# Patient Record
Sex: FEMALE | Race: WHITE | NOT HISPANIC OR LATINO | Employment: OTHER | URBAN - METROPOLITAN AREA
[De-identification: names, ages, dates, MRNs, and addresses within clinical notes are randomized per-mention and may not be internally consistent; named-entity substitution may affect disease eponyms.]

---

## 2017-02-03 PROBLEM — N39.3 STRESS INCONTINENCE OF URINE: Status: ACTIVE | Noted: 2017-02-03

## 2017-02-03 PROBLEM — N81.4 UTERINE PROLAPSE: Status: ACTIVE | Noted: 2017-02-03

## 2017-02-03 PROBLEM — N39.41 URGENCY INCONTINENCE: Status: ACTIVE | Noted: 2017-02-03

## 2019-02-21 ENCOUNTER — OFFICE VISIT (OUTPATIENT)
Dept: GASTROENTEROLOGY | Facility: CLINIC | Age: 64
End: 2019-02-21

## 2019-02-21 VITALS — DIASTOLIC BLOOD PRESSURE: 66 MMHG | SYSTOLIC BLOOD PRESSURE: 147 MMHG | HEART RATE: 72 BPM | WEIGHT: 122 LBS

## 2019-02-21 DIAGNOSIS — R19.7 DIARRHEA, UNSPECIFIED TYPE: Primary | ICD-10-CM

## 2019-02-21 PROCEDURE — 99244 OFF/OP CNSLTJ NEW/EST MOD 40: CPT | Performed by: INTERNAL MEDICINE

## 2019-02-21 RX ORDER — PROCHLORPERAZINE MALEATE 10 MG
TABLET ORAL EVERY 8 HOURS PRN
Refills: 0 | COMMUNITY
Start: 2018-12-31 | End: 2020-01-22 | Stop reason: HOSPADM

## 2019-02-21 RX ORDER — CARVEDILOL 25 MG/1
25 TABLET ORAL 2 TIMES DAILY WITH MEALS
Refills: 0 | COMMUNITY
Start: 2018-12-15

## 2019-02-21 RX ORDER — VENLAFAXINE HYDROCHLORIDE 150 MG/1
150 CAPSULE, EXTENDED RELEASE ORAL DAILY
Refills: 0 | COMMUNITY
Start: 2019-01-30 | End: 2020-01-22 | Stop reason: HOSPADM

## 2019-02-21 RX ORDER — LOSARTAN POTASSIUM 100 MG/1
100 TABLET ORAL DAILY
Refills: 0 | COMMUNITY
Start: 2019-01-29

## 2019-02-21 RX ORDER — AMLODIPINE BESYLATE 5 MG/1
TABLET ORAL
Refills: 0 | COMMUNITY
Start: 2019-02-09 | End: 2020-01-22 | Stop reason: HOSPADM

## 2019-02-21 RX ORDER — TRAZODONE HYDROCHLORIDE 100 MG/1
100 TABLET ORAL NIGHTLY
Refills: 1 | COMMUNITY
Start: 2019-01-30

## 2019-02-21 RX ORDER — SERTRALINE HYDROCHLORIDE 100 MG/1
50 TABLET, FILM COATED ORAL 2 TIMES DAILY
Refills: 0 | COMMUNITY
Start: 2019-02-09 | End: 2020-01-22 | Stop reason: HOSPADM

## 2019-02-21 RX ORDER — COLESEVELAM 180 1/1
625 TABLET ORAL 2 TIMES DAILY WITH MEALS
Refills: 0 | COMMUNITY
Start: 2019-01-29 | End: 2020-01-22 | Stop reason: HOSPADM

## 2019-02-21 RX ORDER — FENOFIBRATE 160 MG/1
160 TABLET ORAL DAILY
Refills: 0 | COMMUNITY
Start: 2019-01-29 | End: 2020-01-22 | Stop reason: HOSPADM

## 2019-02-21 RX ORDER — CYANOCOBALAMIN 1000 UG/ML
INJECTION, SOLUTION INTRAMUSCULAR; SUBCUTANEOUS
Refills: 0 | COMMUNITY
Start: 2019-02-14

## 2019-02-21 RX ORDER — DONEPEZIL HYDROCHLORIDE 5 MG/1
5 TABLET, FILM COATED ORAL NIGHTLY
Refills: 1 | COMMUNITY
Start: 2019-01-30

## 2019-02-21 RX ORDER — POTASSIUM CHLORIDE 20 MEQ/1
20 TABLET, EXTENDED RELEASE ORAL DAILY
Refills: 0 | COMMUNITY
Start: 2019-02-20 | End: 2020-01-22 | Stop reason: HOSPADM

## 2019-02-21 RX ORDER — TOPIRAMATE 50 MG/1
50 TABLET, FILM COATED ORAL DAILY
Refills: 0 | COMMUNITY
Start: 2019-01-29 | End: 2020-01-22 | Stop reason: HOSPADM

## 2019-02-22 PROBLEM — R19.7 DIARRHEA: Status: ACTIVE | Noted: 2019-02-22

## 2019-03-22 ENCOUNTER — OUTSIDE FACILITY SERVICE (OUTPATIENT)
Dept: GASTROENTEROLOGY | Facility: CLINIC | Age: 64
End: 2019-03-22

## 2019-03-22 ENCOUNTER — LAB REQUISITION (OUTPATIENT)
Dept: LAB | Facility: HOSPITAL | Age: 64
End: 2019-03-22

## 2019-03-22 DIAGNOSIS — R19.7 DIARRHEA: ICD-10-CM

## 2019-03-22 PROCEDURE — 45385 COLONOSCOPY W/LESION REMOVAL: CPT | Performed by: INTERNAL MEDICINE

## 2019-03-22 PROCEDURE — 45380 COLONOSCOPY AND BIOPSY: CPT | Performed by: INTERNAL MEDICINE

## 2019-03-22 PROCEDURE — 88305 TISSUE EXAM BY PATHOLOGIST: CPT | Performed by: INTERNAL MEDICINE

## 2019-03-25 LAB
CYTO UR: NORMAL
LAB AP CASE REPORT: NORMAL
LAB AP CLINICAL INFORMATION: NORMAL
PATH REPORT.FINAL DX SPEC: NORMAL
PATH REPORT.GROSS SPEC: NORMAL

## 2019-04-04 ENCOUNTER — OFFICE VISIT (OUTPATIENT)
Dept: GASTROENTEROLOGY | Facility: CLINIC | Age: 64
End: 2019-04-04

## 2019-04-04 ENCOUNTER — APPOINTMENT (OUTPATIENT)
Dept: LAB | Facility: HOSPITAL | Age: 64
End: 2019-04-04

## 2019-04-04 VITALS
WEIGHT: 126 LBS | DIASTOLIC BLOOD PRESSURE: 71 MMHG | HEART RATE: 58 BPM | SYSTOLIC BLOOD PRESSURE: 147 MMHG | OXYGEN SATURATION: 98 % | RESPIRATION RATE: 20 BRPM

## 2019-04-04 DIAGNOSIS — R10.9 ABDOMINAL PAIN, UNSPECIFIED ABDOMINAL LOCATION: ICD-10-CM

## 2019-04-04 DIAGNOSIS — R19.7 DIARRHEA, UNSPECIFIED TYPE: Primary | ICD-10-CM

## 2019-04-04 PROCEDURE — 36415 COLL VENOUS BLD VENIPUNCTURE: CPT | Performed by: INTERNAL MEDICINE

## 2019-04-04 PROCEDURE — 86316 IMMUNOASSAY TUMOR OTHER: CPT | Performed by: INTERNAL MEDICINE

## 2019-04-04 PROCEDURE — 99214 OFFICE O/P EST MOD 30 MIN: CPT | Performed by: INTERNAL MEDICINE

## 2019-04-04 RX ORDER — ALOSETRON HYDROCHLORIDE 0.5 MG/1
0.5 TABLET, FILM COATED ORAL 2 TIMES DAILY
Qty: 60 TABLET | Refills: 1 | Status: SHIPPED | OUTPATIENT
Start: 2019-04-04 | End: 2019-05-02

## 2019-04-05 NOTE — PROGRESS NOTES
PCP:  Philomena Walters MD     No referring provider defined for this encounter.    Chief Complaint   Patient presents with   • Follow-up     colonoscopy        HPI   The patient is known to me.  She had a tubular adenoma there was a small benign-appearing submucosal lesion distal to the valve.  She has had a recent CAT scan..  Apparently, the CAT scan was normal.  She would like to try something different for her area.    Allergies   Allergen Reactions   • Codeine           Current Outpatient Medications:   •  amLODIPine (NORVASC) 5 MG tablet, , Disp: , Rfl: 0  •  carvedilol (COREG) 25 MG tablet, , Disp: , Rfl: 0  •  colesevelam (WELCHOL) 625 MG tablet, , Disp: , Rfl: 0  •  cyanocobalamin 1000 MCG/ML injection, inject contents of 1 vial intramuscularly MONTHLY, Disp: , Rfl: 0  •  donepezil (ARICEPT) 5 MG tablet, , Disp: , Rfl: 1  •  fenofibrate 160 MG tablet, , Disp: , Rfl: 0  •  losartan (COZAAR) 100 MG tablet, , Disp: , Rfl: 0  •  metFORMIN (GLUCOPHAGE) 1000 MG tablet, , Disp: , Rfl: 0  •  potassium chloride (K-DUR,KLOR-CON) 20 MEQ CR tablet, , Disp: , Rfl: 0  •  prochlorperazine (COMPAZINE) 10 MG tablet, Take 10 mg by mouth 3 (Three) Times a Day As Needed., Disp: , Rfl: 0  •  sertraline (ZOLOFT) 100 MG tablet, take 1/2 tablet by mouth every morning then 1 tablet every evening, Disp: , Rfl: 0  •  topiramate (TOPAMAX) 50 MG tablet, , Disp: , Rfl: 0  •  traZODone (DESYREL) 100 MG tablet, , Disp: , Rfl: 1  •  venlafaxine XR (EFFEXOR-XR) 150 MG 24 hr capsule, , Disp: , Rfl: 0  •  alosetron (LOTRONEX) 0.5 MG tablet, Take 1 tablet by mouth 2 (Two) Times a Day., Disp: 60 tablet, Rfl: 1  •  Pancrelipase, Lip-Prot-Amyl, (CREON) 79260 units capsule delayed-release particles, Take 2 by mouth 30 minutes before meals and 1 before a snack, Disp: 630 capsule, Rfl: 1     Past Medical History:   Diagnosis Date   • COPD (chronic obstructive pulmonary disease) (CMS/HCC)    • Diabetes mellitus (CMS/HCC)    • History of ear injury  1973    Injury of the right eardrum.This has resulted in the headaches   • Hypertension        Past Surgical History:   Procedure Laterality Date   • BREAST CYST EXCISION     • SINUS SURGERY     • TONSILLECTOMY     • TUBAL ABDOMINAL LIGATION          Social History     Socioeconomic History   • Marital status:      Spouse name: Not on file   • Number of children: Not on file   • Years of education: Not on file   • Highest education level: Not on file   Tobacco Use   • Smoking status: Current Every Day Smoker     Packs/day: 1.00     Types: Cigarettes   • Smokeless tobacco: Never Used   Substance and Sexual Activity   • Alcohol use: No        Family History   Problem Relation Age of Onset   • Aortic aneurysm Mother    • Lung disease Father    • Parkinsonism Father    • Heart disease Other         Review of Systems     Vitals:    04/04/19 1509   BP: 147/71   Pulse: 58   Resp: 20   SpO2: 98%        Physical Exam   General Appearance: Alert, in no acute distress   Head: Normocephalic, without obvious abnormality, atraumatic   Eyes: Lids and lashes normal, conjunctivae and sclerae normal, no icterus, no pallor, corneas clear, PERRLA   Ears: Ears appear intact with no abnormalities noted   Throat: No oral lesions, no thrush, oral mucosa moist   Neck: No adenopathy, supple, trachea midline, no thyromegaly, no JVD   Lungs: Clear to auscultation,respirations regular, even and unlabored Heart: Regular rhythm and normal rate, normal S1 and S2, no murmur, no gallop, no rub, no click   Chest Wall: Symmetrical respiratory expansion   Abdomen: Normal bowel sounds, no masses, no organomegaly, soft non-tender, non-distended, no guarding, no rebound tenderness   Extremities: Moves all extremities well, no edema, no cyanosis, no redness   Skin: No bleeding, bruising or rash   Neurologic: Cranial nerves 2 - 12 grossly intact, no focal deficits       Glo was seen today for follow-up.    Diagnoses and all orders for this  visit:    Diarrhea, unspecified type    Impressions and plan #1 diarrhea: I am going to check a chromogranin A level we will try her on Lotronex 0.5 mg twice a day.  If she gets constipated she is to stop it immediately and call us.  We talked about the issues that have developed in the past with patients getting ischemic colitis.  We will be starting a low dose.  We will continue on that for a month.  If she does not have any significant improvement we may need to discontinue it.  She feels the Creon is helping to some degree.  Interestingly, it seems to be helping with her abdominal discomfort.  We will continue the Creon and call in a prescription.  We will see her back in follow-up.  She will call us with any worsening.  We may need to reevaluate her colon in 1 year a year to be sure that the small benign-appearing movable submucosal lesion is no larger.    Adan Wright MD

## 2019-04-08 LAB — CGA SERPL-SCNC: 27 NMOL/L (ref 0–5)

## 2019-05-02 RX ORDER — ALOSETRON HYDROCHLORIDE 0.5 MG/1
1 TABLET, FILM COATED ORAL 2 TIMES DAILY
Qty: 60 TABLET | Refills: 5 | Status: ON HOLD | OUTPATIENT
Start: 2019-05-02 | End: 2020-01-06

## 2020-01-06 ENCOUNTER — APPOINTMENT (OUTPATIENT)
Dept: CT IMAGING | Facility: HOSPITAL | Age: 65
End: 2020-01-06

## 2020-01-06 ENCOUNTER — HOSPITAL ENCOUNTER (INPATIENT)
Facility: HOSPITAL | Age: 65
LOS: 16 days | Discharge: NURSING, REHAB OR LONG-TERM CARE | End: 2020-01-22
Attending: INTERNAL MEDICINE | Admitting: INTERNAL MEDICINE

## 2020-01-06 ENCOUNTER — APPOINTMENT (OUTPATIENT)
Dept: GENERAL RADIOLOGY | Facility: HOSPITAL | Age: 65
End: 2020-01-06

## 2020-01-06 ENCOUNTER — APPOINTMENT (OUTPATIENT)
Dept: NEUROLOGY | Facility: HOSPITAL | Age: 65
End: 2020-01-06

## 2020-01-06 DIAGNOSIS — Z74.09 IMPAIRED MOBILITY AND ADLS: ICD-10-CM

## 2020-01-06 DIAGNOSIS — Z78.9 IMPAIRED MOBILITY AND ADLS: ICD-10-CM

## 2020-01-06 DIAGNOSIS — G93.40 ENCEPHALOPATHY ACUTE: Primary | ICD-10-CM

## 2020-01-06 DIAGNOSIS — R13.10 DYSPHAGIA, UNSPECIFIED TYPE: ICD-10-CM

## 2020-01-06 DIAGNOSIS — R41.841 COGNITIVE COMMUNICATION DEFICIT: ICD-10-CM

## 2020-01-06 PROBLEM — G40.901 STATUS EPILEPTICUS (HCC): Status: ACTIVE | Noted: 2020-01-06

## 2020-01-06 PROBLEM — K86.89 PANCREATIC INSUFFICIENCY: Chronic | Status: ACTIVE | Noted: 2020-01-06

## 2020-01-06 PROBLEM — I63.9 STROKE (CEREBRUM) (HCC): Status: ACTIVE | Noted: 2020-01-06

## 2020-01-06 PROBLEM — K86.89 PANCREATIC INSUFFICIENCY: Status: ACTIVE | Noted: 2020-01-06

## 2020-01-06 PROBLEM — F17.200 SMOKER: Status: ACTIVE | Noted: 2020-01-06

## 2020-01-06 PROBLEM — K52.9 CHRONIC DIARRHEA: Status: ACTIVE | Noted: 2020-01-06

## 2020-01-06 PROBLEM — E11.9 TYPE 2 DIABETES MELLITUS (HCC): Chronic | Status: ACTIVE | Noted: 2020-01-06

## 2020-01-06 PROBLEM — I10 HYPERTENSION: Status: ACTIVE | Noted: 2020-01-06

## 2020-01-06 PROBLEM — E11.9 TYPE 2 DIABETES MELLITUS (HCC): Status: ACTIVE | Noted: 2020-01-06

## 2020-01-06 PROBLEM — E11.40 DIABETIC NEUROPATHY (HCC): Status: ACTIVE | Noted: 2020-01-06

## 2020-01-06 PROBLEM — M48.061 LUMBAR STENOSIS: Status: ACTIVE | Noted: 2020-01-06

## 2020-01-06 PROBLEM — F32.A DEPRESSION: Status: ACTIVE | Noted: 2020-01-06

## 2020-01-06 PROBLEM — J96.00 ACUTE RESPIRATORY FAILURE (HCC): Status: ACTIVE | Noted: 2020-01-06

## 2020-01-06 PROBLEM — K52.9 CHRONIC DIARRHEA: Chronic | Status: ACTIVE | Noted: 2020-01-06

## 2020-01-06 PROBLEM — I10 HYPERTENSION: Chronic | Status: ACTIVE | Noted: 2020-01-06

## 2020-01-06 PROBLEM — F32.A DEPRESSION: Chronic | Status: ACTIVE | Noted: 2020-01-06

## 2020-01-06 PROBLEM — F03.90 DEMENTIA (HCC): Status: ACTIVE | Noted: 2020-01-06

## 2020-01-06 PROBLEM — F03.90 DEMENTIA (HCC): Chronic | Status: ACTIVE | Noted: 2020-01-06

## 2020-01-06 PROBLEM — M48.061 LUMBAR STENOSIS: Chronic | Status: ACTIVE | Noted: 2020-01-06

## 2020-01-06 PROBLEM — E11.40 DIABETIC NEUROPATHY (HCC): Chronic | Status: ACTIVE | Noted: 2020-01-06

## 2020-01-06 LAB
ALBUMIN SERPL-MCNC: 4.1 G/DL (ref 3.5–5.2)
ALBUMIN/GLOB SERPL: 1.5 G/DL
ALP SERPL-CCNC: 46 U/L (ref 39–117)
ALT SERPL W P-5'-P-CCNC: 18 U/L (ref 1–33)
ANION GAP SERPL CALCULATED.3IONS-SCNC: 15 MMOL/L (ref 5–15)
APTT PPP: 27.7 SECONDS (ref 24–37)
ARTERIAL PATENCY WRIST A: ABNORMAL
AST SERPL-CCNC: 40 U/L (ref 1–32)
ATMOSPHERIC PRESS: ABNORMAL MM[HG]
BASE EXCESS BLDA CALC-SCNC: -3.9 MMOL/L (ref 0–2)
BASOPHILS # BLD AUTO: 0.03 10*3/MM3 (ref 0–0.2)
BASOPHILS NFR BLD AUTO: 0.2 % (ref 0–1.5)
BDY SITE: ABNORMAL
BILIRUB SERPL-MCNC: 0.3 MG/DL (ref 0.2–1.2)
BODY TEMPERATURE: 37 C
BUN BLD-MCNC: 19 MG/DL (ref 8–23)
BUN/CREAT SERPL: 25.3 (ref 7–25)
CALCIUM SPEC-SCNC: 9.2 MG/DL (ref 8.6–10.5)
CHLORIDE SERPL-SCNC: 103 MMOL/L (ref 98–107)
CO2 BLDA-SCNC: 21.1 MMOL/L (ref 22–33)
CO2 SERPL-SCNC: 21 MMOL/L (ref 22–29)
COHGB MFR BLD: 1.5 % (ref 0–2)
CREAT BLD-MCNC: 0.75 MG/DL (ref 0.57–1)
D-LACTATE SERPL-SCNC: 1.3 MMOL/L (ref 0.5–2)
DEPRECATED RDW RBC AUTO: 43.9 FL (ref 37–54)
EOSINOPHIL # BLD AUTO: 0.01 10*3/MM3 (ref 0–0.4)
EOSINOPHIL NFR BLD AUTO: 0.1 % (ref 0.3–6.2)
EPAP: 0
ERYTHROCYTE [DISTWIDTH] IN BLOOD BY AUTOMATED COUNT: 14.1 % (ref 12.3–15.4)
GFR SERPL CREATININE-BSD FRML MDRD: 78 ML/MIN/1.73
GLOBULIN UR ELPH-MCNC: 2.7 GM/DL
GLUCOSE BLD-MCNC: 147 MG/DL (ref 65–99)
GLUCOSE BLDC GLUCOMTR-MCNC: 145 MG/DL (ref 70–130)
HBA1C MFR BLD: 5.6 % (ref 4.8–5.6)
HCO3 BLDA-SCNC: 20.1 MMOL/L (ref 20–26)
HCT VFR BLD AUTO: 36.9 % (ref 34–46.6)
HCT VFR BLD CALC: 34.7 %
HGB BLD-MCNC: 11.2 G/DL (ref 12–15.9)
HGB BLDA-MCNC: 11.3 G/DL (ref 14–18)
HOROWITZ INDEX BLD+IHG-RTO: 60 %
IMM GRANULOCYTES # BLD AUTO: 0.1 10*3/MM3 (ref 0–0.05)
IMM GRANULOCYTES NFR BLD AUTO: 0.6 % (ref 0–0.5)
INR PPP: 1.22 (ref 0.85–1.16)
IPAP: 0
LIPASE SERPL-CCNC: 695 U/L (ref 13–60)
LYMPHOCYTES # BLD AUTO: 1.47 10*3/MM3 (ref 0.7–3.1)
LYMPHOCYTES NFR BLD AUTO: 8.3 % (ref 19.6–45.3)
MAGNESIUM SERPL-MCNC: 1.4 MG/DL (ref 1.6–2.4)
MCH RBC QN AUTO: 26.4 PG (ref 26.6–33)
MCHC RBC AUTO-ENTMCNC: 30.4 G/DL (ref 31.5–35.7)
MCV RBC AUTO: 87 FL (ref 79–97)
METHGB BLD QL: 0.5 % (ref 0–1.5)
MODALITY: ABNORMAL
MONOCYTES # BLD AUTO: 1.14 10*3/MM3 (ref 0.1–0.9)
MONOCYTES NFR BLD AUTO: 6.4 % (ref 5–12)
NEUTROPHILS # BLD AUTO: 14.94 10*3/MM3 (ref 1.7–7)
NEUTROPHILS NFR BLD AUTO: 84.4 % (ref 42.7–76)
NOTE: ABNORMAL
NRBC BLD AUTO-RTO: 0 /100 WBC (ref 0–0.2)
NT-PROBNP SERPL-MCNC: 2061 PG/ML (ref 5–900)
OXYHGB MFR BLDV: 98.2 % (ref 94–99)
PAW @ PEAK INSP FLOW SETTING VENT: 0 CMH2O
PCO2 BLDA: 32.5 MM HG (ref 35–45)
PCO2 TEMP ADJ BLD: 32.5 MM HG (ref 35–45)
PH BLDA: 7.4 PH UNITS (ref 7.35–7.45)
PH, TEMP CORRECTED: 7.4 PH UNITS
PHOSPHATE SERPL-MCNC: 4.2 MG/DL (ref 2.5–4.5)
PLATELET # BLD AUTO: 163 10*3/MM3 (ref 140–450)
PMV BLD AUTO: 12.1 FL (ref 6–12)
PO2 BLDA: 151 MM HG (ref 83–108)
PO2 TEMP ADJ BLD: 151 MM HG (ref 83–108)
POTASSIUM BLD-SCNC: 3.4 MMOL/L (ref 3.5–5.2)
PROT SERPL-MCNC: 6.8 G/DL (ref 6–8.5)
PROTHROMBIN TIME: 14.8 SECONDS (ref 11.2–14.3)
RBC # BLD AUTO: 4.24 10*6/MM3 (ref 3.77–5.28)
SODIUM BLD-SCNC: 139 MMOL/L (ref 136–145)
TOTAL RATE: 0 BREATHS/MINUTE
TROPONIN T SERPL-MCNC: <0.01 NG/ML (ref 0–0.03)
TSH SERPL DL<=0.05 MIU/L-ACNC: 2.19 UIU/ML (ref 0.27–4.2)
WBC NRBC COR # BLD: 17.69 10*3/MM3 (ref 3.4–10.8)

## 2020-01-06 PROCEDURE — 83605 ASSAY OF LACTIC ACID: CPT | Performed by: INTERNAL MEDICINE

## 2020-01-06 PROCEDURE — 82962 GLUCOSE BLOOD TEST: CPT

## 2020-01-06 PROCEDURE — 25010000002 PROPOFOL 10 MG/ML EMULSION: Performed by: NURSE PRACTITIONER

## 2020-01-06 PROCEDURE — 71045 X-RAY EXAM CHEST 1 VIEW: CPT

## 2020-01-06 PROCEDURE — 0042T HC CT CEREBRAL PERFUSION W/WO CONTRAST: CPT

## 2020-01-06 PROCEDURE — 94799 UNLISTED PULMONARY SVC/PX: CPT

## 2020-01-06 PROCEDURE — 70450 CT HEAD/BRAIN W/O DYE: CPT

## 2020-01-06 PROCEDURE — 82805 BLOOD GASES W/O2 SATURATION: CPT

## 2020-01-06 PROCEDURE — 84443 ASSAY THYROID STIM HORMONE: CPT | Performed by: NURSE PRACTITIONER

## 2020-01-06 PROCEDURE — 70498 CT ANGIOGRAPHY NECK: CPT

## 2020-01-06 PROCEDURE — 85610 PROTHROMBIN TIME: CPT | Performed by: INTERNAL MEDICINE

## 2020-01-06 PROCEDURE — 83880 ASSAY OF NATRIURETIC PEPTIDE: CPT | Performed by: INTERNAL MEDICINE

## 2020-01-06 PROCEDURE — 80053 COMPREHEN METABOLIC PANEL: CPT | Performed by: INTERNAL MEDICINE

## 2020-01-06 PROCEDURE — 85730 THROMBOPLASTIN TIME PARTIAL: CPT | Performed by: INTERNAL MEDICINE

## 2020-01-06 PROCEDURE — 83735 ASSAY OF MAGNESIUM: CPT | Performed by: INTERNAL MEDICINE

## 2020-01-06 PROCEDURE — 99291 CRITICAL CARE FIRST HOUR: CPT | Performed by: INTERNAL MEDICINE

## 2020-01-06 PROCEDURE — 84484 ASSAY OF TROPONIN QUANT: CPT | Performed by: INTERNAL MEDICINE

## 2020-01-06 PROCEDURE — 25010000002 HEPARIN (PORCINE) PER 1000 UNITS: Performed by: NURSE PRACTITIONER

## 2020-01-06 PROCEDURE — 84100 ASSAY OF PHOSPHORUS: CPT | Performed by: INTERNAL MEDICINE

## 2020-01-06 PROCEDURE — 94640 AIRWAY INHALATION TREATMENT: CPT

## 2020-01-06 PROCEDURE — 99255 IP/OBS CONSLTJ NEW/EST HI 80: CPT | Performed by: PSYCHIATRY & NEUROLOGY

## 2020-01-06 PROCEDURE — 95819 EEG AWAKE AND ASLEEP: CPT

## 2020-01-06 PROCEDURE — 0 IOPAMIDOL PER 1 ML: Performed by: INTERNAL MEDICINE

## 2020-01-06 PROCEDURE — 94002 VENT MGMT INPAT INIT DAY: CPT

## 2020-01-06 PROCEDURE — 83690 ASSAY OF LIPASE: CPT | Performed by: INTERNAL MEDICINE

## 2020-01-06 PROCEDURE — 36600 WITHDRAWAL OF ARTERIAL BLOOD: CPT

## 2020-01-06 PROCEDURE — 85025 COMPLETE CBC W/AUTO DIFF WBC: CPT | Performed by: INTERNAL MEDICINE

## 2020-01-06 PROCEDURE — 83036 HEMOGLOBIN GLYCOSYLATED A1C: CPT | Performed by: INTERNAL MEDICINE

## 2020-01-06 PROCEDURE — 70496 CT ANGIOGRAPHY HEAD: CPT

## 2020-01-06 RX ORDER — HEPARIN SODIUM 5000 [USP'U]/ML
5000 INJECTION, SOLUTION INTRAVENOUS; SUBCUTANEOUS EVERY 8 HOURS SCHEDULED
Status: DISCONTINUED | OUTPATIENT
Start: 2020-01-06 | End: 2020-01-08

## 2020-01-06 RX ORDER — SODIUM CHLORIDE 9 MG/ML
50 INJECTION, SOLUTION INTRAVENOUS CONTINUOUS
Status: DISCONTINUED | OUTPATIENT
Start: 2020-01-06 | End: 2020-01-10

## 2020-01-06 RX ORDER — POTASSIUM CHLORIDE 1.5 G/1.77G
40 POWDER, FOR SOLUTION ORAL ONCE
Status: COMPLETED | OUTPATIENT
Start: 2020-01-06 | End: 2020-01-06

## 2020-01-06 RX ORDER — IPRATROPIUM BROMIDE AND ALBUTEROL SULFATE 2.5; .5 MG/3ML; MG/3ML
3 SOLUTION RESPIRATORY (INHALATION)
Status: DISCONTINUED | OUTPATIENT
Start: 2020-01-06 | End: 2020-01-20

## 2020-01-06 RX ORDER — FAMOTIDINE 10 MG/ML
20 INJECTION, SOLUTION INTRAVENOUS DAILY
Status: DISCONTINUED | OUTPATIENT
Start: 2020-01-06 | End: 2020-01-09

## 2020-01-06 RX ORDER — ATORVASTATIN CALCIUM 40 MG/1
80 TABLET, FILM COATED ORAL NIGHTLY
Status: DISCONTINUED | OUTPATIENT
Start: 2020-01-06 | End: 2020-01-22 | Stop reason: HOSPADM

## 2020-01-06 RX ORDER — DEXTROSE MONOHYDRATE 25 G/50ML
25 INJECTION, SOLUTION INTRAVENOUS
Status: DISCONTINUED | OUTPATIENT
Start: 2020-01-06 | End: 2020-01-07

## 2020-01-06 RX ORDER — ASPIRIN 300 MG/1
300 SUPPOSITORY RECTAL DAILY
Status: DISCONTINUED | OUTPATIENT
Start: 2020-01-06 | End: 2020-01-22 | Stop reason: HOSPADM

## 2020-01-06 RX ORDER — MAGNESIUM SULFATE HEPTAHYDRATE 40 MG/ML
4 INJECTION, SOLUTION INTRAVENOUS ONCE
Status: COMPLETED | OUTPATIENT
Start: 2020-01-06 | End: 2020-01-06

## 2020-01-06 RX ORDER — CHLORHEXIDINE GLUCONATE 0.12 MG/ML
15 RINSE ORAL EVERY 12 HOURS SCHEDULED
Status: DISCONTINUED | OUTPATIENT
Start: 2020-01-06 | End: 2020-01-08

## 2020-01-06 RX ORDER — DICYCLOMINE HCL 20 MG
20 TABLET ORAL 2 TIMES DAILY
COMMUNITY
End: 2020-01-22 | Stop reason: HOSPADM

## 2020-01-06 RX ORDER — NICOTINE POLACRILEX 4 MG
15 LOZENGE BUCCAL
Status: DISCONTINUED | OUTPATIENT
Start: 2020-01-06 | End: 2020-01-07

## 2020-01-06 RX ORDER — ASPIRIN 81 MG/1
81 TABLET, CHEWABLE ORAL DAILY
Status: DISCONTINUED | OUTPATIENT
Start: 2020-01-06 | End: 2020-01-22 | Stop reason: HOSPADM

## 2020-01-06 RX ADMIN — CHLORHEXIDINE GLUCONATE 0.12% ORAL RINSE 15 ML: 1.2 LIQUID ORAL at 20:04

## 2020-01-06 RX ADMIN — FAMOTIDINE 20 MG: 10 INJECTION, SOLUTION INTRAVENOUS at 18:30

## 2020-01-06 RX ADMIN — HEPARIN SODIUM 5000 UNITS: 5000 INJECTION, SOLUTION INTRAVENOUS; SUBCUTANEOUS at 22:25

## 2020-01-06 RX ADMIN — SODIUM CHLORIDE 50 ML/HR: 9 INJECTION, SOLUTION INTRAVENOUS at 18:31

## 2020-01-06 RX ADMIN — IOPAMIDOL 115 ML: 755 INJECTION, SOLUTION INTRAVENOUS at 17:30

## 2020-01-06 RX ADMIN — ATORVASTATIN CALCIUM 80 MG: 40 TABLET, FILM COATED ORAL at 22:25

## 2020-01-06 RX ADMIN — ASPIRIN 300 MG: 300 SUPPOSITORY RECTAL at 18:30

## 2020-01-06 RX ADMIN — MAGNESIUM SULFATE 4 G: 4 INJECTION INTRAVENOUS at 22:25

## 2020-01-06 RX ADMIN — POTASSIUM CHLORIDE 40 MEQ: 1.5 POWDER, FOR SOLUTION ORAL at 22:25

## 2020-01-06 RX ADMIN — IPRATROPIUM BROMIDE AND ALBUTEROL SULFATE 3 ML: 2.5; .5 SOLUTION RESPIRATORY (INHALATION) at 20:37

## 2020-01-06 RX ADMIN — SODIUM CHLORIDE 5 MG/HR: 9 INJECTION, SOLUTION INTRAVENOUS at 20:04

## 2020-01-06 RX ADMIN — PROPOFOL 5 MCG/KG/MIN: 10 INJECTION, EMULSION INTRAVENOUS at 22:20

## 2020-01-06 NOTE — NURSING NOTE
ACC REVIEW REPORT: [facility]        PATIENT NAME: Glo Berry    PATIENT ID: [id number]    BED: N 235    BED TYPE: ICU    BED GIVEN TO: Diane in the ED    TIME BED GIVEN: 1500    YOB: 1955    AGE: 64    GENDER: F    PREVIOUS ADMIT TO [facility]:     PREVIOUS ADMISSION DATE:     PATIENT CLASS:     TODAY'S DATE: 1/6/2020    TRANSFER DATE: 1-6-20    ETA: ~ 1545    TRANSFERRING FACILITY: [facility]    TRANSFERRING FACILITY PHONE # : [phone], ext [phone]    TRANSFERRING MD: Eyad    DATE/TIME REQUEST RECEIVED: 1-6-20@8636    [facility] RN: Courtney Santiago    REPORT FROM: Diane    TIME REPORT TAKEN: 1456    DIAGNOSIS: CVA/seizure    REASON FOR TRANSFER TO [facility]: higher level of care    TRANSPORTATION: flying    CLINICAL REASON FOR TRANSFER TO [facility]: 64 y.o. Woke up this morning with confused conversation - she went to the BR and said she didn't feel well - she fell to the floor on her side & had a seizure - per EMS pt was drawing to the left and her gaze was to the left - she would make noises but no comprehensible sounds  She arrived in the ED at 12:53 and the seizure stopped at 1317  She is intubated (medically induced coma)      CLINICAL INFORMATION    HEIGHT: estimated 5'    WEIGHT: 138#    ALLERGIES: NKA    LUNDY: 16F    INFECTIOUS DISEASE:     ISOLATION:     1ST VITAL SIGNS:   TIME: initial  B/P: >200/100  RESP:     LAST VITAL SIGNS:  TIME: 1418  TEMP: not checked  PULSE: 74  B/P: 164/93  RESP:     LAB INFORMATION: bun 21, Cr 1.07, PT 12, INR 1.2, PTT 22.4, WBC 20, 85% neutrophils, glucose 161, , alk phos 56, ast 59, alt 31, total bilirubin 50, albumin 4.1, Mg 1.4    CULTURE INFORMATION:     MEDS/IV FLUIDS: #18 x2 rt forearm  NS x 1 liter  meds given:  Ativan 4mg IV @1302  Morphine 4mg at 1312  Versed 5mg at 1313  Rocc 60mg at 1315  1000mg dilantin completed at 1409  Versed 5mg @1421  Rocc 60mg at 1426  Versed gtt at 10 at 1434      CARDIAC SYSTEM:    RHYTHM: NSR    Is patient  taking or has patient been given any drugs that could increase bleeding? no  (Plavix, Brilinta, Effient, Eliquis, Xarelto, Warfarin, Integrilin, Angiomax)      CARDIAC ENZYMES:    DATE: 1-6-20  TROP: 0.0446    CARDIAC NOTES: BP dropped to 95/67 @ 1349      RESPIRATORY SYSTEM:    LUNG SOUNDS:    ABG RESULTS: not available at time of report if done    ETT: #7    SECURED AT MEASUREMENT (CM): 24 at the lip    ON VENTILATOR:    TV: 417  FI02: 50%  RATE: 14  PEEP: 5    O2 SAT: 100%    RESPIRATORY STATUS: CT chest done - edema at josseline-hepatic...unknown significance      CNS/MUSCULOSKELETAL      WILFREDO COMA SCALE: 10    LAST KNOWN WELL: unknown      NIHSS - unable to perform    CAT SCAN RESULTS: chronic white matter changes, old left basal ganglia lacunar infarction    CNS/MUSCULOSKELETAL NOTES: pt started tearing and and raising her arms at 1418 and BP came up to 164/93 - she is not totally sedated after versed gtt started  Decerebrate posturing ceased after paralytics given      GI//GY    NG TUBE:    SIZE: 16 g. Rt nare - clamped  DATE INSERTED: 1/6/20    GI//GY NOTES: dark yellow urine per callahan    PAST MEDICAL HISTORY: HTN, afib, GERD, DM,    OTHER SYMPTOM NOTES: pt takes topamax - no hx of sz; she also takes donepezil - unknown if hx of dementia  She also takes creon    ADDITIONAL NOTES: Dr Petersen to be notified by Caterina Santiago, RN  1/6/2020  3:16 PM

## 2020-01-06 NOTE — CONSULTS
Neurology    Referring provider:   Maximiliano Muller  [address]  [address]  [place] [address]    Reason for Consultation: Altered mental status possible stroke    Chief complaint: Nonverbal    History of present illness: 64-year-old woman seen for Dr. Jacobs for evaluation of altered mental status.    The patient presented to the emergency room at [facility].  She apparently was last known well last night at 10:00 but was restless prior to going to bed.    On awakening the ED noted that she was awake on arrival but inappropriate with leftward eye gaze right-sided neglect and facial droop and questionable decorticate posturing.    Became unresponsive at 1240 and was intubated.    She apparently received multiple doses of Ativan and Versed paralytics and morphine.  She was said to get a gram of Dilantin as well.    Have records from [facility] indicate that she had a history of COPD diabetes and hypertension.    Surgical history is positive for sinus surgery tubal ligation the breast cyst excision and tonsillectomy.        Review of Systems: She is noncommunicative    Home meds:   Medications Prior to Admission   Medication Sig Dispense Refill Last Dose   • alosetron (LOTRONEX) 0.5 MG tablet Take 2 tablets by mouth 2 (Two) Times a Day. 60 tablet 5    • amLODIPine (NORVASC) 5 MG tablet   0 Taking   • carvedilol (COREG) 25 MG tablet   0 Taking   • colesevelam (WELCHOL) 625 MG tablet   0 Taking   • cyanocobalamin 1000 MCG/ML injection inject contents of 1 vial intramuscularly MONTHLY  0 Taking   • donepezil (ARICEPT) 5 MG tablet   1 Taking   • fenofibrate 160 MG tablet   0 Taking   • losartan (COZAAR) 100 MG tablet   0 Taking   • metFORMIN (GLUCOPHAGE) 1000 MG tablet   0 Taking   • Pancrelipase, Lip-Prot-Amyl, (CREON) 06469 units capsule delayed-release particles Take 2 by mouth 30 minutes before meals and 1 before a snack 630 capsule 1    • potassium chloride (K-DUR,KLOR-CON) 20 MEQ CR tablet   0 Taking    • prochlorperazine (COMPAZINE) 10 MG tablet Take 10 mg by mouth 3 (Three) Times a Day As Needed.  0 Taking   • sertraline (ZOLOFT) 100 MG tablet take 1/2 tablet by mouth every morning then 1 tablet every evening  0 Taking   • topiramate (TOPAMAX) 50 MG tablet   0 Taking   • traZODone (DESYREL) 100 MG tablet   1 Taking   • venlafaxine XR (EFFEXOR-XR) 150 MG 24 hr capsule   0 Taking       History  Past Medical History:   Diagnosis Date   • COPD (chronic obstructive pulmonary disease) (CMS/[facility])    • Diabetes mellitus (CMS/[facility])    • History of ear injury 1973    Injury of the right eardrum.This has resulted in the headaches   • Hypertension    ,   Past Surgical History:   Procedure Laterality Date   • BREAST CYST EXCISION     • SINUS SURGERY     • TONSILLECTOMY     • TUBAL ABDOMINAL LIGATION     ,   Family History   Problem Relation Age of Onset   • Aortic aneurysm Mother    • Lung disease Father    • Parkinsonism Father    • Heart disease Other    ,   Social History     Tobacco Use   • Smoking status: Current Every Day Smoker     Packs/day: 1.00     Types: Cigarettes   • Smokeless tobacco: Never Used   Substance Use Topics   • Alcohol use: No   • Drug use: Not on file    and Allergies:  Codeine,    Vital Signs   There were no vitals taken for this visit.  There is no height or weight on file to calculate BMI.    Physical Exam:   General: White female unresponsive              Head: No sign of trauma              Neck: No bruit              Resp: Ventilated              Cor: Regular rhythm.  Pressure is elevated at 179 systolic              Extremities: No edema              Skin: Warm and dry              Neuro: Unresponsive but does localize discomfort with the right arm.    Corneal reflexes are absent bilaterally.    Pupils are 3 mm and minimally reactive.    She has roving eye movements.    Face appears symmetrical.    Reflexes are 3+ right with crossed adductors and bilateral Babinski signs.    Motor testing  shows her to move her left side better than her right.    Muscle tone is increased in the right leg compared to the left.        Results Review: CT of the brain was personally reviewed and is unremarkable.        Labs:  Lab Results (last 72 hours)     ** No results found for the last 72 hours. **          Rads:  Imaging Results (Last 72 Hours)     Procedure Component Value Units Date/Time    XR Chest 1 View [[id number]] Resulted:  01/06/20 1648     Updated:  01/06/20 1648    CT Head Without Contrast [[id number]] Collected:  01/06/20 1618     Updated:  01/06/20 1645    Narrative:       EXAMINATION: CT HEAD WO CONTRAST-01/06/2020:      INDICATION: Stroke.     TECHNIQUE: CT scan of the head was performed at 5 mm intervals. No  intravenous contrast was utilized.     The radiation dose reduction device was turned on for each scan per the  ALARA (As Low as Reasonably Achievable) protocol.     COMPARISON: NONE.     FINDINGS: There are age-related periventricular white matter changes.  There is no intracranial mass, hemorrhage, midline shift or extra-axial  fluid collection.       Impression:       Age-related change. There are no acute findings.     D:  01/06/2020  E:  01/06/2020             CT Angiogram Head [[id number]] Resulted:  01/06/20 1618     Updated:  01/06/20 1634    CT Angiogram Neck [[id number]] Resulted:  01/06/20 1618     Updated:  01/06/20 1634    CT Cerebral Perfusion With & Without Contrast [[id number]] Resulted:  01/06/20 1637     Updated:  01/06/20 1634            Assessment: Stroke, acute versus remote    Altered mental status, complicated by sedation       Plan:    Continue full support and try to get a better picture of back story.        Comment:   Guarded prognosis    I discussed the patients findings and my recommendations with nursing staff and primary care team      Buzz Petersen MD  01/06/20  4:50 PM

## 2020-01-06 NOTE — H&P
INTENSIVIST   HOSPITAL VISIT NOTE     Hospital:  LOS: 0 days     Subjective   S     Ms. Glo Brery, 64 y.o. female is followed for:      Encephalopathy acute    Hypertension    T2DM       As an Intensivist, we provide an integrated approach to the ICU patient and family, medical management of comorbid conditions, including but not limited to electrolytes, glycemic control, organ dysfunction, lead interdisciplinary rounds and coordinate the care with all other services, including those from other specialists.     HPI:  Glo Berry is a 64 y.o. female being transferred via airvac to [facility] on 1/6 from [facility] with altered mentation concerning for stroke vs status epilepticus.     Her last known well was 1/5 ~10PM and family reports that she was somewhat restless prior to bed.     Upon awakening her family found her altered and took her to the OSH for further evaluation.    Per the ED physican, she was awake on arrival but inappropriate with leftward eye deviation, right-sided neglect, facial droop, and questionable decorticate posturing, ~1240 she became unresponsive and was intubated.     Reportedly she received multiple doses of Ativan and Versed, however it is difficult to discern if this was pre/post intubation. CT head negative for acute process and she was transferred to [facility] for higher level of care.    ED physician talked to the Stroke RN, who accepted the patient.    Dr. Petersen -Neurology- called me to let me know of the admission.      PMH: She  has a past medical history of COPD (chronic obstructive pulmonary disease) (CMS/[facility]), Diabetes mellitus (CMS/[facility]), History of ear injury (1973), and Hypertension.   PSxH: She  has a past surgical history that includes Sinus surgery; Tubal ligation; Breast cyst excision; and Tonsillectomy.      Medications:  No current facility-administered medications on file prior to encounter.      Current Outpatient Medications on File Prior to Encounter    Medication Sig   • alosetron (LOTRONEX) 0.5 MG tablet Take 2 tablets by mouth 2 (Two) Times a Day.   • amLODIPine (NORVASC) 5 MG tablet    • carvedilol (COREG) 25 MG tablet    • colesevelam (WELCHOL) 625 MG tablet    • cyanocobalamin 1000 MCG/ML injection inject contents of 1 vial intramuscularly MONTHLY   • donepezil (ARICEPT) 5 MG tablet    • fenofibrate 160 MG tablet    • losartan (COZAAR) 100 MG tablet    • metFORMIN (GLUCOPHAGE) 1000 MG tablet    • Pancrelipase, Lip-Prot-Amyl, (CREON) 86490 units capsule delayed-release particles Take 2 by mouth 30 minutes before meals and 1 before a snack   • potassium chloride (K-DUR,KLOR-CON) 20 MEQ CR tablet    • prochlorperazine (COMPAZINE) 10 MG tablet Take 10 mg by mouth 3 (Three) Times a Day As Needed.   • sertraline (ZOLOFT) 100 MG tablet take 1/2 tablet by mouth every morning then 1 tablet every evening   • topiramate (TOPAMAX) 50 MG tablet    • traZODone (DESYREL) 100 MG tablet    • venlafaxine XR (EFFEXOR-XR) 150 MG 24 hr capsule        Allergies: She is allergic to codeine.   FH: Her family history includes Aortic aneurysm in her mother; Heart disease in an other family member; Lung disease in her father; Parkinsonism in her father.   SH: She  reports that she has been smoking cigarettes. She has been smoking about 1.00 pack per day. She has never used smokeless tobacco. She reports that she does not drink alcohol.     ROS:   ROS cannot be reliably obtained from the patient due to her Acuity of condition, Altered Mental Status and Intubated.     Objective   O     .Vitals:  Temp:   No data recorded   BP:   No data recorded   Pulse:   No data recorded   Resp:   No data recorded   SpO2:   No data recorded   Device:      Flow Rate:   No data recorded     Medications (drips):      Mechanical Ventilation  Settings: Observed:                                          Physical Examination    Telemetry:            Constitutional:  No acute distress.   Cardiovascular:  Normal rate, regular and rhythm. Normal heart sounds.  No murmurs, gallop or rub.   Respiratory: No respiratory distress.  Normal breath sounds  No adventitious sounds.    Abdominal:  Soft with no tenderness  No distension. No HSM.   Extremities: Warm with no cyanosis.  No peripheral edema.   Neurological:   Non responsive to verbal stimuli, although she withdraws to certain stimuli.  She received ROCuronium for intubation.                   Hematology:        Chemistry:  CrCl cannot be calculated (Patient's most recent lab result is older than the maximum 30 days allowed.).                  Hepatic:        Arterial Blood Gases:        Images:  No radiology results for the last day    Echo:       Results: Reviewed.  I reviewed the patient's new laboratory and imaging results.  I independently reviewed the patient's new images.    Medications: Reviewed.    Assessment/Plan   A / P     Assessment:    64 y.o.female, admitted on (Not on file) with CVA (cerebral vascular accident) (CMS/[facility]) [I63.9]:     1. Altered mental status  1. R/O AIS  2. R/o Seizures  2. PMH Dementia  3. PMH HTN  4. PMH Pancreatic inusfficiency, chronic diarrhea.  5. Tobacco use  6. T2DM with neuropathy        No results found for: HGBA1C    Nutrition Support: Patient isn't on Tube Feeding   Modulars: Patient doesn't have any tube feeding modular orders   Diet: No diet orders on file   Advance Directives: There are no questions and answers to display.        Plan:    1. Neurology to see her. Discussed with Dr. Petersen  2. CT Stroke protocol done on admission.  3. Continue Mechanical Ventilation  4. Discussion with family re: [facility], based on her neuro diagnosis and prognosis, as well as her history of dementia.    Plan of care and goals reviewed during interdisciplinary rounds.  I discussed the patient's findings and my recommendations with nursing staff    Level of Risk is High due to:  illness with threat to life or bodily function.     Time: was  greater than 35 minutes.    (This excludes time spent performing separately reportable procedures and services).  Patient was at high risk of imminent or life-threatening deterioration in her condition due to Altered mental status and Respiratory failure.     Tristan Hull MD, FACP, FCCP, CNSC  Intensive Care Medicine, Nutrition Support and Pulmonary Medicine     [x]  Primary Attending  []  Consultant

## 2020-01-07 ENCOUNTER — APPOINTMENT (OUTPATIENT)
Dept: GENERAL RADIOLOGY | Facility: HOSPITAL | Age: 65
End: 2020-01-07

## 2020-01-07 ENCOUNTER — APPOINTMENT (OUTPATIENT)
Dept: CARDIOLOGY | Facility: HOSPITAL | Age: 65
End: 2020-01-07

## 2020-01-07 LAB
AMPHET+METHAMPHET UR QL: NEGATIVE
AMPHETAMINES UR QL: NEGATIVE
ANION GAP SERPL CALCULATED.3IONS-SCNC: 15 MMOL/L (ref 5–15)
ARTERIAL PATENCY WRIST A: ABNORMAL
ATMOSPHERIC PRESS: ABNORMAL MM[HG]
BARBITURATES UR QL SCN: POSITIVE
BASE EXCESS BLDA CALC-SCNC: -3.2 MMOL/L (ref 0–2)
BASOPHILS # BLD AUTO: 0.05 10*3/MM3 (ref 0–0.2)
BASOPHILS NFR BLD AUTO: 0.3 % (ref 0–1.5)
BDY SITE: ABNORMAL
BENZODIAZ UR QL SCN: POSITIVE
BH CV ECHO MEAS - AO MAX PG: 19 MMHG
BH CV ECHO MEAS - AO MEAN PG: 8 MMHG
BH CV ECHO MEAS - AO ROOT DIAM: 3.2 CM
BH CV ECHO MEAS - AO V2 MAX: 217 CM/SEC
BH CV ECHO MEAS - AO V2 VTI: 46.3 CM
BH CV ECHO MEAS - AVA PLANIMETRY TRACED: 1.5 CM2
BH CV ECHO MEAS - EDV(MOD-SP2): 96.3 ML
BH CV ECHO MEAS - EF(MOD-BP): 69 %
BH CV ECHO MEAS - ESV(MOD-SP2): 30.3 ML
BH CV ECHO MEAS - IVS/LVPW: 1.1 CM
BH CV ECHO MEAS - IVSD: 0.9 CM
BH CV ECHO MEAS - LA DIMENSION: 4 CM
BH CV ECHO MEAS - LAT PEAK E' VEL: 6.4 CM/SEC
BH CV ECHO MEAS - LV MAX PG: 13 MMHG
BH CV ECHO MEAS - LV MEAN PG: 5 MMHG
BH CV ECHO MEAS - LV V1 MAX: 181 CM/SEC
BH CV ECHO MEAS - LV V1 VTI: 35.1 CM
BH CV ECHO MEAS - LVIDD: 4.6 CM
BH CV ECHO MEAS - LVIDS: 2.8 CM
BH CV ECHO MEAS - LVOT AREA: 1.8 CM2
BH CV ECHO MEAS - LVOT DIAM: 1.5 CM
BH CV ECHO MEAS - LVPWD: 0.8 CM
BH CV ECHO MEAS - MED PEAK E' VEL: 4.61 CM/SEC
BH CV ECHO MEAS - MV A MAX VEL: 114 CM/SEC
BH CV ECHO MEAS - MV DEC SLOPE: 425 CM/SEC2
BH CV ECHO MEAS - MV DEC TIME: 292 MSEC
BH CV ECHO MEAS - MV E MAX VEL: 91.9 CM/SEC
BH CV ECHO MEAS - MV E/A: 0.8
BH CV ECHO MEAS - MV P1/2T: 65 MSEC
BH CV ECHO MEAS - MV V2 VTI: 34 CM
BH CV ECHO MEAS - PA ACC SLOPE: 638 CM/SEC2
BH CV ECHO MEAS - PA ACC TIME: 176 SEC
BH CV ECHO MEAS - PA V2 MAX: 123 CM/SEC
BH CV ECHO MEAS - RV MAX PG: 6 MMHG
BH CV ECHO MEAS - RV V1 VTI: 33 CM
BH CV ECHO MEAS - TAPSE (>1.6): 2.8 CM2
BH CV ECHO MEASUREMENTS AVERAGE E/E' RATIO: 16.69
BH CV XLRA - RV BASE: 3.1 CM
BH CV XLRA - RV LENGTH: 5.9 CM
BH CV XLRA - RV MID: 1.9 CM
BH CV XLRA - TDI S': 12.7 CM/SEC
BH CV XLRA MEAS LEFT CCA RATIO VEL: 85.6 CM/SEC
BH CV XLRA MEAS LEFT DIST CCA EDV: 15.7 CM/SEC
BH CV XLRA MEAS LEFT DIST CCA PSV: 83.8 CM/SEC
BH CV XLRA MEAS LEFT DIST ICA EDV: 32.4 CM/SEC
BH CV XLRA MEAS LEFT DIST ICA PSV: 177.8 CM/SEC
BH CV XLRA MEAS LEFT ICA RATIO VEL: 166 CM/SEC
BH CV XLRA MEAS LEFT ICA/CCA RATIO: 1.9
BH CV XLRA MEAS LEFT MID CCA EDV: 17.5 CM/SEC
BH CV XLRA MEAS LEFT MID CCA PSV: 86.4 CM/SEC
BH CV XLRA MEAS LEFT MID ICA EDV: 41.3 CM/SEC
BH CV XLRA MEAS LEFT MID ICA PSV: 167 CM/SEC
BH CV XLRA MEAS LEFT PROX CCA EDV: 13.1 CM/SEC
BH CV XLRA MEAS LEFT PROX CCA PSV: 82.9 CM/SEC
BH CV XLRA MEAS LEFT PROX ECA EDV: 32.3 CM/SEC
BH CV XLRA MEAS LEFT PROX ECA PSV: 220.3 CM/SEC
BH CV XLRA MEAS LEFT PROX ICA EDV: 29.5 CM/SEC
BH CV XLRA MEAS LEFT PROX ICA PSV: 140.4 CM/SEC
BH CV XLRA MEAS LEFT PROX SCLA PSV: 149 CM/SEC
BH CV XLRA MEAS LEFT VERTEBRAL A EDV: 13.4 CM/SEC
BH CV XLRA MEAS LEFT VERTEBRAL A PSV: 51.5 CM/SEC
BH CV XLRA MEAS RIGHT CCA RATIO VEL: 84.9 CM/SEC
BH CV XLRA MEAS RIGHT DIST CCA EDV: 14.9 CM/SEC
BH CV XLRA MEAS RIGHT DIST CCA PSV: 81.6 CM/SEC
BH CV XLRA MEAS RIGHT DIST ICA EDV: 20.7 CM/SEC
BH CV XLRA MEAS RIGHT DIST ICA PSV: 93 CM/SEC
BH CV XLRA MEAS RIGHT ICA RATIO VEL: 117 CM/SEC
BH CV XLRA MEAS RIGHT ICA/CCA RATIO: 1.4
BH CV XLRA MEAS RIGHT MID CCA EDV: 19.9 CM/SEC
BH CV XLRA MEAS RIGHT MID CCA PSV: 85.5 CM/SEC
BH CV XLRA MEAS RIGHT MID ICA EDV: 30.8 CM/SEC
BH CV XLRA MEAS RIGHT MID ICA PSV: 117.5 CM/SEC
BH CV XLRA MEAS RIGHT PROX CCA EDV: 14.9 CM/SEC
BH CV XLRA MEAS RIGHT PROX CCA PSV: 91 CM/SEC
BH CV XLRA MEAS RIGHT PROX ECA EDV: 20.3 CM/SEC
BH CV XLRA MEAS RIGHT PROX ECA PSV: 114.5 CM/SEC
BH CV XLRA MEAS RIGHT PROX ICA EDV: 23.3 CM/SEC
BH CV XLRA MEAS RIGHT PROX ICA PSV: 109.4 CM/SEC
BH CV XLRA MEAS RIGHT PROX SCLA PSV: 182 CM/SEC
BH CV XLRA MEAS RIGHT VERTEBRAL A EDV: 13 CM/SEC
BH CV XLRA MEAS RIGHT VERTEBRAL A PSV: 41.6 CM/SEC
BODY TEMPERATURE: 37 C
BUN BLD-MCNC: 18 MG/DL (ref 8–23)
BUN/CREAT SERPL: 24 (ref 7–25)
BUPRENORPHINE SERPL-MCNC: NEGATIVE NG/ML
CALCIUM SPEC-SCNC: 9.1 MG/DL (ref 8.6–10.5)
CANNABINOIDS SERPL QL: NEGATIVE
CHLORIDE SERPL-SCNC: 107 MMOL/L (ref 98–107)
CHOLEST SERPL-MCNC: 108 MG/DL (ref 0–200)
CO2 BLDA-SCNC: 21.8 MMOL/L (ref 22–33)
CO2 SERPL-SCNC: 20 MMOL/L (ref 22–29)
COCAINE UR QL: NEGATIVE
COHGB MFR BLD: 1.6 % (ref 0–2)
CREAT BLD-MCNC: 0.75 MG/DL (ref 0.57–1)
DEPRECATED RDW RBC AUTO: 47.7 FL (ref 37–54)
EOSINOPHIL # BLD AUTO: 0.03 10*3/MM3 (ref 0–0.4)
EOSINOPHIL NFR BLD AUTO: 0.2 % (ref 0.3–6.2)
ERYTHROCYTE [DISTWIDTH] IN BLOOD BY AUTOMATED COUNT: 14.3 % (ref 12.3–15.4)
GFR SERPL CREATININE-BSD FRML MDRD: 78 ML/MIN/1.73
GLUCOSE BLD-MCNC: 125 MG/DL (ref 65–99)
GLUCOSE BLDC GLUCOMTR-MCNC: 127 MG/DL (ref 70–130)
GLUCOSE BLDC GLUCOMTR-MCNC: 131 MG/DL (ref 70–130)
GLUCOSE BLDC GLUCOMTR-MCNC: 133 MG/DL (ref 70–130)
GLUCOSE BLDC GLUCOMTR-MCNC: 146 MG/DL (ref 70–130)
HCO3 BLDA-SCNC: 20.8 MMOL/L (ref 20–26)
HCT VFR BLD AUTO: 37.1 % (ref 34–46.6)
HCT VFR BLD CALC: 33.2 %
HDLC SERPL-MCNC: 32 MG/DL (ref 40–60)
HGB BLD-MCNC: 10.7 G/DL (ref 12–15.9)
HGB BLDA-MCNC: 10.8 G/DL (ref 14–18)
HOROWITZ INDEX BLD+IHG-RTO: 30 %
IMM GRANULOCYTES # BLD AUTO: 0.11 10*3/MM3 (ref 0–0.05)
IMM GRANULOCYTES NFR BLD AUTO: 0.6 % (ref 0–0.5)
LDLC SERPL CALC-MCNC: 30 MG/DL (ref 0–100)
LDLC/HDLC SERPL: 0.93 {RATIO}
LEFT ARM BP: NORMAL MMHG
LEFT ATRIUM VOLUME INDEX: 29.2 ML/M2
LEFT ATRIUM VOLUME: 50 CM3
LV EF 2D ECHO EST: 70 %
LYMPHOCYTES # BLD AUTO: 2.07 10*3/MM3 (ref 0.7–3.1)
LYMPHOCYTES NFR BLD AUTO: 10.9 % (ref 19.6–45.3)
MAGNESIUM SERPL-MCNC: 3.1 MG/DL (ref 1.6–2.4)
MAXIMAL PREDICTED HEART RATE: 156 BPM
MCH RBC QN AUTO: 26.7 PG (ref 26.6–33)
MCHC RBC AUTO-ENTMCNC: 28.8 G/DL (ref 31.5–35.7)
MCV RBC AUTO: 92.5 FL (ref 79–97)
METHADONE UR QL SCN: NEGATIVE
METHGB BLD QL: 0.8 % (ref 0–1.5)
MODALITY: ABNORMAL
MONOCYTES # BLD AUTO: 1.45 10*3/MM3 (ref 0.1–0.9)
MONOCYTES NFR BLD AUTO: 7.7 % (ref 5–12)
NEUTROPHILS # BLD AUTO: 15.22 10*3/MM3 (ref 1.7–7)
NEUTROPHILS NFR BLD AUTO: 80.3 % (ref 42.7–76)
NOTE: ABNORMAL
NRBC BLD AUTO-RTO: 0 /100 WBC (ref 0–0.2)
OPIATES UR QL: POSITIVE
OXYCODONE UR QL SCN: NEGATIVE
OXYHGB MFR BLDV: 95.1 % (ref 94–99)
PCO2 BLDA: 32.8 MM HG (ref 35–45)
PCO2 TEMP ADJ BLD: 32.8 MM HG (ref 35–45)
PCP UR QL SCN: POSITIVE
PH BLDA: 7.41 PH UNITS (ref 7.35–7.45)
PH, TEMP CORRECTED: 7.41 PH UNITS
PHOSPHATE SERPL-MCNC: 2.7 MG/DL (ref 2.5–4.5)
PLATELET # BLD AUTO: 167 10*3/MM3 (ref 140–450)
PMV BLD AUTO: 12 FL (ref 6–12)
PO2 BLDA: 86.2 MM HG (ref 83–108)
PO2 TEMP ADJ BLD: 86.2 MM HG (ref 83–108)
POTASSIUM BLD-SCNC: 3.3 MMOL/L (ref 3.5–5.2)
POTASSIUM BLD-SCNC: 4.5 MMOL/L (ref 3.5–5.2)
PROPOXYPH UR QL: NEGATIVE
RBC # BLD AUTO: 4.01 10*6/MM3 (ref 3.77–5.28)
SODIUM BLD-SCNC: 142 MMOL/L (ref 136–145)
STRESS TARGET HR: 133 BPM
TRICYCLICS UR QL SCN: NEGATIVE
TRIGL SERPL-MCNC: 231 MG/DL (ref 0–150)
VENTILATOR MODE: ABNORMAL
VLDLC SERPL-MCNC: 46.2 MG/DL
WBC NRBC COR # BLD: 18.93 10*3/MM3 (ref 3.4–10.8)

## 2020-01-07 PROCEDURE — 94799 UNLISTED PULMONARY SVC/PX: CPT

## 2020-01-07 PROCEDURE — 94003 VENT MGMT INPAT SUBQ DAY: CPT

## 2020-01-07 PROCEDURE — 99291 CRITICAL CARE FIRST HOUR: CPT | Performed by: INTERNAL MEDICINE

## 2020-01-07 PROCEDURE — 82805 BLOOD GASES W/O2 SATURATION: CPT

## 2020-01-07 PROCEDURE — 83735 ASSAY OF MAGNESIUM: CPT | Performed by: INTERNAL MEDICINE

## 2020-01-07 PROCEDURE — 82962 GLUCOSE BLOOD TEST: CPT

## 2020-01-07 PROCEDURE — 71045 X-RAY EXAM CHEST 1 VIEW: CPT

## 2020-01-07 PROCEDURE — 84100 ASSAY OF PHOSPHORUS: CPT | Performed by: INTERNAL MEDICINE

## 2020-01-07 PROCEDURE — 25010000002 FOSPHENYTOIN 100 MG PE/2ML SOLUTION 2 ML VIAL: Performed by: PSYCHIATRY & NEUROLOGY

## 2020-01-07 PROCEDURE — 25010000002 FENTANYL CITRATE (PF) 2500 MCG/50ML SOLUTION: Performed by: INTERNAL MEDICINE

## 2020-01-07 PROCEDURE — 85025 COMPLETE CBC W/AUTO DIFF WBC: CPT | Performed by: INTERNAL MEDICINE

## 2020-01-07 PROCEDURE — 99232 SBSQ HOSP IP/OBS MODERATE 35: CPT | Performed by: PSYCHIATRY & NEUROLOGY

## 2020-01-07 PROCEDURE — 93880 EXTRACRANIAL BILAT STUDY: CPT

## 2020-01-07 PROCEDURE — 25010000002 HEPARIN (PORCINE) PER 1000 UNITS: Performed by: NURSE PRACTITIONER

## 2020-01-07 PROCEDURE — 25010000002 PROPOFOL 10 MG/ML EMULSION: Performed by: NURSE PRACTITIONER

## 2020-01-07 PROCEDURE — 93306 TTE W/DOPPLER COMPLETE: CPT

## 2020-01-07 PROCEDURE — 36600 WITHDRAWAL OF ARTERIAL BLOOD: CPT

## 2020-01-07 PROCEDURE — 80306 DRUG TEST PRSMV INSTRMNT: CPT | Performed by: NURSE PRACTITIONER

## 2020-01-07 PROCEDURE — 80061 LIPID PANEL: CPT | Performed by: NURSE PRACTITIONER

## 2020-01-07 PROCEDURE — 80048 BASIC METABOLIC PNL TOTAL CA: CPT | Performed by: INTERNAL MEDICINE

## 2020-01-07 PROCEDURE — 84132 ASSAY OF SERUM POTASSIUM: CPT | Performed by: INTERNAL MEDICINE

## 2020-01-07 RX ORDER — POTASSIUM CHLORIDE 1.5 G/1.77G
40 POWDER, FOR SOLUTION ORAL ONCE
Status: DISCONTINUED | OUTPATIENT
Start: 2020-01-07 | End: 2020-01-07

## 2020-01-07 RX ORDER — POTASSIUM CHLORIDE 7.45 MG/ML
10 INJECTION INTRAVENOUS
Status: DISCONTINUED | OUTPATIENT
Start: 2020-01-07 | End: 2020-01-22 | Stop reason: HOSPADM

## 2020-01-07 RX ORDER — MAGNESIUM SULFATE HEPTAHYDRATE 40 MG/ML
4 INJECTION, SOLUTION INTRAVENOUS AS NEEDED
Status: DISPENSED | OUTPATIENT
Start: 2020-01-07 | End: 2020-01-21

## 2020-01-07 RX ORDER — RISPERIDONE 1 MG/ML
1 SOLUTION ORAL EVERY 12 HOURS SCHEDULED
Status: DISCONTINUED | OUTPATIENT
Start: 2020-01-07 | End: 2020-01-09

## 2020-01-07 RX ORDER — NICOTINE 21 MG/24HR
1 PATCH, TRANSDERMAL 24 HOURS TRANSDERMAL ONCE
Status: DISCONTINUED | OUTPATIENT
Start: 2020-01-07 | End: 2020-01-08

## 2020-01-07 RX ORDER — NICOTINE 21 MG/24HR
1 PATCH, TRANSDERMAL 24 HOURS TRANSDERMAL
Status: DISCONTINUED | OUTPATIENT
Start: 2020-01-08 | End: 2020-01-22 | Stop reason: HOSPADM

## 2020-01-07 RX ORDER — ATROPINE SULFATE 1 MG/ML
INJECTION, SOLUTION INTRAMUSCULAR; INTRAVENOUS; SUBCUTANEOUS
Status: DISPENSED
Start: 2020-01-07 | End: 2020-01-08

## 2020-01-07 RX ORDER — POTASSIUM CHLORIDE 1.5 G/1.77G
40 POWDER, FOR SOLUTION ORAL AS NEEDED
Status: DISCONTINUED | OUTPATIENT
Start: 2020-01-07 | End: 2020-01-22 | Stop reason: HOSPADM

## 2020-01-07 RX ADMIN — SODIUM CHLORIDE 5 MG/HR: 9 INJECTION, SOLUTION INTRAVENOUS at 12:37

## 2020-01-07 RX ADMIN — HEPARIN SODIUM 5000 UNITS: 5000 INJECTION, SOLUTION INTRAVENOUS; SUBCUTANEOUS at 15:00

## 2020-01-07 RX ADMIN — POTASSIUM CHLORIDE 40 MEQ: 1.5 POWDER, FOR SOLUTION ORAL at 12:09

## 2020-01-07 RX ADMIN — SODIUM CHLORIDE 5 MG/HR: 9 INJECTION, SOLUTION INTRAVENOUS at 02:09

## 2020-01-07 RX ADMIN — FAMOTIDINE 20 MG: 10 INJECTION, SOLUTION INTRAVENOUS at 08:44

## 2020-01-07 RX ADMIN — SODIUM CHLORIDE 5 MG/HR: 9 INJECTION, SOLUTION INTRAVENOUS at 07:14

## 2020-01-07 RX ADMIN — HEPARIN SODIUM 5000 UNITS: 5000 INJECTION, SOLUTION INTRAVENOUS; SUBCUTANEOUS at 06:09

## 2020-01-07 RX ADMIN — FOSPHENYTOIN SODIUM 100 MG PE: 50 INJECTION, SOLUTION INTRAMUSCULAR; INTRAVENOUS at 22:17

## 2020-01-07 RX ADMIN — POTASSIUM CHLORIDE 40 MEQ: 1.5 POWDER, FOR SOLUTION ORAL at 16:49

## 2020-01-07 RX ADMIN — ASPIRIN 300 MG: 300 SUPPOSITORY RECTAL at 08:39

## 2020-01-07 RX ADMIN — FOSPHENYTOIN SODIUM 100 MG PE: 50 INJECTION, SOLUTION INTRAMUSCULAR; INTRAVENOUS at 16:11

## 2020-01-07 RX ADMIN — ATORVASTATIN CALCIUM 80 MG: 40 TABLET, FILM COATED ORAL at 20:50

## 2020-01-07 RX ADMIN — NICOTINE 1 PATCH: 21 PATCH, EXTENDED RELEASE TRANSDERMAL at 22:21

## 2020-01-07 RX ADMIN — RISPERIDONE 1 MG: 1 SOLUTION ORAL at 20:51

## 2020-01-07 RX ADMIN — HEPARIN SODIUM 5000 UNITS: 5000 INJECTION, SOLUTION INTRAVENOUS; SUBCUTANEOUS at 22:17

## 2020-01-07 RX ADMIN — SODIUM CHLORIDE 50 ML/HR: 9 INJECTION, SOLUTION INTRAVENOUS at 16:16

## 2020-01-07 RX ADMIN — CHLORHEXIDINE GLUCONATE 0.12% ORAL RINSE 15 ML: 1.2 LIQUID ORAL at 20:00

## 2020-01-07 RX ADMIN — RISPERIDONE 1 MG: 1 SOLUTION ORAL at 12:09

## 2020-01-07 RX ADMIN — FENTANYL CITRATE 50 MCG/HR: 50 INJECTION, SOLUTION INTRAMUSCULAR; INTRAVENOUS at 12:25

## 2020-01-07 RX ADMIN — IPRATROPIUM BROMIDE AND ALBUTEROL SULFATE 3 ML: 2.5; .5 SOLUTION RESPIRATORY (INHALATION) at 19:12

## 2020-01-07 RX ADMIN — CHLORHEXIDINE GLUCONATE 0.12% ORAL RINSE 15 ML: 1.2 LIQUID ORAL at 08:44

## 2020-01-07 RX ADMIN — PROPOFOL 25 MCG/KG/MIN: 10 INJECTION, EMULSION INTRAVENOUS at 06:34

## 2020-01-07 RX ADMIN — DEXMEDETOMIDINE 0.2 MCG/KG/HR: 100 INJECTION, SOLUTION, CONCENTRATE INTRAVENOUS at 12:09

## 2020-01-07 RX ADMIN — IPRATROPIUM BROMIDE AND ALBUTEROL SULFATE 3 ML: 2.5; .5 SOLUTION RESPIRATORY (INHALATION) at 07:01

## 2020-01-07 RX ADMIN — IPRATROPIUM BROMIDE AND ALBUTEROL SULFATE 3 ML: 2.5; .5 SOLUTION RESPIRATORY (INHALATION) at 15:16

## 2020-01-07 RX ADMIN — IPRATROPIUM BROMIDE AND ALBUTEROL SULFATE 3 ML: 2.5; .5 SOLUTION RESPIRATORY (INHALATION) at 11:12

## 2020-01-07 NOTE — PROGRESS NOTES
INTENSIVIST   HOSPITAL VISIT NOTE     Hospital:  LOS: 1 day     Subjective   S     Ms. Glo Berry, 64 y.o. female is followed for:      Encephalopathy acute    Hypertension    T2DM       As an Intensivist, we provide an integrated approach to the ICU patient and family, medical management of comorbid conditions, including but not limited to electrolytes, glycemic control, organ dysfunction, lead interdisciplinary rounds and coordinate the care with all other services, including those from other specialists.     HPI:  Agitated during the night.  Once sedation on hold, she was awakening up and following some commands.      Objective   O     .Vitals:  Temp: 98.2 °F (36.8 °C) (01/07/20 1600) Temp  Min: 98.2 °F (36.8 °C)  Max: 99.1 °F (37.3 °C)   BP: 97/49 (01/07/20 1615) BP  Min: 93/53  Max: 192/90   Pulse: (!) 46 (01/07/20 1615) Pulse  Min: 7  Max: 92   Resp: 16 (01/07/20 1600) Resp  Min: 16  Max: 26   SpO2: 93 % (01/07/20 1615) SpO2  Min: 93 %  Max: 100 %   Device: ventilator (01/07/20 1600)    Flow Rate:   No data recorded     Medications (drips):    dexmedetomidine Last Rate: 0.3 mcg/kg/hr (01/07/20 1622)   fentanyl 10 mcg/mL Last Rate: 50 mcg/hr (01/07/20 1240)   niCARdipine Last Rate: Stopped (01/07/20 1615)   sodium chloride Last Rate: 50 mL/hr (01/07/20 1616)        Mechanical Ventilation  Settings: Observed:   Mode: VC+/AC (01/07/20 1722)    Vt (Set, L): 0.35 L (01/07/20 1722)    Resp Rate (Set): 16 (01/07/20 1722) Resp Rate (Observed) Vent: 16 (01/07/20 1722)   FiO2 (%): 30 % (01/07/20 1722)    PEEP/CPAP (cm H2O): 5 cm H20 (01/07/20 1722) Plateau Pressure (cm H2O): 8.3 cm H2O (01/07/20 1722)    Minute Ventilation (L/min) (Obs): 5.81 L/min (01/07/20 1722)    I:E Ratio (Obs): 1:3.90 (01/07/20 1722)     Physical Examination    Telemetry:  Rhythm: normal sinus rhythm (01/07/20 1600)         Constitutional:  No acute distress.   Cardiovascular: Normal rate, regular and rhythm. Normal heart sounds.  No murmurs,  gallop or rub.   Respiratory: No respiratory distress.  Normal breath sounds  No adventitious sounds.    Abdominal:  Soft with no tenderness  No distension. No HSM.   Extremities: Warm with no cyanosis.  No peripheral edema.   Neurological:   Sedated  Best Eye Response: 4-->(E4) spontaneous (01/07/20 1600)  Best Motor Response: 5-->(M5) localizes pain (01/07/20 1600)  Best Verbal Response: 1-->(V1) none (01/07/20 1600)  [place] Coma Scale Score: 10 (01/07/20 1600)       Hematology:  Results from last 7 days   Lab Units 01/07/20  0333 01/06/20  1821   WBC 10*3/mm3 18.93* 17.69*   HEMOGLOBIN g/dL 10.7* 11.2*   MCV fL 92.5 87.0   PLATELETS 10*3/mm3 167 163       Chemistry:  Estimated Creatinine Clearance: 71.1 mL/min (by C-G formula based on SCr of 0.75 mg/dL).    Results from last 7 days   Lab Units 01/07/20  0333 01/06/20  1821   SODIUM mmol/L 142 139   POTASSIUM mmol/L 3.3* 3.4*   CHLORIDE mmol/L 107 103   CO2 mmol/L 20.0* 21.0*   ANION GAP mmol/L 15.0 15.0   GLUCOSE mg/dL 125* 147*   CALCIUM mg/dL 9.1 9.2     Results from last 7 days   Lab Units 01/07/20  0333 01/06/20  1821   BUN mg/dL 18 19   CREATININE mg/dL 0.75 0.75     Results from last 7 days   Lab Units 01/07/20  0333 01/06/20  1821   MAGNESIUM mg/dL 3.1* 1.4*   PHOSPHORUS mg/dL 2.7 4.2       Hepatic:  Results from last 7 days   Lab Units 01/06/20  1821   ALK PHOS U/L 46   BILIRUBIN mg/dL 0.3   ALT (SGPT) U/L 18   AST (SGOT) U/L 40*   ALBUMIN g/dL 4.10       Arterial Blood Gases:  Results from last 7 days   Lab Units 01/07/20  0413 01/06/20  1741   PH, ARTERIAL pH units 7.410 7.400   PCO2, ARTERIAL mm Hg 32.8* 32.5*   PO2 ART mm Hg 86.2 151.0*   FIO2 % 30 60     Lab Results   Lab Value Date/Time    THCURSCR Negative 01/07/2020 0507    PCPUR Positive (A) 01/07/2020 0507    COCAINEUR Negative 01/07/2020 0507    METAMPSCNUR Negative 01/07/2020 0507    LABOPIASCN Positive (A) 01/07/2020 0507    AMPHETSCREEN Negative 01/07/2020 0507    LABBENZSCN Positive (A)  01/07/2020 0507    TRICYCLICSCN Negative 01/07/2020 0507    LABMETHSCN Negative 01/07/2020 0507    BARBITSCNUR Positive (A) 01/07/2020 0507    OXYCODONESCN Negative 01/07/2020 0507       Images:  Ct Angiogram Head    Result Date: 1/7/2020  Normal intracranial CT angiogram.  D:  01/06/2020 E:  01/07/2020   This report was finalized on 1/7/2020 9:48 AM by Dr. Noe Duggan MD.      Ct Head Without Contrast    Result Date: 1/6/2020  Age-related change. There are no acute findings.  D:  01/06/2020 E:  01/06/2020    This report was finalized on 1/6/2020 5:04 PM by Dr. Noe Duggan MD.      Ct Angiogram Neck    Result Date: 1/7/2020  There are bilateral calcifications involving the carotid bifurcations producing only mild narrowing of the internal carotid arteries (approximately 30% bilaterally). Both vertebral arteries are normal as is the basilar artery.  D:  01/06/2020 E:  01/07/2020  This report was finalized on 1/7/2020 9:48 AM by Dr. Noe Duggan MD.      Xr Chest 1 View    Result Date: 1/7/2020  Interval placement of esophagogastric tube coursing below the diaphragm and out of the field-of-view with endotracheal tube remaining in appropriate position above the level of the italo. No new consolidation or overt edema. No pleural effusion.  D:  01/07/2020 E:  01/07/2020  This report was finalized on 1/7/2020 4:30 PM by Dr. Sean Gonzalez.      Xr Chest 1 View    Result Date: 1/7/2020  Endotracheal tube is well positioned. There are no acute cardiopulmonary findings.  D:  01/06/2020 E:  01/07/2020  This report was finalized on 1/7/2020 9:47 AM by Dr. Noe Duggan MD.      Ct Cerebral Perfusion With & Without Contrast    Result Date: 1/7/2020  No perfusion abnormality to suggest evidence of reversible ischemia.  D:  01/06/2020 E:  01/07/2020            Results: Reviewed.  I reviewed the patient's new laboratory and imaging results.  I independently reviewed the patient's new images.    Medications:  Reviewed.    Assessment/Plan   A / P     Assessment:    64 y.o.female, admitted on 1/6/2020 with CVA (cerebral vascular accident) (CMS/[facility]) [I63.9]  Encephalopathy [G93.40]:     1. Respiratory failure  1. Intubated 01/06/20  2. Mechanical Ventilation  2. Altered mental status  1. Negative w/u for CVA.  2. Probably PRES as per Neurology  3. PMH Dementia  4. PMH HTN  5. PMH Pancreatic insufficiency, chronic diarrhea.  6. Tobacco use  7. UDS: PCP (+), R/o false positive as she is on  8. T2DM with neuropathy    Results from last 7 days   Lab Units 01/07/20  1726 01/07/20  1141 01/07/20  0616 01/06/20  2357 01/06/20  1754   GLUCOSE mg/dL 127 133* 146* 131* 145*     Lab Results   Lab Value Date/Time    HGBA1C 5.60 01/06/2020 1821       Nutrition Support: Patient isn't on Tube Feeding   Modulars: Patient doesn't have any tube feeding modular orders   Diet: NPO Diet   Advance Directives: Code Status and Medical Interventions:   Ordered at: 01/06/20 1647     Code Status:    CPR     Medical Interventions (Level of Support Prior to Arrest):    Full        Plan:    1. Discussed with family  2. Risperdal  3. Replace K  4. BP control  5. Goal: Glucose < 180 mg/dL.   6. Eventually MRI Brain  7. Discussed with Dr. Petersen.  8. Disposition: Keep in ICU.     Plan of care and goals reviewed during interdisciplinary rounds.  I discussed the patient's findings and my recommendations with nursing staff    Level of Risk is High due to:  illness with threat to life or bodily function.     Time: was greater than 35 minutes.    (This excludes time spent performing separately reportable procedures and services).  Patient was at high risk of imminent or life-threatening deterioration in her condition due to Altered mental status and Respiratory failure.     Tristan Hull MD, FACP, FCCP, CNSC  Intensive Care Medicine, Nutrition Support and Pulmonary Medicine     [x]  Primary Attending  []  Consultant

## 2020-01-07 NOTE — PROGRESS NOTES
"Neurology       Patient Care Team:  Philomena Walters MD as PCP - General (Internal Medicine)    Chief complaint: Altered mental status    History: The patient's family is here and is able to give a fairly clear picture of the situation.    The patient was at home and having some abdominal discomfort.  She does have irritable bowel syndrome.    This was said to be somewhat worse than usual.    She has been not taking her blood pressure medications and had a blood pressure that was so high that they could not get it recorded on the machine that they have at home.    Patient became confused dysarthric and at the hospital developed focal jerking of her right arm which led to a generalized convulsion which lasted about 10 minutes.    She was subsequently sedated and intubated.    This morning she is been following commands is a wean her from the ventilator.      Past Medical History:   Diagnosis Date   • COPD (chronic obstructive pulmonary disease) (CMS/[facility])    • Diabetes mellitus (CMS/[facility])    • History of ear injury 1973    Injury of the right eardrum.This has resulted in the headaches   • Hypertension        Vital Signs   Vitals:    01/07/20 1112 01/07/20 1200 01/07/20 1300 01/07/20 1311   BP:  155/75 138/76    BP Location:  Left arm     Patient Position:  Lying     Pulse: 76 81 64    Resp:  20     Temp:       TempSrc:       SpO2:  96% 94%    Weight:    66.5 kg (146 lb 9.7 oz)   Height:    162.6 cm (64.02\")       Physical Exam:   General: Sedated              Neuro: Arouses to open her eyes and look at me but not follow commands.    Pupils are equal.    The eyes are conjugate.    Muscle tone is normal and reflexes are symmetrical bilaterally.        Results Review:  CT perfusion scan was unremarkable.    CT angiogram showed bilateral calcifications involving the carotid bifurcations but only mild narrowing bilaterally.      Results from last 7 days   Lab Units 01/07/20  0333   WBC 10*3/mm3 18.93*   HEMOGLOBIN g/dL " 10.7*   HEMATOCRIT % 37.1   PLATELETS 10*3/mm3 167     Results from last 7 days   Lab Units 01/07/20  0333 01/06/20  1821   SODIUM mmol/L 142 139   POTASSIUM mmol/L 3.3* 3.4*   CHLORIDE mmol/L 107 103   CO2 mmol/L 20.0* 21.0*   BUN mg/dL 18 19   CREATININE mg/dL 0.75 0.75   CALCIUM mg/dL 9.1 9.2   BILIRUBIN mg/dL  --  0.3   ALK PHOS U/L  --  46   ALT (SGPT) U/L  --  18   AST (SGOT) U/L  --  40*   GLUCOSE mg/dL 125* 147*       Imaging Results (Last 24 Hours)     Procedure Component Value Units Date/Time    CT Angiogram Neck [[id number]] Collected:  01/06/20 1652     Updated:  01/07/20 0951    Narrative:       EXAMINATION: CT ANGIOGRAM NECK- 01/06/2020     INDICATION: Stroke     TECHNIQUE: CT angiogram of the neck was performed following intravenous  contrast. Images are displayed in the axial, sagittal and coronal  projections (3-D).     The radiation dose reduction device was turned on for each scan per the  ALARA (As Low as Reasonably Achievable) protocol.     COMPARISON: NONE     FINDINGS: Both common carotid arteries are normal. There is mild  calcification in both carotid bifurcations with tortuous internal  carotid arteries but without evidence of high-grade stenosis. Both  vertebral arteries are patent as is the basilar artery.       Impression:       There are bilateral calcifications involving the carotid  bifurcations producing only mild narrowing of the internal carotid  arteries (approximately 30% bilaterally). Both vertebral arteries are  normal as is the basilar artery.     D:  01/06/2020  E:  01/07/2020     This report was finalized on 1/7/2020 9:48 AM by Dr. Noe Duggan MD.       CT Angiogram Head [[id number]] Collected:  01/06/20 1656     Updated:  01/07/20 0951    Narrative:       EXAMINATION: CT ANGIOGRAM HEAD- 01/06/2020     INDICATION: Stroke     TECHNIQUE: Intracranial CTA was performed prior to and following  intravenous contrast.     The radiation dose reduction device was turned on for each  scan per the  ALARA (As Low as Reasonably Achievable) protocol.     COMPARISON: NONE     FINDINGS: The intracranial portion of the internal carotid arteries is  normal. The anterior cerebral arteries are patent with no stenosis,  occlusion or aneurysm. Likewise the middle cerebral arteries demonstrate  no large vessel occlusion. The basilar artery is normal.  The superior  cerebellar and posterior cerebral arteries are normal.       Impression:       Normal intracranial CT angiogram.     D:  01/06/2020  E:  01/07/2020        This report was finalized on 1/7/2020 9:48 AM by Dr. Noe Duggan MD.       XR Chest 1 View [[id number]] Collected:  01/06/20 1714     Updated:  01/07/20 0950    Narrative:       EXAMINATION: XR CHEST 1 VW-      INDICATION: Confirm ET tube placement.      COMPARISON: 08/04/2014.     FINDINGS: The endotracheal tube is well positioned. The heart is at the  upper limits of normal. The heart is compensated. There are chronic  pulmonary findings.           Impression:       Endotracheal tube is well positioned. There are no acute  cardiopulmonary findings.     D:  01/06/2020  E:  01/07/2020     This report was finalized on 1/7/2020 9:47 AM by Dr. Noe Duggan MD.       XR Chest 1 View [[id number]] Collected:  01/07/20 0758     Updated:  01/07/20 0935    Narrative:       EXAMINATION: XR CHEST 1 VW-      INDICATION: Intubated patient.      COMPARISON: 01/06/2020.     FINDINGS: Cardiac size borderline enlarged and unchanged with  endotracheal tube terminating above the level of the italo.  Esophagogastric tube has been placed in the interim coursing below the  diaphragm and out of the field-of-view. No pneumothorax or pleural  effusion.  No overt edema or focal consolidation.           Impression:       Interval placement of esophagogastric tube coursing below  the diaphragm and out of the field-of-view with endotracheal tube  remaining in appropriate position above the level of the italo. No  new  consolidation or overt edema. No pleural effusion.     D:  01/07/2020  E:  01/07/2020       CT Cerebral Perfusion With & Without Contrast [[id number]] Collected:  01/06/20 1638     Updated:  01/07/20 0859    Narrative:       EXAMINATION: CT CEREBRAL PERFUSION W WO CONTRAST-      INDICATION: TIA, initial screening, stroke symptoms.     TECHNIQUE: Cerebral perfusion analysis was performed using computed  tomography with contrast administration, including post processing of  parametric maps with determination of cerebral blood flow, cerebral  blood volume, and mean transit time.     The radiation dose reduction device was turned on for each scan per the  ALARA (As Low as Reasonably Achievable) protocol.     COMPARISON: None.     FINDINGS: CEREBRAL BLOOD VOLUME: There is normal symmetrical cerebral  blood volume on perfusion maps.     CEREBRAL BLOOD FLOW: There is normal symmetrical cerebral blood flow on  perfusion maps.     TIME TO PEAK: There is normal symmetrical time to peak on perfusion  maps.     MEAN TRANSIT TIME: Normal symmetric appearance of mean transient time  perfusion map.     PERFUSION ANALYSIS: Normal cerebral perfusion.       Impression:       No perfusion abnormality to suggest evidence of reversible  ischemia.     D:  01/06/2020  E:  01/07/2020                         CT Head Without Contrast [[id number]] Collected:  01/06/20 1618     Updated:  01/06/20 1707    Narrative:       EXAMINATION: CT HEAD WO CONTRAST-01/06/2020:      INDICATION: Stroke.     TECHNIQUE: CT scan of the head was performed at 5 mm intervals. No  intravenous contrast was utilized.     The radiation dose reduction device was turned on for each scan per the  ALARA (As Low as Reasonably Achievable) protocol.     COMPARISON: NONE.     FINDINGS: There are age-related periventricular white matter changes.  There is no intracranial mass, hemorrhage, midline shift or extra-axial  fluid collection.       Impression:       Age-related  change. There are no acute findings.     D:  01/06/2020  E:  01/06/2020           This report was finalized on 1/6/2020 5:04 PM by Dr. Noe Duggan MD.             Assessment:  Malignant hypertension complicated by focal seizures and confusion    Plan:  Continue wean.    Dilantin 100 mg 3 times daily in the short run.    MRI brain.  Is probably best done tomorrow        Comment:  This is currently compatible with a malignant hypertension although with the seizures one would be concerned about PRES.         I discussed the patients findings and my recommendations with family and primary care team    Buzz Petersen MD  01/07/20  2:15 PM

## 2020-01-07 NOTE — CONSULTS
Order noted for diabetes education. Current A1c 5.6%. Inappropriate for education at this time.Please re consult if needed. Thank you.

## 2020-01-07 NOTE — PROGRESS NOTES
Discharge Planning Assessment  [facility]     Patient Name: Glo Berry  MRN: [id number]  Today's Date: 1/7/2020    Admit Date: 1/6/2020    Discharge Needs Assessment     Row Name 01/07/20 1103       Living Environment    Lives With  spouse    Name(s) of Who Lives With Patient  Lives c her spouse in 1 level home in [place], [place].  Her spouse is disabled and she is the primary caregiver.    Current Living Arrangements  home/apartment/condo    Primary Care Provided by  self    Provides Primary Care For  spouse    Family Caregiver if Needed  spouse;child(mya), adult    Quality of Family Relationships  supportive    Able to Return to Prior Arrangements  yes       Resource/Environmental Concerns    Resource/Environmental Concerns  none    Transportation Concerns  car, none       Transition Planning    Transportation Anticipated  family or friend will provide       Discharge Needs Assessment    Readmission Within the Last 30 Days  no previous admission in last 30 days    Concerns to be Addressed  adjustment to diagnosis/illness;basic needs    Equipment Currently Used at Home  bath bench;grab bar        Discharge Plan     Row Name 01/07/20 1101       Plan    Plan  To be determined.    Plan Comments  Mrs. Berry is sedated and intubated on [facility] vent.  Talked to her daughter at .  She lives c her disabled spouse in 1 level home in [place]  She is independent c ADL's.  She cares for her spouse at home.  They have a bath chair at home, but she does not use other DME.  Her PCP is Dr. Philomena Walters.  She gets her Rx filled at [facility] in [place], [place]  Her insurance covers most of cost of her medications.  She is able to pay the copays.  She is critically ill at present.  CM will follor for upcoming needs.          Destination      Coordination has not been started for this encounter.      Durable Medical Equipment      Coordination has not been started for this encounter.      Dialysis/Infusion      Coordination  has not been started for this encounter.      Home Medical Care      Coordination has not been started for this encounter.      Therapy      Coordination has not been started for this encounter.      Community Resources      Coordination has not been started for this encounter.          Demographic Summary    No documentation.       Functional Status     Row Name 01/07/20 1102       Functional Status    Usual Activity Tolerance  good    Current Activity Tolerance  other (see comments)       Functional Status, IADL    Medications  independent    Meal Preparation  independent    Housekeeping  independent    Laundry  independent    Shopping  independent       Mental Status Summary    Recent Changes in Mental Status/Cognitive Functioning  unable to assess       Employment/    Employment Status  retired        Psychosocial    No documentation.       Abuse/Neglect    No documentation.       Legal    No documentation.       Substance Abuse    No documentation.       Patient Forms    No documentation.           Jane Bowers RN

## 2020-01-07 NOTE — PLAN OF CARE
Propofol changed to fentanyl and precedex. Patient resting comfortably. Risperdal started in hopes of calming patient down enough to wean from sedation and vent. Sinus mary while on precedex. Incontinent. Purewick in place plus a brief. Potassium replaced. Recheck is at 2100.

## 2020-01-07 NOTE — SIGNIFICANT NOTE
01/07/20 0830   SLP Deferred Reason   SLP Deferred Reason Patient unavailable for evaluation  (Consult received for cognitive-communication eval. Noted pt intubated. Will place on hold.)

## 2020-01-07 NOTE — PLAN OF CARE
Problem: Patient Care Overview  Goal: Plan of Care Review  1/6/2020 2017 by Annalise Ureña RN  Outcome: Ongoing (interventions implemented as appropriate)   Patient arrived around 1607 today via helicopter. She was taken immediately to CT scan. After all scans completed, patient was taken to ICU. Restraints were used as she was starting to localize with rt hand/arm. Patient will open eyes to stimulus, does not track. Has a cough and gag, weak corneal. Patient moves to the right side. Appears to move left side less than right side. She has not attempted to communicate. NG is coiled in throat, nightshift RN said she will fix this. Isabelle area reddened. Skin cream applied to site. EEG was contacted by the CN to come hook patient up to EEG monitor.

## 2020-01-07 NOTE — PLAN OF CARE
Problem: Patient Care Overview  Goal: Plan of Care Review  Flowsheets (Taken 1/7/2020 7951)  Progress: improving  Plan of Care Reviewed With: patient  Outcome Summary: Patient NIH 23. Does not follow commands, does move all extremities (R>L), localizing, opens eyes to stimulation. EEG done. Started propofol due to increased agitation, patient now sedated on vent. FiO2 @30%. Cardene started to maintain SBP <180. NG replaced. UOP adequate. DIL at bedside. Mag and K replaced.  said to pass on family hx of aneurysm (mother, paternal aunt, and maternal uncle). VSS. ROBRETM.

## 2020-01-07 NOTE — PLAN OF CARE
Problem: Stroke (Ischemic) (Adult)  Goal: Signs and Symptoms of Listed Potential Problems Will be Absent, Minimized or Managed (Stroke)  Outcome: Ongoing (interventions implemented as appropriate)  Flowsheets (Taken 1/6/2020 2015)  Problems Assessed (Stroke (Ischemic)): cognitive impairment; communication impairment; eating/swallowing impairment; motor/sensory impairment; skin breakdown

## 2020-01-08 ENCOUNTER — APPOINTMENT (OUTPATIENT)
Dept: GENERAL RADIOLOGY | Facility: HOSPITAL | Age: 65
End: 2020-01-08

## 2020-01-08 LAB
ANION GAP SERPL CALCULATED.3IONS-SCNC: 13 MMOL/L (ref 5–15)
ARTERIAL PATENCY WRIST A: ABNORMAL
ATMOSPHERIC PRESS: ABNORMAL MM[HG]
BASE EXCESS BLDA CALC-SCNC: -5.7 MMOL/L (ref 0–2)
BASE EXCESS BLDA CALC-SCNC: -6.6 MMOL/L (ref 0–2)
BASE EXCESS BLDA CALC-SCNC: -6.8 MMOL/L (ref 0–2)
BASOPHILS # BLD AUTO: 0.03 10*3/MM3 (ref 0–0.2)
BASOPHILS NFR BLD AUTO: 0.3 % (ref 0–1.5)
BDY SITE: ABNORMAL
BODY TEMPERATURE: 37 C
BUN BLD-MCNC: 17 MG/DL (ref 8–23)
BUN/CREAT SERPL: 31.5 (ref 7–25)
CALCIUM SPEC-SCNC: 8.6 MG/DL (ref 8.6–10.5)
CHLORIDE SERPL-SCNC: 112 MMOL/L (ref 98–107)
CO2 BLDA-SCNC: 18.5 MMOL/L (ref 22–33)
CO2 BLDA-SCNC: 19.8 MMOL/L (ref 22–33)
CO2 BLDA-SCNC: 20.6 MMOL/L (ref 22–33)
CO2 SERPL-SCNC: 18 MMOL/L (ref 22–29)
COHGB MFR BLD: 1.5 % (ref 0–2)
COHGB MFR BLD: 1.8 % (ref 0–2)
COHGB MFR BLD: 1.9 % (ref 0–2)
CREAT BLD-MCNC: 0.54 MG/DL (ref 0.57–1)
DEPRECATED RDW RBC AUTO: 47.3 FL (ref 37–54)
EOSINOPHIL # BLD AUTO: 0.01 10*3/MM3 (ref 0–0.4)
EOSINOPHIL NFR BLD AUTO: 0.1 % (ref 0.3–6.2)
ERYTHROCYTE [DISTWIDTH] IN BLOOD BY AUTOMATED COUNT: 14.4 % (ref 12.3–15.4)
GFR SERPL CREATININE-BSD FRML MDRD: 114 ML/MIN/1.73
GLUCOSE BLD-MCNC: 98 MG/DL (ref 65–99)
GLUCOSE BLDC GLUCOMTR-MCNC: 117 MG/DL (ref 70–130)
GLUCOSE BLDC GLUCOMTR-MCNC: 122 MG/DL (ref 70–130)
GLUCOSE BLDC GLUCOMTR-MCNC: 130 MG/DL (ref 70–130)
GLUCOSE BLDC GLUCOMTR-MCNC: 141 MG/DL (ref 70–130)
HCO3 BLDA-SCNC: 17.6 MMOL/L (ref 20–26)
HCO3 BLDA-SCNC: 18.7 MMOL/L (ref 20–26)
HCO3 BLDA-SCNC: 19.5 MMOL/L (ref 20–26)
HCT VFR BLD AUTO: 29 % (ref 34–46.6)
HCT VFR BLD CALC: 25.1 %
HCT VFR BLD CALC: 26.5 %
HCT VFR BLD CALC: 29.1 %
HGB BLD-MCNC: 8.5 G/DL (ref 12–15.9)
HGB BLDA-MCNC: 8.2 G/DL (ref 14–18)
HGB BLDA-MCNC: 8.7 G/DL (ref 14–18)
HGB BLDA-MCNC: 9.5 G/DL (ref 14–18)
HOROWITZ INDEX BLD+IHG-RTO: 28 %
HOROWITZ INDEX BLD+IHG-RTO: 30 %
HOROWITZ INDEX BLD+IHG-RTO: 36 %
IMM GRANULOCYTES # BLD AUTO: 0.06 10*3/MM3 (ref 0–0.05)
IMM GRANULOCYTES NFR BLD AUTO: 0.6 % (ref 0–0.5)
LYMPHOCYTES # BLD AUTO: 1.27 10*3/MM3 (ref 0.7–3.1)
LYMPHOCYTES NFR BLD AUTO: 11.8 % (ref 19.6–45.3)
MAGNESIUM SERPL-MCNC: 1.8 MG/DL (ref 1.6–2.4)
MCH RBC QN AUTO: 26.7 PG (ref 26.6–33)
MCHC RBC AUTO-ENTMCNC: 29.3 G/DL (ref 31.5–35.7)
MCV RBC AUTO: 91.2 FL (ref 79–97)
METHGB BLD QL: 0.6 % (ref 0–1.5)
METHGB BLD QL: 0.8 % (ref 0–1.5)
METHGB BLD QL: ABNORMAL
MODALITY: ABNORMAL
MONOCYTES # BLD AUTO: 0.86 10*3/MM3 (ref 0.1–0.9)
MONOCYTES NFR BLD AUTO: 8 % (ref 5–12)
NEUTROPHILS # BLD AUTO: 8.54 10*3/MM3 (ref 1.7–7)
NEUTROPHILS NFR BLD AUTO: 79.2 % (ref 42.7–76)
NOTE: ABNORMAL
NRBC BLD AUTO-RTO: 0 /100 WBC (ref 0–0.2)
OXYHGB MFR BLDV: 88.3 % (ref 94–99)
OXYHGB MFR BLDV: 91.1 % (ref 94–99)
OXYHGB MFR BLDV: 92.2 % (ref 94–99)
PCO2 BLDA: 30.5 MM HG (ref 35–45)
PCO2 BLDA: 35.4 MM HG (ref 35–45)
PCO2 BLDA: 36.4 MM HG (ref 35–45)
PCO2 TEMP ADJ BLD: 30.5 MM HG (ref 35–45)
PCO2 TEMP ADJ BLD: 35.4 MM HG (ref 35–45)
PCO2 TEMP ADJ BLD: 36.4 MM HG (ref 35–45)
PH BLDA: 7.33 PH UNITS (ref 7.35–7.45)
PH BLDA: 7.34 PH UNITS (ref 7.35–7.45)
PH BLDA: 7.37 PH UNITS (ref 7.35–7.45)
PH, TEMP CORRECTED: 7.33 PH UNITS
PH, TEMP CORRECTED: 7.34 PH UNITS
PH, TEMP CORRECTED: 7.37 PH UNITS
PHOSPHATE SERPL-MCNC: 2.7 MG/DL (ref 2.5–4.5)
PLATELET # BLD AUTO: 80 10*3/MM3 (ref 140–450)
PLATELET # BLD AUTO: 96 10*3/MM3 (ref 140–450)
PMV BLD AUTO: 12.2 FL (ref 6–12)
PO2 BLDA: 58.5 MM HG (ref 83–108)
PO2 BLDA: 62.5 MM HG (ref 83–108)
PO2 BLDA: 68.2 MM HG (ref 83–108)
PO2 TEMP ADJ BLD: 58.5 MM HG (ref 83–108)
PO2 TEMP ADJ BLD: 62.5 MM HG (ref 83–108)
PO2 TEMP ADJ BLD: 68.2 MM HG (ref 83–108)
POTASSIUM BLD-SCNC: 4.2 MMOL/L (ref 3.5–5.2)
RBC # BLD AUTO: 3.18 10*6/MM3 (ref 3.77–5.28)
SODIUM BLD-SCNC: 143 MMOL/L (ref 136–145)
VENTILATOR MODE: ABNORMAL
WBC NRBC COR # BLD: 10.77 10*3/MM3 (ref 3.4–10.8)

## 2020-01-08 PROCEDURE — 80048 BASIC METABOLIC PNL TOTAL CA: CPT | Performed by: INTERNAL MEDICINE

## 2020-01-08 PROCEDURE — 84100 ASSAY OF PHOSPHORUS: CPT | Performed by: INTERNAL MEDICINE

## 2020-01-08 PROCEDURE — 25010000002 HEPARIN (PORCINE) PER 1000 UNITS: Performed by: NURSE PRACTITIONER

## 2020-01-08 PROCEDURE — 86022 PLATELET ANTIBODIES: CPT | Performed by: INTERNAL MEDICINE

## 2020-01-08 PROCEDURE — 25010000002 LORAZEPAM PER 2 MG: Performed by: INTERNAL MEDICINE

## 2020-01-08 PROCEDURE — 94799 UNLISTED PULMONARY SVC/PX: CPT

## 2020-01-08 PROCEDURE — 94770: CPT

## 2020-01-08 PROCEDURE — 25010000002 FOSPHENYTOIN 100 MG PE/2ML SOLUTION 2 ML VIAL: Performed by: PSYCHIATRY & NEUROLOGY

## 2020-01-08 PROCEDURE — 25010000002 HALOPERIDOL LACTATE PER 5 MG: Performed by: INTERNAL MEDICINE

## 2020-01-08 PROCEDURE — 82962 GLUCOSE BLOOD TEST: CPT

## 2020-01-08 PROCEDURE — 71045 X-RAY EXAM CHEST 1 VIEW: CPT

## 2020-01-08 PROCEDURE — 99291 CRITICAL CARE FIRST HOUR: CPT | Performed by: INTERNAL MEDICINE

## 2020-01-08 PROCEDURE — 99231 SBSQ HOSP IP/OBS SF/LOW 25: CPT | Performed by: PSYCHIATRY & NEUROLOGY

## 2020-01-08 PROCEDURE — 82805 BLOOD GASES W/O2 SATURATION: CPT

## 2020-01-08 PROCEDURE — 85049 AUTOMATED PLATELET COUNT: CPT | Performed by: INTERNAL MEDICINE

## 2020-01-08 PROCEDURE — 85025 COMPLETE CBC W/AUTO DIFF WBC: CPT | Performed by: INTERNAL MEDICINE

## 2020-01-08 PROCEDURE — 83735 ASSAY OF MAGNESIUM: CPT | Performed by: INTERNAL MEDICINE

## 2020-01-08 PROCEDURE — 36600 WITHDRAWAL OF ARTERIAL BLOOD: CPT

## 2020-01-08 RX ORDER — HALOPERIDOL 5 MG/ML
5 INJECTION INTRAMUSCULAR EVERY 6 HOURS PRN
Status: DISCONTINUED | OUTPATIENT
Start: 2020-01-08 | End: 2020-01-09

## 2020-01-08 RX ORDER — LORAZEPAM 2 MG/ML
1 INJECTION INTRAMUSCULAR ONCE
Status: COMPLETED | OUTPATIENT
Start: 2020-01-08 | End: 2020-01-08

## 2020-01-08 RX ADMIN — ASPIRIN 81 MG 81 MG: 81 TABLET ORAL at 08:17

## 2020-01-08 RX ADMIN — IPRATROPIUM BROMIDE AND ALBUTEROL SULFATE 3 ML: 2.5; .5 SOLUTION RESPIRATORY (INHALATION) at 20:01

## 2020-01-08 RX ADMIN — IPRATROPIUM BROMIDE AND ALBUTEROL SULFATE 3 ML: 2.5; .5 SOLUTION RESPIRATORY (INHALATION) at 16:03

## 2020-01-08 RX ADMIN — SODIUM CHLORIDE 5 MG/HR: 9 INJECTION, SOLUTION INTRAVENOUS at 14:29

## 2020-01-08 RX ADMIN — HALOPERIDOL LACTATE 5 MG: 5 INJECTION, SOLUTION INTRAMUSCULAR at 17:54

## 2020-01-08 RX ADMIN — HEPARIN SODIUM 5000 UNITS: 5000 INJECTION, SOLUTION INTRAVENOUS; SUBCUTANEOUS at 05:38

## 2020-01-08 RX ADMIN — ATORVASTATIN CALCIUM 80 MG: 40 TABLET, FILM COATED ORAL at 21:42

## 2020-01-08 RX ADMIN — SODIUM CHLORIDE 50 ML/HR: 9 INJECTION, SOLUTION INTRAVENOUS at 14:24

## 2020-01-08 RX ADMIN — FOSPHENYTOIN SODIUM 100 MG PE: 50 INJECTION, SOLUTION INTRAMUSCULAR; INTRAVENOUS at 21:42

## 2020-01-08 RX ADMIN — SODIUM CHLORIDE 12.5 MG/HR: 9 INJECTION, SOLUTION INTRAVENOUS at 21:45

## 2020-01-08 RX ADMIN — IPRATROPIUM BROMIDE AND ALBUTEROL SULFATE 3 ML: 2.5; .5 SOLUTION RESPIRATORY (INHALATION) at 12:06

## 2020-01-08 RX ADMIN — FOSPHENYTOIN SODIUM 100 MG PE: 50 INJECTION, SOLUTION INTRAMUSCULAR; INTRAVENOUS at 14:35

## 2020-01-08 RX ADMIN — CHLORHEXIDINE GLUCONATE 0.12% ORAL RINSE 15 ML: 1.2 LIQUID ORAL at 08:16

## 2020-01-08 RX ADMIN — NICOTINE 1 PATCH: 21 PATCH, EXTENDED RELEASE TRANSDERMAL at 08:16

## 2020-01-08 RX ADMIN — LORAZEPAM 1 MG: 2 INJECTION INTRAMUSCULAR; INTRAVENOUS at 18:28

## 2020-01-08 RX ADMIN — MAGNESIUM SULFATE HEPTAHYDRATE 4 G: 40 INJECTION, SOLUTION INTRAVENOUS at 05:38

## 2020-01-08 RX ADMIN — IPRATROPIUM BROMIDE AND ALBUTEROL SULFATE 3 ML: 2.5; .5 SOLUTION RESPIRATORY (INHALATION) at 07:25

## 2020-01-08 RX ADMIN — FOSPHENYTOIN SODIUM 100 MG PE: 50 INJECTION, SOLUTION INTRAMUSCULAR; INTRAVENOUS at 05:38

## 2020-01-08 RX ADMIN — RISPERIDONE 1 MG: 1 SOLUTION ORAL at 08:17

## 2020-01-08 RX ADMIN — RISPERIDONE 1 MG: 1 SOLUTION ORAL at 21:03

## 2020-01-08 RX ADMIN — FAMOTIDINE 20 MG: 10 INJECTION, SOLUTION INTRAVENOUS at 08:17

## 2020-01-08 NOTE — PROGRESS NOTES
Clinical Nutrition     Multidisciplinary Rounds      Patient Name: Glo Berry  Date of Encounter: 01/08/20 10:18 AM  MRN: [id number]  Admission date: 1/6/2020    SAMIR PUGA to continue to follow per protocol.     Plan to extubate    Current diet: NPO Diet  No active supplement orders    Intervention:  Follow treatment plan  Care plan reviewed    Follow up:   Per protocol    Sharon Mcknight RD  10:18 AM  Time: 10min

## 2020-01-08 NOTE — PLAN OF CARE
Problem: Patient Care Overview  Goal: Plan of Care Review  Outcome: Ongoing (interventions implemented as appropriate)  Flowsheets (Taken 1/8/2020 0640)  Progress: improving  Plan of Care Reviewed With: patient; spouse  Patient Agreement with Plan of Care: agrees  Outcome Summary: Patient much more awake at later part of the shift. Following commands. Exteremely restless and agitated when getting vital signs and oral care. Precedex increased. Tried to wean fentanyl but patient very agitated. VSS. On 30% Fio2 on the vent. MRI needed once patient is extubated.     Problem: Patient Care Overview  Goal: Plan of Care Review  Outcome: Ongoing (interventions implemented as appropriate)  Flowsheets (Taken 1/8/2020 0640)  Progress: improving  Plan of Care Reviewed With: patient; family  Outcome Summary: Patient much more awake at later part of the shift. Following commands. Exteremely restless and agitated when getting vital signs and oral care. Precedex increased. Tried to wean fentanyl but patient very agitated. VSS. On 30% Fio2 on the vent. MRI needed once patient is extubated.     Problem: Skin Injury Risk (Adult)  Goal: Identify Related Risk Factors and Signs and Symptoms  Outcome: Ongoing (interventions implemented as appropriate)  Flowsheets (Taken 1/8/2020 0640)  Related Risk Factors (Skin Injury Risk): critical care admission     Problem: Restraint, Nonbehavioral (Nonviolent)  Goal: Rationale and Justification  Outcome: Ongoing (interventions implemented as appropriate)  Flowsheets (Taken 1/8/2020 0640)  Rationale and Justification: prevent line/tube removal; prevent harm to self     Problem: Fall Risk (Adult)  Goal: Absence of Fall  Outcome: Ongoing (interventions implemented as appropriate)  Flowsheets (Taken 1/8/2020 0640)  Absence of Fall: making progress toward outcome     Problem: Ventilation, Mechanical Invasive (Adult)  Goal: Signs and Symptoms of Listed Potential Problems Will be Absent, Minimized or  Managed (Ventilation, Mechanical Invasive)  Outcome: Ongoing (interventions implemented as appropriate)  Flowsheets (Taken 1/8/2020 4158)  Problems Assessed (Mechanical Ventilation, Invasive): all  Problems Present (Mech Vent, Invasive): inability to wean

## 2020-01-08 NOTE — PROGRESS NOTES
Neurology       Patient Care Team:  Philomena Walters MD as PCP - General (Internal Medicine)    Chief complaint: Altered mental status    History: Patient weaned from most of her sedation and ready to be weaned from the ventilator.    She is following complex commands.      Past Medical History:   Diagnosis Date   • COPD (chronic obstructive pulmonary disease) (CMS/[facility])    • Diabetes mellitus (CMS/[facility])    • History of ear injury 1973    Injury of the right eardrum.This has resulted in the headaches   • Hypertension        Vital Signs   Vitals:    01/08/20 0725 01/08/20 0800 01/08/20 0900 01/08/20 1000   BP:  166/75 124/69 136/68   BP Location:  Left arm  Left arm   Patient Position:  Lying  Lying   Pulse:  80 58 59   Resp: 16 22  19   Temp:  99.6 °F (37.6 °C)     TempSrc:  Axillary     SpO2:   96% 95%   Weight:       Height:           Physical Exam:   General: Mildly sedated but awake              Neuro: Calm and cooperative.  Her  are equal.    She is able to move all extremities without difficulty.        Results Review:  Reviewed  Results from last 7 days   Lab Units 01/08/20 0326   WBC 10*3/mm3 10.77   HEMOGLOBIN g/dL 8.5*   HEMATOCRIT % 29.0*   PLATELETS 10*3/mm3 80*     Results from last 7 days   Lab Units 01/08/20  0326 01/07/20  2053 01/07/20  0333 01/06/20  1821   SODIUM mmol/L 143  --  142 139   POTASSIUM mmol/L 4.2 4.5 3.3* 3.4*   CHLORIDE mmol/L 112*  --  107 103   CO2 mmol/L 18.0*  --  20.0* 21.0*   BUN mg/dL 17  --  18 19   CREATININE mg/dL 0.54*  --  0.75 0.75   CALCIUM mg/dL 8.6  --  9.1 9.2   BILIRUBIN mg/dL  --   --   --  0.3   ALK PHOS U/L  --   --   --  46   ALT (SGPT) U/L  --   --   --  18   AST (SGOT) U/L  --   --   --  40*   GLUCOSE mg/dL 98  --  125* 147*       Imaging Results (Last 24 Hours)     Procedure Component Value Units Date/Time    XR Chest 1 View [[id number]] Collected:  01/08/20 0912     Updated:  01/08/20 1012    Narrative:       EXAMINATION: XR CHEST 1 VW-01/08/2020:       INDICATION: Intubated patient.      COMPARISON: 01/07/2020.     FINDINGS: The endotracheal tube is well positioned. The heart is  slightly large. The heart is compensated. There is minimal left basilar  airspace disease, likely representing atelectasis.           Impression:       There is left basilar atelectasis which has developed since  the previous examination, mild. The endotracheal tube remains well  positioned and there has otherwise been no change since the previous  exam.     D:  01/08/2020  E:  01/08/2020             XR Chest 1 View [[id number]] Collected:  01/07/20 0758     Updated:  01/07/20 1633    Narrative:       EXAMINATION: XR CHEST 1 VW-      INDICATION: Intubated patient.      COMPARISON: 01/06/2020.     FINDINGS: Cardiac size borderline enlarged and unchanged with  endotracheal tube terminating above the level of the italo.  Esophagogastric tube has been placed in the interim coursing below the  diaphragm and out of the field-of-view. No pneumothorax or pleural  effusion.  No overt edema or focal consolidation.           Impression:       Interval placement of esophagogastric tube coursing below  the diaphragm and out of the field-of-view with endotracheal tube  remaining in appropriate position above the level of the italo. No new  consolidation or overt edema. No pleural effusion.     D:  01/07/2020  E:  01/07/2020     This report was finalized on 1/7/2020 4:30 PM by Dr. Sean Gonzalez.             Assessment:  Malignant hypertension with possible PRES    Plan:  Extubate    MRI brain    Comment:  Unlikely to see anything on the MRI scan I will change the clinical course.    The primary issue for this patient is taking medications faithfully         I discussed the patients findings and my recommendations with patient, family and nursing staff    Buzz Petersen MD  01/08/20  11:03 AM

## 2020-01-08 NOTE — PROGRESS NOTES
INTENSIVIST   HOSPITAL VISIT NOTE     Hospital:  LOS: 2 days     Subjective   S     Ms. Glo Berry, 64 y.o. female is followed for:      Encephalopathy acute    Hypertension    T2DM       As an Intensivist, we provide an integrated approach to the ICU patient and family, medical management of comorbid conditions, including but not limited to electrolytes, glycemic control, organ dysfunction, lead interdisciplinary rounds and coordinate the care with all other services, including those from other specialists.     HPI:  Seen this morning.  Restlessness during the night.  She was calm when I saw her ~ 08:30  Nodded her head appropriately.  She passed her RSBI and SBT and was extubated. No immediate complications.  Tmax 37.6 °C     Objective   O     .Vitals:  Temp: 98.4 °F (36.9 °C) (01/08/20 1200) Temp  Min: 97.6 °F (36.4 °C)  Max: 99.6 °F (37.6 °C)   BP: (!) 183/98(Cardene started) (01/08/20 1400) BP  Min: 93/53  Max: 196/88   Pulse: 86 (01/08/20 1400) Pulse  Min: 46  Max: 86   Resp: 20 (01/08/20 1400) Resp  Min: 16  Max: 22   SpO2: 95 % (01/08/20 1400) SpO2  Min: 92 %  Max: 100 %   Device: humidified, nasal cannula (01/08/20 1400)    Flow Rate: 3 (01/08/20 1400) Flow (L/min)  Min: 3  Max: 3     Medications (drips):    dexmedetomidine Last Rate: Stopped (01/08/20 1053)   fentanyl 10 mcg/mL Last Rate: Stopped (01/08/20 1053)   niCARdipine Last Rate: 5 mg/hr (01/08/20 1429)   sodium chloride Last Rate: 50 mL/hr (01/08/20 1424)        Mechanical Ventilation  Settings: Observed:   Mode: (S) PS(Changes made by CLIVE Jacobs MD ) (01/08/20 0848)    Vt (Set, L): 0.35 L (01/08/20 0726)    Resp Rate (Set): 16 (01/08/20 0726) Resp Rate (Observed) Vent: 19 (01/08/20 1000)   FiO2 (%): 28 % (01/08/20 0848)    PEEP/CPAP (cm H2O): 5 cm H20 (01/08/20 0848) Plateau Pressure (cm H2O): 8.3 cm H2O (01/08/20 0848)    Minute Ventilation (L/min) (Obs): 8.08 L/min (01/08/20 0848)    I:E Ratio (Obs): 1:5.00 (01/08/20 0848)     Physical  Examination    Telemetry:  Rhythm: normal sinus rhythm (01/08/20 1400)         Constitutional:  No acute distress.   Cardiovascular: Normal rate, regular and rhythm. Normal heart sounds.  No murmurs, gallop or rub.   Respiratory: No respiratory distress.  Normal breath sounds  No adventitious sounds.    Abdominal:  Soft with no tenderness  No distension. No HSM.   Extremities: Warm with no cyanosis.  No peripheral edema.   Neurological:   Awake.  Best Eye Response: 4-->(E4) spontaneous (01/08/20 1400)  Best Motor Response: 5-->(M5) localizes pain (01/08/20 1400)  Best Verbal Response: 4-->(V4) confused (01/08/20 1400)  [place] Coma Scale Score: 13 (01/08/20 1400)       Hematology:  Results from last 7 days   Lab Units 01/08/20 0326 01/07/20 0333 01/06/20  1821   WBC 10*3/mm3 10.77 18.93* 17.69*   HEMOGLOBIN g/dL 8.5* 10.7* 11.2*   MCV fL 91.2 92.5 87.0   PLATELETS 10*3/mm3 80* 167 163       Chemistry:  Estimated Creatinine Clearance: 98.7 mL/min (A) (by C-G formula based on SCr of 0.54 mg/dL (L)).    Results from last 7 days   Lab Units 01/08/20 0326 01/07/20 2053 01/07/20 0333 01/06/20  1821   SODIUM mmol/L 143  --  142 139   POTASSIUM mmol/L 4.2 4.5 3.3* 3.4*   CHLORIDE mmol/L 112*  --  107 103   CO2 mmol/L 18.0*  --  20.0* 21.0*   ANION GAP mmol/L 13.0  --  15.0 15.0   GLUCOSE mg/dL 98  --  125* 147*   CALCIUM mg/dL 8.6  --  9.1 9.2     Results from last 7 days   Lab Units 01/08/20 0326 01/07/20 0333 01/06/20  1821   BUN mg/dL 17 18 19   CREATININE mg/dL 0.54* 0.75 0.75     Results from last 7 days   Lab Units 01/08/20 0326 01/07/20 0333 01/06/20  1821   MAGNESIUM mg/dL 1.8 3.1* 1.4*   PHOSPHORUS mg/dL 2.7 2.7 4.2     Arterial Blood Gases:  Results from last 7 days   Lab Units 01/08/20  0946 01/08/20  0421 01/07/20  0413   PH, ARTERIAL pH units 7.331* 7.338* 7.410   PCO2, ARTERIAL mm Hg 35.4 36.4 32.8*   PO2 ART mm Hg 68.2* 62.5* 86.2   FIO2 % 28 30 30     Lab Results   Lab Value Date/Time    THCURSCR  Negative 01/07/2020 0507    PCPUR Positive (A) 01/07/2020 0507    COCAINEUR Negative 01/07/2020 0507    METAMPSCNUR Negative 01/07/2020 0507    LABOPIASCN Positive (A) 01/07/2020 0507    AMPHETSCREEN Negative 01/07/2020 0507    LABBENZSCN Positive (A) 01/07/2020 0507    TRICYCLICSCN Negative 01/07/2020 0507    LABMETHSCN Negative 01/07/2020 0507    BARBITSCNUR Positive (A) 01/07/2020 0507    OXYCODONESCN Negative 01/07/2020 0507       Images:  Ct Angiogram Head    Result Date: 1/7/2020  Normal intracranial CT angiogram.  D:  01/06/2020 E:  01/07/2020   This report was finalized on 1/7/2020 9:48 AM by Dr. Noe Duggan MD.      Ct Head Without Contrast    Result Date: 1/6/2020  Age-related change. There are no acute findings.  D:  01/06/2020 E:  01/06/2020    This report was finalized on 1/6/2020 5:04 PM by Dr. Noe Duggan MD.      Ct Angiogram Neck    Result Date: 1/7/2020  There are bilateral calcifications involving the carotid bifurcations producing only mild narrowing of the internal carotid arteries (approximately 30% bilaterally). Both vertebral arteries are normal as is the basilar artery.  D:  01/06/2020 E:  01/07/2020  This report was finalized on 1/7/2020 9:48 AM by Dr. Noe Duggan MD.      Xr Chest 1 View    Result Date: 1/8/2020  There is left basilar atelectasis which has developed since the previous examination, mild. The endotracheal tube remains well positioned and there has otherwise been no change since the previous exam.  D:  01/08/2020 E:  01/08/2020  This report was finalized on 1/8/2020 11:57 AM by Dr. Noe Duggan MD.      Xr Chest 1 View    Result Date: 1/7/2020  Interval placement of esophagogastric tube coursing below the diaphragm and out of the field-of-view with endotracheal tube remaining in appropriate position above the level of the italo. No new consolidation or overt edema. No pleural effusion.  D:  01/07/2020 E:  01/07/2020  This report was finalized on 1/7/2020 4:30 PM by   Sean Gonzalez.      Xr Chest 1 View    Result Date: 1/7/2020  Endotracheal tube is well positioned. There are no acute cardiopulmonary findings.  D:  01/06/2020 E:  01/07/2020  This report was finalized on 1/7/2020 9:47 AM by Dr. Noe Duggan MD.      Ct Cerebral Perfusion With & Without Contrast    Result Date: 1/7/2020  No perfusion abnormality to suggest evidence of reversible ischemia.  D:  01/06/2020 E:  01/07/2020            Results: Reviewed.  I reviewed the patient's new laboratory and imaging results.  I independently reviewed the patient's new images.    Medications: Reviewed.    Assessment/Plan   A / P     Assessment:    64 y.o.female, admitted on 1/6/2020 with CVA (cerebral vascular accident) (CMS/[facility]) [I63.9]  Encephalopathy [G93.40]:     1. Respiratory failure  1. Intubated 01/06/20  2. Mechanical Ventilation  2. Altered mental status  1. Negative w/u for CVA.  2. Probably PRES as per Neurology  3. NC Anemia  4. Thrombocytopenia  1. Dropped by ~ 52%, but just in 2 days.     Lab Results   Lab Value Date/Time    PLT 80 (L) 01/08/2020 0326     01/07/2020 0333     01/06/2020 1821     08/04/2014 1130       5. PMH Dementia  6. PMH HTN  7. PMH Pancreatic insufficiency, chronic diarrhea.  8. Tobacco use  9. UDS: PCP (+), R/o false positive as she is on  10. T2DM with neuropathy    Results from last 7 days   Lab Units 01/08/20  1135 01/08/20  0553 01/07/20  1726 01/07/20  1141 01/07/20  0616 01/06/20  2357   GLUCOSE mg/dL 130 122 127 133* 146* 131*     Lab Results   Lab Value Date/Time    HGBA1C 5.60 01/06/2020 1821       Nutrition Support: Patient isn't on Tube Feeding   Modulars: Patient doesn't have any tube feeding modular orders   Diet: NPO Diet   Advance Directives: Code Status and Medical Interventions:   Ordered at: 01/06/20 1647     Code Status:    CPR     Medical Interventions (Level of Support Prior to Arrest):    Full        Plan:    1. Monitor PLT count. Hold heparin  today.  2. Extubate (done).  3. No changes in insulin doses.  4. Eventually MRI Brain  5. Disposition: Keep in ICU.     Plan of care and goals reviewed during interdisciplinary rounds.  I discussed the patient's findings and my recommendations with nursing staff    Level of Risk is High due to:  illness with threat to life or bodily function.     Time: was greater than 35 minutes.    (This excludes time spent performing separately reportable procedures and services).  Patient was at high risk of imminent or life-threatening deterioration in her condition due to Altered mental status and Respiratory failure.     Tristan Hull MD, FACP, FCCP, CNSC  Intensive Care Medicine, Nutrition Support and Pulmonary Medicine     [x]  Primary Attending  []  Consultant

## 2020-01-08 NOTE — NURSING NOTE
Patient extremely agitated and restless. Climbing out of bed. Tore IV's out of arm. Had to increase sedation to protect the patient. After the patient calmed down, daughter-in-law requested no more sedation till the doctor came around. Informed her that it was for the patients own protection. Daughter-in-law stated that she wants patient to be extubated today and that if she is sedated that won't happen. Explained to patient that using fentanyl is a fast sedation and wears off quickly.

## 2020-01-08 NOTE — PLAN OF CARE
Patient was extubated at 1053 this morning. Without family at bedside patient can be restless and try to climb out of bed. Haldol ordered every 6 hours as needed for confusion. Swallowing evaluation due tomorrow in the AM. Still awaiting MRI. Cardene started for pressures in the 200's. Currently it is maxed out with pressures still in the 170-180's. Goal is under 180.

## 2020-01-09 ENCOUNTER — APPOINTMENT (OUTPATIENT)
Dept: MRI IMAGING | Facility: HOSPITAL | Age: 65
End: 2020-01-09

## 2020-01-09 ENCOUNTER — APPOINTMENT (OUTPATIENT)
Dept: GENERAL RADIOLOGY | Facility: HOSPITAL | Age: 65
End: 2020-01-09

## 2020-01-09 LAB
ALBUMIN SERPL-MCNC: 3.6 G/DL (ref 3.5–5.2)
ALBUMIN/GLOB SERPL: 1.3 G/DL
ALP SERPL-CCNC: 46 U/L (ref 39–117)
ALT SERPL W P-5'-P-CCNC: 14 U/L (ref 1–33)
ANION GAP SERPL CALCULATED.3IONS-SCNC: 21 MMOL/L (ref 5–15)
AST SERPL-CCNC: 31 U/L (ref 1–32)
BASOPHILS # BLD AUTO: 0.04 10*3/MM3 (ref 0–0.2)
BASOPHILS NFR BLD AUTO: 0.4 % (ref 0–1.5)
BILIRUB SERPL-MCNC: 0.3 MG/DL (ref 0.2–1.2)
BUN BLD-MCNC: 12 MG/DL (ref 8–23)
BUN/CREAT SERPL: 22.2 (ref 7–25)
CALCIUM SPEC-SCNC: 9.2 MG/DL (ref 8.6–10.5)
CHLORIDE SERPL-SCNC: 108 MMOL/L (ref 98–107)
CO2 SERPL-SCNC: 18 MMOL/L (ref 22–29)
CREAT BLD-MCNC: 0.54 MG/DL (ref 0.57–1)
DEPRECATED RDW RBC AUTO: 47.3 FL (ref 37–54)
EOSINOPHIL # BLD AUTO: 0.02 10*3/MM3 (ref 0–0.4)
EOSINOPHIL NFR BLD AUTO: 0.2 % (ref 0.3–6.2)
ERYTHROCYTE [DISTWIDTH] IN BLOOD BY AUTOMATED COUNT: 14.8 % (ref 12.3–15.4)
GFR SERPL CREATININE-BSD FRML MDRD: 114 ML/MIN/1.73
GLOBULIN UR ELPH-MCNC: 2.8 GM/DL
GLUCOSE BLD-MCNC: 110 MG/DL (ref 65–99)
GLUCOSE BLDC GLUCOMTR-MCNC: 116 MG/DL (ref 70–130)
GLUCOSE BLDC GLUCOMTR-MCNC: 127 MG/DL (ref 70–130)
GLUCOSE BLDC GLUCOMTR-MCNC: 129 MG/DL (ref 70–130)
GLUCOSE BLDC GLUCOMTR-MCNC: 132 MG/DL (ref 70–130)
HCT VFR BLD AUTO: 28.9 % (ref 34–46.6)
HGB BLD-MCNC: 8.5 G/DL (ref 12–15.9)
IMM GRANULOCYTES # BLD AUTO: 0.08 10*3/MM3 (ref 0–0.05)
IMM GRANULOCYTES NFR BLD AUTO: 0.7 % (ref 0–0.5)
LYMPHOCYTES # BLD AUTO: 0.92 10*3/MM3 (ref 0.7–3.1)
LYMPHOCYTES NFR BLD AUTO: 8.1 % (ref 19.6–45.3)
MAGNESIUM SERPL-MCNC: 2 MG/DL (ref 1.6–2.4)
MCH RBC QN AUTO: 26 PG (ref 26.6–33)
MCHC RBC AUTO-ENTMCNC: 29.4 G/DL (ref 31.5–35.7)
MCV RBC AUTO: 88.4 FL (ref 79–97)
MONOCYTES # BLD AUTO: 1.07 10*3/MM3 (ref 0.1–0.9)
MONOCYTES NFR BLD AUTO: 9.5 % (ref 5–12)
NEUTROPHILS # BLD AUTO: 9.16 10*3/MM3 (ref 1.7–7)
NEUTROPHILS NFR BLD AUTO: 81.1 % (ref 42.7–76)
NRBC BLD AUTO-RTO: 0 /100 WBC (ref 0–0.2)
PF4 HEPARIN CMPLX AB SER-ACNC: 0.15 OD (ref 0–0.4)
PHOSPHATE SERPL-MCNC: 2.3 MG/DL (ref 2.5–4.5)
PLATELET # BLD AUTO: 82 10*3/MM3 (ref 140–450)
PMV BLD AUTO: 12.5 FL (ref 6–12)
POTASSIUM BLD-SCNC: 3.2 MMOL/L (ref 3.5–5.2)
PROT SERPL-MCNC: 6.4 G/DL (ref 6–8.5)
RBC # BLD AUTO: 3.27 10*6/MM3 (ref 3.77–5.28)
SODIUM BLD-SCNC: 147 MMOL/L (ref 136–145)
WBC NRBC COR # BLD: 11.29 10*3/MM3 (ref 3.4–10.8)

## 2020-01-09 PROCEDURE — 92610 EVALUATE SWALLOWING FUNCTION: CPT

## 2020-01-09 PROCEDURE — 80053 COMPREHEN METABOLIC PANEL: CPT | Performed by: INTERNAL MEDICINE

## 2020-01-09 PROCEDURE — 94770: CPT

## 2020-01-09 PROCEDURE — 97162 PT EVAL MOD COMPLEX 30 MIN: CPT

## 2020-01-09 PROCEDURE — 94799 UNLISTED PULMONARY SVC/PX: CPT

## 2020-01-09 PROCEDURE — 97166 OT EVAL MOD COMPLEX 45 MIN: CPT

## 2020-01-09 PROCEDURE — 71045 X-RAY EXAM CHEST 1 VIEW: CPT

## 2020-01-09 PROCEDURE — 84100 ASSAY OF PHOSPHORUS: CPT | Performed by: INTERNAL MEDICINE

## 2020-01-09 PROCEDURE — 99232 SBSQ HOSP IP/OBS MODERATE 35: CPT | Performed by: PSYCHIATRY & NEUROLOGY

## 2020-01-09 PROCEDURE — 92523 SPEECH SOUND LANG COMPREHEN: CPT

## 2020-01-09 PROCEDURE — 93010 ELECTROCARDIOGRAM REPORT: CPT | Performed by: INTERNAL MEDICINE

## 2020-01-09 PROCEDURE — 83735 ASSAY OF MAGNESIUM: CPT | Performed by: INTERNAL MEDICINE

## 2020-01-09 PROCEDURE — 85025 COMPLETE CBC W/AUTO DIFF WBC: CPT | Performed by: INTERNAL MEDICINE

## 2020-01-09 PROCEDURE — 25010000002 LORAZEPAM PER 2 MG: Performed by: NURSE PRACTITIONER

## 2020-01-09 PROCEDURE — 70551 MRI BRAIN STEM W/O DYE: CPT

## 2020-01-09 PROCEDURE — 93005 ELECTROCARDIOGRAM TRACING: CPT | Performed by: NURSE PRACTITIONER

## 2020-01-09 PROCEDURE — 25010000002 FOSPHENYTOIN 100 MG PE/2ML SOLUTION 2 ML VIAL: Performed by: PSYCHIATRY & NEUROLOGY

## 2020-01-09 PROCEDURE — 25010000002 LORAZEPAM PER 2 MG: Performed by: INTERNAL MEDICINE

## 2020-01-09 PROCEDURE — 97535 SELF CARE MNGMENT TRAINING: CPT

## 2020-01-09 PROCEDURE — 82962 GLUCOSE BLOOD TEST: CPT

## 2020-01-09 PROCEDURE — 99291 CRITICAL CARE FIRST HOUR: CPT | Performed by: INTERNAL MEDICINE

## 2020-01-09 PROCEDURE — 25010000002 HALOPERIDOL LACTATE PER 5 MG: Performed by: INTERNAL MEDICINE

## 2020-01-09 RX ORDER — RISPERIDONE 1 MG/ML
1 SOLUTION ORAL NIGHTLY
Status: DISCONTINUED | OUTPATIENT
Start: 2020-01-09 | End: 2020-01-10

## 2020-01-09 RX ORDER — TRAZODONE HYDROCHLORIDE 50 MG/1
50 TABLET ORAL NIGHTLY PRN
Status: DISCONTINUED | OUTPATIENT
Start: 2020-01-09 | End: 2020-01-20

## 2020-01-09 RX ORDER — LORAZEPAM 2 MG/ML
0.25 INJECTION INTRAMUSCULAR ONCE
Status: COMPLETED | OUTPATIENT
Start: 2020-01-09 | End: 2020-01-09

## 2020-01-09 RX ORDER — CARVEDILOL 12.5 MG/1
25 TABLET ORAL 2 TIMES DAILY WITH MEALS
Status: DISCONTINUED | OUTPATIENT
Start: 2020-01-09 | End: 2020-01-22 | Stop reason: HOSPADM

## 2020-01-09 RX ORDER — SERTRALINE HYDROCHLORIDE 100 MG/1
100 TABLET, FILM COATED ORAL NIGHTLY
Status: DISCONTINUED | OUTPATIENT
Start: 2020-01-09 | End: 2020-01-10

## 2020-01-09 RX ORDER — LORAZEPAM 2 MG/ML
0.25 INJECTION INTRAMUSCULAR ONCE AS NEEDED
Status: COMPLETED | OUTPATIENT
Start: 2020-01-09 | End: 2020-01-09

## 2020-01-09 RX ORDER — LEVETIRACETAM 500 MG/1
500 TABLET ORAL EVERY 12 HOURS SCHEDULED
Status: DISCONTINUED | OUTPATIENT
Start: 2020-01-09 | End: 2020-01-19 | Stop reason: CLARIF

## 2020-01-09 RX ORDER — FAMOTIDINE 10 MG/ML
20 INJECTION, SOLUTION INTRAVENOUS EVERY 12 HOURS SCHEDULED
Status: DISCONTINUED | OUTPATIENT
Start: 2020-01-09 | End: 2020-01-14

## 2020-01-09 RX ORDER — TRAZODONE HYDROCHLORIDE 50 MG/1
50 TABLET ORAL NIGHTLY
Status: DISCONTINUED | OUTPATIENT
Start: 2020-01-09 | End: 2020-01-16

## 2020-01-09 RX ADMIN — FOSPHENYTOIN SODIUM 100 MG PE: 50 INJECTION, SOLUTION INTRAMUSCULAR; INTRAVENOUS at 06:03

## 2020-01-09 RX ADMIN — FAMOTIDINE 20 MG: 10 INJECTION, SOLUTION INTRAVENOUS at 08:20

## 2020-01-09 RX ADMIN — RISPERIDONE 1 MG: 1 SOLUTION ORAL at 10:07

## 2020-01-09 RX ADMIN — ASPIRIN 81 MG 81 MG: 81 TABLET ORAL at 08:21

## 2020-01-09 RX ADMIN — FAMOTIDINE 20 MG: 10 INJECTION, SOLUTION INTRAVENOUS at 20:15

## 2020-01-09 RX ADMIN — LEVETIRACETAM 500 MG: 500 TABLET, FILM COATED ORAL at 20:14

## 2020-01-09 RX ADMIN — LORAZEPAM 0.25 MG: 2 INJECTION INTRAMUSCULAR; INTRAVENOUS at 18:44

## 2020-01-09 RX ADMIN — IPRATROPIUM BROMIDE AND ALBUTEROL SULFATE 3 ML: 2.5; .5 SOLUTION RESPIRATORY (INHALATION) at 11:56

## 2020-01-09 RX ADMIN — POTASSIUM CHLORIDE 40 MEQ: 1.5 POWDER, FOR SOLUTION ORAL at 20:14

## 2020-01-09 RX ADMIN — SODIUM CHLORIDE 7.5 MG/HR: 9 INJECTION, SOLUTION INTRAVENOUS at 11:50

## 2020-01-09 RX ADMIN — IPRATROPIUM BROMIDE AND ALBUTEROL SULFATE 3 ML: 2.5; .5 SOLUTION RESPIRATORY (INHALATION) at 16:20

## 2020-01-09 RX ADMIN — TRAZODONE HYDROCHLORIDE 50 MG: 50 TABLET ORAL at 20:14

## 2020-01-09 RX ADMIN — ATORVASTATIN CALCIUM 80 MG: 40 TABLET, FILM COATED ORAL at 20:14

## 2020-01-09 RX ADMIN — SODIUM CHLORIDE 5 MG/HR: 9 INJECTION, SOLUTION INTRAVENOUS at 15:57

## 2020-01-09 RX ADMIN — LORAZEPAM 0.25 MG: 2 INJECTION INTRAMUSCULAR; INTRAVENOUS at 05:58

## 2020-01-09 RX ADMIN — IPRATROPIUM BROMIDE AND ALBUTEROL SULFATE 3 ML: 2.5; .5 SOLUTION RESPIRATORY (INHALATION) at 07:00

## 2020-01-09 RX ADMIN — HALOPERIDOL LACTATE 5 MG: 5 INJECTION, SOLUTION INTRAMUSCULAR at 02:56

## 2020-01-09 RX ADMIN — IBUPROFEN 600 MG: 100 SUSPENSION ORAL at 21:47

## 2020-01-09 RX ADMIN — SERTRALINE HYDROCHLORIDE 100 MG: 100 TABLET ORAL at 20:14

## 2020-01-09 RX ADMIN — SODIUM CHLORIDE 7.5 MG/HR: 9 INJECTION, SOLUTION INTRAVENOUS at 08:22

## 2020-01-09 RX ADMIN — SODIUM CHLORIDE 50 ML/HR: 9 INJECTION, SOLUTION INTRAVENOUS at 02:46

## 2020-01-09 RX ADMIN — LEVETIRACETAM 500 MG: 500 TABLET, FILM COATED ORAL at 15:08

## 2020-01-09 RX ADMIN — RISPERIDONE 1 MG: 1 SOLUTION ORAL at 20:14

## 2020-01-09 RX ADMIN — DEXMEDETOMIDINE HYDROCHLORIDE 0.2 MCG/KG/HR: 100 INJECTION, SOLUTION INTRAVENOUS at 22:28

## 2020-01-09 RX ADMIN — NICOTINE 1 PATCH: 21 PATCH, EXTENDED RELEASE TRANSDERMAL at 08:21

## 2020-01-09 RX ADMIN — CARVEDILOL 25 MG: 12.5 TABLET, FILM COATED ORAL at 17:47

## 2020-01-09 RX ADMIN — SODIUM CHLORIDE 10 MG/HR: 9 INJECTION, SOLUTION INTRAVENOUS at 22:27

## 2020-01-09 RX ADMIN — SODIUM CHLORIDE 7.5 MG/HR: 9 INJECTION, SOLUTION INTRAVENOUS at 00:41

## 2020-01-09 RX ADMIN — IPRATROPIUM BROMIDE AND ALBUTEROL SULFATE 3 ML: 2.5; .5 SOLUTION RESPIRATORY (INHALATION) at 19:50

## 2020-01-09 RX ADMIN — SODIUM CHLORIDE 7.5 MG/HR: 9 INJECTION, SOLUTION INTRAVENOUS at 04:24

## 2020-01-09 RX ADMIN — CARVEDILOL 25 MG: 12.5 TABLET, FILM COATED ORAL at 11:39

## 2020-01-09 NOTE — NURSING NOTE
Patient extremely restless and agitated. She has increased her SBP to 210 and Cardene increased as well. Patient pulling at all lines, lines, gown and restraints. Neville Sotomayor was informed of the situation. Orders received.

## 2020-01-09 NOTE — NURSING NOTE
Dr Petersen in to see patient. MD aware patient remains on high dose of Cardene for BP control. MD said he will look at home med list to see if we can restart those. MD also aware patient has irregular heart rhythm and EKG has been ordered.

## 2020-01-09 NOTE — PLAN OF CARE
Problem: Stroke (Ischemic) (Adult)  Goal: Signs and Symptoms of Listed Potential Problems Will be Absent, Minimized or Managed (Stroke)  Outcome: Ongoing (interventions implemented as appropriate)  Flowsheets (Taken 1/9/2020 5813)  Problems Assessed (Stroke (Ischemic)): bladder/bowel dysfunction;cognitive impairment;communication impairment;motor/sensory impairment;situational response

## 2020-01-09 NOTE — THERAPY EVALUATION
Acute Care - Occupational Therapy Initial Evaluation  [facility]     Patient Name: Glo Berry  : 1955  MRN: [id number]  Today's Date: 2020             Admit Date: 2020       ICD-10-CM ICD-9-CM   1. Impaired mobility and ADLs Z74.09 799.89     Patient Active Problem List   Diagnosis   • Uterine prolapse   • Stress incontinence of urine   • Urgency incontinence   • Diarrhea   • Encephalopathy acute   • R/O AIS    • R/O status epilepticus    • Hypertension   • Pancreatic insufficiency   • T2DM on metformin    • Chronic diarrhea   • Lumbar stenosis   • Diabetic neuropathy    • Depression   • Dementia    • Smoker   • Acute respiratory failure    • Encephalopathy     Past Medical History:   Diagnosis Date   • COPD (chronic obstructive pulmonary disease) (CMS/HCC)    • Diabetes mellitus (CMS/HCC)    • History of ear injury 1973    Injury of the right eardrum.This has resulted in the headaches   • Hypertension      Past Surgical History:   Procedure Laterality Date   • BREAST CYST EXCISION     • SINUS SURGERY     • TONSILLECTOMY     • TUBAL ABDOMINAL LIGATION            OT ASSESSMENT FLOWSHEET (last 12 hours)      Occupational Therapy Evaluation     Row Name 20 0757                   OT Evaluation Time/Intention    Subjective Information  no complaints  -DIPAK        Document Type  evaluation  -DIPAK        Mode of Treatment  individual therapy;occupational therapy  -DIPAK        Patient Effort  good  -DIPAK        Symptoms Noted During/After Treatment  fatigue  -DIPAK           General Information    Patient Profile Reviewed?  yes  -DIPAK        Patient Observations  cooperative;agree to therapy;lethargic  -DIPAK        Patient/Family Observations  son present during session and giving much of home information with pt. approval  -DIPAK        General Observations of Patient  supine, NG, IV, 02, tele  -DIPAK        Prior Level of Function  independent:;all household mobility;ADL's;cooking;mod assist:;cleaning;shopping  short distance community only  -DIPAK        Equipment Currently Used at Home  shower chair;grab bar  -DIPAK        Existing Precautions/Restrictions  fall;oxygen therapy device and L/min NG  -DIPAK        Limitations/Impairments  safety/cognitive  -DIPAK        Risks Reviewed  patient and family:;LOB;increased discomfort;change in vital signs;lines disloged  -DIPAK        Benefits Reviewed  patient and family:;improve function;increase independence;increase strength;increase balance;increase knowledge  -DIPAK        Barriers to Rehab  previous functional deficit  -DIPAK           Relationship/Environment    Lives With  spouse  -DIPAK        Name(s) of Who Lives With Patient  -- spouse with disabilities, assist each other.  -DIPAK        Family Caregiver if Needed  spouse;child(mya), adult cleaning lady, friends assist with groceries.  -DIPAK           Resource/Environmental Concerns    Current Living Arrangements  home/apartment/condo  -DIPAK           Home Main Entrance    Number of Stairs, Main Entrance  two  -DIPAK        Stair Railings, Main Entrance  none  -DIPAK           Cognitive Assessment/Intervention- PT/OT    Orientation Status (Cognition)  oriented to;person;disoriented to;place;situation;time knew month not year, only got day/yr birthday  -DIPAK        Follows Commands (Cognition)  follows one step commands;50-74% accuracy;physical/tactile prompts required;initiation impaired;repetition of directions required  -DIPAK        Safety Deficit (Cognitive)  impulsivity;insight into deficits/self awareness;judgment;problem solving;safety precautions awareness;moderate deficit  -DIPAK        Cognitive Assessment/Intervention Comment  pt. tried to climb  OOB over railing.  Pt. difficulty intiating task.  -DIPAK           Safety Issues, Functional Mobility    Safety Issues Affecting Function (Mobility)  impulsivity;insight into deficits/self awareness;judgment;problem solving;safety precaution awareness;sequencing abilities  -DIPAK        Impairments Affecting  Function (Mobility)  balance;cognition;coordination;endurance/activity tolerance;postural/trunk control;range of motion (ROM);strength  -           Bed Mobility Assessment/Treatment    Bed Mobility Assessment/Treatment  rolling left;supine-sit;scooting/bridging  -        Rolling Left [place] (Bed Mobility)  supervision  -        Scooting/Bridging [place] (Bed Mobility)  maximum assist (25% patient effort) to EOB  -DIPAK        Supine-Sit [place] (Bed Mobility)  moderate assist (50% patient effort);verbal cues;nonverbal cues (demo/gesture)  -        Bed Mobility, Safety Issues  cognitive deficits limit understanding;decreased use of arms for pushing/pulling;decreased use of legs for bridging/pushing;impaired trunk control for bed mobility  -        Assistive Device (Bed Mobility)  bed rails;draw sheet  -        Comment (Bed Mobility)  most assist needed side to sit and scooting to EOB  -           Functional Mobility    Functional Mobility- Ind. Level  moderate assist (50% patient effort);2 person assist required;verbal cues required;nonverbal cues required (demo/gesture)  -        Functional Mobility- Device  other (see comments) gait belt and BUE support  -        Functional Mobility-Distance (Feet)  2  -        Functional Mobility- Safety Issues  step length decreased;sequencing ability decreased;supplemental O2  -        Functional Mobility- Comment  pt. difficulty advancing feet and shifting weight, extensive time to take turn steps over to recliner.  -           Transfer Assessment/Treatment    Transfer Assessment/Treatment  sit-stand transfer;stand-sit transfer  -        Comment (Transfers)  cues for hand placement and not to sit prior to legs hitting chair.  -           Sit-Stand Transfer    Sit-Stand [place] (Transfers)  moderate assist (50% patient effort);2 person assist;verbal cues;nonverbal cues (demo/gesture)  -        Assistive Device (Sit-Stand Transfers)   other (see comments) gait belt and BUE support  -DIPAK           Stand-Sit Transfer    Stand-Sit [place] (Transfers)  moderate assist (50% patient effort);2 person assist;nonverbal cues (demo/gesture);verbal cues  -DIPAK        Assistive Device (Stand-Sit Transfers)  -- gait belt and BUE support  -DIPAK           ADL Assessment/Intervention    76959 - OT Self Care/Mgmt Minutes  8  -DIPAK        BADL Assessment/Intervention  grooming;lower body dressing;upper body dressing;toileting  -DIPAK           Upper Body Dressing Assessment/Training    Upper Body Dressing [place] Level  don;pajama/robe;dependent (less than 25% patient effort)  -DIPAK        Upper Body Dressing Position  edge of bed sitting  -DIPAK           Lower Body Dressing Assessment/Training    Lower Body Dressing [place] Level  don;socks;dependent (less than 25% patient effort)  -DIPAK        Lower Body Dressing Position  supine  -DIPAK           Grooming Assessment/Training    [place] Level (Grooming)  wash face, hands;maximum assist (25% patient effort);hair care, combing/brushing;dependent (less than 25% patient effort);verbal cues;nonverbal cues (demo/gesture)  -DIPAK        Assistive Devices (Grooming)  hand over hand  -DIPAK        Grooming Position  supported sitting  -DIPAK        Comment (Grooming)  limited by alertness and much hair spray in hair.  -DIPAK           Toileting Assessment/Training    [place] Level (Toileting)  toileting skills;dependent (less than 25% patient effort)  -DIPAK        Toileting Position  supine  -DIPAK        Comment (Toileting)  brief changed prior to chair.  Pt. was able to assist by rolling.  -DIPAK           BADL Safety/Performance    Impairments, BADL Safety/Performance  balance;cognition;endurance/activity tolerance;coordination;strength;trunk/postural control  -DIPAK        Cognitive Impairments, BADL Safety/Performance  attention;awareness, need for assistance;impulsivity;insight into deficits/self awareness;judgment;problem  solving/reasoning;sequencing abilities;safety precaution awareness  -DIPAK        Skilled BADL Treatment/Intervention  cognitive/safety deficit modifications;hand-over-hand training/cues  -DIPAK           General ROM    GENERAL ROM COMMENTS  WFL AROM BUE  -DIPAK           MMT (Manual Muscle Testing)    General MMT Comments  BUE grossly 4/5  -DIPAK           Motor Assessment/Interventions    Additional Documentation  Balance (Group);Balance Interventions (Group);Fine Motor Testing & Training (Group);Gross Motor Coordination (Group);Therapeutic Exercise (Group);Therapeutic Exercise Interventions (Group)  -           Therapeutic Exercise    88811 - OT Therapeutic Activity Minutes  6  -DIPAK           Gross Motor Coordination    Gross Motor Skill, Impairments Detail  pt. difficulty coordinating use of wash cloth to clean face  -DIPAK           Balance    Balance  static sitting balance;static standing balance  -DIPAK           Static Sitting Balance    Level of [place] (Unsupported Sitting, Static Balance)  moderate assist, 50 to 74% patient effort  -DIPAK        Sitting Position (Unsupported Sitting, Static Balance)  sitting on edge of bed  -DIPAK        Time Able to Maintain Position (Unsupported Sitting, Static Balance)  3 to 4 minutes  -DIPAK           Static Standing Balance    Level of [place] (Supported Standing, Static Balance)  moderate assist, 50 to 74% patient effort;2 person assist  -DIPAK        Time Able to Maintain Position (Supported Standing, Static Balance)  30 to 45 seconds  -DIPAK        Assistive Device Utilized (Supported Standing, Static Balance)  other (see comments) BUE support and gait belt  -DIPAK           Fine Motor Testing & Training    Comment, Fine Motor Coordination  GIRMA fully due to limited alertness  -DIPAK           Sensory Assessment/Intervention    Sensory General Assessment  -- GIRMA, but with observation appears intact  -DIPAK        Additional Documentation  Vision Assessment/Intervention (Group)  -DIPAK            Vision Assessment/Intervention    Visual Impairment/Limitations  corrective lenses for reading unable to fully assess, pt. did look L and R at OT  -DIPAK           Positioning and Restraints    Pre-Treatment Position  in bed  -DIPAK        Post Treatment Position  chair  -DIPAK        In Chair  reclined;call light within reach;encouraged to call for assist;exit alarm on;with family/caregiver;RUE elevated;heels elevated  -DIPAK           Pain Assessment    Additional Documentation  Pain Scale: Numbers Pre/Post-Treatment (Group)  -DIPAK           Pain Scale: Numbers Pre/Post-Treatment    Pain Scale: Numbers, Pretreatment  0/10 - no pain  -DIPAK        Pain Scale: Numbers, Post-Treatment  0/10 - no pain  -DIPAK           Clinical Impression (OT)    OT Diagnosis  impaired ADL independence  -DIPAK        Patient/Family Goals Statement (OT Eval)  per son to return to PLOF and maybe determine gait device for home  -DIPAK        Criteria for Skilled Therapeutic Interventions Met (OT Eval)  yes;treatment indicated  -DIPAK        Rehab Potential (OT Eval)  good, to achieve stated therapy goals  -DIPAK        Therapy Frequency (OT Eval)  daily  -DIPAK        Care Plan Review (OT)  evaluation/treatment results reviewed;care plan/treatment goals reviewed;risks/benefits reviewed;current/potential barriers reviewed;patient/other agree to care plan  -DIPAK        Care Plan Review, Other Participant (OT Eval)  son  -DIPAK        Patient/Family Concerns, Equipment Needs at Discharge (OT)  -- TBA further with progress, possible mobility device for gait  -DIPAK        Anticipated Discharge Disposition (OT)  skilled nursing facility  -DIPAK           Vital Signs    Pre Systolic BP Rehab  177 nurse OK session with current vitals  -DIPAK        Pre Treatment Diastolic BP  89  -DIPAK        Post Systolic BP Rehab  178  -DIPAK        Post Treatment Diastolic BP  87  -DIPAK        Pretreatment Heart Rate (beats/min)  114  -DIPAK        Posttreatment Heart Rate (beats/min)  103  -DIPAK        Pre SpO2 (%)   -- 90's  -DIPAK        Post SpO2 (%)  93  -DIPAK        O2 Delivery Post Treatment  supplemental O2  -DIPAK        Pre Patient Position  Supine  -DIPAK        Intra Patient Position  Sitting standing  -DIPAK        Post Patient Position  Sitting  -DIPAK           Planned OT Interventions    Planned Therapy Interventions (OT Eval)  activity tolerance training;BADL retraining;cognitive/visual perception retraining;functional balance retraining;neuromuscular control/coordination retraining;occupation/activity based interventions;patient/caregiver education/training;ROM/therapeutic exercise;strengthening exercise;transfer/mobility retraining  -DIPAK           OT Goals    Bed Mobility Goal Selection (OT)  bed mobility, OT goal 1  -DIPAK        Transfer Goal Selection (OT)  transfer, OT goal 1  -DIPAK        Grooming Goal Selection (OT)  grooming, OT goal 1  -DIPAK        Balance Goal Selection (OT)  balance, OT goal 1  -DIPAK        Additional Documentation  Grooming Goal Selection (OT) (Row);Balance Goal Selection (OT) (Row)  -DIPAK           Bed Mobility Goal 1 (OT)    Activity/Assistive Device (Bed Mobility Goal 1, OT)  sit to supine;supine to sit;scooting  -DIPAK        [place] Level/Cues Needed (Bed Mobility Goal 1, OT)  minimum assist (75% or more patient effort)  -DIPAK        Time Frame (Bed Mobility Goal 1, OT)  long term goal (LTG);10 days  -DIPAK        Progress/Outcomes (Bed Mobility Goal 1, OT)  goal ongoing  -DIPAK           Transfer Goal 1 (OT)    Activity/Assistive Device (Transfer Goal 1, OT)  sit-to-stand/stand-to-sit;bed-to-chair/chair-to-bed;toilet;commode, bedside without drop arms;walker, rolling  -DIPAK        [place] Level/Cues Needed (Transfer Goal 1, OT)  minimum assist (75% or more patient effort)  -DIPAK        Time Frame (Transfer Goal 1, OT)  long term goal (LTG);10 days  -DIPAK        Progress/Outcome (Transfer Goal 1, OT)  goal ongoing  -DIPAK           Grooming Goal 1 (OT)    Activity/Device (Grooming Goal 1, OT)  hair care;oral  care;wash face, hands  -DIPAK        [place] (Grooming Goal 1, OT)  standby assist;verbal cues required  -DIPAK        Time Frame (Grooming Goal 1, OT)  long term goal (LTG);10 days  -DIPAK        Progress/Outcome (Grooming Goal 1, OT)  goal ongoing  -DIPAK           Balance Goal 1 (OT)    Activity/Assistive Device (Balance Goal 1, OT)  sitting, dynamic;standing, static  -DIPAK        [place] Level/Cues Needed (Balance Goal 1, OT)  contact guard assist  -DIPAK        Time Frame (Balance Goal 1, OT)  long term goal (LTG);10 days  -DIPAK        Progress/Outcomes (Balance Goal 1, OT)  goal ongoing  -DIPAK           Living Environment    Home Accessibility  stairs to enter home;tub/shower is not walk in  -DIPAK          User Key  (r) = Recorded By, (t) = Taken By, (c) = Cosigned By    Initials Name Effective Dates    DIPAK Villasenor Stephanie Bre, OT 06/08/18 -                OT Recommendation and Plan  Outcome Summary/Treatment Plan (OT)  Patient/Family Concerns, Equipment Needs at Discharge (OT): (TBA further with progress, possible mobility device for gait)  Anticipated Discharge Disposition (OT): skilled nursing facility  Planned Therapy Interventions (OT Eval): activity tolerance training, BADL retraining, cognitive/visual perception retraining, functional balance retraining, neuromuscular control/coordination retraining, occupation/activity based interventions, patient/caregiver education/training, ROM/therapeutic exercise, strengthening exercise, transfer/mobility retraining  Therapy Frequency (OT Eval): daily  Outcome Summary: OT evaluation completed as patient able to participate.  Pt. mod A of 2 supine to sit, sit to stand and steps to recliner.  Max to dependent with ADL task completion.  Pt. demonstrating restlessness until settled into recliner.  Pt. demonstrating impaired balance, strength, endurance, coordination and cognition and is appropriate for skilled OT services to address deficit areas.  Pt. will geno need a mobility  device and possible BSC at discharge and recommend SNF pending progress.    Outcome Measures     Row Name 01/09/20 0757             How much help from another is currently needed...    Putting on and taking off regular lower body clothing?  1  -DPIAK      Bathing (including washing, rinsing, and drying)  1  -DIPAK      Toileting (which includes using toilet bed pan or urinal)  1  -DIPAK      Putting on and taking off regular upper body clothing  1  -DIPAK      Taking care of personal grooming (such as brushing teeth)  2  -DIPAK      Eating meals  1  -DIPAK      AM-PAC 6 Clicks Score (OT)  7  -DIPAK         Functional Assessment    Outcome Measure Options  AM-PAC 6 Clicks Daily Activity (OT)  -DIPAK        User Key  (r) = Recorded By, (t) = Taken By, (c) = Cosigned By    Initials Name Provider Type    Stephanie Dowell OT Occupational Therapist          Time Calculation:   Time Calculation- OT     Row Name 01/09/20 0757             Time Calculation- OT    OT Start Time  0757  -DIPAK      OT Received On  01/09/20  -DIPAK      OT Goal Re-Cert Due Date  01/19/20  -         Timed Charges    27186 - OT Therapeutic Activity Minutes  6  -DIPAK      80247 - OT Self Care/Mgmt Minutes  8  -DIPAK        User Key  (r) = Recorded By, (t) = Taken By, (c) = Cosigned By    Initials Name Provider Type    Stephanie Dowell OT Occupational Therapist        Therapy Charges for Today     Code Description Service Date Service Provider Modifiers Qty    27450157660 HC OT SELF CARE/MGMT/TRAIN EA 15 MIN 1/9/2020 Stephanie Villasenor OT GO 1    31412937085 HC OT EVAL MOD COMPLEXITY 4 1/9/2020 Stephanie Villasenor OT GO 1    97317794243 HC OT THER SUPP EA 15 MIN 1/9/2020 Stephanie Villasenor OT GO 2               Stephanie Villasenor OT  1/9/2020

## 2020-01-09 NOTE — PROGRESS NOTES
"                  Clinical Nutrition     Nutrition Support Assessment  Reason for Visit:   Difficulty chewing/swallowing      Patient Name: Glo Berry  YOB: 1955  MRN: [id number]  Date of Encounter: 01/09/20 11:21 AM  Admission date: 1/6/2020        Nutrition Assessment   Assessment       Hospital Problem List    Encephalopathy acute    R/O AIS     R/O status epilepticus     Hypertension    Pancreatic insufficiency    T2DM on metformin     Chronic diarrhea    Lumbar stenosis    Diabetic neuropathy     Depression    Dementia     Smoker    Acute respiratory failure     Encephalopathy    ARF/VENT 1-6-20    Extubated 1-8-20    PMH: She  has a past medical history of COPD (chronic obstructive pulmonary disease) (CMS/[facility]), Diabetes mellitus (CMS/[facility]), History of ear injury (1973), and Hypertension.   PSxH: She  has a past surgical history that includes Sinus surgery; Tubal ligation; Breast cyst excision; and Tonsillectomy.     Reported/Observed/Food/Nutrition Related History:     Pt sitting in chair, is not alert, family at bedside    Family reports h/o of \" IBS, clot to the gut\"     Per RN: pt has weak cough, dazed    Labs reviewed     Results from last 7 days   Lab Units 01/09/20  0558 01/08/20  0326 01/07/20 2053 01/07/20  0333 01/06/20  1821   GLUCOSE mg/dL 110* 98  --  125* 147*   BUN mg/dL 12 17  --  18 19   CREATININE mg/dL 0.54* 0.54*  --  0.75 0.75   SODIUM mmol/L 147* 143  --  142 139   CHLORIDE mmol/L 108* 112*  --  107 103   POTASSIUM mmol/L 3.2* 4.2 4.5 3.3* 3.4*   PHOSPHORUS mg/dL 2.3* 2.7  --  2.7 4.2   MAGNESIUM mg/dL 2.0 1.8  --  3.1* 1.4*   ALT (SGPT) U/L 14  --   --   --  18     Results from last 7 days   Lab Units 01/09/20  0558 01/07/20  0333 01/06/20  1821   ALBUMIN g/dL 3.60  --  4.10   CHOLESTEROL mg/dL  --  108  --    TRIGLYCERIDES mg/dL  --  231*  --        Results from last 7 days   Lab Units 01/09/20  0519 01/08/20  2309 01/08/20  1714 01/08/20  1135 01/08/20  0553 " 01/07/20  1726   GLUCOSE mg/dL 116 141* 117 130 122 127     Lab Results   Lab Value Date/Time    HGBA1C 5.60 01/06/2020 1821         Medications reviewed   Pertinent:pepcid, insulin, keppra, cardene@75ml, NS @25ml/hr      Intake/Ouptut 24 hrs (7:00AM - 6:59 AM)     Intake & Output (last day)       01/08 0701 - 01/09 0700 01/09 0701 - 01/10 0700    I.V. (mL/kg) 2171.8 (33.8) 277 (4.3)    IV Piggyback 200     Total Intake(mL/kg) 2371.8 (36.9) 277 (4.3)    Urine (mL/kg/hr) 300 (0.2)     Total Output 300     Net +2071.8 +277          Urine Unmeasured Occurrence 6 x 1 x             GI: wnl    SKIN: wnl    Current Nutrition Prescription     PO: NPO Diet  No active supplement orders         Nutrition Diagnosis     1-9-20  Problem Swallowing difficulty   Etiology Encephalopathy   Signs/Symptoms PER SLP Eval     Problem Altered GI function   Etiology h/o Pancreatic Insuffiency, ? IBS/ clot   Signs/Symptoms Chronic diarrhea       Nutrition Intervention   1.  Follow treatment progress    Pt failed SLP eval, suggest start EN: Peptamen AF @60ml/hr, free water @40ml/hr, rec stop NS    May need more fluid to correct hypernatremia     Rec MVI as TF volume too low to meet RDI's          At Target Goal Volume     % Est needs   Volume  1200ml     Energy/kcals 1440kcals 100%   Protein 91g pro 100%   Fiber 7g             Fluid via EN 974mL     Total Fluid  1774     Meets 100% RDI No          Goal:   General: Nutrition support treatment    EN/PN: Initiate EN    Additional goals: once pt able to take po diet, suggest resume creon supplements      Monitoring/Evaluation:   Per protocol, I&O, Pertinent labs, Skin status, GI status, Symptoms, Swallow function      Will Continue to follow per protocol      Sharon Mcknight RD, [facility]  Time Spent: 45

## 2020-01-09 NOTE — PLAN OF CARE
Patient drowsy but easily aroused during the shift.  Patient is confused to place and situation but is calm and cooperative. Respiratory rate between 22 and 28 on 4 liters of humidified oxygen vi nasal cannula. SBP has been 140's to 180's on Cardene.  MRI successfully performed this A.M.

## 2020-01-09 NOTE — NURSING NOTE
Dr Hull aware that patient remains confused and disoriented. She tries to pull out NG, get OOB or out of chair. Restraints in use.

## 2020-01-09 NOTE — PLAN OF CARE
Problem: Fall Risk (Adult)  Goal: Absence of Fall  Outcome: Ongoing (interventions implemented as appropriate)  Flowsheets (Taken 1/9/2020 3402)  Absence of Fall: making progress toward outcome

## 2020-01-09 NOTE — PROGRESS NOTES
Continued Stay Note  [facility]     Patient Name: Glo Berry  MRN: [id number]  Today's Date: 1/9/2020    Admit Date: 1/6/2020    Discharge Plan     Row Name 01/09/20 1500       Plan    Plan Comments  Talked to Mrs. Berry and her family at .  We discussed discharge plans for Mrs. Berry.  SNF level care is recommended per PT.OT.  They would like her to transfer to [facility] swing bed as 1st choice and [facility] as 2nd choice.  Will refer to both.  Goal is to transfer to SNF when medically ready.      Final Discharge Disposition Code  03 - skilled nursing facility (SNF)        Discharge Codes    No documentation.             Jane Bowers RN

## 2020-01-09 NOTE — PLAN OF CARE
Patient remains confused and disoriented. She is restless and constantly attempting to pull self over side rails of bed or arms of chair. Staff has repositioned her, moved her from bed to chair and back a couple of times. Family at bedside trying to get her to relax. Restraints were finally needed to keep patient from getting up and hurting self as so is not able to walk at this time. Patient still with coarse lung sounds, weak cough. Irregular heart rate at times. No edema. Afebrile today. Where she is constantly pushing self around in bed or chair, her heels have become red. Heels protectors placed on patient as well as padding to side rail.

## 2020-01-09 NOTE — PLAN OF CARE
Problem: Pain, Chronic (Adult)  Goal: Acceptable Pain/Comfort Level and Functional Ability  Outcome: Ongoing (interventions implemented as appropriate)  Flowsheets (Taken 1/9/2020 8435)  Acceptable Pain/Comfort Level and Functional Ability: making progress toward outcome

## 2020-01-09 NOTE — THERAPY EVALUATION
Acute Care - Speech Language Pathology Initial Evaluation  [facility]   Cognitive-Communication Evaluation  Clinical Swallow Evaluation     Patient Name: Glo Berry  : 1955  MRN: [id number]  Today's Date: 2020               Admit Date: 2020     Visit Dx:    ICD-10-CM ICD-9-CM   1. Impaired mobility and ADLs Z74.09 799.89   2. Dysphagia, unspecified type R13.10 787.20   3. Cognitive communication deficit R41.841 799.52     Patient Active Problem List   Diagnosis   • Uterine prolapse   • Stress incontinence of urine   • Urgency incontinence   • Diarrhea   • Encephalopathy acute   • R/O AIS    • R/O status epilepticus    • Hypertension   • Pancreatic insufficiency   • T2DM on metformin    • Chronic diarrhea   • Lumbar stenosis   • Diabetic neuropathy    • Depression   • Dementia    • Smoker   • Acute respiratory failure    • Encephalopathy     Past Medical History:   Diagnosis Date   • COPD (chronic obstructive pulmonary disease) (CMS/HCC)    • Diabetes mellitus (CMS/[facility])    • History of ear injury 1973    Injury of the right eardrum.This has resulted in the headaches   • Hypertension      Past Surgical History:   Procedure Laterality Date   • BREAST CYST EXCISION     • SINUS SURGERY     • TONSILLECTOMY     • TUBAL ABDOMINAL LIGATION          SLP EVALUATION (last 72 hours)      SLP SLC Evaluation     Row Name 20 [id number]                   General Information    Prior Level of Function-Communication  other (see comments) baseline dementia  -SM           Comprehension Assessment/Intervention    Comprehension Assessment/Intervention  Auditory Comprehension Did not attempt reading/writing 2' restless  -SM           Auditory Comprehension Assessment/Intervention    Answers Questions (Communication)  yes/no;wh questions;personal;simple;mild impairment;moderate impairment  -SM        Able to Follow Commands (Communication)  1-step;mild impairment  -SM        Narrative Discourse  moderate impairment   -           Expression Assessment/Intervention    Expression Assessment/Intervention  verbal expression  -           Verbal Expression Assessment/Intervention    Spontaneous/Functional Words  mild impairment  -        Conversational Discourse/Fluency  mild impairment;moderate impairment  -           Motor Speech Assessment/Intervention    Motor Speech Function  WFL  -           Cognitive Assessment Intervention- SLP    Cognitive Function (Cognition)  moderate impairment  -        Orientation Status (Cognition)  unable/difficult to assess  -        Attention (Cognitive)  selective;sustained;moderate impairment  -        Cognition, Comment  restless, negatively impacted cog-comm skills  -           SLP Clinical Impressions    SLP Diagnosis  Moderate cog-comm deficits, negatively impacted by current restless state  -        Rehab Potential/Prognosis  good  -        SLC Criteria for Skilled Therapy Interventions Met  yes  -           Recommendations    Therapy Frequency (SLP SLC)  5 days per week  -        Predicted Duration Therapy Intervention (Days)  until discharge  -        Anticipated Dischage Disposition  anticipate therapy at next level of care  -          User Key  (r) = Recorded By, (t) = Taken By, (c) = Cosigned By    Initials Name Effective Dates     Elizabet Kaur MS CCC-SLP 06/22/15 -              EDUCATION  The patient has been educated in the following areas:     Cognitive Impairment Communication Impairment.    SLP Recommendation and Plan  SLP Diagnosis: Moderate cog-comm deficits, negatively impacted by current restless state     [facility] [place] [place] Criteria for Skilled Therapy Interventions Met: yes  Anticipated Dischage Disposition: anticipate therapy at next level of care     Predicted Duration Therapy Intervention (Days): until discharge           SLP GOALS     Row Name 01/09/20 0930             Comprehend Questions Goal 1 (SLP)    Improve Ability to Comprehend Questions Goal  1 (SLP)  simple yes/no questions;simple wh questions;90%;with minimal cues (75-90%)  -      Time Frame (Comprehend Questions Goal 1, SLP)  short term goal (STG);by discharge  -         Follow Directions Goal 2 (SLP)    Improve Ability to Follow Directions Goal 1 (SLP)  1 step direction without objects;90%;with minimal cues (75-90%)  -      Time Frame (Follow Directions Goal 1, SLP)  short term goal (STG);by discharge  -         Attention Goal 1 (SLP)    Improve Attention by Goal 1 (SLP)  complete selective attention task;90%;with minimal cues (75-90%)  -      Time Frame (Attention Goal 1, SLP)  short term goal (STG);by discharge  -         Additional Goal 1 (SLP)    Additional Goal 1, SLP  LTG: Improve cog-comm skills in order to participate in care while in hospital setting with 80% accuracy and cues  -      Time Frame (Additional Goal 1, SLP)  by discharge  -        User Key  (r) = Recorded By, (t) = Taken By, (c) = Cosigned By    Initials Name Provider Type    Elizabet Oropeza MS CCC-SLP Speech and Language Pathologist                  Time Calculation:     Time Calculation- SLP     Row Name 20 1035             Time Calculation- SLP    SLP Start Time  0930  -      SLP Received On  20  -        User Key  (r) = Recorded By, (t) = Taken By, (c) = Cosigned By    Initials Name Provider Type    Elizabet Oropeza MS CCC-SLP Speech and Language Pathologist          Therapy Charges for Today     Code Description Service Date Service Provider Modifiers Qty    52081424500 HC ST EVAL ORAL PHARYNG SWALLOW 2 2020 Elizabet Kaur MS CCC-SLP GN 1    47117607710 HC ST EVAL SPEECH AND PROD W LANG  1 2020 lEizabet Kaur MS CCC-SLP GN 1                     Elizabet Kaur MS CCC-SLP  2020 and Acute Care - Speech Language Pathology   Swallow Initial Evaluation  [place]     Patient Name: Glo Berry  : 1955  MRN: [id number]  Today's Date:  1/9/2020               Admit Date: 1/6/2020    Visit Dx:     ICD-10-CM ICD-9-CM   1. Impaired mobility and ADLs Z74.09 799.89   2. Dysphagia, unspecified type R13.10 787.20   3. Cognitive communication deficit R41.841 799.52     Patient Active Problem List   Diagnosis   • Uterine prolapse   • Stress incontinence of urine   • Urgency incontinence   • Diarrhea   • Encephalopathy acute   • R/O AIS    • R/O status epilepticus    • Hypertension   • Pancreatic insufficiency   • T2DM on metformin    • Chronic diarrhea   • Lumbar stenosis   • Diabetic neuropathy    • Depression   • Dementia    • Smoker   • Acute respiratory failure    • Encephalopathy     Past Medical History:   Diagnosis Date   • COPD (chronic obstructive pulmonary disease) (CMS/HCC)    • Diabetes mellitus (CMS/[facility])    • History of ear injury 1973    Injury of the right eardrum.This has resulted in the headaches   • Hypertension      Past Surgical History:   Procedure Laterality Date   • BREAST CYST EXCISION     • SINUS SURGERY     • TONSILLECTOMY     • TUBAL ABDOMINAL LIGATION          SWALLOW EVALUATION (last 72 hours)      SLP Adult Swallow Evaluation     Row Name 01/09/20 0930                   Rehab Evaluation    Document Type  evaluation  -SM        Patient Observations  alert;cooperative  -SM        Patient/Family Observations  Son present  -SM           General Information    Patient Profile Reviewed  yes  -SM        Pertinent History Of Current Problem  Int 1/6-1/8. AMS, ? PRES  -SM        Current Method of Nutrition  NPO  -SM        Prior Level of Function-Swallowing  other (see comments) some GI issues at baseline per son  -        Plans/Goals Discussed with  patient and family;agreed upon  -        Barriers to Rehab  none identified  -        Patient's Goals for Discharge  patient did not state  -        Family Goals for Discharge  patient able to return to PO diet;patient able to eat/drink without coughing/choking  -           Pain  Assessment    Additional Documentation  Pain Scale: FACES Pre/Post-Treatment (Group)  -           Pain Scale: FACES Pre/Post-Treatment    Pain: FACES Scale, Pretreatment  4-->hurts little more  -SM        Pain: FACES Scale, Post-Treatment  4-->hurts little more  -SM        Pre/Post Treatment Pain Comment  restless, RN aware and managing  -           Oral Musculature and Cranial Nerve Assessment    Oral Motor General Assessment  generalized oral motor weakness  -           Clinical Swallow Eval    Oral Prep Phase  -- DNT solids  -        Pharyngeal Phase  suspected pharyngeal impairment  -           Pharyngeal Phase Concerns    Pharyngeal Phase Concerns  wet vocal quality;multiple swallows;cough  -        Wet Vocal Quality  thin  -        Multiple Swallows  pudding  -        Cough  thin;other (see comments) delayed  -           Clinical Impression    SLP Swallowing Diagnosis  suspected pharyngeal dysfunction  -        Functional Impact  risk of aspiration/pneumonia  -           Recommendations    SLP Diet Recommendation  NPO  -        Recommended Diagnostics  reassess via clinical swallow evaluation;other (see comments) will check back tomorrow for ? clinical improvement  -        SLP Rec. for Method of Medication Administration  meds via alternate route  -          User Key  (r) = Recorded By, (t) = Taken By, (c) = Cosigned By    Initials Name Effective Dates     Elizabet Kaur, MS CCC-SLP 06/22/15 -           EDUCATION  The patient has been educated in the following areas:   Dysphagia (Swallowing Impairment) NPO rationale.    SLP Recommendation and Plan  SLP Swallowing Diagnosis: suspected pharyngeal dysfunction  SLP Diet Recommendation: NPO     SLP Rec. for Method of Medication Administration: meds via alternate route        Recommended Diagnostics: reassess via clinical swallow evaluation, other (see comments)(will check back tomorrow for ? clinical improvement)     Anticipated  Dischage Disposition: anticipate therapy at next level of care        Predicted Duration Therapy Intervention (Days): until discharge            SLP GOALS     Row Name 01/09/20 0930             Comprehend Questions Goal 1 (SLP)    Improve Ability to Comprehend Questions Goal 1 (SLP)  simple yes/no questions;simple wh questions;90%;with minimal cues (75-90%)  -      Time Frame (Comprehend Questions Goal 1, SLP)  short term goal (STG);by discharge  -         Follow Directions Goal 2 (SLP)    Improve Ability to Follow Directions Goal 1 (SLP)  1 step direction without objects;90%;with minimal cues (75-90%)  -      Time Frame (Follow Directions Goal 1, SLP)  short term goal (STG);by discharge  -         Attention Goal 1 (SLP)    Improve Attention by Goal 1 (SLP)  complete selective attention task;90%;with minimal cues (75-90%)  -      Time Frame (Attention Goal 1, SLP)  short term goal (STG);by discharge  -         Additional Goal 1 (SLP)    Additional Goal 1, SLP  LTG: Improve cog-comm skills in order to participate in care while in hospital setting with 80% accuracy and cues  -      Time Frame (Additional Goal 1, SLP)  by discharge  -        User Key  (r) = Recorded By, (t) = Taken By, (c) = Cosigned By    Initials Name Provider Type    Elizabet Oropeza MS CCC-SLP Speech and Language Pathologist             Time Calculation:   Time Calculation- SLP     Row Name 01/09/20 1035             Time Calculation- SLP    SLP Start Time  0930  -      SLP Received On  01/09/20  -        User Key  (r) = Recorded By, (t) = Taken By, (c) = Cosigned By    Initials Name Provider Type    Elizabet Oropeza MS CCC-SLP Speech and Language Pathologist          Therapy Charges for Today     Code Description Service Date Service Provider Modifiers Qty    90818490708 HC ST EVAL ORAL PHARYNG SWALLOW 2 1/9/2020 Elizabet Kaur MS CCC-SLP GN 1    24331376734 HC ST EVAL SPEECH AND PROD W LANG  1 1/9/2020  Elizabet Kaur, MS CCC-SLP GN 1               Elizabet Kaur, MS CCC-SLP  1/9/2020

## 2020-01-09 NOTE — PLAN OF CARE
Problem: Skin Injury Risk (Adult)  Goal: Skin Health and Integrity  Outcome: Ongoing (interventions implemented as appropriate)  Flowsheets (Taken 1/9/2020 7003)  Skin Health and Integrity: making progress toward outcome

## 2020-01-09 NOTE — NURSING NOTE
Patient very restless. She wants a drink of water. It was explained to her that she did not pass the dysphagia eval and could not have anything. It was offered to have a pink swab for her mouth. RN tried to reposition patient and help her relax.

## 2020-01-09 NOTE — PROGRESS NOTES
Neurology       Patient Care Team:  Philomena Walters MD as PCP - General (Internal Medicine)    Chief complaint: Sleepy    History: Patient is off the ventilator and is lethargic.    She has been weaned off Precedex but is still taking risperidone 1 mg twice daily and is on Dilantin 300 mg daily.      Past Medical History:   Diagnosis Date   • COPD (chronic obstructive pulmonary disease) (CMS/[facility])    • Diabetes mellitus (CMS/[facility])    • History of ear injury 1973    Injury of the right eardrum.This has resulted in the headaches   • Hypertension        Vital Signs   Vitals:    01/09/20 0730 01/09/20 0800 01/09/20 0900 01/09/20 1000   BP: 161/90 177/89 159/83 168/86   BP Location:  Left arm     Patient Position:  Lying     Pulse: 120 109 96 101   Resp:  24  22   Temp:  97.7 °F (36.5 °C)     TempSrc:  Axillary     SpO2: 91% 91% 92% 92%   Weight:       Height:           Physical Exam:   General: Calm but quite sleepy              Neuro: Oriented to person.    Speech is soft and articulate.    Cranial nerves show benign fundi full eye movements symmetrical face.    Motor testing shows symmetrical strength but overall severe weakness.        Results Review:  The brain shows minimal ischemic white matter change and no acute stroke.      Results from last 7 days   Lab Units 01/09/20  0558   WBC 10*3/mm3 11.29*   HEMOGLOBIN g/dL 8.5*   HEMATOCRIT % 28.9*   PLATELETS 10*3/mm3 82*     Results from last 7 days   Lab Units 01/09/20  0558 01/08/20  0326 01/07/20  2053 01/07/20  0333 01/06/20  1821   SODIUM mmol/L 147* 143  --  142 139   POTASSIUM mmol/L 3.2* 4.2 4.5 3.3* 3.4*   CHLORIDE mmol/L 108* 112*  --  107 103   CO2 mmol/L 18.0* 18.0*  --  20.0* 21.0*   BUN mg/dL 12 17  --  18 19   CREATININE mg/dL 0.54* 0.54*  --  0.75 0.75   CALCIUM mg/dL 9.2 8.6  --  9.1 9.2   BILIRUBIN mg/dL 0.3  --   --   --  0.3   ALK PHOS U/L 46  --   --   --  46   ALT (SGPT) U/L 14  --   --   --  18   AST (SGOT) U/L 31  --   --   --  40*   GLUCOSE  mg/dL 110* 98  --  125* 147*       Imaging Results (Last 24 Hours)     Procedure Component Value Units Date/Time    MRI Brain Without Contrast [[id number]] Collected:  01/09/20 0858     Updated:  01/09/20 1028    Narrative:       EXAMINATION: MRI BRAIN WO CONTRAST- 01/09/2020     INDICATION: Malignant hypertension     TECHNIQUE: Multiplanar MRI of the brain without intravenous contrast     COMPARISON: NONE     FINDINGS: Mild motion degradation:  No restriction on diffusion-weighted sequences. Midline structures are  symmetric without evidence of mass, mass effect or midline shift.  Ventricles and sulci within normal limits. Mild amount of increased T2  and FLAIR signal findings in the periventricular and deep white matter  suggesting chronic small vessel ischemic disease. Pituitary and sella  within normal limits. Cervicomedullary junction of limited evaluation  due to motion degradation. Globes and orbits retain normal T2 signal  characteristics. Visualized paranasal sinuses and mastoid air cells are  grossly clear and well pneumatized with the exception of trace bilateral  mastoid effusions. No cerebellopontine angle mass lesion. Normal signal  flow voids in the distal internal carotid and vertebrobasilar arteries.       Impression:       No acute intracranial findings of acute infarction or signal  abnormality other than mild increased signal findings of likely chronic  small vessel ischemic disease in the periventricular and subcortical  white matter of typical predominance in the periventricular regions.  Posterior parenchyma without subcortical signal abnormality or  restriction.      D:  01/09/2020  E:  01/09/2020     This report was finalized on 1/9/2020 10:25 AM by Dr. Sean Gonzalez.       XR Chest 1 View [[id number]] Collected:  01/09/20 0859     Updated:  01/09/20 0944    Narrative:       EXAMINATION: XR CHEST 1 VW- 01/09/2020     INDICATION: resp failure, extubated 01/08/2020     COMPARISON: 01/08/2020      FINDINGS: ET tube is seen at the level of the clavicles. NG tube is seen  in the stomach. The heart is enlarged, vasculature is cephalized. Left  basilar atelectasis has resolved. No new pulmonary parenchymal disease,  effusion or pneumothorax is seen.       Impression:       Interval clearing of mild left basilar atelectasis. No new  chest disease is seen.     D:  01/09/2020  E:  01/09/2020           CT Cerebral Perfusion With & Without Contrast [[id number]] Collected:  01/06/20 1638     Updated:  01/09/20 0843    Narrative:       EXAMINATION: CT CEREBRAL PERFUSION W WO CONTRAST-      INDICATION: TIA, initial screening, stroke symptoms.     TECHNIQUE: Cerebral perfusion analysis was performed using computed  tomography with contrast administration, including post processing of  parametric maps with determination of cerebral blood flow, cerebral  blood volume, and mean transit time.     The radiation dose reduction device was turned on for each scan per the  ALARA (As Low as Reasonably Achievable) protocol.     COMPARISON: None.     FINDINGS: CEREBRAL BLOOD VOLUME: There is normal symmetrical cerebral  blood volume on perfusion maps.     CEREBRAL BLOOD FLOW: There is normal symmetrical cerebral blood flow on  perfusion maps.     TIME TO PEAK: There is normal symmetrical time to peak on perfusion  maps.     MEAN TRANSIT TIME: Normal symmetric appearance of mean transient time  perfusion map.     PERFUSION ANALYSIS: Normal cerebral perfusion.       Impression:       No perfusion abnormality to suggest evidence of reversible  ischemia.     D:  01/06/2020  E:  01/07/2020                 This report was finalized on 1/9/2020 8:40 AM by Dr. Trena Nettles MD.       XR Chest 1 View [[id number]] Collected:  01/08/20 0912     Updated:  01/08/20 1200    Narrative:       EXAMINATION: XR CHEST 1 VW-01/08/2020:      INDICATION: Intubated patient.      COMPARISON: 01/07/2020.     FINDINGS: The endotracheal tube is well  positioned. The heart is  slightly large. The heart is compensated. There is minimal left basilar  airspace disease, likely representing atelectasis.           Impression:       There is left basilar atelectasis which has developed since  the previous examination, mild. The endotracheal tube remains well  positioned and there has otherwise been no change since the previous  exam.     D:  01/08/2020  E:  01/08/2020     This report was finalized on 1/8/2020 11:57 AM by Dr. Noe Duggan MD.             Assessment:  Malignant hypertension with seizures and encephalopathy    Plan:  Resume Coreg 25 mg twice daily.    Discontinue Precedex.    Discontinue Dilantin.    Keppra 500 mg twice daily.    Decrease Risperdal to 1 mg at bedtime.    Resume Zoloft 100 mg at bedtime.    Trazodone 50 mg at bedtime as needed for sleep    Comment:  Patient is slowly recovering.  She does not appear to have pressor any new areas of brain injury.         I discussed the patients findings and my recommendations with patient, family and nursing staff    Buzz Petersen MD  01/09/20  10:49 AM

## 2020-01-09 NOTE — PLAN OF CARE
Problem: Patient Care Overview  Goal: Plan of Care Review  Outcome: Ongoing (interventions implemented as appropriate)  Flowsheets (Taken 1/8/2020 2000 by Ariadna Santos, RN)  Plan of Care Reviewed With: patient;son  Note:   SLP evaluation completed. Continue NPO. Will continue to address dysphagia and cog-comm deficits. Please see note for further details and recommendations.

## 2020-01-09 NOTE — THERAPY EVALUATION
Patient Name: Glo Berry  : 1955    MRN: [id number]                              Today's Date: 2020       Admit Date: 2020    Visit Dx:     ICD-10-CM ICD-9-CM   1. Impaired mobility and ADLs Z74.09 799.89   2. Dysphagia, unspecified type R13.10 787.20   3. Cognitive communication deficit R41.841 799.52     Patient Active Problem List   Diagnosis   • Uterine prolapse   • Stress incontinence of urine   • Urgency incontinence   • Diarrhea   • Encephalopathy acute   • R/O AIS    • R/O status epilepticus    • Hypertension   • Pancreatic insufficiency   • T2DM on metformin    • Chronic diarrhea   • Lumbar stenosis   • Diabetic neuropathy    • Depression   • Dementia    • Smoker   • Acute respiratory failure    • Encephalopathy     Past Medical History:   Diagnosis Date   • COPD (chronic obstructive pulmonary disease) (CMS/HCC)    • Diabetes mellitus (CMS/HCC)    • History of ear injury 1973    Injury of the right eardrum.This has resulted in the headaches   • Hypertension      Past Surgical History:   Procedure Laterality Date   • BREAST CYST EXCISION     • SINUS SURGERY     • TONSILLECTOMY     • TUBAL ABDOMINAL LIGATION       General Information     Row Name 20 1457          PT Evaluation Time/Intention    Document Type  evaluation  -KR     Mode of Treatment  physical therapy  -KR     Row Name 20 1457          General Information    Patient Profile Reviewed?  yes  -KR     Prior Level of Function  independent:;all household mobility;gait;transfer;ADL's;dressing;bathing pt day to day with independence; difficulty getting around house  -KR     Existing Precautions/Restrictions  fall;oxygen therapy device and L/min;other (see comments) NG  -KR     Barriers to Rehab  previous functional deficit;cognitive status  -KR     Row Name 20 1457          Relationship/Environment    Lives With  spouse  -KR     Row Name 20 1457          Resource/Environmental Concerns    Current Living  Arrangements  home/apartment/condo  -KR     Row Name 01/09/20 1457          Home Main Entrance    Number of Stairs, Main Entrance  two  -KR     Stair Railings, Main Entrance  none  -KR     Row Name 01/09/20 1457          Stairs Within Home, Primary    Number of Stairs, Within Home, Primary  none  -KR     Row Name 01/09/20 1457          Cognitive Assessment/Intervention- PT/OT    Orientation Status (Cognition)  oriented to;person  -KR     Row Name 01/09/20 1457          Safety Issues, Functional Mobility    Safety Issues Affecting Function (Mobility)  ability to follow commands;awareness of need for assistance;insight into deficits/self awareness;safety precaution awareness;safety precautions follow-through/compliance  -KR     Impairments Affecting Function (Mobility)  balance;cognition;coordination;endurance/activity tolerance;shortness of breath;strength;postural/trunk control  -KR       User Key  (r) = Recorded By, (t) = Taken By, (c) = Cosigned By    Initials Name Provider Type    Jovita Villegas PT Physical Therapist        Mobility     Row Name 01/09/20 1458          Bed Mobility Assessment/Treatment    Comment (Bed Mobility)  UIC  -KR     Row Name 01/09/20 1458          Transfer Assessment/Treatment    Comment (Transfers)  STS x2 from chair. PT/tech in front on either side to block raciel knees and provide B UE support. Pt required VC's for sequencing and safe hand placement. Upon standing pt demonstrated very forward flexed posture; difficulty staying alert.   -KR     Row Name 01/09/20 1458          Sit-Stand Transfer    Sit-Stand [place] (Transfers)  moderate assist (50% patient effort);2 person assist;verbal cues  -KR     Assistive Device (Sit-Stand Transfers)  other (see comments) B UE support  -KR     Row Name 01/09/20 1458          Gait/Stairs Assessment/Training    Gait/Stairs Assessment/Training  gait/ambulation independence  -KR     [place] Level (Gait)  moderate assist (50% patient  effort);2 person assist;verbal cues  -KR     Assistive Device (Gait)  other (see comments) B UE support  -KR     Distance in Feet (Gait)  20  -KR     Pattern (Gait)  step-to  -KR     Deviations/Abnormal Patterns (Gait)  base of support, narrow;alla decreased;festinating/shuffling;stride length decreased  -KR     Bilateral Gait Deviations  forward flexed posture;heel strike decreased  -KR     Comment (Gait/Stairs)  Pt demonstrated step to gait pattern with slow alla and small, shuffled steps. Max cueing for upright posture. B UE support for increased stability. Pt required increased encouragement to participate. Pt very lethargic; difficulty keeping eyes open during ambulation. Returned to room in chair.   -KR       User Key  (r) = Recorded By, (t) = Taken By, (c) = Cosigned By    Initials Name Provider Type    Jovita Villegas, PT Physical Therapist        Obj/Interventions     Row Name 01/09/20 1501          General ROM    GENERAL ROM COMMENTS  BLE WFL   -KR     Row Name 01/09/20 1501          MMT (Manual Muscle Testing)    General MMT Comments  BLE grossly 3+/5  -KR     Row Name 01/09/20 1501          Static Sitting Balance    Level of [place] (Unsupported Sitting, Static Balance)  moderate assist, 50 to 74% patient effort  -KR     Sitting Position (Unsupported Sitting, Static Balance)  sitting in chair  -KR     Comment (Unsupported Sitting, Static Balance)  demonstrated significant posterior lean  -KR     Row Name 01/09/20 1501          Static Standing Balance    Level of [place] (Supported Standing, Static Balance)  minimal assist, 75% patient effort;2 person assist  -KR     Assistive Device Utilized (Supported Standing, Static Balance)  other (see comments) B UE support  -KR     Row Name 01/09/20 1501          Dynamic Standing Balance    Level of [place], Reaches Outside Midline (Standing, Dynamic Balance)  moderate assist, 50 to 74% patient effort;2 person assist  -KR     Assistive  Device Utilized (Supported Standing, Dynamic Balance)  other (see comments) B UE support  -KR     Row Name 01/09/20 1501          Sensory Assessment/Intervention    Sensory General Assessment  -- unable to formally assess due to cognitive status   -KR       User Key  (r) = Recorded By, (t) = Taken By, (c) = Cosigned By    Initials Name Provider Type    Jovita Villegas, PT Physical Therapist        Goals/Plan     Row Name 01/09/20 1503          Bed Mobility Goal 1 (PT)    Activity/Assistive Device (Bed Mobility Goal 1, PT)  sit to supine;supine to sit  -KR     [place] Level/Cues Needed (Bed Mobility Goal 1, PT)  minimum assist (75% or more patient effort)  -KR     Time Frame (Bed Mobility Goal 1, PT)  2 weeks  -KR     Progress/Outcomes (Bed Mobility Goal 1, PT)  goal ongoing  -KR     Row Name 01/09/20 1503          Transfer Goal 1 (PT)    Activity/Assistive Device (Transfer Goal 1, PT)  sit-to-stand/stand-to-sit;bed-to-chair/chair-to-bed;walker, rolling  -KR     [place] Level/Cues Needed (Transfer Goal 1, PT)  minimum assist (75% or more patient effort)  -KR     Time Frame (Transfer Goal 1, PT)  2 weeks  -KR     Progress/Outcome (Transfer Goal 1, PT)  goal ongoing  -KR     Row Name 01/09/20 1503          Gait Training Goal 1 (PT)    Activity/Assistive Device (Gait Training Goal 1, PT)  gait (walking locomotion);assistive device use;walker, rolling  -KR     [place] Level (Gait Training Goal 1, PT)  minimum assist (75% or more patient effort)  -KR     Distance (Gait Goal 1, PT)  100 feet  -KR     Time Frame (Gait Training Goal 1, PT)  2 weeks  -KR     Progress/Outcome (Gait Training Goal 1, PT)  goal ongoing  -KR       User Key  (r) = Recorded By, (t) = Taken By, (c) = Cosigned By    Initials Name Provider Type    Jovita Villegas PT Physical Therapist        Clinical Impression     Row Name 01/09/20 1502          Pain Assessment    Additional Documentation  Pain Scale: Numbers Pre/Post-Treatment  (Group)  -KR     Row Name 01/09/20 1502          Pain Scale: Numbers Pre/Post-Treatment    Pain Scale: Numbers, Pretreatment  0/10 - no pain  -KR     Pain Scale: Numbers, Post-Treatment  0/10 - no pain  -KR     Row Name 01/09/20 1502          Physical Therapy Clinical Impression    Patient/Family Goals Statement (PT Clinical Impression)  return to PLOF  -KR     Criteria for Skilled Interventions Met (PT Clinical Impression)  yes;treatment indicated  -KR     Rehab Potential (PT Clinical Summary)  good, to achieve stated therapy goals  -KR     Row Name 01/09/20 1502          Vital Signs    Pre Systolic BP Rehab  162  -KR     Pre Treatment Diastolic BP  84  -KR     Post Systolic BP Rehab  174 pt unable to rest arm for accurate BP  -KR     Post Treatment Diastolic BP  108  -KR     Pretreatment Heart Rate (beats/min)  122  -KR     Posttreatment Heart Rate (beats/min)  123  -KR     Pre SpO2 (%)  92  -KR     O2 Delivery Pre Treatment  supplemental O2  -KR     Post SpO2 (%)  95  -KR     O2 Delivery Post Treatment  supplemental O2  -KR     Pre Patient Position  Sitting  -KR     Intra Patient Position  Standing  -KR     Post Patient Position  Sitting  -KR     Row Name 01/09/20 1502          Positioning and Restraints    Pre-Treatment Position  sitting in chair/recliner  -KR     Post Treatment Position  chair  -KR     In Chair  notified nsg;reclined;call light within reach;encouraged to call for assist;exit alarm on;with family/caregiver;RUE elevated;LUE elevated;legs elevated  -KR       User Key  (r) = Recorded By, (t) = Taken By, (c) = Cosigned By    Initials Name Provider Type    Jovita Villegas, PT Physical Therapist        Outcome Measures     Row Name 01/09/20 1504          How much help from another person do you currently need...    Turning from your back to your side while in flat bed without using bedrails?  3  -KR     Moving from lying on back to sitting on the side of a flat bed without bedrails?  2  -KR      Moving to and from a bed to a chair (including a wheelchair)?  2  -KR     Standing up from a chair using your arms (e.g., wheelchair, bedside chair)?  2  -KR     Climbing 3-5 steps with a railing?  1  -KR     To walk in hospital room?  2  -KR     AM-PAC 6 Clicks Score (PT)  12  -KR     Row Name 01/09/20 1504          Functional Assessment    Outcome Measure Options  AM-PAC 6 Clicks Basic Mobility (PT)  -KR       User Key  (r) = Recorded By, (t) = Taken By, (c) = Cosigned By    Initials Name Provider Type    Jovita Villegas, PT Physical Therapist          PT Recommendation and Plan  Planned Therapy Interventions (PT Eval): balance training, bed mobility training, gait training, strengthening, transfer training  Outcome Summary/Treatment Plan (PT)  Anticipated Discharge Disposition (PT): skilled nursing facility  Plan of Care Reviewed With: patient  Outcome Summary: PT initial evaluation completed for pt presenting with generalized weakness, confusion, and decreased functional mobility. Pt required modA x2 for STS transfers and ambulated 20ft with modA x2 and B UE support. Pt very limited by increased lethargy. Pt's decreased independence warrants PT skilled care. Recommend D/C to SNF.     Time Calculation:   PT Charges     Row Name 01/09/20 1103             Time Calculation    Start Time  1103  -KR      PT Received On  01/09/20  -KR      PT Goal Re-Cert Due Date  01/19/20  -KR        User Key  (r) = Recorded By, (t) = Taken By, (c) = Cosigned By    Initials Name Provider Type    Jovita Villegas PT Physical Therapist        Therapy Charges for Today     Code Description Service Date Service Provider Modifiers Qty    46615145694 HC PT EVAL MOD COMPLEXITY 4 1/9/2020 Jovita Seo, PT GP 1    72806040845 HC PT THER SUPP EA 15 MIN 1/9/2020 Jovita Seo PT GP 2          PT G-Codes  Outcome Measure Options: AM-PAC 6 Clicks Basic Mobility (PT)  AM-PAC 6 Clicks Score (PT): 12  AM-PAC 6 Clicks Score (OT):  7    Karen Seo, PT  1/9/2020

## 2020-01-09 NOTE — PLAN OF CARE
Problem: Airway, Artificial (Adult)  Goal: Signs and Symptoms of Listed Potential Problems Will be Absent, Minimized or Managed (Airway, Artificial)  Outcome: Outcome(s) achieved  Flowsheets (Taken 1/9/2020 9947)  Problems Assessed (Artificial Airway): all

## 2020-01-09 NOTE — PLAN OF CARE
Problem: Patient Care Overview  Goal: Plan of Care Review  Outcome: Ongoing (interventions implemented as appropriate)  Flowsheets (Taken 1/9/2020 1103)  Plan of Care Reviewed With: patient  Outcome Summary: PT initial evaluation completed for pt presenting with generalized weakness, confusion, and decreased functional mobility. Pt required modA x2 for STS transfers and ambulated 20ft with modA x2 and B UE support. Pt very limited by increased lethargy. Pt's decreased independence warrants PT skilled care. Recommend D/C to SNF.

## 2020-01-09 NOTE — PLAN OF CARE
Problem: Patient Care Overview  Goal: Plan of Care Review  Flowsheets (Taken 1/9/2020 9851)  Progress: no change  Outcome Summary: OT evaluation completed as patient able to participate.  Pt. mod A of 2 supine to sit, sit to stand and steps to recliner.  Max to dependent with ADL task completion.  Pt. demonstrating restlessness until settled into recliner.  Pt. demonstrating impaired balance, strength, endurance, coordination and cognition and is appropriate for skilled OT services to address deficit areas.  Pt. emilie lora need a mobility device and possible BSC at discharge and recommend SNF pending progress.

## 2020-01-09 NOTE — PROGRESS NOTES
INTENSIVIST   HOSPITAL VISIT NOTE     Hospital:  LOS: 3 days     Subjective   S     Ms. Glo Berry, 64 y.o. female is followed for:      Encephalopathy acute    Hypertension    T2DM       As an Intensivist, we provide an integrated approach to the ICU patient and family, medical management of comorbid conditions, including but not limited to electrolytes, glycemic control, organ dysfunction, lead interdisciplinary rounds and coordinate the care with all other services, including those from other specialists.     HPI:  Extubated yesterday.  Restless during the night.  Up in the chair in the morning.  She did not pass her dysphagia evaluation.  Still some visual hallucinations (which she was having before admission).  MRI done.  Still requiring Nicardipine infusion.     Objective   O     .Vitals:  Temp: 98.6 °F (37 °C) (01/09/20 1200) Temp  Min: 97.7 °F (36.5 °C)  Max: 100 °F (37.8 °C)   BP: 160/89 (01/09/20 1530) BP  Min: 135/108  Max: 197/83   Pulse: 106 (01/09/20 1530) Pulse  Min: 80  Max: 126   Resp: 24 (01/09/20 1400) Resp  Min: 20  Max: 28   SpO2: 96 % (01/09/20 1530) SpO2  Min: 88 %  Max: 96 %   Device: humidified (01/09/20 1200)    Flow Rate: 4 (01/09/20 1400) Flow (L/min)  Min: 4  Max: 4     Medications (drips):    niCARdipine Last Rate: 5 mg/hr (01/09/20 1557)   sodium chloride Last Rate: 50 mL/hr (01/09/20 0246)        Physical Examination    Telemetry:  Rhythm: sinus arrhythmia (01/09/20 1400)         Constitutional:  No acute distress.   Cardiovascular: Normal rate, regular and rhythm. Normal heart sounds.  No murmurs, gallop or rub.   Respiratory: No respiratory distress.  Normal breath sounds  No adventitious sounds.    Abdominal:  Soft with no tenderness  No distension. No HSM.   Extremities: Warm with no cyanosis.  No peripheral edema.   Neurological:   Awake.  Best Eye Response: 3-->(E3) to speech (01/09/20 1400)  Best Motor Response: 6-->(M6) obeys commands (01/09/20 1400)  Best Verbal Response:  4-->(V4) confused (01/09/20 1400)  [place] Coma Scale Score: 13 (01/09/20 1400)       Hematology:  Results from last 7 days   Lab Units 01/09/20  0558 01/08/20  1606 01/08/20 0326 01/07/20  0333   WBC 10*3/mm3 11.29*  --  10.77 18.93*   HEMOGLOBIN g/dL 8.5*  --  8.5* 10.7*   MCV fL 88.4  --  91.2 92.5   PLATELETS 10*3/mm3 82* 96* 80* 167       Chemistry:  Estimated Creatinine Clearance: 106.8 mL/min (A) (by C-G formula based on SCr of 0.54 mg/dL (L)).    Results from last 7 days   Lab Units 01/09/20  0558 01/08/20  0326 01/07/20 2053 01/07/20  0333   SODIUM mmol/L 147* 143  --  142   POTASSIUM mmol/L 3.2* 4.2 4.5 3.3*   CHLORIDE mmol/L 108* 112*  --  107   CO2 mmol/L 18.0* 18.0*  --  20.0*   ANION GAP mmol/L 21.0* 13.0  --  15.0   GLUCOSE mg/dL 110* 98  --  125*   CALCIUM mg/dL 9.2 8.6  --  9.1     Results from last 7 days   Lab Units 01/09/20  0558 01/08/20  0326 01/07/20  0333   BUN mg/dL 12 17 18   CREATININE mg/dL 0.54* 0.54* 0.75     Results from last 7 days   Lab Units 01/09/20  0558 01/08/20  0326 01/07/20  0333   MAGNESIUM mg/dL 2.0 1.8 3.1*   PHOSPHORUS mg/dL 2.3* 2.7 2.7       Lab Results   Lab Value Date/Time    HEPBIGM 0.148 01/08/2020 1606       Lab Results   Lab Value Date/Time    THCURSCR Negative 01/07/2020 0507    PCPUR Positive (A) 01/07/2020 0507    COCAINEUR Negative 01/07/2020 0507    METAMPSCNUR Negative 01/07/2020 0507    LABOPIASCN Positive (A) 01/07/2020 0507    AMPHETSCREEN Negative 01/07/2020 0507    LABBENZSCN Positive (A) 01/07/2020 0507    TRICYCLICSCN Negative 01/07/2020 0507    LABMETHSCN Negative 01/07/2020 0507    BARBITSCNUR Positive (A) 01/07/2020 0507    OXYCODONESCN Negative 01/07/2020 0507       Images:  Mri Brain Without Contrast    Result Date: 1/9/2020  No acute intracranial findings of acute infarction or signal abnormality other than mild increased signal findings of likely chronic small vessel ischemic disease in the periventricular and subcortical white matter of  typical predominance in the periventricular regions. Posterior parenchyma without subcortical signal abnormality or restriction.   D:  01/09/2020 E:  01/09/2020  This report was finalized on 1/9/2020 10:25 AM by Dr. Sean Gonzalez.      Xr Chest 1 View    Result Date: 1/9/2020  Interval clearing of mild left basilar atelectasis. No new chest disease is seen.  D:  01/09/2020 E:  01/09/2020        Xr Chest 1 View    Result Date: 1/8/2020  There is left basilar atelectasis which has developed since the previous examination, mild. The endotracheal tube remains well positioned and there has otherwise been no change since the previous exam.  D:  01/08/2020 E:  01/08/2020  This report was finalized on 1/8/2020 11:57 AM by Dr. Noe Duggan MD.      Results: Reviewed.  I reviewed the patient's new laboratory and imaging results.  I independently reviewed the patient's new images.    Medications: Reviewed.    Assessment/Plan   A / P     Assessment:    64 y.o.female, admitted on 1/6/2020 with CVA (cerebral vascular accident) (CMS/[facility]) [I63.9]  Encephalopathy [G93.40]:     1. Respiratory failure  1. Intubated 01/06/20  2. Mechanical Ventilation  3. Extubated 01/08/20  2. Altered mental status  1. Negative w/u for CVA, including MRI.  2. 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  3. NC Anemia  4. Thrombocytopenia  1. Dropped by ~ 52% on 01/08/20, but just in 2 days.   2. Stable over past 24 h 01/08 = 01/09  3. Hep Ab: Negative    Lab Results   Lab Value Date/Time    PLT 82 (L) 01/09/2020 0558    PLT 96 (L) 01/08/2020 1606    PLT 80 (L) 01/08/2020 0326     01/07/2020 0333       5. PMH Dementia  6. PMH HTN  7. PMH Pancreatic insufficiency, chronic diarrhea.  8. Tobacco use  9. Enteral Nutrition: Started 01/09/20   10. UDS: PCP (+), R/o false positive as she is on  11. T2DM with neuropathy    Results from last 7 days   Lab Units 01/09/20  1133 01/09/20  0519  01/08/20  2309 01/08/20  1714 01/08/20  1135 01/08/20  0553   GLUCOSE mg/dL 129 116 141* 117 130 122     Lab Results   Lab Value Date/Time    HGBA1C 5.60 01/06/2020 1821       Nutrition Support: Diet, Tube Feeding Tube Feeding Formula: Peptamen Intense VHP (Peptide Based, Very High Protein)   Modulars: Patient doesn't have any tube feeding modular orders   Diet: NPO Diet   Advance Directives: Code Status and Medical Interventions:   Ordered at: 01/06/20 1647     Code Status:    CPR     Medical Interventions (Level of Support Prior to Arrest):    Full        Plan:    1. Monitor PLT count. May resume Heparin for VTE prophylaxis.  2. Start Enteral Nutrition  3. Wean Nicardipine infusion  4. Goal: Glucose < 180 mg/dL  5. Watch Na levels.  6. Replace K  7. Discussed with Dr. Petersen  1. Discontinue Dexmedetomidine, but may need to be re-started.  8. Disposition: Keep in ICU.     Plan of care and goals reviewed during interdisciplinary rounds.  I discussed the patient's findings and my recommendations with nursing staff    Level of Risk is High due to:  illness with threat to life or bodily function.     Time: was greater than 35 minutes.    (This excludes time spent performing separately reportable procedures and services).  Patient was at high risk of imminent or life-threatening deterioration in her condition due to Altered mental status and Respiratory failure.     Tristan Hull MD, FACP, FCCP, CNSC  Intensive Care Medicine, Nutrition Support and Pulmonary Medicine     [x]  Primary Attending  []  Consultant

## 2020-01-10 ENCOUNTER — ANCILLARY PROCEDURE (OUTPATIENT)
Dept: SPEECH THERAPY | Facility: HOSPITAL | Age: 65
End: 2020-01-10

## 2020-01-10 LAB
ALBUMIN SERPL-MCNC: 3.4 G/DL (ref 3.5–5.2)
ALBUMIN/GLOB SERPL: 1.1 G/DL
ALP SERPL-CCNC: 60 U/L (ref 39–117)
ALT SERPL W P-5'-P-CCNC: 17 U/L (ref 1–33)
ANION GAP SERPL CALCULATED.3IONS-SCNC: 19 MMOL/L (ref 5–15)
AST SERPL-CCNC: 35 U/L (ref 1–32)
BASOPHILS # BLD AUTO: 0.04 10*3/MM3 (ref 0–0.2)
BASOPHILS NFR BLD AUTO: 0.3 % (ref 0–1.5)
BILIRUB SERPL-MCNC: 0.3 MG/DL (ref 0.2–1.2)
BUN BLD-MCNC: 12 MG/DL (ref 8–23)
BUN/CREAT SERPL: 27.9 (ref 7–25)
CALCIUM SPEC-SCNC: 9.8 MG/DL (ref 8.6–10.5)
CHLORIDE SERPL-SCNC: 107 MMOL/L (ref 98–107)
CO2 SERPL-SCNC: 20 MMOL/L (ref 22–29)
CREAT BLD-MCNC: 0.43 MG/DL (ref 0.57–1)
DEPRECATED RDW RBC AUTO: 48.9 FL (ref 37–54)
EOSINOPHIL # BLD AUTO: 0.12 10*3/MM3 (ref 0–0.4)
EOSINOPHIL NFR BLD AUTO: 1 % (ref 0.3–6.2)
ERYTHROCYTE [DISTWIDTH] IN BLOOD BY AUTOMATED COUNT: 15.2 % (ref 12.3–15.4)
GFR SERPL CREATININE-BSD FRML MDRD: 148 ML/MIN/1.73
GLOBULIN UR ELPH-MCNC: 3.2 GM/DL
GLUCOSE BLD-MCNC: 125 MG/DL (ref 65–99)
GLUCOSE BLDC GLUCOMTR-MCNC: 147 MG/DL (ref 70–130)
GLUCOSE BLDC GLUCOMTR-MCNC: 149 MG/DL (ref 70–130)
GLUCOSE BLDC GLUCOMTR-MCNC: 156 MG/DL (ref 70–130)
GLUCOSE BLDC GLUCOMTR-MCNC: 99 MG/DL (ref 70–130)
HCT VFR BLD AUTO: 31 % (ref 34–46.6)
HGB BLD-MCNC: 9.2 G/DL (ref 12–15.9)
IMM GRANULOCYTES # BLD AUTO: 0.05 10*3/MM3 (ref 0–0.05)
IMM GRANULOCYTES NFR BLD AUTO: 0.4 % (ref 0–0.5)
LYMPHOCYTES # BLD AUTO: 0.85 10*3/MM3 (ref 0.7–3.1)
LYMPHOCYTES NFR BLD AUTO: 7.4 % (ref 19.6–45.3)
MAGNESIUM SERPL-MCNC: 1.7 MG/DL (ref 1.6–2.4)
MCH RBC QN AUTO: 26.4 PG (ref 26.6–33)
MCHC RBC AUTO-ENTMCNC: 29.7 G/DL (ref 31.5–35.7)
MCV RBC AUTO: 89.1 FL (ref 79–97)
MONOCYTES # BLD AUTO: 1.13 10*3/MM3 (ref 0.1–0.9)
MONOCYTES NFR BLD AUTO: 9.8 % (ref 5–12)
NEUTROPHILS # BLD AUTO: 9.33 10*3/MM3 (ref 1.7–7)
NEUTROPHILS NFR BLD AUTO: 81.1 % (ref 42.7–76)
NRBC BLD AUTO-RTO: 0 /100 WBC (ref 0–0.2)
PHOSPHATE SERPL-MCNC: 2.4 MG/DL (ref 2.5–4.5)
PLATELET # BLD AUTO: 107 10*3/MM3 (ref 140–450)
PMV BLD AUTO: 12.9 FL (ref 6–12)
POTASSIUM BLD-SCNC: 3.2 MMOL/L (ref 3.5–5.2)
POTASSIUM BLD-SCNC: 3.3 MMOL/L (ref 3.5–5.2)
PROT SERPL-MCNC: 6.6 G/DL (ref 6–8.5)
RBC # BLD AUTO: 3.48 10*6/MM3 (ref 3.77–5.28)
SODIUM BLD-SCNC: 146 MMOL/L (ref 136–145)
WBC NRBC COR # BLD: 11.52 10*3/MM3 (ref 3.4–10.8)

## 2020-01-10 PROCEDURE — 84132 ASSAY OF SERUM POTASSIUM: CPT | Performed by: NURSE PRACTITIONER

## 2020-01-10 PROCEDURE — 99232 SBSQ HOSP IP/OBS MODERATE 35: CPT | Performed by: PSYCHIATRY & NEUROLOGY

## 2020-01-10 PROCEDURE — 85025 COMPLETE CBC W/AUTO DIFF WBC: CPT | Performed by: INTERNAL MEDICINE

## 2020-01-10 PROCEDURE — 92610 EVALUATE SWALLOWING FUNCTION: CPT

## 2020-01-10 PROCEDURE — 84100 ASSAY OF PHOSPHORUS: CPT | Performed by: INTERNAL MEDICINE

## 2020-01-10 PROCEDURE — 93010 ELECTROCARDIOGRAM REPORT: CPT | Performed by: INTERNAL MEDICINE

## 2020-01-10 PROCEDURE — 25010000002 HEPARIN (PORCINE) PER 1000 UNITS: Performed by: INTERNAL MEDICINE

## 2020-01-10 PROCEDURE — 80053 COMPREHEN METABOLIC PANEL: CPT | Performed by: INTERNAL MEDICINE

## 2020-01-10 PROCEDURE — 82962 GLUCOSE BLOOD TEST: CPT

## 2020-01-10 PROCEDURE — 94799 UNLISTED PULMONARY SVC/PX: CPT

## 2020-01-10 PROCEDURE — 92612 ENDOSCOPY SWALLOW (FEES) VID: CPT

## 2020-01-10 PROCEDURE — 63710000001 INSULIN REGULAR HUMAN PER 5 UNITS: Performed by: NURSE PRACTITIONER

## 2020-01-10 PROCEDURE — 83735 ASSAY OF MAGNESIUM: CPT | Performed by: INTERNAL MEDICINE

## 2020-01-10 PROCEDURE — 99291 CRITICAL CARE FIRST HOUR: CPT | Performed by: INTERNAL MEDICINE

## 2020-01-10 PROCEDURE — 93005 ELECTROCARDIOGRAM TRACING: CPT | Performed by: INTERNAL MEDICINE

## 2020-01-10 RX ORDER — HEPARIN SODIUM 5000 [USP'U]/ML
5000 INJECTION, SOLUTION INTRAVENOUS; SUBCUTANEOUS EVERY 8 HOURS SCHEDULED
Status: DISCONTINUED | OUTPATIENT
Start: 2020-01-10 | End: 2020-01-22 | Stop reason: HOSPADM

## 2020-01-10 RX ORDER — HYDROCODONE BITARTRATE AND ACETAMINOPHEN 5; 325 MG/1; MG/1
1 TABLET ORAL EVERY 6 HOURS PRN
Status: DISPENSED | OUTPATIENT
Start: 2020-01-10 | End: 2020-01-20

## 2020-01-10 RX ORDER — RISPERIDONE 1 MG/ML
2 SOLUTION ORAL NIGHTLY
Status: DISCONTINUED | OUTPATIENT
Start: 2020-01-10 | End: 2020-01-22 | Stop reason: HOSPADM

## 2020-01-10 RX ORDER — RISPERIDONE 1 MG/ML
1 SOLUTION ORAL DAILY
Status: DISCONTINUED | OUTPATIENT
Start: 2020-01-10 | End: 2020-01-11

## 2020-01-10 RX ORDER — DONEPEZIL HYDROCHLORIDE 10 MG/1
5 TABLET, FILM COATED ORAL NIGHTLY
Status: DISCONTINUED | OUTPATIENT
Start: 2020-01-10 | End: 2020-01-22 | Stop reason: HOSPADM

## 2020-01-10 RX ADMIN — FAMOTIDINE 20 MG: 10 INJECTION, SOLUTION INTRAVENOUS at 08:38

## 2020-01-10 RX ADMIN — TRAZODONE HYDROCHLORIDE 50 MG: 50 TABLET ORAL at 20:01

## 2020-01-10 RX ADMIN — IPRATROPIUM BROMIDE AND ALBUTEROL SULFATE 3 ML: 2.5; .5 SOLUTION RESPIRATORY (INHALATION) at 16:04

## 2020-01-10 RX ADMIN — INSULIN HUMAN 2 UNITS: 100 INJECTION, SOLUTION PARENTERAL at 17:52

## 2020-01-10 RX ADMIN — HEPARIN SODIUM 5000 UNITS: 5000 INJECTION INTRAVENOUS; SUBCUTANEOUS at 08:38

## 2020-01-10 RX ADMIN — FAMOTIDINE 20 MG: 10 INJECTION, SOLUTION INTRAVENOUS at 20:01

## 2020-01-10 RX ADMIN — SODIUM CHLORIDE 50 ML/HR: 9 INJECTION, SOLUTION INTRAVENOUS at 02:11

## 2020-01-10 RX ADMIN — IPRATROPIUM BROMIDE AND ALBUTEROL SULFATE 3 ML: 2.5; .5 SOLUTION RESPIRATORY (INHALATION) at 12:21

## 2020-01-10 RX ADMIN — ATORVASTATIN CALCIUM 80 MG: 40 TABLET, FILM COATED ORAL at 20:00

## 2020-01-10 RX ADMIN — POTASSIUM CHLORIDE 40 MEQ: 1.5 POWDER, FOR SOLUTION ORAL at 21:51

## 2020-01-10 RX ADMIN — ASPIRIN 81 MG 81 MG: 81 TABLET ORAL at 08:36

## 2020-01-10 RX ADMIN — SERTRALINE HYDROCHLORIDE 150 MG: 100 TABLET ORAL at 20:01

## 2020-01-10 RX ADMIN — RISPERIDONE 2 MG: 1 SOLUTION ORAL at 21:40

## 2020-01-10 RX ADMIN — LEVETIRACETAM 500 MG: 500 TABLET, FILM COATED ORAL at 08:36

## 2020-01-10 RX ADMIN — NICOTINE 1 PATCH: 21 PATCH, EXTENDED RELEASE TRANSDERMAL at 08:37

## 2020-01-10 RX ADMIN — POTASSIUM CHLORIDE 40 MEQ: 1.5 POWDER, FOR SOLUTION ORAL at 10:04

## 2020-01-10 RX ADMIN — CARVEDILOL 25 MG: 12.5 TABLET, FILM COATED ORAL at 08:36

## 2020-01-10 RX ADMIN — POTASSIUM CHLORIDE 40 MEQ: 1.5 POWDER, FOR SOLUTION ORAL at 14:44

## 2020-01-10 RX ADMIN — RISPERIDONE 1 MG: 1 SOLUTION ORAL at 12:35

## 2020-01-10 RX ADMIN — HEPARIN SODIUM 5000 UNITS: 5000 INJECTION INTRAVENOUS; SUBCUTANEOUS at 21:40

## 2020-01-10 RX ADMIN — IPRATROPIUM BROMIDE AND ALBUTEROL SULFATE 3 ML: 2.5; .5 SOLUTION RESPIRATORY (INHALATION) at 07:48

## 2020-01-10 RX ADMIN — DEXMEDETOMIDINE HYDROCHLORIDE 1 MCG/KG/HR: 100 INJECTION, SOLUTION INTRAVENOUS at 15:27

## 2020-01-10 RX ADMIN — IPRATROPIUM BROMIDE AND ALBUTEROL SULFATE 3 ML: 2.5; .5 SOLUTION RESPIRATORY (INHALATION) at 20:01

## 2020-01-10 RX ADMIN — SODIUM CHLORIDE 4 MG/HR: 9 INJECTION, SOLUTION INTRAVENOUS at 12:25

## 2020-01-10 RX ADMIN — TRAZODONE HYDROCHLORIDE 50 MG: 50 TABLET ORAL at 22:18

## 2020-01-10 RX ADMIN — HEPARIN SODIUM 5000 UNITS: 5000 INJECTION INTRAVENOUS; SUBCUTANEOUS at 14:44

## 2020-01-10 RX ADMIN — HYDROCODONE BITARTRATE AND ACETAMINOPHEN 1 TABLET: 5; 325 TABLET ORAL at 13:47

## 2020-01-10 RX ADMIN — LEVETIRACETAM 500 MG: 500 TABLET, FILM COATED ORAL at 20:01

## 2020-01-10 RX ADMIN — MAGNESIUM SULFATE HEPTAHYDRATE 4 G: 40 INJECTION, SOLUTION INTRAVENOUS at 10:04

## 2020-01-10 RX ADMIN — CARVEDILOL 25 MG: 12.5 TABLET, FILM COATED ORAL at 17:52

## 2020-01-10 RX ADMIN — SODIUM CHLORIDE 6 MG/HR: 9 INJECTION, SOLUTION INTRAVENOUS at 16:21

## 2020-01-10 RX ADMIN — SODIUM CHLORIDE 5 MG/HR: 9 INJECTION, SOLUTION INTRAVENOUS at 06:33

## 2020-01-10 RX ADMIN — DONEPEZIL HYDROCHLORIDE 5 MG: 5 TABLET ORAL at 20:01

## 2020-01-10 RX ADMIN — SODIUM CHLORIDE 6 MG/HR: 9 INJECTION, SOLUTION INTRAVENOUS at 21:11

## 2020-01-10 RX ADMIN — POTASSIUM CHLORIDE 40 MEQ: 1.5 POWDER, FOR SOLUTION ORAL at 00:03

## 2020-01-10 NOTE — PROGRESS NOTES
INTENSIVIST   HOSPITAL VISIT NOTE     Hospital:  LOS: 4 days     Subjective   S     Ms. Glo Berry, 64 y.o. female is followed for:      Encephalopathy acute    Hypertension    T2DM       As an Intensivist, we provide an integrated approach to the ICU patient and family, medical management of comorbid conditions, including but not limited to electrolytes, glycemic control, organ dysfunction, lead interdisciplinary rounds and coordinate the care with all other services, including those from other specialists.     HPI:  Extubated X 2 d.  Restless.  Leg/back hurts.  Starting to make sense.  Talking some.     Objective   O     .Vitals:  Temp: 97.9 °F (36.6 °C) (01/10/20 1230) Temp  Min: 97.5 °F (36.4 °C)  Max: 98.3 °F (36.8 °C)   BP: 161/91 (01/10/20 1500) BP  Min: 122/87  Max: 210/96   Pulse: 98 (01/10/20 1605) Pulse  Min: 79  Max: 152   Resp: 20 (01/10/20 1605) Resp  Min: 20  Max: 32   SpO2: 99 % (01/10/20 1605) SpO2  Min: 88 %  Max: 99 %   Device: humidified, nasal cannula (01/10/20 1605)    Flow Rate: 6 (01/10/20 1605) Flow (L/min)  Min: 4  Max: 6     Medications (drips):    dexmedetomidine Last Rate: 1.2 mcg/kg/hr (01/10/20 1600)   niCARdipine Last Rate: 6 mg/hr (01/10/20 1600)   sodium chloride Last Rate: 50 mL/hr (01/10/20 0211)        Physical Examination    Telemetry:  Rhythm: sinus arrhythmia (01/10/20 1200)         Constitutional:  No acute distress.   Cardiovascular: Normal rate, regular and rhythm. Normal heart sounds.  No murmurs, gallop or rub.   Respiratory: No respiratory distress.  Normal breath sounds  No adventitious sounds.    Abdominal:  Soft with no tenderness  No distension. No HSM.   Extremities: Warm with no cyanosis.  No peripheral edema.   Neurological:   Awake. Confused.  Best Eye Response: 4-->(E4) spontaneous (01/10/20 1200)  Best Motor Response: 6-->(M6) obeys commands (01/10/20 1200)  Best Verbal Response: 4-->(V4) confused (01/10/20 1200)  Ana Coma Scale Score: 14 (01/10/20  1200)       Hematology:  Results from last 7 days   Lab Units 01/10/20  0715 01/09/20  0558 01/08/20  1606 01/08/20  0326   WBC 10*3/mm3 11.52* 11.29*  --  10.77   HEMOGLOBIN g/dL 9.2* 8.5*  --  8.5*   MCV fL 89.1 88.4  --  91.2   PLATELETS 10*3/mm3 107* 82* 96* 80*       Chemistry:  Estimated Creatinine Clearance: 132.5 mL/min (A) (by C-G formula based on SCr of 0.43 mg/dL (L)).    Results from last 7 days   Lab Units 01/10/20  0715 01/09/20  0558 01/08/20  0326   SODIUM mmol/L 146* 147* 143   POTASSIUM mmol/L 3.2* 3.2* 4.2   CHLORIDE mmol/L 107 108* 112*   CO2 mmol/L 20.0* 18.0* 18.0*   ANION GAP mmol/L 19.0* 21.0* 13.0   GLUCOSE mg/dL 125* 110* 98   CALCIUM mg/dL 9.8 9.2 8.6     Results from last 7 days   Lab Units 01/10/20  0715 01/09/20  0558 01/08/20  0326   BUN mg/dL 12 12 17   CREATININE mg/dL 0.43* 0.54* 0.54*     Results from last 7 days   Lab Units 01/10/20  0715 01/09/20  0558 01/08/20  0326   MAGNESIUM mg/dL 1.7 2.0 1.8   PHOSPHORUS mg/dL 2.4* 2.3* 2.7       Lab Results   Lab Value Date/Time    HEPBIGM 0.148 01/08/2020 1606       Lab Results   Lab Value Date/Time    THCURSCR Negative 01/07/2020 0507    PCPUR Positive (A) 01/07/2020 0507    COCAINEUR Negative 01/07/2020 0507    METAMPSCNUR Negative 01/07/2020 0507    LABOPIASCN Positive (A) 01/07/2020 0507    AMPHETSCREEN Negative 01/07/2020 0507    LABBENZSCN Positive (A) 01/07/2020 0507    TRICYCLICSCN Negative 01/07/2020 0507    LABMETHSCN Negative 01/07/2020 0507    BARBITSCNUR Positive (A) 01/07/2020 0507    OXYCODONESCN Negative 01/07/2020 0507       Images:  Mri Brain Without Contrast    Result Date: 1/9/2020  No acute intracranial findings of acute infarction or signal abnormality other than mild increased signal findings of likely chronic small vessel ischemic disease in the periventricular and subcortical white matter of typical predominance in the periventricular regions. Posterior parenchyma without subcortical signal abnormality or  restriction.   D:  01/09/2020 E:  01/09/2020  This report was finalized on 1/9/2020 10:25 AM by Dr. Sean Gonzalez.      Xr Chest 1 View    Result Date: 1/9/2020  Interval clearing of mild left basilar atelectasis. No new chest disease is seen.  D:  01/09/2020 E:  01/09/2020    This report was finalized on 1/9/2020 10:01 PM by Dr. Stephane Erickson MD.      Results: Reviewed.  I reviewed the patient's new laboratory and imaging results.  I independently reviewed the patient's new images.    Medications: Reviewed.    Assessment/Plan   A / P     Assessment:    64 y.o.female, admitted on 1/6/2020 with CVA (cerebral vascular accident) (CMS/formerly [facility]) [I63.9]  Encephalopathy [G93.40]:     1. Respiratory failure  1. Intubated 01/06/20  2. Mechanical Ventilation  3. Extubated 01/08/20  2. Altered mental status  1. Negative w/u for CVA, including MRI.  2. 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  3. Memory loss, to resume Donepezil  3. NC Anemia  4. Thrombocytopenia  1. Dropped by ~ 52% on 01/08/20, but just in 2 days.   2. Improving, PLT up to 107 K, 01/10/20   3. Hep Ab: Negative  5. PMH Dementia  6. PMH HTN  7. PMH Pancreatic insufficiency, chronic diarrhea.  8. Tobacco use  9. Enteral Nutrition: Started 01/09/20   10. UDS: PCP (+), R/o false positive as she is on  11. T2DM with neuropathy    Results from last 7 days   Lab Units 01/10/20  1222 01/10/20  0458 01/09/20  2315 01/09/20  1637 01/09/20  1133 01/09/20  0519   GLUCOSE mg/dL 147* 99 132* 127 129 116     Lab Results   Lab Value Date/Time    HGBA1C 5.60 01/06/2020 1821       Nutrition Support: Diet, Tube Feeding Tube Feeding Formula: Peptamen Intense VHP (Peptide Based, Very High Protein)   Modulars: Patient doesn't have any tube feeding modular orders   Diet: NPO Diet   Advance Directives: Code Status and Medical Interventions:   Ordered at: 01/06/20 1647     Code Status:    CPR     Medical Interventions (Level of  Support Prior to Arrest):    Full        Plan:    1. Advanced Enteral Nutrition  2. Wean Nicardipine infusion  3. Goal: Glucose < 180 mg/dL  4. Watch Na levels.  5. Replace K  6. Disposition: Keep in ICU.   1. Discussed with family at bedside.    Plan of care and goals reviewed during interdisciplinary rounds.  I discussed the patient's findings and my recommendations with nursing staff    Level of Risk is High due to:  illness with threat to life or bodily function.     Time: was greater than 35 minutes.    (This excludes time spent performing separately reportable procedures and services).  Patient was at high risk of imminent or life-threatening deterioration in her condition due to Altered mental status and Respiratory failure.     Tristan Hull MD, FACP, FCCP, CNSC  Intensive Care Medicine, Nutrition Support and Pulmonary Medicine     [x]  Primary Attending  []  Consultant

## 2020-01-10 NOTE — PLAN OF CARE
Problem: Fall Risk (Adult)  Goal: Absence of Fall  Outcome: Ongoing (interventions implemented as appropriate)  Flowsheets (Taken 1/10/2020 1637)  Absence of Fall: making progress toward outcome

## 2020-01-10 NOTE — PLAN OF CARE
Problem: Patient Care Overview  Goal: Plan of Care Review  1/10/2020 1332 by Donald Marx MS CCC-SLP  Outcome: Ongoing (interventions implemented as appropriate)  Flowsheets (Taken 1/10/2020 1332)  Plan of Care Reviewed With: patient;son  Note:   SLP FEES evaluation completed. Will address dysphagia. Please see note for further details and recommendations.

## 2020-01-10 NOTE — SIGNIFICANT NOTE
Called by nursing staff out of concern for intermittent atrial fibrillation.  I reviewed her EKG from today which revealed an ectopic atrial focus.  Yesterday she had sinus rhythm.  Heart rate today has ranged from .  She is currently receiving Coreg as well as a Precedex drip for agitation.  I will order an EKG for the morning but will not add additional medications at this time.

## 2020-01-10 NOTE — PLAN OF CARE
Problem: Patient Care Overview  Goal: Plan of Care Review  Outcome: Ongoing (interventions implemented as appropriate)  Flowsheets (Taken 1/10/2020 0253)  Progress: no change  Plan of Care Reviewed With: patient; family  Outcome Summary: Beginning of shift patient resting comfortably after recieving x1 ativan dose for dayshift agitation/restlessness. Approx 2200 pateient awakening from rest more agitated and restless again. TF had to be stopped d/t patient continually sliding down in bed and unable to maintain safe HOB > 35 degrees. With restlessness patient was again attempting to climb OOB and get up. Noted tachycardia (-150s), tachypnea (RR 28-32), HTN (-190s on cardene), ectopy, and decreased O2 sats (88-90% on 6L). Patient requiring NT suction x 1 to clear secretions. Called ICU APRN to discuss patient changes and plan. Precedex ordered and titrated for patient restlessness and agitation. Significant improvement in HR, HTN, RR, O2 saturation after precedex gtt started. HR 80-90, -140 (off of cardene). RR 20-24, and able to restart TF. BM x3 through shift. K replaced for 1/9/20 K of 3.2. Awaiting AM recheck. Will continue to decrease precedex drip as tolerated. Will continue to monitor.

## 2020-01-10 NOTE — PLAN OF CARE
Problem: Pain, Chronic (Adult)  Goal: Acceptable Pain/Comfort Level and Functional Ability  Flowsheets (Taken 1/10/2020 7875)  Acceptable Pain/Comfort Level and Functional Ability: making progress toward outcome

## 2020-01-10 NOTE — PROGRESS NOTES
Continued Stay Note  [facility]     Patient Name: Glo Berry  MRN: [id number]  Today's Date: 1/10/2020    Admit Date: 1/6/2020    Discharge Plan     Row Name 01/10/20 0905       Plan    Plan  SNF    Patient/Family in Agreement with Plan  yes    Plan Comments  Cardene gtt.  Goal is to transfer to [facility] swing bed vs/ [facility] When medically ready.        Discharge Codes    No documentation.       Expected Discharge Date and Time     Expected Discharge Date Expected Discharge Time    [place] 10, 2020             Jane Bowers RN

## 2020-01-10 NOTE — PLAN OF CARE
Patient remains extremely restless and impulsive. She has been moved from bed to chair several times today, received Resperidol, Norvo and on Precedex drip. Patient gets restless and HR goes to 130's with more irregular beats, SBP rises to 180-200. Patient currently on 6L NC oxygen. She is able to do some weight bearing but unsteady and not able to remain standing independently. She is disoriented to place, situation and time. Her speech is still garbled and wet. Breath sounds coarse, with loose congested cough. Patient failed FEES today and TF was started. Patient afebrile. Chair and Bed alarms in use. Family does excellent job of calming and reorienting patient.

## 2020-01-10 NOTE — MBS/VFSS/FEES
Acute Care - Speech Language Pathology   Swallow Initial Evaluation [facility]   Fiberoptic Endoscopic Evaluation of Swallowing (FEES)     Patient Name: Glo Berry  : 1955  MRN: [id number]  Today's Date: 1/10/2020               Admit Date: 2020    Visit Dx:     ICD-10-CM ICD-9-CM   1. Impaired mobility and ADLs Z74.09 799.89   2. Dysphagia, unspecified type R13.10 787.20   3. Cognitive communication deficit R41.841 799.52     Patient Active Problem List   Diagnosis   • Uterine prolapse   • Stress incontinence of urine   • Urgency incontinence   • Diarrhea   • Encephalopathy acute   • R/O AIS    • R/O status epilepticus    • Hypertension   • Pancreatic insufficiency   • T2DM on metformin    • Chronic diarrhea   • Lumbar stenosis   • Diabetic neuropathy    • Depression   • Dementia    • Smoker   • Acute respiratory failure    • Encephalopathy     Past Medical History:   Diagnosis Date   • COPD (chronic obstructive pulmonary disease) (CMS/[facility])    • Diabetes mellitus (CMS/[facility])    • History of ear injury 1973    Injury of the right eardrum.This has resulted in the headaches   • Hypertension      Past Surgical History:   Procedure Laterality Date   • BREAST CYST EXCISION     • SINUS SURGERY     • TONSILLECTOMY     • TUBAL ABDOMINAL LIGATION          SWALLOW EVALUATION (last 72 hours)      SLP Adult Swallow Evaluation     Row Name 01/10/20 1035 01/10/20 0910 20 0930             Rehab Evaluation    Document Type  evaluation  -MP  evaluation  -MP  evaluation  -SM      Subjective Information  no complaints  -MP  no complaints  -MP  --      Patient Observations  alert;cooperative  -MP  alert;cooperative  -MP  alert;cooperative  -SM      Patient/Family Observations  Son present  -MP  Son present  -MP  Son present  -SM      Patient Effort  good  -MP  good  -MP  --         General Information    Patient Profile Reviewed  yes  -MP  yes  -MP  yes  -SM      Pertinent History Of Current Problem  See ISELA salas   -MP  Adm for AMS, suspected PRES. Intubated 1/6-1/8. Hx dementia, HTN, DM2.  -MP  Int 1/6-1/8. AMS, ? PRES  -SM      Current Method of Nutrition  --  NPO;nasogastric feedings  -  NPO  -SM      Precautions/Limitations, Vision  --  WFL;for purposes of eval  -MP  --      Precautions/Limitations, Hearing  --  WFL;for purposes of eval  -MP  --      Prior Level of Function-Communication  --  cognitive-linguistic impairment;other (see comments) dementia @ baseline  -MP  --      Prior Level of Function-Swallowing  --  no diet consistency restrictions  -MP  other (see comments) some GI issues at baseline per son  -      Plans/Goals Discussed with  --  patient;family;agreed upon  -  patient and family;agreed upon  -      Barriers to Rehab  --  none identified  -  none identified  -      Patient's Goals for Discharge  --  return to PO diet  -MP  patient did not state  -      Family Goals for Discharge  --  patient able to return to PO diet  -  patient able to return to PO diet;patient able to eat/drink without coughing/choking  -         Pain Assessment    Additional Documentation  Pain Scale: FACES Pre/Post-Treatment (Group)  -MP  Pain Scale: FACES Pre/Post-Treatment (Group)  -MP  Pain Scale: FACES Pre/Post-Treatment (Group)  -SM         Pain Scale: FACES Pre/Post-Treatment    Pain: FACES Scale, Pretreatment  2-->hurts little bit  -MP  2-->hurts little bit  -MP  4-->hurts little more  -SM      Pain: FACES Scale, Post-Treatment  2-->hurts little bit  -MP  2-->hurts little bit  -MP  4-->hurts little more  -SM      Pre/Post Treatment Pain Comment  --  --  restless, RN aware and managing  -         Oral Motor and Function    Dentition Assessment  --  missing teeth  -  --      Secretion Management  --  WNL/WFL  -MP  --      Mucosal Quality  --  moist, healthy  -  --         Oral Musculature and Cranial Nerve Assessment    Oral Motor General Assessment  --  generalized oral motor weakness  -  generalized  oral motor weakness  -SM         General Eating/Swallowing Observations    Respiratory Support Currently in Use  --  nasal cannula  -MP  --      Eating/Swallowing Skills  --  fed by SLP  -MP  --      Positioning During Eating  --  upright in chair  -MP  --      Utensils Used  --  spoon;cup  -MP  --      Consistencies Trialed  --  thin liquids;pureed  -MP  --         Clinical Swallow Eval    Oral Prep Phase  --  --  -- DNT solids  -SM      Pharyngeal Phase  --  suspected pharyngeal impairment  -MP  suspected pharyngeal impairment  -      Clinical Swallow Evaluation Summary  --  Clinical swallow evaluation completed. Pt given trials of ice chipx1, thin via tsp/cup, & puree. Delayed throat clear following cup sips thin. Multiple swallows across consistencies. Per RN, pt more alert/appropriate today than previously. Pt/son anxious to begin PO diet. Will proceed w/ FEES this PM to further assess swallow function & determine possibility of safely initiating PO diet.  -MP  --         Pharyngeal Phase Concerns    Pharyngeal Phase Concerns  --  throat clear;multiple swallows  -MP  wet vocal quality;multiple swallows;cough  -SM      Wet Vocal Quality  --  --  thin  -SM      Multiple Swallows  --  thin;pudding  -MP  pudding  -SM      Cough  --  --  thin;other (see comments) delayed  -SM      Throat Clear  --  thin  -MP  --         Fiberoptic Endoscopic Evaluation of Swallowing (FEES)    Risks/Benefits Reviewed  risks/benefits explained;patient;family;agreed to eval  -MP  --  --      Nasal Entry  left:  -MP  --  --      Special Considerations  Scope #338  -MP  --  --         Anatomy and Physiology    Anatomic Considerations  edema;other (see comments) t/o hypopharynx, >arytenoids  -MP  --  --      Velopharyngeal  WFL  -MP  --  --      Base of Tongue  symmetrical  -MP  --  --      Epiglottis  WFL  -MP  --  --      Laryngeal Function Breathing  symmetrical  -MP  --  --      Laryngeal Function Phonation  symmetrical  -MP  --  --       Laryngeal Function to Breath Hold  TVF contact  -MP  --  --      Secretion Rating Scale (Kamar et al. 1996)  1- secretions present around the laryngeal vestibule  -MP  --  --      Secretion Description  thick;clear;white  -MP  --  --      Ice Chips  DNA  -MP  --  --      Spontaneous Swallow  frequency reduced  -MP  --  --      Sensory  sensed scope  -MP  --  --      Utensils Used  Spoon  -MP  --  --      Consistencies Trialed  thin liquids;pudding/puree  -MP  --  --         FEES Interpretation    Oral Phase  WFL;other (see comments) for thin & puree  -MP  --  --         Initiation of Pharyngeal Swallow    Initiation of Pharyngeal Swallow  bolus in valleculae  -MP  --  --      Pharyngeal Phase  impaired pharyngeal phase of swallowing  -MP  --  --      Aspiration During the Swallow  thin liquids;secondary to reduced vestibular closure;secondary to reduced laryngeal elevation  -MP  --  --      Penetration After the Swallow  pudding/puree;secondary to residue;in pyriform sinuses  -MP  --  --      Aspiration After the Swallow  pudding/puree;secondary to residue;in pyriform sinuses  -MP  --  --      Response to Aspiration  inconsistent response  -MP  --  --      Rosenbek's Scale  thin:;pudding/puree:;8-->Level 8  -MP  --  --      Residue  pyriform sinuses;secondary to reduced laryngeal elevation;secondary to reduced hyolaryngeal excursion  -MP  --  --      Response to Residue  unable to clear residue  -MP  --  --      Attempted Compensatory Maneuvers  other (see comments) not cognitively appropriate for comps  -MP  --  --      FEES Summary  SLP FEES evaluation completed. Pt presents w/ severe pharyngeal dysphagia @ this time. Aspiration during the swallow w/ thin liquid 2' reduced laryngeal vestibular closure. Penetration/aspiration after the swallow w/ pudding from pyriform sinus residue that pt could not clear, mixed w/ secretions, and spilled into the vestibule. Inconsistent delayed weak cough response, but wet  vocal quality noted. Safest recommendation @ this time is to continue NPO w/ NG. SLP will continue to follow for tx & assess readiness for repeat instrumental eval.  -MP  --  --         Clinical Impression    SLP Swallowing Diagnosis  severe;pharyngeal dysfunction  -MP  suspected pharyngeal dysfunction  -MP  suspected pharyngeal dysfunction  -      Functional Impact  risk of aspiration/pneumonia  -MP  risk of aspiration/pneumonia  -MP  risk of aspiration/pneumonia  -SM      Rehab Potential/Prognosis, Swallowing  good, to achieve stated therapy goals  -MP  adequate, monitor progress closely  -MP  --      Swallow Criteria for Skilled Therapeutic Interventions Met  demonstrates skilled criteria  -MP  demonstrates skilled criteria  -MP  --         Recommendations    Therapy Frequency (Swallow)  5 days per week  -MP  --  --      Predicted Duration Therapy Intervention (Days)  until discharge  -MP  --  --      SLP Diet Recommendation  NPO;temporary alternate methods of nutrition/hydration  -MP  NPO;temporary alternate methods of nutrition/hydration  -MP  NPO  -      Recommended Diagnostics  reassess via FEES;other (see comments) when demonstrating s/sxs clinical improvement  -  FEES  -MP  reassess via clinical swallow evaluation;other (see comments) will check back tomorrow for ? clinical improvement  -      Recommended Precautions and Strategies  other (see comments) Oral care BID/PRN  -MP  other (see comments) Oral care BID/PRN  -MP  --      SLP Rec. for Method of Medication Administration  meds via alternate route  -MP  meds via alternate route  -MP  meds via alternate route  -      Anticipated Dischage Disposition  unknown;anticipate therapy at next level of care  -MP  unknown;anticipate therapy at next level of care  -MP  --        User Key  (r) = Recorded By, (t) = Taken By, (c) = Cosigned By    Initials Name Effective Dates    Elizabet Oropeza MS CCC-SLP 06/22/15 -     MP Donald Marx MS  [facility]SLP 06/19/19 -           EDUCATION  The patient has been educated in the following areas:   Dysphagia (Swallowing Impairment) NPO rationale.    SLP Recommendation and Plan  SLP Swallowing Diagnosis: severe, pharyngeal dysfunction  SLP Diet Recommendation: NPO, temporary alternate methods of nutrition/hydration  Recommended Precautions and Strategies: other (see comments)(Oral care BID/PRN)  SLP Rec. for Method of Medication Administration: meds via alternate route        Recommended Diagnostics: reassess via FEES, other (see comments)(when demonstrating s/sxs clinical improvement)  Swallow Criteria for Skilled Therapeutic Interventions Met: demonstrates skilled criteria  Anticipated Dischage Disposition: unknown, anticipate therapy at next level of care  Rehab Potential/Prognosis, Swallowing: good, to achieve stated therapy goals  Therapy Frequency (Swallow): 5 days per week  Predicted Duration Therapy Intervention (Days): until discharge       Plan of Care Reviewed With: patient, son    SLP GOALS     Row Name 01/10/20 1035 01/09/20 0930          Oral Nutrition/Hydration Goal 1 (SLP)    Oral Nutrition/Hydration Goal 1, SLP  LTG: Pt will improve swallow function in order to safely return to PO diet w/ no overt s/sxs aspiration/distress w/ 100% acc and no cues  -MP  --     Time Frame (Oral Nutrition/Hydration Goal 1, SLP)  by discharge  -MP  --        Oral Nutrition/Hydration Goal 2 (SLP)    Oral Nutrition/Hydration Goal 2, SLP  Pt will tolerate therapeutic trials of thin H2O & puree w/ no overt s/sxs aspiration/distress w/ 70% acc and no cues in order to determine readiness for repeat instrumental eval  -MP  --     Time Frame (Oral Nutrition/Hydration Goal 2, SLP)  short term goal (STG);by discharge  -MP  --        Pharyngeal Strengthening Exercise Goal 1 (SLP)    Activity (Pharyngeal Strengthening Goal 1, SLP)  increase superior movement of the hyolaryngeal complex;increase anterior movement of the hyolaryngeal  complex;increase closure at entrance to airway/closure of airway at glottis;increase squeeze/positive pressure generation  -MP  --     Increase Superior Movement of the Hyolaryngeal Complex  effortful pitch glide (falsetto + pharyngeal squeeze)  -MP  --     Increase Anterior Movement of the Hyolaryngeal Complex  chin tuck against resistance (CTAR)  -MP  --     Increase Closure at Entrance to Airway/Closure of Airway at Glottis  supraglottic swallow  -MP  --     Increase Squeeze/Positive Pressure Generation  hard effortful swallow  -MP  --     [place]/Accuracy (Pharyngeal Strengthening Goal 1, SLP)  with minimal cues (75-90% accuracy)  -MP  --     Time Frame (Pharyngeal Strengthening Goal 1, SLP)  short term goal (STG);by discharge  -MP  --        Comprehend Questions Goal 1 (SLP)    Improve Ability to Comprehend Questions Goal 1 (SLP)  simple yes/no questions;simple wh questions;90%;with minimal cues (75-90%)  -MP  simple yes/no questions;simple wh questions;90%;with minimal cues (75-90%)  -SM     Time Frame (Comprehend Questions Goal 1, SLP)  short term goal (STG);by discharge  -MP  short term goal (STG);by discharge  -SM     Progress/Outcomes (Comprehend Questions Goal 1, SLP)  goal ongoing  -MP  --        Follow Directions Goal 2 (SLP)    Improve Ability to Follow Directions Goal 1 (SLP)  1 step direction without objects;90%;with minimal cues (75-90%)  -MP  1 step direction without objects;90%;with minimal cues (75-90%)  -SM     Time Frame (Follow Directions Goal 1, SLP)  short term goal (STG);by discharge  -MP  short term goal (STG);by discharge  -SM     Progress/Outcomes (Follow Directions Goal 1, SLP)  goal ongoing  -MP  --        Attention Goal 1 (SLP)    Improve Attention by Goal 1 (SLP)  complete selective attention task;90%;with minimal cues (75-90%)  -MP  complete selective attention task;90%;with minimal cues (75-90%)  -SM     Time Frame (Attention Goal 1, SLP)  short term goal (STG);by discharge   -MP  short term goal (STG);by discharge  -SM     Progress/Outcomes (Attention Goal 1, SLP)  goal ongoing  -MP  --        Additional Goal 1 (SLP)    Additional Goal 1, SLP  LTG: Improve cog-comm skills in order to participate in care while in hospital setting with 80% accuracy and cues  -MP  LTG: Improve cog-comm skills in order to participate in care while in hospital setting with 80% accuracy and cues  -SM     Time Frame (Additional Goal 1, SLP)  by discharge  -MP  by discharge  -SM     Progress/Outcomes (Additional Goal 1, SLP)  goal ongoing  -MP  --       User Key  (r) = Recorded By, (t) = Taken By, (c) = Cosigned By    Initials Name Provider Type    Elizabet Oropeza MS CCC-SLP Speech and Language Pathologist    Donald Woodruff MS CCC-SLP Speech and Language Pathologist             Time Calculation:   Time Calculation- SLP     Row Name 01/10/20 1333 01/10/20 0930          Time Calculation- SLP    SLP Start Time  1035  -MP  0910  -MP     SLP Received On  01/10/20  -MP  01/10/20  -MP       User Key  (r) = Recorded By, (t) = Taken By, (c) = Cosigned By    Initials Name Provider Type    Donald Woodruff MS CCC-SLP Speech and Language Pathologist          Therapy Charges for Today     Code Description Service Date Service Provider Modifiers Qty    66944189358 HC ST EVAL ORAL PHARYNG SWALLOW 3 1/10/2020 Donald Marx MS CCC-SLP GN 1    [id number] HC ST FIBEROPTIC ENDO EVAL SWALL 5 1/10/2020 Donald Marx MS CCC-JAS GN 1               MS ROULA Kern  1/10/2020

## 2020-01-10 NOTE — THERAPY EVALUATION
Acute Care - Speech Language Pathology   Swallow Re-Evaluation [facility]   Clinical Swallow Evaluation     Patient Name: Glo Berry  : 1955  MRN: [id number]  Today's Date: 1/10/2020               Admit Date: 2020    Visit Dx:     ICD-10-CM ICD-9-CM   1. Impaired mobility and ADLs Z74.09 799.89   2. Dysphagia, unspecified type R13.10 787.20   3. Cognitive communication deficit R41.841 799.52     Patient Active Problem List   Diagnosis   • Uterine prolapse   • Stress incontinence of urine   • Urgency incontinence   • Diarrhea   • Encephalopathy acute   • R/O AIS    • R/O status epilepticus    • Hypertension   • Pancreatic insufficiency   • T2DM on metformin    • Chronic diarrhea   • Lumbar stenosis   • Diabetic neuropathy    • Depression   • Dementia    • Smoker   • Acute respiratory failure    • Encephalopathy     Past Medical History:   Diagnosis Date   • COPD (chronic obstructive pulmonary disease) (CMS/HCC)    • Diabetes mellitus (CMS/H[facility])    • History of ear injury 1973    Injury of the right eardrum.This has resulted in the headaches   • Hypertension      Past Surgical History:   Procedure Laterality Date   • BREAST CYST EXCISION     • SINUS SURGERY     • TONSILLECTOMY     • TUBAL ABDOMINAL LIGATION          SWALLOW EVALUATION (last 72 hours)      SLP Adult Swallow Evaluation     Row Name 01/10/20 0910 20 0930                Rehab Evaluation    Document Type  evaluation  -MP  evaluation  -SM       Subjective Information  no complaints  -MP  --       Patient Observations  alert;cooperative  -MP  alert;cooperative  -SM       Patient/Family Observations  Son present  -MP  Son present  -SM       Patient Effort  good  -MP  --          General Information    Patient Profile Reviewed  yes  -MP  yes  -SM       Pertinent History Of Current Problem  Adm for AMS, suspected PRES. Intubated -. Hx dementia, HTN, DM2.  -MP  Int -. AMS, ? PRES  -SM       Current Method of Nutrition   NPO;nasogastric feedings  -  NPO  -       Precautions/Limitations, Vision  WFL;for purposes of eval  -MP  --       Precautions/Limitations, Hearing  WFL;for purposes of eval  -MP  --       Prior Level of Function-Communication  cognitive-linguistic impairment;other (see comments) dementia @ baseline  -MP  --       Prior Level of Function-Swallowing  no diet consistency restrictions  -MP  other (see comments) some GI issues at baseline per son  -       Plans/Goals Discussed with  patient;family;agreed upon  -  patient and family;agreed upon  -       Barriers to Rehab  none identified  -MP  none identified  -       Patient's Goals for Discharge  return to PO diet  -  patient did not state  -       Family Goals for Discharge  patient able to return to PO diet  -  patient able to return to PO diet;patient able to eat/drink without coughing/choking  -          Pain Assessment    Additional Documentation  Pain Scale: FACES Pre/Post-Treatment (Group)  -MP  Pain Scale: FACES Pre/Post-Treatment (Group)  -SM          Pain Scale: FACES Pre/Post-Treatment    Pain: FACES Scale, Pretreatment  2-->hurts little bit  -MP  4-->hurts little more  -SM       Pain: FACES Scale, Post-Treatment  2-->hurts little bit  -MP  4-->hurts little more  -SM       Pre/Post Treatment Pain Comment  --  restless, RN aware and managing  -          Oral Motor and Function    Dentition Assessment  missing teeth  -MP  --       Secretion Management  WNL/WFL  -MP  --       Mucosal Quality  moist, healthy  -  --          Oral Musculature and Cranial Nerve Assessment    Oral Motor General Assessment  generalized oral motor weakness  -  generalized oral motor weakness  -          General Eating/Swallowing Observations    Respiratory Support Currently in Use  nasal cannula  -  --       Eating/Swallowing Skills  fed by SLP  -MP  --       Positioning During Eating  upright in chair  -MP  --       Utensils Used  spoon;cup  -MP  --        Consistencies Trialed  thin liquids;pureed  -MP  --          Clinical Swallow Eval    Oral Prep Phase  --  -- DNT solids  -SM       Pharyngeal Phase  suspected pharyngeal impairment  -MP  suspected pharyngeal impairment  -       Clinical Swallow Evaluation Summary  Clinical swallow evaluation completed. Pt given trials of ice chipx1, thin via tsp/cup, & puree. Delayed throat clear following cup sips thin. Multiple swallows across consistencies. Per RN, pt more alert/appropriate today than previously. Pt/son anxious to begin PO diet. Will proceed w/ FEES this PM to further assess swallow function & determine possibility of safely initiating PO diet.  -MP  --          Pharyngeal Phase Concerns    Pharyngeal Phase Concerns  throat clear;multiple swallows  -MP  wet vocal quality;multiple swallows;cough  -SM       Wet Vocal Quality  --  thin  -SM       Multiple Swallows  thin;pudding  -MP  pudding  -SM       Cough  --  thin;other (see comments) delayed  -SM       Throat Clear  thin  -MP  --          Clinical Impression    SLP Swallowing Diagnosis  suspected pharyngeal dysfunction  -MP  suspected pharyngeal dysfunction  -       Functional Impact  risk of aspiration/pneumonia  -  risk of aspiration/pneumonia  -       Rehab Potential/Prognosis, Swallowing  adequate, monitor progress closely  -MP  --       Swallow Criteria for Skilled Therapeutic Interventions Met  demonstrates skilled criteria  -MP  --          Recommendations    SLP Diet Recommendation  NPO;temporary alternate methods of nutrition/hydration  -  NPO  -       Recommended Diagnostics  FEES  -MP  reassess via clinical swallow evaluation;other (see comments) will check back tomorrow for ? clinical improvement  -       Recommended Precautions and Strategies  other (see comments) Oral care BID/PRN  -MP  --       SLP Rec. for Method of Medication Administration  meds via alternate route  -  meds via alternate route  -       Anticipated  Dischage Disposition  unknown;anticipate therapy at next level of care  -  --         User Key  (r) = Recorded By, (t) = Taken By, (c) = Cosigned By    Initials Name Effective Dates    Elizabet Oropeza, MS CCC-SLP 06/22/15 -     MP Francisco JDonald MS CCC-SLP 06/19/19 -           EDUCATION  The patient has been educated in the following areas:   Dysphagia (Swallowing Impairment) NPO rationale.    SLP Recommendation and Plan  SLP Swallowing Diagnosis: suspected pharyngeal dysfunction  SLP Diet Recommendation: NPO, temporary alternate methods of nutrition/hydration  Recommended Precautions and Strategies: other (see comments)(Oral care BID/PRN)  SLP Rec. for Method of Medication Administration: meds via alternate route        Recommended Diagnostics: FEES  Swallow Criteria for Skilled Therapeutic Interventions Met: demonstrates skilled criteria  Anticipated Dischage Disposition: unknown, anticipate therapy at next level of care  Rehab Potential/Prognosis, Swallowing: adequate, monitor progress closely             Plan of Care Reviewed With: patient, son    SLP GOALS     Row Name 01/09/20 0930             Comprehend Questions Goal 1 (SLP)    Improve Ability to Comprehend Questions Goal 1 (SLP)  simple yes/no questions;simple wh questions;90%;with minimal cues (75-90%)  -      Time Frame (Comprehend Questions Goal 1, SLP)  short term goal (STG);by discharge  -         Follow Directions Goal 2 (SLP)    Improve Ability to Follow Directions Goal 1 (SLP)  1 step direction without objects;90%;with minimal cues (75-90%)  -      Time Frame (Follow Directions Goal 1, SLP)  short term goal (STG);by discharge  -         Attention Goal 1 (SLP)    Improve Attention by Goal 1 (SLP)  complete selective attention task;90%;with minimal cues (75-90%)  -      Time Frame (Attention Goal 1, SLP)  short term goal (STG);by discharge  -         Additional Goal 1 (SLP)    Additional Goal 1, SLP  LTG: Improve cog-comm  skills in order to participate in care while in hospital setting with 80% accuracy and cues  -      Time Frame (Additional Goal 1, SLP)  by discharge  -        User Key  (r) = Recorded By, (t) = Taken By, (c) = Cosigned By    Initials Name Provider Type    Elizabet Oropeza MS CCC-SLP Speech and Language Pathologist             Time Calculation:   Time Calculation- SLP     Row Name 01/10/20 0930             Time Calculation- SLP    SLP Start Time  0910  -MP      SLP Received On  01/10/20  -        User Key  (r) = Recorded By, (t) = Taken By, (c) = Cosigned By    Initials Name Provider Type    Donald Woodruff MS CCC-SLP Speech and Language Pathologist          Therapy Charges for Today     Code Description Service Date Service Provider Modifiers Qty    [id number] HC ST EVAL ORAL PHARYNG SWALLOW 3 1/10/2020 Donald Marx MS CCC-SLP GN 1               Donald Marx MS CCC-SLP  1/10/2020

## 2020-01-10 NOTE — DISCHARGE INSTR - DIET
FEES  1/10/19  Reason for Referral  Patient was referred for a FEES to assess the efficiency of his/her swallow function, rule out aspiration and make recommendations regarding safe dietary consistencies, effective compensatory strategies, and safe eating environment.         Recommendations/Treatment  SLP Swallowing Diagnosis: severe, pharyngeal dysfunction  Functional Impact: risk of aspiration/pneumonia  Rehab Potential/Prognosis, Swallowing: good, to achieve stated therapy goals  Swallow Criteria for Skilled Therapeutic Interventions Met: demonstrates skilled criteria  Therapy Frequency (Swallow): 5 days per week  Predicted Duration Therapy Intervention (Days): until discharge  SLP Diet Recommendation: NPO, temporary alternate methods of nutrition/hydration  Recommended Diagnostics: reassess via FEES, other (see comments)(when demonstrating s/sxs clinical improvement)  Recommended Precautions and Strategies: other (see comments)(Oral care BID/PRN)  SLP Rec. for Method of Medication Administration: meds via alternate route  Anticipated Dischage Disposition: unknown, anticipate therapy at next level of care    Instrumental Set-up  Risks/Benefits Reviewed: risks/benefits explained, patient, family, agreed to eval  Nasal Entry: left:  Anatomic Considerations: edema, other (see comments)(t/o hypopharynx, >arytenoids)  Utensils Used: Spoon  Consistencies Trialed: thin liquids, pudding/puree    Oral Preparation/ Oral Phase  Oral Phase: WFL, other (see comments)(for thin & puree)    Pharyngeal Phase  Initiation of Pharyngeal Swallow: bolus in valleculae  Pharyngeal Phase: impaired pharyngeal phase of swallowing  Aspiration During the Swallow: thin liquids, secondary to reduced vestibular closure, secondary to reduced laryngeal elevation  Penetration After the Swallow: pudding/puree, secondary to residue, in pyriform sinuses  Aspiration After the Swallow: pudding/puree, secondary to residue, in pyriform sinuses  Response  to Aspiration: inconsistent response  Rosenbek's Scale: thin:, pudding/puree:, 8-->Level 8  Residue: pyriform sinuses, secondary to reduced laryngeal elevation, secondary to reduced hyolaryngeal excursion  Response to Residue: unable to clear residue  Attempted Compensatory Maneuvers: other (see comments)(not cognitively appropriate for comps)  FEES Summary: SLP FEES evaluation completed. Pt presents w/ severe pharyngeal dysphagia @ this time. Aspiration during the swallow w/ thin liquid 2' reduced laryngeal vestibular closure. Penetration/aspiration after the swallow w/ pudding from pyriform sinus residue that pt could not clear, mixed w/ secretions, and spilled into the vestibule. Inconsistent delayed weak cough response, but wet vocal quality noted. Safest recommendation @ this time is to continue NPO w/ NG. SLP will continue to follow for tx & assess readiness for repeat instrumental eval.

## 2020-01-10 NOTE — PLAN OF CARE
Problem: Patient Care Overview  Goal: Plan of Care Review  Outcome: Ongoing (interventions implemented as appropriate)  Flowsheets (Taken 1/10/2020 4247)  Plan of Care Reviewed With: patient;son  Note:   SLP evaluation completed. Will proceed w/ FEES to further assess swallow function . Please see note for further details and recommendations.

## 2020-01-10 NOTE — PROGRESS NOTES
Neurology       Patient Care Team:  Philomena Walters MD as PCP - General (Internal Medicine)    Chief complaint: Altered mental status    History: None the family indicates that the patient has had significant issues with memory over the last several years.    This apparently began while on a trip to [place] when she fell and had a brief loss of consciousness.    Since that time she has had some short-term memory issues with difficulty driving and functioning on a day-to-day basis.    She is total self-care however.    She seen Dr. Villarreal who had her on memory drug presumably Aricept which helped initially but wore off over time.    She is also had trouble with major depression.    She came in on event both venlafaxine and Zoloft.    She is been quite restless yesterday but is now more alert and somewhat calmer on Precedex.    She talks about her time in [place] and fall.      Past Medical History:   Diagnosis Date   • COPD (chronic obstructive pulmonary disease) (CMS/[facility])    • Diabetes mellitus (CMS/[facility])    • History of ear injury 1973    Injury of the right eardrum.This has resulted in the headaches   • Hypertension        Vital Signs   Vitals:    01/10/20 0800 01/10/20 0900 01/10/20 1000 01/10/20 1100   BP: (!) 175/138 177/90 (!) 162/103 (!) 170/114   BP Location: Left arm      Patient Position: Lying      Pulse: 118 115 103 100   Resp:   22    Temp: 97.5 °F (36.4 °C)      TempSrc: Axillary      SpO2: 94% 95% 95% 94%   Weight:       Height:           Physical Exam:   General: Awake and alert              Neuro: Oriented to person and place.    Speech is soft and articulate.    She moves all extremities well.        Results Review:  Reviewed  Results from last 7 days   Lab Units 01/10/20  0715   WBC 10*3/mm3 11.52*   HEMOGLOBIN g/dL 9.2*   HEMATOCRIT % 31.0*   PLATELETS 10*3/mm3 107*     Results from last 7 days   Lab Units 01/10/20  0715 01/09/20  0558 01/08/20  0326  01/06/20  1821   SODIUM mmol/L 146*  147* 143   < > 139   POTASSIUM mmol/L 3.2* 3.2* 4.2   < > 3.4*   CHLORIDE mmol/L 107 108* 112*   < > 103   CO2 mmol/L 20.0* 18.0* 18.0*   < > 21.0*   BUN mg/dL 12 12 17   < > 19   CREATININE mg/dL 0.43* 0.54* 0.54*   < > 0.75   CALCIUM mg/dL 9.8 9.2 8.6   < > 9.2   BILIRUBIN mg/dL 0.3 0.3  --   --  0.3   ALK PHOS U/L 60 46  --   --  46   ALT (SGPT) U/L 17 14  --   --  18   AST (SGOT) U/L 35* 31  --   --  40*   GLUCOSE mg/dL 125* 110* 98   < > 147*    < > = values in this interval not displayed.       Imaging Results (Last 24 Hours)     Procedure Component Value Units Date/Time    Fiberoptic Endo (fees) [[id number]] Resulted:  01/10/20 1108     Updated:  01/10/20 1108    Narrative:       This procedure was auto-finalized with no dictation required.    XR Chest 1 View [[id number]] Collected:  01/09/20 0859     Updated:  01/09/20 2204    Narrative:       EXAMINATION: XR CHEST 1 VW- 01/09/2020     INDICATION: resp failure, extubated 01/08/2020     COMPARISON: 01/08/2020     FINDINGS: ET tube is seen at the level of the clavicles. NG tube is seen  in the stomach. The heart is enlarged, vasculature is cephalized. Left  basilar atelectasis has resolved. No new pulmonary parenchymal disease,  effusion or pneumothorax is seen.       Impression:       Interval clearing of mild left basilar atelectasis. No new  chest disease is seen.     D:  01/09/2020  E:  01/09/2020         This report was finalized on 1/9/2020 10:01 PM by Dr. Stephane Erikcson MD.             Assessment:  Probable Alzheimer's disease    Malignant hypertension.    Major depression.        Plan:  Increase Risperdal to 1 mg in the morning and 2 mg at bedtime.    Increase Zoloft  To 50 mg daily.    Taper Precedex as tolerated.    Add Aricept 5 mg daily.    Continue efforts to mobilize and get blood pressure down.    Comment:  Sounds like this lady has a pre-existing dementia now are dealing with trying to get her back on her feet again.         I discussed the patients  findings and my recommendations with patient, family and nursing staff    Buzz Petersen MD  01/10/20  11:34 AM

## 2020-01-11 LAB
ANION GAP SERPL CALCULATED.3IONS-SCNC: 13 MMOL/L (ref 5–15)
BASOPHILS # BLD AUTO: 0.02 10*3/MM3 (ref 0–0.2)
BASOPHILS NFR BLD AUTO: 0.2 % (ref 0–1.5)
BUN BLD-MCNC: 17 MG/DL (ref 8–23)
BUN/CREAT SERPL: 47.2 (ref 7–25)
CALCIUM SPEC-SCNC: 10.4 MG/DL (ref 8.6–10.5)
CHLORIDE SERPL-SCNC: 106 MMOL/L (ref 98–107)
CO2 SERPL-SCNC: 24 MMOL/L (ref 22–29)
CREAT BLD-MCNC: 0.36 MG/DL (ref 0.57–1)
DEPRECATED RDW RBC AUTO: 46.7 FL (ref 37–54)
EOSINOPHIL # BLD AUTO: 0.16 10*3/MM3 (ref 0–0.4)
EOSINOPHIL NFR BLD AUTO: 1.5 % (ref 0.3–6.2)
ERYTHROCYTE [DISTWIDTH] IN BLOOD BY AUTOMATED COUNT: 15 % (ref 12.3–15.4)
FOLATE SERPL-MCNC: 6.33 NG/ML (ref 4.78–24.2)
GFR SERPL CREATININE-BSD FRML MDRD: >150 ML/MIN/1.73
GLUCOSE BLD-MCNC: 181 MG/DL (ref 65–99)
GLUCOSE BLDC GLUCOMTR-MCNC: 140 MG/DL (ref 70–130)
GLUCOSE BLDC GLUCOMTR-MCNC: 158 MG/DL (ref 70–130)
GLUCOSE BLDC GLUCOMTR-MCNC: 163 MG/DL (ref 70–130)
GLUCOSE BLDC GLUCOMTR-MCNC: 177 MG/DL (ref 70–130)
HCT VFR BLD AUTO: 30.5 % (ref 34–46.6)
HGB BLD-MCNC: 9.2 G/DL (ref 12–15.9)
IMM GRANULOCYTES # BLD AUTO: 0.05 10*3/MM3 (ref 0–0.05)
IMM GRANULOCYTES NFR BLD AUTO: 0.5 % (ref 0–0.5)
LYMPHOCYTES # BLD AUTO: 1.05 10*3/MM3 (ref 0.7–3.1)
LYMPHOCYTES NFR BLD AUTO: 9.7 % (ref 19.6–45.3)
MAGNESIUM SERPL-MCNC: 1.3 MG/DL (ref 1.6–2.4)
MCH RBC QN AUTO: 26.1 PG (ref 26.6–33)
MCHC RBC AUTO-ENTMCNC: 30.2 G/DL (ref 31.5–35.7)
MCV RBC AUTO: 86.6 FL (ref 79–97)
MONOCYTES # BLD AUTO: 1.04 10*3/MM3 (ref 0.1–0.9)
MONOCYTES NFR BLD AUTO: 9.6 % (ref 5–12)
NEUTROPHILS # BLD AUTO: 8.55 10*3/MM3 (ref 1.7–7)
NEUTROPHILS NFR BLD AUTO: 78.5 % (ref 42.7–76)
NRBC BLD AUTO-RTO: 0 /100 WBC (ref 0–0.2)
PHOSPHATE SERPL-MCNC: 1.3 MG/DL (ref 2.5–4.5)
PLATELET # BLD AUTO: 107 10*3/MM3 (ref 140–450)
PMV BLD AUTO: 13.4 FL (ref 6–12)
POTASSIUM BLD-SCNC: 3.7 MMOL/L (ref 3.5–5.2)
RBC # BLD AUTO: 3.52 10*6/MM3 (ref 3.77–5.28)
SODIUM BLD-SCNC: 143 MMOL/L (ref 136–145)
VIT B12 BLD-MCNC: 738 PG/ML (ref 211–946)
WBC NRBC COR # BLD: 10.87 10*3/MM3 (ref 3.4–10.8)

## 2020-01-11 PROCEDURE — 94799 UNLISTED PULMONARY SVC/PX: CPT

## 2020-01-11 PROCEDURE — 93010 ELECTROCARDIOGRAM REPORT: CPT | Performed by: INTERNAL MEDICINE

## 2020-01-11 PROCEDURE — 25010000002 HALOPERIDOL LACTATE PER 5 MG: Performed by: NURSE PRACTITIONER

## 2020-01-11 PROCEDURE — 84100 ASSAY OF PHOSPHORUS: CPT | Performed by: INTERNAL MEDICINE

## 2020-01-11 PROCEDURE — 97116 GAIT TRAINING THERAPY: CPT

## 2020-01-11 PROCEDURE — 93005 ELECTROCARDIOGRAM TRACING: CPT | Performed by: INTERNAL MEDICINE

## 2020-01-11 PROCEDURE — 82962 GLUCOSE BLOOD TEST: CPT

## 2020-01-11 PROCEDURE — 25010000002 MAGNESIUM SULFATE 2 GM/50ML SOLUTION: Performed by: NURSE PRACTITIONER

## 2020-01-11 PROCEDURE — 25010000002 HEPARIN (PORCINE) PER 1000 UNITS: Performed by: INTERNAL MEDICINE

## 2020-01-11 PROCEDURE — 83735 ASSAY OF MAGNESIUM: CPT | Performed by: INTERNAL MEDICINE

## 2020-01-11 PROCEDURE — 97110 THERAPEUTIC EXERCISES: CPT

## 2020-01-11 PROCEDURE — 80048 BASIC METABOLIC PNL TOTAL CA: CPT | Performed by: INTERNAL MEDICINE

## 2020-01-11 PROCEDURE — 85025 COMPLETE CBC W/AUTO DIFF WBC: CPT | Performed by: INTERNAL MEDICINE

## 2020-01-11 PROCEDURE — 82746 ASSAY OF FOLIC ACID SERUM: CPT | Performed by: PSYCHIATRY & NEUROLOGY

## 2020-01-11 PROCEDURE — 25010000002 MAGNESIUM SULFATE 2 GM/50ML SOLUTION: Performed by: INTERNAL MEDICINE

## 2020-01-11 PROCEDURE — 82607 VITAMIN B-12: CPT | Performed by: PSYCHIATRY & NEUROLOGY

## 2020-01-11 PROCEDURE — 99232 SBSQ HOSP IP/OBS MODERATE 35: CPT | Performed by: INTERNAL MEDICINE

## 2020-01-11 PROCEDURE — 99232 SBSQ HOSP IP/OBS MODERATE 35: CPT | Performed by: PSYCHIATRY & NEUROLOGY

## 2020-01-11 RX ORDER — RISPERIDONE 1 MG/ML
1 SOLUTION ORAL DAILY
Status: DISCONTINUED | OUTPATIENT
Start: 2020-01-12 | End: 2020-01-22 | Stop reason: HOSPADM

## 2020-01-11 RX ORDER — MAGNESIUM SULFATE HEPTAHYDRATE 40 MG/ML
2 INJECTION, SOLUTION INTRAVENOUS AS NEEDED
Status: DISCONTINUED | OUTPATIENT
Start: 2020-01-11 | End: 2020-01-11

## 2020-01-11 RX ORDER — HALOPERIDOL 5 MG/ML
5 INJECTION INTRAMUSCULAR ONCE
Status: COMPLETED | OUTPATIENT
Start: 2020-01-11 | End: 2020-01-11

## 2020-01-11 RX ORDER — MAGNESIUM SULFATE HEPTAHYDRATE 40 MG/ML
6 INJECTION, SOLUTION INTRAVENOUS AS NEEDED
Status: DISCONTINUED | OUTPATIENT
Start: 2020-01-11 | End: 2020-01-22 | Stop reason: HOSPADM

## 2020-01-11 RX ADMIN — INSULIN HUMAN 2 UNITS: 100 INJECTION, SOLUTION PARENTERAL at 12:39

## 2020-01-11 RX ADMIN — HEPARIN SODIUM 5000 UNITS: 5000 INJECTION INTRAVENOUS; SUBCUTANEOUS at 22:15

## 2020-01-11 RX ADMIN — MAGNESIUM SULFATE 2 G: 2 INJECTION INTRAVENOUS at 11:57

## 2020-01-11 RX ADMIN — DEXMEDETOMIDINE HYDROCHLORIDE 1.5 MCG/KG/HR: 100 INJECTION, SOLUTION INTRAVENOUS at 14:39

## 2020-01-11 RX ADMIN — HYDROCODONE BITARTRATE AND ACETAMINOPHEN 1 TABLET: 5; 325 TABLET ORAL at 06:42

## 2020-01-11 RX ADMIN — HEPARIN SODIUM 5000 UNITS: 5000 INJECTION INTRAVENOUS; SUBCUTANEOUS at 05:57

## 2020-01-11 RX ADMIN — POTASSIUM CHLORIDE 40 MEQ: 1.5 POWDER, FOR SOLUTION ORAL at 02:09

## 2020-01-11 RX ADMIN — IPRATROPIUM BROMIDE AND ALBUTEROL SULFATE 3 ML: 2.5; .5 SOLUTION RESPIRATORY (INHALATION) at 07:37

## 2020-01-11 RX ADMIN — CARVEDILOL 25 MG: 12.5 TABLET, FILM COATED ORAL at 08:21

## 2020-01-11 RX ADMIN — FAMOTIDINE 20 MG: 10 INJECTION, SOLUTION INTRAVENOUS at 20:56

## 2020-01-11 RX ADMIN — DEXMEDETOMIDINE HYDROCHLORIDE 1.3 MCG/KG/HR: 100 INJECTION, SOLUTION INTRAVENOUS at 23:22

## 2020-01-11 RX ADMIN — MAGNESIUM SULFATE 2 G: 2 INJECTION INTRAVENOUS at 07:21

## 2020-01-11 RX ADMIN — POTASSIUM & SODIUM PHOSPHATES POWDER PACK 280-160-250 MG 2 PACKET: 280-160-250 PACK at 07:20

## 2020-01-11 RX ADMIN — SERTRALINE HYDROCHLORIDE 150 MG: 100 TABLET ORAL at 20:56

## 2020-01-11 RX ADMIN — FAMOTIDINE 20 MG: 10 INJECTION, SOLUTION INTRAVENOUS at 08:22

## 2020-01-11 RX ADMIN — INSULIN HUMAN 2 UNITS: 100 INJECTION, SOLUTION PARENTERAL at 06:12

## 2020-01-11 RX ADMIN — RISPERIDONE 1 MG: 1 SOLUTION ORAL at 08:22

## 2020-01-11 RX ADMIN — IPRATROPIUM BROMIDE AND ALBUTEROL SULFATE 3 ML: 2.5; .5 SOLUTION RESPIRATORY (INHALATION) at 15:43

## 2020-01-11 RX ADMIN — DONEPEZIL HYDROCHLORIDE 5 MG: 5 TABLET ORAL at 21:05

## 2020-01-11 RX ADMIN — LEVETIRACETAM 500 MG: 500 TABLET, FILM COATED ORAL at 08:22

## 2020-01-11 RX ADMIN — LEVETIRACETAM 500 MG: 500 TABLET, FILM COATED ORAL at 20:56

## 2020-01-11 RX ADMIN — POTASSIUM PHOSPHATE, MONOBASIC AND POTASSIUM PHOSPHATE, DIBASIC 30 MMOL: 224; 236 INJECTION, SOLUTION, CONCENTRATE INTRAVENOUS at 13:44

## 2020-01-11 RX ADMIN — ASPIRIN 81 MG 81 MG: 81 TABLET ORAL at 08:21

## 2020-01-11 RX ADMIN — HEPARIN SODIUM 5000 UNITS: 5000 INJECTION INTRAVENOUS; SUBCUTANEOUS at 16:46

## 2020-01-11 RX ADMIN — INSULIN HUMAN 2 UNITS: 100 INJECTION, SOLUTION PARENTERAL at 18:06

## 2020-01-11 RX ADMIN — DEXMEDETOMIDINE HYDROCHLORIDE 1.4 MCG/KG/HR: 100 INJECTION, SOLUTION INTRAVENOUS at 02:09

## 2020-01-11 RX ADMIN — HALOPERIDOL LACTATE 5 MG: 5 INJECTION INTRAMUSCULAR at 02:31

## 2020-01-11 RX ADMIN — RISPERIDONE 2 MG: 1 SOLUTION ORAL at 20:56

## 2020-01-11 RX ADMIN — IPRATROPIUM BROMIDE AND ALBUTEROL SULFATE 3 ML: 2.5; .5 SOLUTION RESPIRATORY (INHALATION) at 20:44

## 2020-01-11 RX ADMIN — SODIUM CHLORIDE 5 MG/HR: 9 INJECTION, SOLUTION INTRAVENOUS at 11:58

## 2020-01-11 RX ADMIN — ATORVASTATIN CALCIUM 80 MG: 40 TABLET, FILM COATED ORAL at 20:56

## 2020-01-11 RX ADMIN — MAGNESIUM SULFATE 2 G: 2 INJECTION INTRAVENOUS at 14:39

## 2020-01-11 RX ADMIN — HYDROCODONE BITARTRATE AND ACETAMINOPHEN 1 TABLET: 5; 325 TABLET ORAL at 00:07

## 2020-01-11 RX ADMIN — TRAZODONE HYDROCHLORIDE 50 MG: 50 TABLET ORAL at 20:56

## 2020-01-11 RX ADMIN — CARVEDILOL 25 MG: 12.5 TABLET, FILM COATED ORAL at 18:05

## 2020-01-11 RX ADMIN — NICOTINE 1 PATCH: 21 PATCH, EXTENDED RELEASE TRANSDERMAL at 08:22

## 2020-01-11 RX ADMIN — IPRATROPIUM BROMIDE AND ALBUTEROL SULFATE 3 ML: 2.5; .5 SOLUTION RESPIRATORY (INHALATION) at 11:47

## 2020-01-11 NOTE — PLAN OF CARE
Problem: Patient Care Overview  Goal: Plan of Care Review  Outcome: Ongoing (interventions implemented as appropriate)  Flowsheets  Taken 1/11/2020 1217 by Bernadette Monsivais, PT  Progress: improving  Plan of Care Reviewed With: patient  Outcome Summary: Pt demonstrated increased indep with bed mobility, transfers, and gait; progressed forward ambulation distance to 50 total ft with use of RW, min x2 A, 1 seated rest break. Cont to be limited by generalized weakness and decreased safety awareness. Will cont PT POC.

## 2020-01-11 NOTE — PLAN OF CARE
Problem: Patient Care Overview  Goal: Plan of Care Review  Outcome: Ongoing (interventions implemented as appropriate)  Flowsheets (Taken 1/11/2020 0535)  Progress: no change  Plan of Care Reviewed With: patient; family  Outcome Summary: Beginning of shift pt was calm, resting in the chair. Pt began to become more agitated/restless as shift progressed. Pt is confused. Approx 2330 pt removed all IVs and was attempting to pull at other lines- precedex maxed. Approx 0200 pt became combative (attempting to bite and hit RNs at bedside), BUE restraints placed and one time dose of Haldol given. Family at bedside and understanding. Pt vitals increase when restless/agitated- Cardene titrated. Will continue to monitor.   Problem: Patient Care Overview  Goal: Plan of Care Review  Outcome: Ongoing (interventions implemented as appropriate)     Problem: Skin Injury Risk (Adult)  Goal: Skin Health and Integrity  Outcome: Ongoing (interventions implemented as appropriate)     Problem: Pain, Chronic (Adult)  Goal: Acceptable Pain/Comfort Level and Functional Ability  Outcome: Ongoing (interventions implemented as appropriate)     Problem: Fall Risk (Adult)  Goal: Absence of Fall  Outcome: Ongoing (interventions implemented as appropriate)

## 2020-01-11 NOTE — PROGRESS NOTES
INTENSIVIST   HOSPITAL VISIT NOTE     Hospital:  LOS: 5 days     Subjective   S     Ms. Glo Berry, 64 y.o. female is followed for:      Encephalopathy acute    Hypertension    T2DM       As an Intensivist, we provide an integrated approach to the ICU patient and family, medical management of comorbid conditions, including but not limited to electrolytes, glycemic control, organ dysfunction, lead interdisciplinary rounds and coordinate the care with all other services, including those from other specialists.     HPI:  Extubated X 3 d.  Better night. Less restlessness.  Still on Dexmedetomidine   Voice still weak, whispering.  No hallucinations for the past 2 days.  Off Nicardipine infusion, since this morning.     Objective   O     .Vitals:  Temp: 97.6 °F (36.4 °C) (01/11/20 0800) Temp  Min: 97.5 °F (36.4 °C)  Max: 98 °F (36.7 °C)   BP: 143/88 (01/11/20 1000) BP  Min: 125/89  Max: 211/184   Pulse: 90 (01/11/20 1000) Pulse  Min: 89  Max: 156   Resp: 24 (01/11/20 1000) Resp  Min: 20  Max: 28   SpO2: 96 % (01/11/20 1000) SpO2  Min: 88 %  Max: 99 %   Device: humidified, nasal cannula (01/11/20 1000)    Flow Rate: 2.5 (01/11/20 1000) Flow (L/min)  Min: 2.5  Max: 6     Medications (drips):    dexmedetomidine Last Rate: 1.5 mcg/kg/hr (01/11/20 0210)   niCARdipine Last Rate: 10 mg/hr (01/11/20 0837)        Physical Examination    Telemetry:  Rhythm: sinus arrhythmia (01/11/20 1000)  Atrial Rhythm: (Dr Reid aware of occ AFib and meds taking) (01/10/20 3897)      Constitutional:  No acute distress.   Cardiovascular: Normal rate, regular and rhythm. Normal heart sounds.  No murmurs, gallop or rub.   Respiratory: No respiratory distress.  Normal breath sounds  No adventitious sounds.    Abdominal:  Soft with no tenderness  No distension. No HSM.   Extremities: Warm with no cyanosis.  No peripheral edema.   Neurological:   Awake. Confused.  Best Eye Response: 4-->(E4) spontaneous (01/11/20 1000)  Best Motor Response:  6-->(M6) obeys commands (01/11/20 1000)  Best Verbal Response: 4-->(V4) confused (01/11/20 1000)  Ana Coma Scale Score: 14 (01/11/20 1000)       Hematology:  Results from last 7 days   Lab Units 01/11/20  0422 01/10/20  0715 01/09/20  0558   WBC 10*3/mm3 10.87* 11.52* 11.29*   HEMOGLOBIN g/dL 9.2* 9.2* 8.5*   MCV fL 86.6 89.1 88.4   PLATELETS 10*3/mm3 107* 107* 82*       Chemistry:  Estimated Creatinine Clearance: 161.3 mL/min (A) (by C-G formula based on SCr of 0.36 mg/dL (L)).    Results from last 7 days   Lab Units 01/11/20  0422 01/10/20  1948 01/10/20  0715 01/09/20  0558   SODIUM mmol/L 143  --  146* 147*   POTASSIUM mmol/L 3.7 3.3* 3.2* 3.2*   CHLORIDE mmol/L 106  --  107 108*   CO2 mmol/L 24.0  --  20.0* 18.0*   ANION GAP mmol/L 13.0  --  19.0* 21.0*   GLUCOSE mg/dL 181*  --  125* 110*   CALCIUM mg/dL 10.4  --  9.8 9.2     Results from last 7 days   Lab Units 01/11/20  0422 01/10/20  0715 01/09/20  0558   BUN mg/dL 17 12 12   CREATININE mg/dL 0.36* 0.43* 0.54*     Results from last 7 days   Lab Units 01/11/20  0422 01/10/20  0715 01/09/20  0558   MAGNESIUM mg/dL 1.3* 1.7 2.0   PHOSPHORUS mg/dL 1.3* 2.4* 2.3*       Lab Results   Lab Value Date/Time    HEPBIGM 0.148 01/08/2020 1606       Lab Results   Lab Value Date/Time    THCURSCR Negative 01/07/2020 0507    PCPUR Positive (A) 01/07/2020 0507    COCAINEUR Negative 01/07/2020 0507    METAMPSCNUR Negative 01/07/2020 0507    LABOPIASCN Positive (A) 01/07/2020 0507    AMPHETSCREEN Negative 01/07/2020 0507    LABBENZSCN Positive (A) 01/07/2020 0507    TRICYCLICSCN Negative 01/07/2020 0507    LABMETHSCN Negative 01/07/2020 0507    BARBITSCNUR Positive (A) 01/07/2020 0507    OXYCODONESCN Negative 01/07/2020 0507       Images:  No radiology results for the last day  Results: Reviewed.  I reviewed the patient's new laboratory and imaging results.  I independently reviewed the patient's new images.    Medications: Reviewed.    Assessment/Plan   A / P      Assessment:    64 y.o.female, admitted on 1/6/2020 with CVA (cerebral vascular accident) (CMS/[facility]) [I63.9]  Encephalopathy [G93.40]:     1. Respiratory failure  1. Intubated 01/06/20  2. Mechanical Ventilation  3. Extubated 01/08/20  2. Altered mental status  1. Negative w/u for CVA, including MRI.  2. 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  3. Memory loss, to resume Donepezil  3. NC Anemia  4. Thrombocytopenia  1. Dropped by ~ 52% on 01/08/20, but just in 2 days.   2. Improving, PLT stable at 107 K, 01/10 - 01/11.  3. Hep Ab: Negative  5. PMH Dementia  6. PMH HTN  7. PMH Pancreatic insufficiency, chronic diarrhea.  8. Tobacco use  9. Enteral Nutrition: Started 01/09/20   10. UDS: PCP (+), R/o false positive as she is on  11. T2DM with neuropathy    Results from last 7 days   Lab Units 01/11/20  0611 01/10/20  2356 01/10/20  1741 01/10/20  1222 01/10/20  0458 01/09/20  2315   GLUCOSE mg/dL 177* 149* 156* 147* 99 132*     Lab Results   Lab Value Date/Time    HGBA1C 5.60 01/06/2020 1821       Nutrition Support: Diet, Tube Feeding Tube Feeding Formula: Peptamen Intense VHP (Peptide Based, Very High Protein)   Modulars: Patient doesn't have any tube feeding modular orders   Diet: NPO Diet   Advance Directives: Code Status and Medical Interventions:   Ordered at: 01/06/20 1647     Code Status:    CPR     Medical Interventions (Level of Support Prior to Arrest):    Full        Plan:    1. Goal: Glucose < 180 mg/dL  2. Replace Mg, Phosp.  3. Na better.  4. Discussed with family at bedside.  5. Wean Dexmedetomidine as tolerated.  6. Disposition: Keep in ICU.   1. Discussed with family at bedside.    Plan of care and goals reviewed during interdisciplinary rounds.  I discussed the patient's findings and my recommendations with nursing staff    Level of Risk is High due to:  illness with threat to life or bodily function.     Time: was greater than 35  minutes.    (This excludes time spent performing separately reportable procedures and services).  Patient was at high risk of imminent or life-threatening deterioration in her condition due to Altered mental status and Respiratory failure.     Tristan Hull MD, FACP, FCCP, CNSC  Intensive Care Medicine, Nutrition Support and Pulmonary Medicine     [x]  Primary Attending  []  Consultant

## 2020-01-11 NOTE — THERAPY TREATMENT NOTE
Patient Name: Glo Berry  : 1955    MRN: [id number]                              Today's Date: 2020       Admit Date: 2020    Visit Dx:     ICD-10-CM ICD-9-CM   1. Impaired mobility and ADLs Z74.09 799.89   2. Dysphagia, unspecified type R13.10 787.20   3. Cognitive communication deficit R41.841 799.52     Patient Active Problem List   Diagnosis   • Uterine prolapse   • Stress incontinence of urine   • Urgency incontinence   • Diarrhea   • Encephalopathy acute   • R/O AIS    • R/O status epilepticus    • Hypertension   • Pancreatic insufficiency   • T2DM on metformin    • Chronic diarrhea   • Lumbar stenosis   • Diabetic neuropathy    • Depression   • Dementia    • Smoker   • Acute respiratory failure    • Encephalopathy     Past Medical History:   Diagnosis Date   • COPD (chronic obstructive pulmonary disease) (CMS/HCC)    • Diabetes mellitus (CMS/HCC)    • History of ear injury 1973    Injury of the right eardrum.This has resulted in the headaches   • Hypertension      Past Surgical History:   Procedure Laterality Date   • BREAST CYST EXCISION     • SINUS SURGERY     • TONSILLECTOMY     • TUBAL ABDOMINAL LIGATION       General Information     Row Name 20 1214          PT Evaluation Time/Intention    Document Type  therapy note (daily note)  -LS     Mode of Treatment  physical therapy  -LS     Row Name 20 1214          General Information    Existing Precautions/Restrictions  fall;oxygen therapy device and L/min;other (see comments) NG  -LS     Row Name 20 1214          Cognitive Assessment/Intervention- PT/OT    Orientation Status (Cognition)  oriented x 3  -LS       User Key  (r) = Recorded By, (t) = Taken By, (c) = Cosigned By    Initials Name Provider Type    LS Bernadette Monsivais, PT Physical Therapist        Mobility     Row Name 20 1215          Bed Mobility Assessment/Treatment    Supine-Sit [place] (Bed Mobility)  moderate assist (50% patient effort);verbal  cues  -LS     Assistive Device (Bed Mobility)  head of bed elevated;draw sheet;bed rails  -LS     Row Name 01/11/20 1215          Transfer Assessment/Treatment    Comment (Transfers)  STS x2: x1 from EOB, x1 from recliner. Cues for hand placement and safety.  -LS     Row Name 01/11/20 1215          Sit-Stand Transfer    Sit-Stand [place] (Transfers)  minimum assist (75% patient effort);2 person assist;verbal cues  -LS     Assistive Device (Sit-Stand Transfers)  walker, front-wheeled  -LS     Row Name 01/11/20 1215          Gait/Stairs Assessment/Training    Gait/Stairs Assessment/Training  gait/ambulation independence  -LS     [place] Level (Gait)  minimum assist (75% patient effort);2 person assist;verbal cues  -LS     Assistive Device (Gait)  walker, front-wheeled  -LS     Distance in Feet (Gait)  50  -LS     Deviations/Abnormal Patterns (Gait)  bilateral deviations;alla decreased;stride length decreased  -LS     Bilateral Gait Deviations  forward flexed posture;heel strike decreased  -LS     Comment (Gait/Stairs)  1 seated rest break after 35 ft; followed with recliner for safety. VC's for upright posture and keeping RW close to body.  -LS       User Key  (r) = Recorded By, (t) = Taken By, (c) = Cosigned By    Initials Name Provider Type    LS Bernadette Monsivais, PT Physical Therapist        Obj/Interventions     Row Name 01/11/20 1216          Therapeutic Exercise    Lower Extremity (Therapeutic Exercise)  LAQ (long arc quad), bilateral  -LS     Lower Extremity Range of Motion (Therapeutic Exercise)  hip flexion/extension, bilateral;ankle dorsiflexion/plantar flexion, bilateral  -LS     Exercise Type (Therapeutic Exercise)  AROM (active range of motion)  -LS     Position (Therapeutic Exercise)  seated  -LS     Sets/Reps (Therapeutic Exercise)  1/10  -LS     Row Name 01/11/20 1216          Static Sitting Balance    Level of [place] (Unsupported Sitting, Static Balance)  contact guard assist  -LS      Sitting Position (Unsupported Sitting, Static Balance)  sitting on edge of bed  -     Row Name 01/11/20 1216          Static Standing Balance    Level of [place] (Supported Standing, Static Balance)  contact guard assist  -LS     Assistive Device Utilized (Supported Standing, Static Balance)  walker, rolling  -       User Key  (r) = Recorded By, (t) = Taken By, (c) = Cosigned By    Initials Name Provider Type    LS Bernadette Monsivais, PT Physical Therapist        Goals/Plan    No documentation.       Clinical Impression     Row Name 01/11/20 1217          Pain Assessment    Additional Documentation  Pain Scale: FACES Pre/Post-Treatment (Group)  -[facility] Name 01/11/20 1217          Pain Scale: Numbers Pre/Post-Treatment    Pain Location - Orientation  generalized  -     Pre/Post Treatment Pain Comment  reports improvement in pain with mobility; tolerated  -     Pain Intervention(s)  Repositioned;Ambulation/increased activity  -     Row Name 01/11/20 1217          Pain Scale: FACES Pre/Post-Treatment    Pain: FACES Scale, Pretreatment  2-->hurts little bit  -LS     Pain: FACES Scale, Post-Treatment  0-->no hurt  -     Row Name 01/11/20 1217          Plan of Care Review    Plan of Care Reviewed With  patient  -     Progress  improving  -     Outcome Summary  Pt demonstrated increased indep with bed mobility, transfers, and gait; progressed forward ambulation distance to 50 total ft with use of RW, min x2 A, 1 seated rest break. Cont to be limited by generalized weakness and decreased safety awareness. Will cont PT POC.   -     Row Name 01/11/20 1217          Vital Signs    Pre Systolic BP Rehab  172  -LS     Pre Treatment Diastolic BP  107  -LS     Post Systolic BP Rehab  182  -LS     Post Treatment Diastolic BP  93  -LS     Pretreatment Heart Rate (beats/min)  94  -LS     Posttreatment Heart Rate (beats/min)  103  -LS     Pre SpO2 (%)  99  -LS     O2 Delivery Pre Treatment  nasal cannula  -LS      Post SpO2 (%)  99  -LS     O2 Delivery Post Treatment  nasal cannula  -LS     Pre Patient Position  Supine  -LS     Intra Patient Position  Standing  -LS     Post Patient Position  Sitting  -LS     Row Name 01/11/20 1217          Positioning and Restraints    Pre-Treatment Position  in bed  -LS     Post Treatment Position  chair  -LS     In Chair  notified nsg;reclined;call light within reach;encouraged to call for assist;RUE elevated;LUE elevated;waffle cushion;legs elevated;exit alarm on;with family/caregiver  -LS     Restraints  released:;reapplied:;notified nsg:;soft limb  -LS       User Key  (r) = Recorded By, (t) = Taken By, (c) = Cosigned By    Initials Name Provider Type    Bernadette Mills, PT Physical Therapist        Outcome Measures     Row Name 01/11/20 1219          How much help from another person do you currently need...    Turning from your back to your side while in flat bed without using bedrails?  3  -LS     Moving from lying on back to sitting on the side of a flat bed without bedrails?  2  -LS     Moving to and from a bed to a chair (including a wheelchair)?  3  -LS     Standing up from a chair using your arms (e.g., wheelchair, bedside chair)?  3  -LS     Climbing 3-5 steps with a railing?  2  -LS     To walk in hospital room?  3  -LS     AM-PAC 6 Clicks Score (PT)  16  -LS       User Key  (r) = Recorded By, (t) = Taken By, (c) = Cosigned By    Initials Name Provider Type    Bernadette Mills, PT Physical Therapist          PT Recommendation and Plan     Plan of Care Reviewed With: patient  Progress: improving  Outcome Summary: Pt demonstrated increased indep with bed mobility, transfers, and gait; progressed forward ambulation distance to 50 total ft with use of RW, min x2 A, 1 seated rest break. Cont to be limited by generalized weakness and decreased safety awareness. Will cont PT POC.      Time Calculation:   PT Charges     Row Name 01/11/20 1385             Time Calculation    Start  Time  1101  -      PT Received On  01/11/20  -         Time Calculation- PT    Total Timed Code Minutes- PT  24 minute(s)  -         Timed Charges    72485 - PT Therapeutic Exercise Minutes  5  -      71611 - Gait Training Minutes   15  -      64281 - PT Therapeutic Activity Minutes  4  -        User Key  (r) = Recorded By, (t) = Taken By, (c) = Cosigned By    Initials Name Provider Type     Bernadette Monsivais, PT Physical Therapist        Therapy Charges for Today     Code Description Service Date Service Provider Modifiers Qty    18333837424 HC GAIT TRAINING EA 15 MIN 1/11/2020 Bernadette Monsivais, PT GP 1    69281496933 HC PT THER PROC EA 15 MIN 1/11/2020 Bernadette Monsivais, PT GP 1    20563904371 HC PT THER SUPP EA 15 MIN 1/11/2020 Bernadette Monsivais, PT GP 2          PT G-Codes  Outcome Measure Options: AM-PAC 6 Clicks Basic Mobility (PT)  AM-PAC 6 Clicks Score (PT): 16  AM-PAC 6 Clicks Score (OT): 7    Bernadette Monsivais, PT  1/11/2020

## 2020-01-11 NOTE — PROGRESS NOTES
Neurology Note    Patient:  Glo Berry    YOB: 1955    REFERRING PHYSICIAN:  Maximiliano Muller MD    CHIEF COMPLAINT:    AMS    HISTORY OF PRESENT ILLNESS:   The patient agitated last night, received IV Haldol, placed in restraints. Calm and cooperative, wants restraints off. Increased upper airway secretions. No seizures reported.    Past Medical History:  Past Medical History:   Diagnosis Date   • COPD (chronic obstructive pulmonary disease) (CMS/[facility])    • Diabetes mellitus (CMS/[facility])    • History of ear injury 1973    Injury of the right eardrum.This has resulted in the headaches   • Hypertension        Past Surgical History:  Past Surgical History:   Procedure Laterality Date   • BREAST CYST EXCISION     • SINUS SURGERY     • TONSILLECTOMY     • TUBAL ABDOMINAL LIGATION         Social History:   Social History     Socioeconomic History   • Marital status:      Spouse name: Not on file   • Number of children: Not on file   • Years of education: Not on file   • Highest education level: Not on file   Tobacco Use   • Smoking status: Current Every Day Smoker     Packs/day: 1.00     Types: Cigarettes   • Smokeless tobacco: Never Used   Substance and Sexual Activity   • Alcohol use: No        Family History:   Family History   Problem Relation Age of Onset   • Aortic aneurysm Mother    • Lung disease Father    • Parkinsonism Father    • Heart disease Other        Medications Prior to Admission:    Prior to Admission medications    Medication Sig Start Date End Date Taking? Authorizing Provider   dicyclomine (BENTYL) 20 MG tablet Take 20 mg by mouth 2 (Two) Times a Day.   Yes Tedyd Emmanuel MD   amLODIPine (NORVASC) 5 MG tablet  2/9/19   Teddy Emmanuel MD   carvedilol (COREG) 25 MG tablet Take 25 mg by mouth 2 (Two) Times a Day With Meals. 12/15/18   Teddy Emmanuel MD   colesevelam (WELCHOL) 625 MG tablet 625 mg 2 (Two) Times a Day With Meals. 1/29/19   Provider  MD Teddy   cyanocobalamin 1000 MCG/ML injection inject contents of 1 vial intramuscularly MONTHLY 2/14/19   Teddy Emmanuel MD   donepezil (ARICEPT) 5 MG tablet Take 5 mg by mouth Every Night. 1/30/19   Teddy Emmanuel MD   fenofibrate 160 MG tablet Take 160 mg by mouth Daily. 1/29/19   Teddy Emmanuel MD   losartan (COZAAR) 100 MG tablet Take 100 mg by mouth Daily. 1/29/19   Teddy Emmanuel MD   metFORMIN (GLUCOPHAGE) 1000 MG tablet Take 1,000 mg by mouth Daily With Breakfast. 1/30/19   Teddy Emmanuel MD   Pancrelipase, Lip-Prot-Amyl, (CREON) 53789 units capsule delayed-release particles Take 2 by mouth 30 minutes before meals and 1 before a snack 4/4/19   Adan Wright MD   potassium chloride (K-DUR,KLOR-CON) 20 MEQ CR tablet Take 20 mEq by mouth Daily. 2/20/19   Teddy Emmanuel MD   prochlorperazine (COMPAZINE) 10 MG tablet Take  by mouth Every 8 (Eight) Hours As Needed. 12/31/18   Teddy Emmanuel MD   sertraline (ZOLOFT) 100 MG tablet Take 50 mg by mouth 2 (Two) Times a Day. 2/9/19   Teddy Emmanuel MD   topiramate (TOPAMAX) 50 MG tablet Take 50 mg by mouth Daily. 1/29/19   Teddy Emmanuel MD   traZODone (DESYREL) 100 MG tablet Take 100 mg by mouth Every Night. 1/30/19   Teddy Emmanuel MD   venlafaxine XR (EFFEXOR-XR) 150 MG 24 hr capsule Take 150 mg by mouth Daily. 1/30/19   Teddy Emmanuel MD       Allergies:  Codeine      Review of system  Review of Systems   Unable to perform ROS: Dementia       Vitals:    01/11/20 1147   BP:    Pulse: 98   Resp: 24   Temp:    SpO2: 100%       Physical exam  Physical Exam   Constitutional: She is oriented to person, place, and time. She appears well-developed and well-nourished.   HENT:   Head: Normocephalic and atraumatic.   Eyes: Pupils are equal, round, and reactive to light. EOM are normal.   Cardiovascular: Normal rate and regular rhythm.   Pulmonary/Chest: Effort normal.   Neurological:  She is alert and oriented to person, place, and time. She has normal strength and normal reflexes. She displays normal reflexes. No cranial nerve deficit or sensory deficit. She exhibits normal muscle tone. Coordination normal. She displays no Babinski's sign on the left side.   Speech mildly dysarthric, fairly attentive, even hypervigilant, VFF, no facial droop, moves limbs x 4.   Psychiatric: She has a normal mood and affect. Her behavior is normal. Thought content normal.         Lab Results   Component Value Date    WBC 10.87 (H) 01/11/2020    HGB 9.2 (L) 01/11/2020    HCT 30.5 (L) 01/11/2020    MCV 86.6 01/11/2020     (L) 01/11/2020     Lab Results   Component Value Date    GLUCOSE 181 (H) 01/11/2020    BUN 17 01/11/2020    CREATININE 0.36 (L) 01/11/2020    EGFRIFNONA >150 01/11/2020    BCR 47.2 (H) 01/11/2020    CO2 24.0 01/11/2020    CALCIUM 10.4 01/11/2020    ALBUMIN 3.40 (L) 01/10/2020    AST 35 (H) 01/10/2020    ALT 17 01/10/2020     TSH  0.270 - 4.200 uIU/mL 2.190    ECG 12 Lead   Order: [id number]   Status:  Preliminary result   Visible to patient:  No (Not Released) Next appt:  None      Narrative & Impression     Test Reason : atrial tachycardia  Blood Pressure : **/** mmHG  Vent. Rate : 104 BPM     Atrial Rate : 104 BPM     P-R Int : 098 ms          QRS Dur : 090 ms      QT Int : 352 ms       P-R-T Axes : 000 035 076 degrees     QTc Int : 462 ms     Sinus tachycardia with short MA with premature atrial complexes with   aberrant conduction  Otherwise normal ECG  When compared with ECG of 10-NICOLE-2020 17:59, (Unconfirmed)  Sinus rhythm has replaced Ectopic atrial rhythm     Referred By:             Confirmed By:       Specimen Collected: 01/11/20 06:04 Last Resulted: 01/11/20 06:06               EEG   Order: [id number]   Status:  Final result   Visible to patient:  No (Not Released)   Details     Reading Physician Reading Date Result Priority   Rashard Watts MD 1/6/2020 STAT      Narrative &  Impression     Reason for referral:     64 y.o.female with altered mental status     Technical Summary:      A 19 channel digital EEG was performed using the international 10-20 placement system, including eye leads and EKG leads.     Findings:     The patient is intubated.  The background shows diffuse low amplitude 2-5 hz delta and theta with underlying faster frequencies present.  Sweat artifact is predominant at times.  Photic stimulation and HVN are not performed.  No focal slowing or epileptiform activity is seen.       IMPRESSION:     Moderate generalized slowing and suppression     No electrographic seizures are seen           This report is transcribed using the Dragon dictation system.                  Last Resulted: 20 22:09              Duplex scan of extracranial arteries using B-mode, color flow, and/or spectral Doppler; bilateral   Order# [id number]   Reading physician: Bk Wesley MD Ordering physician: Aubree Luis APRN Study date: 20   Patient Information     Patient Name  Glo Berry N  [id number] Sex  Female  (Age)  1955 (64 y.o.)   Clinical Indication     CVA (stroke); other symptoms; Unknown   Admission Information     Admission Date/Time Discharge Date/Time Room/Bed   20  1608  N235/1   Interpretation Summary     · Right internal carotid artery stenosis of 0-49%.  · Left internal carotid artery stenosis of 50-69%.        Echocardiogram   Order# [id number]   Reading physician: Bk Wesley MD Ordering physician: Aubree Luis APRN Study date: 20   Patient Information     Patient Name  Glo Berry MRN  [id number] Sex  Female  (Age)  1955 (64 y.o.)   Admission Information     Admission Date/Time Discharge Date/Time Room/Bed   20  1608  N235/1   Sedation Narrator Report     Sedation Narrator Report   Interpretation Summary     · There is calcification of the aortic valve.  · Peak velocity of the flow distal to the aortic  "valve is 217.0 cm/s.  · Estimated EF = 70%.  · Left ventricular systolic function is normal.      Patient Hx Of Height, Weight, and Vitals     Height Weight BSA (Calculated - sq m) BMI (kg/m2) Pulse BP   162.6 cm (64.02\") 66.5 kg (146 lb 9.7 oz) 1.71 sq meters 25.2 68 149/68   Reason for Exam     Other Reasons - Stroke; Lightheadedness, Presyncope, or Syncope; Syncope - Stroke   Cardiac History     Diagnosis Date Comment Source   Diabetes mellitus (CMS/[facility])   Provider   Hypertension   Provider   Study Description     A two-dimensional transthoracic echocardiogram with complete color flow and Doppler was performed. The study is technically adequate for diagnosis. The quality of the study is limited due to poor acoustic windows related to patient being intubated. Verbal consent was obtained from the patient to use saline contrast in order to optimize the study. A total of 20 mL of agitated saline was administered to assess for cardiac shunting.   Patient sedated and intubated on ventilator.   Echocardiogram Findings     Left Ventricle Left ventricular systolic function is normal. Calculated EF = 69%. Estimated EF = 70%. Normal left ventricular cavity size and wall thickness noted. All left ventricular wall segments contract normally.   Right Ventricle Normal right ventricular cavity size, wall thickness, systolic function and septal motion noted.   Left Atrium Normal left atrial size and volume noted. Saline test results are negative.   Right Atrium Normal right atrial size noted. The inferior vena cava is normally sized. Normal IVC inspiratory collapse of greater than 50% noted.   Aortic Valve The aortic valve is abnormal in structure. The valve exhibits sclerosis. There is calcification of the aortic valve.No aortic valve regurgitation is present. No hemodynamically significant aortic valve stenosis is present. Peak velocity of the flow distal to the aortic valve is 217.0 cm/s.   Mitral Valve The mitral valve is " abnormal in structure. MAC is present. Trace mitral valve regurgitation is present. No significant mitral valve stenosis is present.   Tricuspid Valve The tricuspid valve is grossly normal. No evidence of tricuspid valve stenosis is present. No tricuspid valve regurgitation is present.   Pulmonic Valve The pulmonic valve is grossly normal in structure. There is no significant pulmonic valve stenosis present. There is no pulmonic valve regurgitation present.   Greater Vessels No dilation of the aortic root is present. No dilation of the sinuses of Valsalva is present. The inferior vena cava is normally sized. Normal IVC inspiratory collapse of greater than 50% noted.   Pericardium The pericardium is normal. There is no evidence of pericardial effusion.         Radiological Studies:    Ct Angiogram Head    Result Date: 1/7/2020  EXAMINATION: CT ANGIOGRAM HEAD- 01/06/2020  INDICATION: Stroke  TECHNIQUE: Intracranial CTA was performed prior to and following intravenous contrast.  The radiation dose reduction device was turned on for each scan per the ALARA (As Low as Reasonably Achievable) protocol.  COMPARISON: NONE  FINDINGS: The intracranial portion of the internal carotid arteries is normal. The anterior cerebral arteries are patent with no stenosis, occlusion or aneurysm. Likewise the middle cerebral arteries demonstrate no large vessel occlusion. The basilar artery is normal.  The superior cerebellar and posterior cerebral arteries are normal.      Normal intracranial CT angiogram.  D:  01/06/2020 E:  01/07/2020   This report was finalized on 1/7/2020 9:48 AM by Dr. Noe Duggan MD.      Ct Head Without Contrast    Result Date: 1/6/2020  EXAMINATION: CT HEAD WO CONTRAST-01/06/2020:  INDICATION: Stroke.  TECHNIQUE: CT scan of the head was performed at 5 mm intervals. No intravenous contrast was utilized.  The radiation dose reduction device was turned on for each scan per the ALARA (As Low as Reasonably Achievable)  protocol.  COMPARISON: NONE.  FINDINGS: There are age-related periventricular white matter changes. There is no intracranial mass, hemorrhage, midline shift or extra-axial fluid collection.      Age-related change. There are no acute findings.  D:  01/06/2020 E:  01/06/2020    This report was finalized on 1/6/2020 5:04 PM by Dr. Noe Duggan MD.      Ct Angiogram Neck    Result Date: 1/7/2020  EXAMINATION: CT ANGIOGRAM NECK- 01/06/2020  INDICATION: Stroke  TECHNIQUE: CT angiogram of the neck was performed following intravenous contrast. Images are displayed in the axial, sagittal and coronal projections (3-D).  The radiation dose reduction device was turned on for each scan per the ALARA (As Low as Reasonably Achievable) protocol.  COMPARISON: NONE  FINDINGS: Both common carotid arteries are normal. There is mild calcification in both carotid bifurcations with tortuous internal carotid arteries but without evidence of high-grade stenosis. Both vertebral arteries are patent as is the basilar artery.      There are bilateral calcifications involving the carotid bifurcations producing only mild narrowing of the internal carotid arteries (approximately 30% bilaterally). Both vertebral arteries are normal as is the basilar artery.  D:  01/06/2020 E:  01/07/2020  This report was finalized on 1/7/2020 9:48 AM by Dr. Noe Duggan MD.      Mri Brain Without Contrast    Result Date: 1/9/2020  EXAMINATION: MRI BRAIN WO CONTRAST- 01/09/2020  INDICATION: Malignant hypertension  TECHNIQUE: Multiplanar MRI of the brain without intravenous contrast  COMPARISON: NONE  FINDINGS: Mild motion degradation: No restriction on diffusion-weighted sequences. Midline structures are symmetric without evidence of mass, mass effect or midline shift. Ventricles and sulci within normal limits. Mild amount of increased T2 and FLAIR signal findings in the periventricular and deep white matter suggesting chronic small vessel ischemic disease.  Pituitary and sella within normal limits. Cervicomedullary junction of limited evaluation due to motion degradation. Globes and orbits retain normal T2 signal characteristics. Visualized paranasal sinuses and mastoid air cells are grossly clear and well pneumatized with the exception of trace bilateral mastoid effusions. No cerebellopontine angle mass lesion. Normal signal flow voids in the distal internal carotid and vertebrobasilar arteries.      No acute intracranial findings of acute infarction or signal abnormality other than mild increased signal findings of likely chronic small vessel ischemic disease in the periventricular and subcortical white matter of typical predominance in the periventricular regions. Posterior parenchyma without subcortical signal abnormality or restriction.   D:  01/09/2020 E:  01/09/2020  This report was finalized on 1/9/2020 10:25 AM by Dr. Sean Gonzalez.      Xr Chest 1 View    Result Date: 1/9/2020  EXAMINATION: XR CHEST 1 VW- 01/09/2020  INDICATION: resp failure, extubated 01/08/2020  COMPARISON: 01/08/2020  FINDINGS: ET tube is seen at the level of the clavicles. NG tube is seen in the stomach. The heart is enlarged, vasculature is cephalized. Left basilar atelectasis has resolved. No new pulmonary parenchymal disease, effusion or pneumothorax is seen.      Interval clearing of mild left basilar atelectasis. No new chest disease is seen.  D:  01/09/2020 E:  01/09/2020    This report was finalized on 1/9/2020 10:01 PM by Dr. Stephane Erickson MD.      Xr Chest 1 View    Result Date: 1/8/2020  EXAMINATION: XR CHEST 1 VW-01/08/2020:  INDICATION: Intubated patient.  COMPARISON: 01/07/2020.  FINDINGS: The endotracheal tube is well positioned. The heart is slightly large. The heart is compensated. There is minimal left basilar airspace disease, likely representing atelectasis.         There is left basilar atelectasis which has developed since the previous examination, mild. The endotracheal  tube remains well positioned and there has otherwise been no change since the previous exam.  D:  01/08/2020 E:  01/08/2020  This report was finalized on 1/8/2020 11:57 AM by Dr. Noe Duggan MD.      Xr Chest 1 View    Result Date: 1/7/2020  EXAMINATION: XR CHEST 1 VW-  INDICATION: Intubated patient.  COMPARISON: 01/06/2020.  FINDINGS: Cardiac size borderline enlarged and unchanged with endotracheal tube terminating above the level of the italo. Esophagogastric tube has been placed in the interim coursing below the diaphragm and out of the field-of-view. No pneumothorax or pleural effusion.  No overt edema or focal consolidation.         Interval placement of esophagogastric tube coursing below the diaphragm and out of the field-of-view with endotracheal tube remaining in appropriate position above the level of the italo. No new consolidation or overt edema. No pleural effusion.  D:  01/07/2020 E:  01/07/2020  This report was finalized on 1/7/2020 4:30 PM by Dr. Sean Gonzalez.      Xr Chest 1 View    Result Date: 1/7/2020  EXAMINATION: XR CHEST 1 VW-  INDICATION: Confirm ET tube placement.  COMPARISON: 08/04/2014.  FINDINGS: The endotracheal tube is well positioned. The heart is at the upper limits of normal. The heart is compensated. There are chronic pulmonary findings.         Endotracheal tube is well positioned. There are no acute cardiopulmonary findings.  D:  01/06/2020 E:  01/07/2020  This report was finalized on 1/7/2020 9:47 AM by Dr. Noe Duggan MD.      Ct Cerebral Perfusion With & Without Contrast    Result Date: 1/9/2020  EXAMINATION: CT CEREBRAL PERFUSION W WO CONTRAST-  INDICATION: TIA, initial screening, stroke symptoms.  TECHNIQUE: Cerebral perfusion analysis was performed using computed tomography with contrast administration, including post processing of parametric maps with determination of cerebral blood flow, cerebral blood volume, and mean transit time.  The radiation dose reduction device  was turned on for each scan per the ALARA (As Low as Reasonably Achievable) protocol.  COMPARISON: None.  FINDINGS: CEREBRAL BLOOD VOLUME: There is normal symmetrical cerebral blood volume on perfusion maps.  CEREBRAL BLOOD FLOW: There is normal symmetrical cerebral blood flow on perfusion maps.  TIME TO PEAK: There is normal symmetrical time to peak on perfusion maps.  MEAN TRANSIT TIME: Normal symmetric appearance of mean transient time perfusion map.  PERFUSION ANALYSIS: Normal cerebral perfusion.      No perfusion abnormality to suggest evidence of reversible ischemia.  D:  01/06/2020 E:  01/07/2020      This report was finalized on 1/9/2020 8:40 AM by Dr. Trena Nettles MD.      Fiberoptic Endo (fees)    Result Date: 1/10/2020  This procedure was auto-finalized with no dictation required.        During this visit the following were done:  Labs Reviewed []    Labs Ordered []    Radiology Reports Reviewed []    Radiology Ordered []    EKG, echo, and/or stress test reviewed []    EEG results reviewed  []    EEG reviewed and interpreted per myself   []    Discussed case with neurointerventionalist or neuroradiologist []    Referring Provider Records Reviewed []    ER Records Reviewed []    Hospital Records Reviewed []    History Obtained From Family []    Radiological images view and Interpreted per myself []    Case Discussed with referring provider []     Decision to obtain and request outside records  []        Assessment and Plan     1. TME improving. Suspect sundowning 22 dementia.   - Continue current meds.    - Check b12, folate.    2. Suspected new onset seizure. MRI negative for acute stroke, personally reviewed, EEG w/o epileptic changes.   - Continue Keppra.              Electronically signed by Jordin Erickson MD on 1/11/2020 at 12:07 PM

## 2020-01-12 LAB
ANION GAP SERPL CALCULATED.3IONS-SCNC: 13 MMOL/L (ref 5–15)
BASOPHILS # BLD AUTO: 0.02 10*3/MM3 (ref 0–0.2)
BASOPHILS NFR BLD AUTO: 0.2 % (ref 0–1.5)
BUN BLD-MCNC: 22 MG/DL (ref 8–23)
BUN/CREAT SERPL: 64.7 (ref 7–25)
CALCIUM SPEC-SCNC: 10.3 MG/DL (ref 8.6–10.5)
CHLORIDE SERPL-SCNC: 100 MMOL/L (ref 98–107)
CO2 SERPL-SCNC: 28 MMOL/L (ref 22–29)
CREAT BLD-MCNC: 0.34 MG/DL (ref 0.57–1)
DEPRECATED RDW RBC AUTO: 45.7 FL (ref 37–54)
EOSINOPHIL # BLD AUTO: 0.14 10*3/MM3 (ref 0–0.4)
EOSINOPHIL NFR BLD AUTO: 1.6 % (ref 0.3–6.2)
ERYTHROCYTE [DISTWIDTH] IN BLOOD BY AUTOMATED COUNT: 14.6 % (ref 12.3–15.4)
GFR SERPL CREATININE-BSD FRML MDRD: >150 ML/MIN/1.73
GLUCOSE BLD-MCNC: 161 MG/DL (ref 65–99)
GLUCOSE BLDC GLUCOMTR-MCNC: 164 MG/DL (ref 70–130)
GLUCOSE BLDC GLUCOMTR-MCNC: 173 MG/DL (ref 70–130)
GLUCOSE BLDC GLUCOMTR-MCNC: 174 MG/DL (ref 70–130)
GLUCOSE BLDC GLUCOMTR-MCNC: 174 MG/DL (ref 70–130)
HCT VFR BLD AUTO: 28.9 % (ref 34–46.6)
HGB BLD-MCNC: 8.7 G/DL (ref 12–15.9)
IMM GRANULOCYTES # BLD AUTO: 0.08 10*3/MM3 (ref 0–0.05)
IMM GRANULOCYTES NFR BLD AUTO: 0.9 % (ref 0–0.5)
LYMPHOCYTES # BLD AUTO: 1.42 10*3/MM3 (ref 0.7–3.1)
LYMPHOCYTES NFR BLD AUTO: 16.5 % (ref 19.6–45.3)
MAGNESIUM SERPL-MCNC: 1.3 MG/DL (ref 1.6–2.4)
MCH RBC QN AUTO: 25.7 PG (ref 26.6–33)
MCHC RBC AUTO-ENTMCNC: 30.1 G/DL (ref 31.5–35.7)
MCV RBC AUTO: 85.5 FL (ref 79–97)
MONOCYTES # BLD AUTO: 1.03 10*3/MM3 (ref 0.1–0.9)
MONOCYTES NFR BLD AUTO: 11.9 % (ref 5–12)
NEUTROPHILS # BLD AUTO: 5.93 10*3/MM3 (ref 1.7–7)
NEUTROPHILS NFR BLD AUTO: 68.9 % (ref 42.7–76)
NRBC BLD AUTO-RTO: 0 /100 WBC (ref 0–0.2)
PHOSPHATE SERPL-MCNC: 3.8 MG/DL (ref 2.5–4.5)
PLATELET # BLD AUTO: 93 10*3/MM3 (ref 140–450)
PMV BLD AUTO: 13.7 FL (ref 6–12)
POTASSIUM BLD-SCNC: 3 MMOL/L (ref 3.5–5.2)
RBC # BLD AUTO: 3.38 10*6/MM3 (ref 3.77–5.28)
SODIUM BLD-SCNC: 141 MMOL/L (ref 136–145)
WBC NRBC COR # BLD: 8.62 10*3/MM3 (ref 3.4–10.8)

## 2020-01-12 PROCEDURE — 85025 COMPLETE CBC W/AUTO DIFF WBC: CPT | Performed by: INTERNAL MEDICINE

## 2020-01-12 PROCEDURE — 84100 ASSAY OF PHOSPHORUS: CPT | Performed by: INTERNAL MEDICINE

## 2020-01-12 PROCEDURE — 83735 ASSAY OF MAGNESIUM: CPT | Performed by: INTERNAL MEDICINE

## 2020-01-12 PROCEDURE — 97110 THERAPEUTIC EXERCISES: CPT

## 2020-01-12 PROCEDURE — 99232 SBSQ HOSP IP/OBS MODERATE 35: CPT | Performed by: INTERNAL MEDICINE

## 2020-01-12 PROCEDURE — 25010000002 HEPARIN (PORCINE) PER 1000 UNITS: Performed by: INTERNAL MEDICINE

## 2020-01-12 PROCEDURE — 99232 SBSQ HOSP IP/OBS MODERATE 35: CPT | Performed by: PSYCHIATRY & NEUROLOGY

## 2020-01-12 PROCEDURE — 97116 GAIT TRAINING THERAPY: CPT

## 2020-01-12 PROCEDURE — 80048 BASIC METABOLIC PNL TOTAL CA: CPT | Performed by: INTERNAL MEDICINE

## 2020-01-12 PROCEDURE — 25010000002 MAGNESIUM SULFATE 2 GM/50ML SOLUTION: Performed by: INTERNAL MEDICINE

## 2020-01-12 PROCEDURE — 82962 GLUCOSE BLOOD TEST: CPT

## 2020-01-12 PROCEDURE — 94799 UNLISTED PULMONARY SVC/PX: CPT

## 2020-01-12 RX ORDER — IPRATROPIUM BROMIDE AND ALBUTEROL SULFATE 2.5; .5 MG/3ML; MG/3ML
3 SOLUTION RESPIRATORY (INHALATION) EVERY 4 HOURS PRN
Status: DISCONTINUED | OUTPATIENT
Start: 2020-01-12 | End: 2020-01-22 | Stop reason: HOSPADM

## 2020-01-12 RX ORDER — MAGNESIUM SULFATE HEPTAHYDRATE 40 MG/ML
4 INJECTION, SOLUTION INTRAVENOUS ONCE
Status: COMPLETED | OUTPATIENT
Start: 2020-01-12 | End: 2020-01-12

## 2020-01-12 RX ORDER — AMLODIPINE BESYLATE 10 MG/1
10 TABLET ORAL
Status: DISCONTINUED | OUTPATIENT
Start: 2020-01-12 | End: 2020-01-22 | Stop reason: HOSPADM

## 2020-01-12 RX ORDER — LABETALOL HYDROCHLORIDE 5 MG/ML
20 INJECTION, SOLUTION INTRAVENOUS
Status: DISCONTINUED | OUTPATIENT
Start: 2020-01-12 | End: 2020-01-22 | Stop reason: HOSPADM

## 2020-01-12 RX ADMIN — INSULIN HUMAN 2 UNITS: 100 INJECTION, SOLUTION PARENTERAL at 12:31

## 2020-01-12 RX ADMIN — CARVEDILOL 25 MG: 12.5 TABLET, FILM COATED ORAL at 18:13

## 2020-01-12 RX ADMIN — IPRATROPIUM BROMIDE AND ALBUTEROL SULFATE 3 ML: 2.5; .5 SOLUTION RESPIRATORY (INHALATION) at 12:56

## 2020-01-12 RX ADMIN — FAMOTIDINE 20 MG: 10 INJECTION, SOLUTION INTRAVENOUS at 08:10

## 2020-01-12 RX ADMIN — FAMOTIDINE 20 MG: 10 INJECTION, SOLUTION INTRAVENOUS at 20:18

## 2020-01-12 RX ADMIN — INSULIN HUMAN 2 UNITS: 100 INJECTION, SOLUTION PARENTERAL at 05:50

## 2020-01-12 RX ADMIN — LABETALOL 20 MG/4 ML (5 MG/ML) INTRAVENOUS SYRINGE 20 MG: at 16:06

## 2020-01-12 RX ADMIN — RISPERIDONE 2 MG: 1 SOLUTION ORAL at 22:21

## 2020-01-12 RX ADMIN — SODIUM CHLORIDE 7.5 MG/HR: 9 INJECTION, SOLUTION INTRAVENOUS at 22:44

## 2020-01-12 RX ADMIN — MAGNESIUM SULFATE IN WATER 4 G: 40 INJECTION, SOLUTION INTRAVENOUS at 13:20

## 2020-01-12 RX ADMIN — SERTRALINE HYDROCHLORIDE 150 MG: 100 TABLET ORAL at 20:18

## 2020-01-12 RX ADMIN — IPRATROPIUM BROMIDE AND ALBUTEROL SULFATE 3 ML: 2.5; .5 SOLUTION RESPIRATORY (INHALATION) at 03:28

## 2020-01-12 RX ADMIN — IPRATROPIUM BROMIDE AND ALBUTEROL SULFATE 3 ML: 2.5; .5 SOLUTION RESPIRATORY (INHALATION) at 06:47

## 2020-01-12 RX ADMIN — POTASSIUM CHLORIDE 40 MEQ: 1.5 POWDER, FOR SOLUTION ORAL at 12:31

## 2020-01-12 RX ADMIN — CARVEDILOL 25 MG: 12.5 TABLET, FILM COATED ORAL at 08:10

## 2020-01-12 RX ADMIN — MAGNESIUM SULFATE 6 G: 2 INJECTION INTRAVENOUS at 08:15

## 2020-01-12 RX ADMIN — SODIUM CHLORIDE 5 MG/HR: 9 INJECTION, SOLUTION INTRAVENOUS at 18:16

## 2020-01-12 RX ADMIN — DEXMEDETOMIDINE HYDROCHLORIDE 1.3 MCG/KG/HR: 100 INJECTION, SOLUTION INTRAVENOUS at 08:16

## 2020-01-12 RX ADMIN — IPRATROPIUM BROMIDE AND ALBUTEROL SULFATE 3 ML: 2.5; .5 SOLUTION RESPIRATORY (INHALATION) at 19:12

## 2020-01-12 RX ADMIN — HYDROCODONE BITARTRATE AND ACETAMINOPHEN 1 TABLET: 5; 325 TABLET ORAL at 18:13

## 2020-01-12 RX ADMIN — LEVETIRACETAM 500 MG: 500 TABLET, FILM COATED ORAL at 08:10

## 2020-01-12 RX ADMIN — SODIUM CHLORIDE 5 MG/HR: 9 INJECTION, SOLUTION INTRAVENOUS at 02:03

## 2020-01-12 RX ADMIN — HEPARIN SODIUM 5000 UNITS: 5000 INJECTION INTRAVENOUS; SUBCUTANEOUS at 22:20

## 2020-01-12 RX ADMIN — SODIUM CHLORIDE 5 MG/HR: 9 INJECTION, SOLUTION INTRAVENOUS at 09:25

## 2020-01-12 RX ADMIN — NICOTINE 1 PATCH: 21 PATCH, EXTENDED RELEASE TRANSDERMAL at 09:00

## 2020-01-12 RX ADMIN — HEPARIN SODIUM 5000 UNITS: 5000 INJECTION INTRAVENOUS; SUBCUTANEOUS at 05:50

## 2020-01-12 RX ADMIN — DONEPEZIL HYDROCHLORIDE 5 MG: 5 TABLET ORAL at 20:19

## 2020-01-12 RX ADMIN — RISPERIDONE 1 MG: 1 SOLUTION ORAL at 08:11

## 2020-01-12 RX ADMIN — TRAZODONE HYDROCHLORIDE 50 MG: 50 TABLET ORAL at 20:18

## 2020-01-12 RX ADMIN — INSULIN HUMAN 2 UNITS: 100 INJECTION, SOLUTION PARENTERAL at 18:13

## 2020-01-12 RX ADMIN — POTASSIUM CHLORIDE 40 MEQ: 1.5 POWDER, FOR SOLUTION ORAL at 08:16

## 2020-01-12 RX ADMIN — ASPIRIN 81 MG 81 MG: 81 TABLET ORAL at 08:13

## 2020-01-12 RX ADMIN — HEPARIN SODIUM 5000 UNITS: 5000 INJECTION INTRAVENOUS; SUBCUTANEOUS at 14:00

## 2020-01-12 RX ADMIN — IPRATROPIUM BROMIDE AND ALBUTEROL SULFATE 3 ML: 2.5; .5 SOLUTION RESPIRATORY (INHALATION) at 17:05

## 2020-01-12 RX ADMIN — POTASSIUM CHLORIDE 40 MEQ: 1.5 POWDER, FOR SOLUTION ORAL at 16:05

## 2020-01-12 RX ADMIN — ATORVASTATIN CALCIUM 80 MG: 40 TABLET, FILM COATED ORAL at 20:19

## 2020-01-12 RX ADMIN — AMLODIPINE BESYLATE 10 MG: 10 TABLET ORAL at 16:05

## 2020-01-12 RX ADMIN — LEVETIRACETAM 500 MG: 500 TABLET, FILM COATED ORAL at 20:18

## 2020-01-12 NOTE — PLAN OF CARE
Problem: Patient Care Overview  Goal: Plan of Care Review  Outcome: Ongoing (interventions implemented as appropriate)  Flowsheets (Taken 1/12/2020 2080)  Progress: improving  Plan of Care Reviewed With: patient;family  Outcome Summary: Pt demonstrated ability to progress forward ambulation distance to 100 total ft with use of RW, min A, 1 seated rest break. Good motivation to progress gait distance and HEP regimen. Will cont PT POC.

## 2020-01-12 NOTE — PLAN OF CARE
Problem: Patient Care Overview  Goal: Plan of Care Review  Outcome: Ongoing (interventions implemented as appropriate)  Flowsheets (Taken 1/12/2020 0505)  Progress: no change  Plan of Care Reviewed With: patient  Outcome Summary: Pt oriented but remains impulsive and restless. Attempting to titrate precedex down but pt attempts to get out of bed and pull/remove lines and equipment. Around 0200 pt complained of shortness of air and inability to clear secretions- NT and oral suctioned  Problem: Stroke (Ischemic) (Adult)  Goal: Signs and Symptoms of Listed Potential Problems Will be Absent, Minimized or Managed (Stroke)  Outcome: Ongoing (interventions implemented as appropriate)     Problem: Skin Injury Risk (Adult)  Goal: Skin Health and Integrity  Outcome: Ongoing (interventions implemented as appropriate)     Problem: Pain, Chronic (Adult)  Goal: Acceptable Pain/Comfort Level and Functional Ability  Outcome: Ongoing (interventions implemented as appropriate)     Problem: Fall Risk (Adult)  Goal: Absence of Fall  Outcome: Ongoing (interventions implemented as appropriate)    and PRN neb given. Pt secretions are bloody and thick. Pt remains restless but more cooperative. Will continue to monitor.

## 2020-01-12 NOTE — PROGRESS NOTES
Neurology Note    Patient:  Glo Berry    YOB: 1955    REFERRING PHYSICIAN:  Maximiliano Muller MD    CHIEF COMPLAINT:    AMS, right sided weakness/neglect    HISTORY OF PRESENT ILLNESS:   The patient is doing better, still has an NG, no seizures or focal symptoms.    Past Medical History:  Past Medical History:   Diagnosis Date   • COPD (chronic obstructive pulmonary disease) (CMS/[facility])    • Diabetes mellitus (CMS/[facility])    • History of ear injury 1973    Injury of the right eardrum.This has resulted in the headaches   • Hypertension        Past Surgical History:  Past Surgical History:   Procedure Laterality Date   • BREAST CYST EXCISION     • SINUS SURGERY     • TONSILLECTOMY     • TUBAL ABDOMINAL LIGATION         Social History:   Social History     Socioeconomic History   • Marital status:      Spouse name: Not on file   • Number of children: Not on file   • Years of education: Not on file   • Highest education level: Not on file   Tobacco Use   • Smoking status: Current Every Day Smoker     Packs/day: 1.00     Types: Cigarettes   • Smokeless tobacco: Never Used   Substance and Sexual Activity   • Alcohol use: No        Family History:   Family History   Problem Relation Age of Onset   • Aortic aneurysm Mother    • Lung disease Father    • Parkinsonism Father    • Heart disease Other        Medications Prior to Admission:    Prior to Admission medications    Medication Sig Start Date End Date Taking? Authorizing Provider   dicyclomine (BENTYL) 20 MG tablet Take 20 mg by mouth 2 (Two) Times a Day.   Yes Teddy Emmanuel MD   amLODIPine (NORVASC) 5 MG tablet  2/9/19   Teddy Emmanuel MD   carvedilol (COREG) 25 MG tablet Take 25 mg by mouth 2 (Two) Times a Day With Meals. 12/15/18   Teddy Emmanuel MD   colesevelam (WELCHOL) 625 MG tablet 625 mg 2 (Two) Times a Day With Meals. 1/29/19   Teddy Emmanuel MD   cyanocobalamin 1000 MCG/ML injection inject contents of 1  vial intramuscularly MONTHLY 2/14/19   Teddy Emmanuel MD   donepezil (ARICEPT) 5 MG tablet Take 5 mg by mouth Every Night. 1/30/19   Teddy Emmanuel MD   fenofibrate 160 MG tablet Take 160 mg by mouth Daily. 1/29/19   Teddy Emmanuel MD   losartan (COZAAR) 100 MG tablet Take 100 mg by mouth Daily. 1/29/19   Teddy Emmanuel MD   metFORMIN (GLUCOPHAGE) 1000 MG tablet Take 1,000 mg by mouth Daily With Breakfast. 1/30/19   Teddy Emmanuel MD   Pancrelipase, Lip-Prot-Amyl, (CREON) 77888 units capsule delayed-release particles Take 2 by mouth 30 minutes before meals and 1 before a snack 4/4/19   Adan Wright MD   potassium chloride (K-DUR,KLOR-CON) 20 MEQ CR tablet Take 20 mEq by mouth Daily. 2/20/19   Teddy Emmanuel MD   prochlorperazine (COMPAZINE) 10 MG tablet Take  by mouth Every 8 (Eight) Hours As Needed. 12/31/18   Teddy Emmanuel MD   sertraline (ZOLOFT) 100 MG tablet Take 50 mg by mouth 2 (Two) Times a Day. 2/9/19   Teddy Emmanuel MD   topiramate (TOPAMAX) 50 MG tablet Take 50 mg by mouth Daily. 1/29/19   Teddy Emmanuel MD   traZODone (DESYREL) 100 MG tablet Take 100 mg by mouth Every Night. 1/30/19   Teddy Emmanuel MD   venlafaxine XR (EFFEXOR-XR) 150 MG 24 hr capsule Take 150 mg by mouth Daily. 1/30/19   Teddy Emmanuel MD       Allergies:  Codeine      Review of system  Review of Systems   Neurological: Negative for seizures, facial asymmetry and weakness.   All other systems reviewed and are negative.      Vitals:    01/12/20 1400   BP: (!) 197/134   Pulse: 97   Resp:    Temp:    SpO2: 99%       Physical exam  Physical Exam   Constitutional: She is oriented to person, place, and time. She appears well-developed and well-nourished.   HENT:   Head: Normocephalic and atraumatic.   Tongue bruised on the left, midline.   Eyes: Pupils are equal, round, and reactive to light. EOM are normal.   Cardiovascular: Normal rate and regular  rhythm.   Pulmonary/Chest: Effort normal.   Neurological: She is alert and oriented to person, place, and time. She has normal reflexes. No cranial nerve deficit.   No facial droop, speech hoarse, somewhat dysarthric.   Psychiatric: She has a normal mood and affect. Her behavior is normal. Thought content normal.         Lab Results   Component Value Date    WBC 8.62 2020    HGB 8.7 (L) 2020    HCT 28.9 (L) 2020    MCV 85.5 2020    PLT 93 (L) 2020     Lab Results   Component Value Date    GLUCOSE 161 (H) 2020    BUN 22 2020    CREATININE 0.34 (L) 2020    EGFRIFNONA >150 2020    BCR 64.7 (H) 2020    CO2 28.0 2020    CALCIUM 10.3 2020    ALBUMIN 3.40 (L) 01/10/2020    AST 35 (H) 01/10/2020    ALT 17 01/10/2020     Vitamin B-12  211 - 946 pg/mL 738      Folate  4.78 - 24.20 ng/mL 6.33      Duplex scan of extracranial arteries using B-mode, color flow, and/or spectral Doppler; bilateral   Order# [id number]   Reading physician: Bk Wesley MD Ordering physician: Aubree Luis APRN Study date: 20   Patient Information     Patient Name  Glo Berry MRN  [id number] Sex  Female  (Age)  1955 (64 y.o.)   Clinical Indication     CVA (stroke); other symptoms; Unknown   Admission Information     Admission Date/Time Discharge Date/Time Room/Bed   20  1608  N235/1   Interpretation Summary     · Right internal carotid artery stenosis of 0-49%.  · Left internal carotid artery stenosis of 50-69%.        EEG   Order: [id number]   Status:  Final result   Visible to patient:  No (Not Released)   Details     Reading Physician Reading Date Result Priority   Rashard Watts MD 2020 STAT      Narrative & Impression     Reason for referral:     64 y.o.female with altered mental status     Technical Summary:      A 19 channel digital EEG was performed using the international 10-20 placement system, including eye leads and EKG  leads.     Findings:     The patient is intubated.  The background shows diffuse low amplitude 2-5 hz delta and theta with underlying faster frequencies present.  Sweat artifact is predominant at times.  Photic stimulation and HVN are not performed.  No focal slowing or epileptiform activity is seen.       IMPRESSION:     Moderate generalized slowing and suppression     No electrographic seizures are seen           This report is transcribed using the Dragon dictation system.                  Last Resulted: 01/06/20 22:09               Radiological Studies:  EXAMINATION: MRI BRAIN WO CONTRAST- 01/09/2020     INDICATION: Malignant hypertension     TECHNIQUE: Multiplanar MRI of the brain without intravenous contrast     COMPARISON: NONE     FINDINGS: Mild motion degradation:  No restriction on diffusion-weighted sequences. Midline structures are  symmetric without evidence of mass, mass effect or midline shift.  Ventricles and sulci within normal limits. Mild amount of increased T2  and FLAIR signal findings in the periventricular and deep white matter  suggesting chronic small vessel ischemic disease. Pituitary and sella  within normal limits. Cervicomedullary junction of limited evaluation  due to motion degradation. Globes and orbits retain normal T2 signal  characteristics. Visualized paranasal sinuses and mastoid air cells are  grossly clear and well pneumatized with the exception of trace bilateral  mastoid effusions. No cerebellopontine angle mass lesion. Normal signal  flow voids in the distal internal carotid and vertebrobasilar arteries.     IMPRESSION:  No acute intracranial findings of acute infarction or signal  abnormality other than mild increased signal findings of likely chronic  small vessel ischemic disease in the periventricular and subcortical  white matter of typical predominance in the periventricular regions.  Posterior parenchyma without subcortical signal abnormality or  restriction.      D:   01/09/2020  E:  01/09/2020     This report was finalized on 1/9/2020 10:25 AM by Dr. Sean Gonzalez.    During this visit the following were done:  Labs Reviewed [x]    Labs Ordered []    Radiology Reports Reviewed []    Radiology Ordered []    EKG, echo, and/or stress test reviewed [x]    EEG results reviewed  [x]    EEG reviewed and interpreted per myself   []    Discussed case with neurointerventionalist or neuroradiologist []    Referring Provider Records Reviewed []    ER Records Reviewed []    Hospital Records Reviewed []    History Obtained From Family []    Radiological images view and Interpreted per myself [x]    Case Discussed with referring provider []     Decision to obtain and request outside records  []        Assessment and Plan     New onset seizure vs TIA or both, bruised tongue suggests a seizure. Negative MRI and EEG. She does have a moderate LICA stenosis which could have caused an embolic event that triggered a seizure. Persistent dysarthria.    - Continue Keppra, aspirin, statin.   - speech therapy/dysphagia consult.   - neurologically stable for telemetry.             Electronically signed by Jordin Erickson MD on 1/12/2020 at 2:54 PM

## 2020-01-12 NOTE — PROGRESS NOTES
"INTENSIVIST   HOSPITAL VISIT NOTE     Hospital:  LOS: 6 days     Subjective   S     Ms. Glo Berry, 64 y.o. female is followed for:      Encephalopathy acute    Hypertension    T2DM       As an Intensivist, we provide an integrated approach to the ICU patient and family, medical management of comorbid conditions, including but not limited to electrolytes, glycemic control, organ dysfunction, lead interdisciplinary rounds and coordinate the care with all other services, including those from other specialists.     HPI:  Extubated X 4 d.  Doing great this morning.  Talking.  More coherent.  Cooperative.  Voice: \"raspy\".     Objective   O     .Vitals:  Temp: 98.3 °F (36.8 °C) (01/12/20 1200) Temp  Min: 97.6 °F (36.4 °C)  Max: 99.4 °F (37.4 °C)   BP: (!) 197/134 (01/12/20 1400) BP  Min: 108/88  Max: 214/100   Pulse: 97 (01/12/20 1400) Pulse  Min: 74  Max: 137   Resp: 20 (01/12/20 1258) Resp  Min: 20  Max: 28   SpO2: 99 % (01/12/20 1400) SpO2  Min: 79 %  Max: 100 %   Device: nasal cannula (01/12/20 1400)    Flow Rate: 2 (01/12/20 1400) Flow (L/min)  Min: 2  Max: 3     Medications (drips):    dexmedetomidine Last Rate: Stopped (01/12/20 1311)   niCARdipine Last Rate: Stopped (01/12/20 1311)        Physical Examination    Telemetry:  Rhythm: sinus arrhythmia (01/12/20 1400)  Atrial Rhythm: (Dr Reid aware of occ AFib and meds taking) (01/10/20 0627)      Constitutional:  No acute distress.   Cardiovascular: Normal rate, regular and rhythm. Normal heart sounds.  No murmurs, gallop or rub.   Respiratory: No respiratory distress.  Normal breath sounds  No adventitious sounds.    Abdominal:  Soft with no tenderness  No distension. No HSM.   Extremities: Warm with no cyanosis.  No peripheral edema.   Neurological:   Awake.   Best Eye Response: 3-->(E3) to speech (01/12/20 1400)  Best Motor Response: 6-->(M6) obeys commands (01/12/20 1400)  Best Verbal Response: 5-->(V5) oriented (01/12/20 1400)  [place] Coma Scale " Score: 14 (01/12/20 1400)       Hematology:  Results from last 7 days   Lab Units 01/12/20 0536 01/11/20 0422 01/10/20  0715   WBC 10*3/mm3 8.62 10.87* 11.52*   HEMOGLOBIN g/dL 8.7* 9.2* 9.2*   MCV fL 85.5 86.6 89.1   PLATELETS 10*3/mm3 93* 107* 107*       Chemistry:  Estimated Creatinine Clearance: 154.9 mL/min (A) (by C-G formula based on SCr of 0.34 mg/dL (L)).    Results from last 7 days   Lab Units 01/12/20 0536 01/11/20 0422 01/10/20  1948 01/10/20  0715   SODIUM mmol/L 141 143  --  146*   POTASSIUM mmol/L 3.0* 3.7 3.3* 3.2*   CHLORIDE mmol/L 100 106  --  107   CO2 mmol/L 28.0 24.0  --  20.0*   ANION GAP mmol/L 13.0 13.0  --  19.0*   GLUCOSE mg/dL 161* 181*  --  125*   CALCIUM mg/dL 10.3 10.4  --  9.8     Results from last 7 days   Lab Units 01/12/20  0536 01/11/20  0422 01/10/20  0715   BUN mg/dL 22 17 12   CREATININE mg/dL 0.34* 0.36* 0.43*     Results from last 7 days   Lab Units 01/12/20 0536 01/11/20 0422 01/10/20  0715   MAGNESIUM mg/dL 1.3* 1.3* 1.7   PHOSPHORUS mg/dL 3.8 1.3* 2.4*       Lab Results   Lab Value Date/Time    HEPBIGM 0.148 01/08/2020 1606       Lab Results   Lab Value Date/Time    THCURSCR Negative 01/07/2020 0507    PCPUR Positive (A) 01/07/2020 0507    COCAINEUR Negative 01/07/2020 0507    METAMPSCNUR Negative 01/07/2020 0507    LABOPIASCN Positive (A) 01/07/2020 0507    AMPHETSCREEN Negative 01/07/2020 0507    LABBENZSCN Positive (A) 01/07/2020 0507    TRICYCLICSCN Negative 01/07/2020 0507    LABMETHSCN Negative 01/07/2020 0507    BARBITSCNUR Positive (A) 01/07/2020 0507    OXYCODONESCN Negative 01/07/2020 0507       Images:  No radiology results for the last day     Results: Reviewed.  I reviewed the patient's new laboratory and imaging results.  I independently reviewed the patient's new images.    Medications: Reviewed.    Assessment/Plan   A / P     Assessment:    64 y.o.female, admitted on 1/6/2020 with CVA (cerebral vascular accident) (CMS/[facility]) [I63.9]  Encephalopathy  [G93.40]:     1. Respiratory failure } Improved.  1. Intubated 01/06/20  2. Mechanical Ventilation  3. Extubated 01/08/20  2. Altered mental status } Improving.  1. Negative w/u for CVA, including MRI.  2. 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  3. Memory loss, to resume Donepezil  3. NC Anemia  4. Thrombocytopenia  1. Dropped by ~ 52% on 01/08/20, but just in 2 days.   2. Improved, in the  K range.  3. Hep Ab: Negative  5. PMH Dementia  6. PMH HTN  7. PMH Pancreatic insufficiency, chronic diarrhea.  8. Tobacco use  9. Enteral Nutrition: Started 01/09/20   10. UDS: PCP (+), R/o false positive as she is on venlafaxine  11. T2DM with neuropathy    Results from last 7 days   Lab Units 01/12/20  1214 01/12/20  0533 01/11/20  2321 01/11/20  1753 01/11/20  1207 01/11/20  0611   GLUCOSE mg/dL 174* 164* 140* 158* 163* 177*     Lab Results   Lab Value Date/Time    HGBA1C 5.60 01/06/2020 1821       Nutrition Support: Diet, Tube Feeding Tube Feeding Formula: Peptamen Intense VHP (Peptide Based, Very High Protein)   Modulars: Patient doesn't have any tube feeding modular orders   Diet: NPO Diet   Advance Directives: Code Status and Medical Interventions:   Ordered at: 01/06/20 1647     Code Status:    CPR     Medical Interventions (Level of Support Prior to Arrest):    Full        Plan:    1. Goal: Glucose < 180 mg/dL  2. Replace Mg, K  3. Discussed with family at bedside.  4. Dexmedetomidine now off.  5. Disposition: Keep in ICU.   1. Discussed with family at bedside.   2. Nutrition labs in AM  3. PCXR in AM    Plan of care and goals reviewed during interdisciplinary rounds.  I discussed the patient's findings and my recommendations with nursing staff    Level of Risk is High due to:  illness with threat to life or bodily function.     Time: 25 minutes, in direct patient care, with the patient and/or on the [place] coordinating care with other health care  providers.     I have spent > 50% percent of this time, counseling and discussing management.       Tristan Hull MD, FACP, FCCP, CNSC  Intensive Care Medicine, Nutrition Support and Pulmonary Medicine     [x]  Primary Attending  []  Consultant

## 2020-01-12 NOTE — THERAPY TREATMENT NOTE
Patient Name: Glo Berry  : 1955    MRN: [id number]                              Today's Date: 2020       Admit Date: 2020    Visit Dx:     ICD-10-CM ICD-9-CM   1. Impaired mobility and ADLs Z74.09 799.89   2. Dysphagia, unspecified type R13.10 787.20   3. Cognitive communication deficit R41.841 799.52     Patient Active Problem List   Diagnosis   • Uterine prolapse   • Stress incontinence of urine   • Urgency incontinence   • Diarrhea   • Encephalopathy acute   • R/O AIS    • R/O status epilepticus    • Hypertension   • Pancreatic insufficiency   • T2DM on metformin    • Chronic diarrhea   • Lumbar stenosis   • Diabetic neuropathy    • Depression   • Dementia    • Smoker   • Acute respiratory failure    • Encephalopathy     Past Medical History:   Diagnosis Date   • COPD (chronic obstructive pulmonary disease) (CMS/HCC)    • Diabetes mellitus (CMS/HCC)    • History of ear injury 1973    Injury of the right eardrum.This has resulted in the headaches   • Hypertension      Past Surgical History:   Procedure Laterality Date   • BREAST CYST EXCISION     • SINUS SURGERY     • TONSILLECTOMY     • TUBAL ABDOMINAL LIGATION       General Information     Row Name 20 1447          PT Evaluation Time/Intention    Document Type  therapy note (daily note)  -LS     Mode of Treatment  physical therapy  -LS     Row Name 20 1447          General Information    Existing Precautions/Restrictions  fall;oxygen therapy device and L/min;other (see comments) NG  -LS     Row Name 20 1447          Cognitive Assessment/Intervention- PT/OT    Orientation Status (Cognition)  oriented x 3  -LS       User Key  (r) = Recorded By, (t) = Taken By, (c) = Cosigned By    Initials Name Provider Type    LS Bernadette Monsivais, PT Physical Therapist        Mobility     Row Name 20 1447          Bed Mobility Assessment/Treatment    Comment (Bed Mobility)  UIC   -LS     Row Name 20 1447          Transfer  Assessment/Treatment    Comment (Transfers)  STS x2 from recliner; vc for hand placement.  -LS     Row Name 01/12/20 1447          Sit-Stand Transfer    Sit-Stand [place] (Transfers)  minimum assist (75% patient effort);verbal cues  -LS     Assistive Device (Sit-Stand Transfers)  walker, front-wheeled  -LS     Row Name 01/12/20 1447          Gait/Stairs Assessment/Training    Gait/Stairs Assessment/Training  gait/ambulation independence  -LS     [place] Level (Gait)  minimum assist (75% patient effort);verbal cues;1 person to manage equipment  -LS     Assistive Device (Gait)  walker, front-wheeled  -LS     Distance in Feet (Gait)  100  -LS     Deviations/Abnormal Patterns (Gait)  bilateral deviations  -LS     Bilateral Gait Deviations  forward flexed posture;heel strike decreased  -LS     Comment (Gait/Stairs)  VC for upright posture and keeping steps within RW. 1 brief seated rest break after 60 ft; followed with recliner.   -       User Key  (r) = Recorded By, (t) = Taken By, (c) = Cosigned By    Initials Name Provider Type    LS Bernadette Monsivais, PT Physical Therapist        Obj/Interventions     Row Name 01/12/20 1449          Therapeutic Exercise    Upper Extremity Range of Motion (Therapeutic Exercise)  shoulder abduction/adduction, bilateral;elbow flexion/extension, bilateral;shoulder flexion/extension, bilateral  -LS     Lower Extremity (Therapeutic Exercise)  LAQ (long arc quad), bilateral  -LS     Lower Extremity Range of Motion (Therapeutic Exercise)  hip flexion/extension, bilateral;ankle dorsiflexion/plantar flexion, bilateral  -LS     Exercise Type (Therapeutic Exercise)  AROM (active range of motion)  -LS     Position (Therapeutic Exercise)  seated  -LS     Sets/Reps (Therapeutic Exercise)  1/10  -LS     Row Name 01/12/20 1449          Static Sitting Balance    Level of [place] (Unsupported Sitting, Static Balance)  supervision  -LS     Sitting Position (Unsupported Sitting, Static  Balance)  sitting in chair  -LS     Row Name 01/12/20 1444          Static Standing Balance    Level of [place] (Supported Standing, Static Balance)  contact guard assist  -LS     Assistive Device Utilized (Supported Standing, Static Balance)  walker, rolling  -LS       User Key  (r) = Recorded By, (t) = Taken By, (c) = Cosigned By    Initials Name Provider Type    LS Bernadette Monsivais, PT Physical Therapist        Goals/Plan    No documentation.       Clinical Impression     Row Name 01/12/20 145          Pain Scale: Numbers Pre/Post-Treatment    Pain Scale: Numbers, Pretreatment  0/10 - no pain  -LS     Pain Scale: Numbers, Post-Treatment  0/10 - no pain  -LS     Row Name 01/12/20 1452          Plan of Care Review    Plan of Care Reviewed With  patient;family  -     Progress  improving  -     Outcome Summary  Pt demonstrated ability to progress forward ambulation distance to 100 total ft with use of RW, min A, 1 seated rest break. Good motivation to progress gait distance and HEP regimen. Will cont PT POC.   -     Row Name 01/12/20 1453          Vital Signs    Pre Systolic BP Rehab  151  -LS     Pre Treatment Diastolic BP  76  -LS     Pretreatment Heart Rate (beats/min)  81  -LS     Posttreatment Heart Rate (beats/min)  87  -LS     Pre SpO2 (%)  96  -LS     O2 Delivery Pre Treatment  nasal cannula  -LS     Post SpO2 (%)  97  -LS     O2 Delivery Post Treatment  nasal cannula  -LS     Pre Patient Position  Sitting  -LS     Intra Patient Position  Standing  -LS     Post Patient Position  Sitting  -LS     Row Name 01/12/20 6598          Positioning and Restraints    Pre-Treatment Position  sitting in chair/recliner  -LS     Post Treatment Position  chair  -LS     In Chair  notified nsg;reclined;call light within reach;encouraged to call for assist;exit alarm on;with family/caregiver;waffle cushion;legs elevated;on mechanical lift sling  -LS       User Key  (r) = Recorded By, (t) = Taken By, (c) = Cosigned By     Initials Name Provider Type    Bernadette Mills, PT Physical Therapist        Outcome Measures     Row Name 01/12/20 1453          How much help from another person do you currently need...    Turning from your back to your side while in flat bed without using bedrails?  3  -LS     Moving from lying on back to sitting on the side of a flat bed without bedrails?  2  -LS     Moving to and from a bed to a chair (including a wheelchair)?  3  -LS     Standing up from a chair using your arms (e.g., wheelchair, bedside chair)?  3  -LS     Climbing 3-5 steps with a railing?  2  -LS     To walk in hospital room?  3  -LS     AM-PAC 6 Clicks Score (PT)  16  -LS       User Key  (r) = Recorded By, (t) = Taken By, (c) = Cosigned By    Initials Name Provider Type    Bernadette Mills, PT Physical Therapist          PT Recommendation and Plan     Plan of Care Reviewed With: patient, family  Progress: improving  Outcome Summary: Pt demonstrated ability to progress forward ambulation distance to 100 total ft with use of RW, min A, 1 seated rest break. Good motivation to progress gait distance and HEP regimen. Will cont PT POC.      Time Calculation:   PT Charges     Row Name 01/12/20 1454             Time Calculation    Start Time  1317  -      PT Received On  01/12/20  -         Time Calculation- PT    Total Timed Code Minutes- PT  24 minute(s)  -         Timed Charges    65057 - PT Therapeutic Exercise Minutes  6  -LS      13018 - Gait Training Minutes   16  -LS      56100 - PT Therapeutic Activity Minutes  2  -LS        User Key  (r) = Recorded By, (t) = Taken By, (c) = Cosigned By    Initials Name Provider Type    Bernadette Mills, PT Physical Therapist        Therapy Charges for Today     Code Description Service Date Service Provider Modifiers Qty    47397913251 HC GAIT TRAINING EA 15 MIN 1/11/2020 Bernadette Monsivais, PT GP 1    46099400103 HC PT THER PROC EA 15 MIN 1/11/2020 Bernadette Monsivais, PT GP 1    [id number]  PT  THER SUPP EA 15 MIN 1/11/2020 Bernadette Monsivais, PT GP 2    84769554113 HC GAIT TRAINING EA 15 MIN 1/12/2020 Bernadette Monsivais, PT GP 1    77384956185 HC PT THER PROC EA 15 MIN 1/12/2020 Bernadette Monsivais, PT GP 1    41848621076 HC PT THER SUPP EA 15 MIN 1/12/2020 Bernadette Monsivais, PT GP 2          PT G-Codes  Outcome Measure Options: AM-PAC 6 Clicks Basic Mobility (PT)  AM-PAC 6 Clicks Score (PT): 16  AM-PAC 6 Clicks Score (OT): 7    Bernadette Monsivais, PT  1/12/2020

## 2020-01-13 ENCOUNTER — APPOINTMENT (OUTPATIENT)
Dept: GENERAL RADIOLOGY | Facility: HOSPITAL | Age: 65
End: 2020-01-13

## 2020-01-13 LAB
ALBUMIN SERPL-MCNC: 3.2 G/DL (ref 3.5–5.2)
ALBUMIN/GLOB SERPL: 1 G/DL
ALP SERPL-CCNC: 96 U/L (ref 39–117)
ALT SERPL W P-5'-P-CCNC: 22 U/L (ref 1–33)
ANION GAP SERPL CALCULATED.3IONS-SCNC: 11 MMOL/L (ref 5–15)
AST SERPL-CCNC: 37 U/L (ref 1–32)
BASOPHILS # BLD AUTO: 0.05 10*3/MM3 (ref 0–0.2)
BASOPHILS NFR BLD AUTO: 0.4 % (ref 0–1.5)
BILIRUB SERPL-MCNC: 0.2 MG/DL (ref 0.2–1.2)
BUN BLD-MCNC: 21 MG/DL (ref 8–23)
BUN/CREAT SERPL: 53.8 (ref 7–25)
CALCIUM SPEC-SCNC: 10.6 MG/DL (ref 8.6–10.5)
CHLORIDE SERPL-SCNC: 100 MMOL/L (ref 98–107)
CO2 SERPL-SCNC: 28 MMOL/L (ref 22–29)
CREAT BLD-MCNC: 0.39 MG/DL (ref 0.57–1)
CRP SERPL-MCNC: 9.34 MG/DL (ref 0–0.5)
DEPRECATED RDW RBC AUTO: 45.1 FL (ref 37–54)
EOSINOPHIL # BLD AUTO: 0.15 10*3/MM3 (ref 0–0.4)
EOSINOPHIL NFR BLD AUTO: 1.3 % (ref 0.3–6.2)
ERYTHROCYTE [DISTWIDTH] IN BLOOD BY AUTOMATED COUNT: 14.5 % (ref 12.3–15.4)
GFR SERPL CREATININE-BSD FRML MDRD: >150 ML/MIN/1.73
GLOBULIN UR ELPH-MCNC: 3.2 GM/DL
GLUCOSE BLD-MCNC: 191 MG/DL (ref 65–99)
GLUCOSE BLDC GLUCOMTR-MCNC: 179 MG/DL (ref 70–130)
GLUCOSE BLDC GLUCOMTR-MCNC: 196 MG/DL (ref 70–130)
GLUCOSE BLDC GLUCOMTR-MCNC: 197 MG/DL (ref 70–130)
GLUCOSE BLDC GLUCOMTR-MCNC: 212 MG/DL (ref 70–130)
HCT VFR BLD AUTO: 30 % (ref 34–46.6)
HGB BLD-MCNC: 9 G/DL (ref 12–15.9)
IMM GRANULOCYTES # BLD AUTO: 0.26 10*3/MM3 (ref 0–0.05)
IMM GRANULOCYTES NFR BLD AUTO: 2.2 % (ref 0–0.5)
LYMPHOCYTES # BLD AUTO: 1.38 10*3/MM3 (ref 0.7–3.1)
LYMPHOCYTES NFR BLD AUTO: 11.8 % (ref 19.6–45.3)
MAGNESIUM SERPL-MCNC: 1.7 MG/DL (ref 1.6–2.4)
MCH RBC QN AUTO: 25.9 PG (ref 26.6–33)
MCHC RBC AUTO-ENTMCNC: 30 G/DL (ref 31.5–35.7)
MCV RBC AUTO: 86.2 FL (ref 79–97)
MONOCYTES # BLD AUTO: 1.42 10*3/MM3 (ref 0.1–0.9)
MONOCYTES NFR BLD AUTO: 12.1 % (ref 5–12)
NEUTROPHILS # BLD AUTO: 8.43 10*3/MM3 (ref 1.7–7)
NEUTROPHILS NFR BLD AUTO: 72.2 % (ref 42.7–76)
NRBC BLD AUTO-RTO: 0.2 /100 WBC (ref 0–0.2)
PHOSPHATE SERPL-MCNC: 3.4 MG/DL (ref 2.5–4.5)
PLATELET # BLD AUTO: 115 10*3/MM3 (ref 140–450)
PMV BLD AUTO: 13.8 FL (ref 6–12)
POTASSIUM BLD-SCNC: 3.5 MMOL/L (ref 3.5–5.2)
POTASSIUM BLD-SCNC: 4.6 MMOL/L (ref 3.5–5.2)
PREALB SERPL-MCNC: 12.1 MG/DL (ref 20–40)
PROT SERPL-MCNC: 6.4 G/DL (ref 6–8.5)
RBC # BLD AUTO: 3.48 10*6/MM3 (ref 3.77–5.28)
SODIUM BLD-SCNC: 139 MMOL/L (ref 136–145)
TRIGL SERPL-MCNC: 259 MG/DL (ref 0–150)
WBC NRBC COR # BLD: 11.69 10*3/MM3 (ref 3.4–10.8)

## 2020-01-13 PROCEDURE — 94799 UNLISTED PULMONARY SVC/PX: CPT

## 2020-01-13 PROCEDURE — 80053 COMPREHEN METABOLIC PANEL: CPT | Performed by: INTERNAL MEDICINE

## 2020-01-13 PROCEDURE — 84134 ASSAY OF PREALBUMIN: CPT | Performed by: INTERNAL MEDICINE

## 2020-01-13 PROCEDURE — 92526 ORAL FUNCTION THERAPY: CPT

## 2020-01-13 PROCEDURE — 97110 THERAPEUTIC EXERCISES: CPT

## 2020-01-13 PROCEDURE — 84132 ASSAY OF SERUM POTASSIUM: CPT | Performed by: INTERNAL MEDICINE

## 2020-01-13 PROCEDURE — 84478 ASSAY OF TRIGLYCERIDES: CPT | Performed by: INTERNAL MEDICINE

## 2020-01-13 PROCEDURE — 86140 C-REACTIVE PROTEIN: CPT | Performed by: INTERNAL MEDICINE

## 2020-01-13 PROCEDURE — 97530 THERAPEUTIC ACTIVITIES: CPT

## 2020-01-13 PROCEDURE — 71045 X-RAY EXAM CHEST 1 VIEW: CPT

## 2020-01-13 PROCEDURE — 97535 SELF CARE MNGMENT TRAINING: CPT

## 2020-01-13 PROCEDURE — 99232 SBSQ HOSP IP/OBS MODERATE 35: CPT | Performed by: INTERNAL MEDICINE

## 2020-01-13 PROCEDURE — 84100 ASSAY OF PHOSPHORUS: CPT | Performed by: INTERNAL MEDICINE

## 2020-01-13 PROCEDURE — 97116 GAIT TRAINING THERAPY: CPT

## 2020-01-13 PROCEDURE — 85025 COMPLETE CBC W/AUTO DIFF WBC: CPT | Performed by: INTERNAL MEDICINE

## 2020-01-13 PROCEDURE — 82962 GLUCOSE BLOOD TEST: CPT

## 2020-01-13 PROCEDURE — 83735 ASSAY OF MAGNESIUM: CPT | Performed by: INTERNAL MEDICINE

## 2020-01-13 PROCEDURE — 99231 SBSQ HOSP IP/OBS SF/LOW 25: CPT | Performed by: PSYCHIATRY & NEUROLOGY

## 2020-01-13 PROCEDURE — 92507 TX SP LANG VOICE COMM INDIV: CPT

## 2020-01-13 PROCEDURE — 25010000002 HEPARIN (PORCINE) PER 1000 UNITS: Performed by: INTERNAL MEDICINE

## 2020-01-13 RX ORDER — LOSARTAN POTASSIUM 50 MG/1
100 TABLET ORAL DAILY
Status: DISCONTINUED | OUTPATIENT
Start: 2020-01-13 | End: 2020-01-22 | Stop reason: HOSPADM

## 2020-01-13 RX ADMIN — RISPERIDONE 1 MG: 1 SOLUTION ORAL at 10:31

## 2020-01-13 RX ADMIN — CARVEDILOL 25 MG: 12.5 TABLET, FILM COATED ORAL at 08:01

## 2020-01-13 RX ADMIN — INSULIN HUMAN 2 UNITS: 100 INJECTION, SOLUTION PARENTERAL at 23:21

## 2020-01-13 RX ADMIN — MAGNESIUM SULFATE HEPTAHYDRATE 4 G: 40 INJECTION, SOLUTION INTRAVENOUS at 07:59

## 2020-01-13 RX ADMIN — INSULIN HUMAN 2 UNITS: 100 INJECTION, SOLUTION PARENTERAL at 05:52

## 2020-01-13 RX ADMIN — ASPIRIN 81 MG 81 MG: 81 TABLET ORAL at 08:01

## 2020-01-13 RX ADMIN — LEVETIRACETAM 500 MG: 500 TABLET, FILM COATED ORAL at 08:01

## 2020-01-13 RX ADMIN — HEPARIN SODIUM 5000 UNITS: 5000 INJECTION INTRAVENOUS; SUBCUTANEOUS at 15:05

## 2020-01-13 RX ADMIN — FAMOTIDINE 20 MG: 10 INJECTION, SOLUTION INTRAVENOUS at 20:55

## 2020-01-13 RX ADMIN — TRAZODONE HYDROCHLORIDE 50 MG: 50 TABLET ORAL at 20:56

## 2020-01-13 RX ADMIN — SERTRALINE HYDROCHLORIDE 150 MG: 100 TABLET ORAL at 20:56

## 2020-01-13 RX ADMIN — IPRATROPIUM BROMIDE AND ALBUTEROL SULFATE 3 ML: 2.5; .5 SOLUTION RESPIRATORY (INHALATION) at 20:01

## 2020-01-13 RX ADMIN — LOSARTAN POTASSIUM 100 MG: 50 TABLET, FILM COATED ORAL at 10:30

## 2020-01-13 RX ADMIN — INSULIN HUMAN 2 UNITS: 100 INJECTION, SOLUTION PARENTERAL at 01:01

## 2020-01-13 RX ADMIN — CARVEDILOL 25 MG: 12.5 TABLET, FILM COATED ORAL at 18:35

## 2020-01-13 RX ADMIN — NICOTINE 1 PATCH: 21 PATCH, EXTENDED RELEASE TRANSDERMAL at 08:00

## 2020-01-13 RX ADMIN — DONEPEZIL HYDROCHLORIDE 5 MG: 5 TABLET ORAL at 20:55

## 2020-01-13 RX ADMIN — HEPARIN SODIUM 5000 UNITS: 5000 INJECTION INTRAVENOUS; SUBCUTANEOUS at 21:03

## 2020-01-13 RX ADMIN — SODIUM CHLORIDE 5 MG/HR: 9 INJECTION, SOLUTION INTRAVENOUS at 03:33

## 2020-01-13 RX ADMIN — AMLODIPINE BESYLATE 10 MG: 10 TABLET ORAL at 08:01

## 2020-01-13 RX ADMIN — DEXMEDETOMIDINE HYDROCHLORIDE 0.2 MCG/KG/HR: 100 INJECTION, SOLUTION INTRAVENOUS at 17:15

## 2020-01-13 RX ADMIN — IPRATROPIUM BROMIDE AND ALBUTEROL SULFATE 3 ML: 2.5; .5 SOLUTION RESPIRATORY (INHALATION) at 11:54

## 2020-01-13 RX ADMIN — IPRATROPIUM BROMIDE AND ALBUTEROL SULFATE 3 ML: 2.5; .5 SOLUTION RESPIRATORY (INHALATION) at 07:55

## 2020-01-13 RX ADMIN — POTASSIUM CHLORIDE 40 MEQ: 1.5 POWDER, FOR SOLUTION ORAL at 12:16

## 2020-01-13 RX ADMIN — LEVETIRACETAM 500 MG: 500 TABLET, FILM COATED ORAL at 20:56

## 2020-01-13 RX ADMIN — INSULIN HUMAN 2 UNITS: 100 INJECTION, SOLUTION PARENTERAL at 18:36

## 2020-01-13 RX ADMIN — HEPARIN SODIUM 5000 UNITS: 5000 INJECTION INTRAVENOUS; SUBCUTANEOUS at 05:50

## 2020-01-13 RX ADMIN — RISPERIDONE 2 MG: 1 SOLUTION ORAL at 21:35

## 2020-01-13 RX ADMIN — POTASSIUM CHLORIDE 40 MEQ: 1.5 POWDER, FOR SOLUTION ORAL at 08:00

## 2020-01-13 RX ADMIN — INSULIN HUMAN 3 UNITS: 100 INJECTION, SOLUTION PARENTERAL at 12:12

## 2020-01-13 RX ADMIN — ATORVASTATIN CALCIUM 80 MG: 40 TABLET, FILM COATED ORAL at 20:55

## 2020-01-13 RX ADMIN — FAMOTIDINE 20 MG: 10 INJECTION, SOLUTION INTRAVENOUS at 08:01

## 2020-01-13 RX ADMIN — IPRATROPIUM BROMIDE AND ALBUTEROL SULFATE 3 ML: 2.5; .5 SOLUTION RESPIRATORY (INHALATION) at 17:12

## 2020-01-13 NOTE — THERAPY TREATMENT NOTE
Acute Care - Occupational Therapy Treatment Note  [facility]     Patient Name: Glo Berry  : 1955  MRN: [id number]  Today's Date: 2020             Admit Date: 2020       ICD-10-CM ICD-9-CM   1. Encephalopathy acute G93.40 348.30   2. Impaired mobility and ADLs Z74.09 799.89   3. Dysphagia, unspecified type R13.10 787.20   4. Cognitive communication deficit R41.841 799.52     Patient Active Problem List   Diagnosis   • Uterine prolapse   • Stress incontinence of urine   • Urgency incontinence   • Diarrhea   • Encephalopathy acute   • R/O AIS    • R/O status epilepticus    • Hypertension   • Pancreatic insufficiency   • T2DM on metformin    • Chronic diarrhea   • Lumbar stenosis   • Diabetic neuropathy    • Depression   • Dementia    • Smoker   • Acute respiratory failure    • Encephalopathy     Past Medical History:   Diagnosis Date   • COPD (chronic obstructive pulmonary disease) (CMS/[facility])    • Diabetes mellitus (CMS/[facility])    • History of ear injury 1973    Injury of the right eardrum.This has resulted in the headaches   • Hypertension      Past Surgical History:   Procedure Laterality Date   • BREAST CYST EXCISION     • SINUS SURGERY     • TONSILLECTOMY     • TUBAL ABDOMINAL LIGATION         Therapy Treatment    Rehabilitation Treatment Summary     Row Name 20 1444 20 0945          Treatment Time/Intention    Discipline  occupational therapist  -CL  speech language pathologist  -AC     Document Type  therapy note (daily note)  -CL  therapy note (daily note)  -AC     Subjective Information  no complaints  -CL  no complaints  -AC     Patient/Family Observations  --  Pt alert, cooperative. Spouse present near end of session.  -AC     Care Plan Review  --  evaluation/treatment results reviewed;care plan/treatment goals reviewed;risks/benefits reviewed;current/potential barriers reviewed;patient/other agree to care plan  -     Care Plan Review, Other Participant(s)  --  spouse  -AC      Therapy Frequency (Swallow)  --  5 days per week  -AC     Therapy Frequency (SLP SLC)  --  5 days per week  -AC     Patient Effort  excellent  -CL  good  -AC     Existing Precautions/Restrictions  fall;oxygen therapy device and L/min;other (see comments) NG  -CL  --     Recorded by [CL] Ayana Anderson, OT 01/13/20 7394 [AC] Elisha Sal, MS CCC-SLP 01/13/20 1309     Row Name 01/13/20 1444             Vital Signs    Pre Systolic BP Rehab  -- VSS  -CL      Recorded by [CL] Ayana Anderson, OT 01/13/20 1554      Row Name 01/13/20 1444             Cognitive Assessment/Intervention    Additional Documentation  Cognitive Assessment/Intervention (Group)  -CL      Recorded by [CL] Ayana Anderson, OT 01/13/20 1554      Row Name 01/13/20 1444             Cognitive Assessment/Intervention- PT/OT    Affect/Mental Status (Cognitive)  confused  -CL      Orientation Status (Cognition)  oriented x 3  -CL      Follows Commands (Cognition)  follows one step commands;over 90% accuracy;repetition of directions required;verbal cues/prompting required  -CL      Cognitive Function (Cognitive)  safety deficit  -CL      Safety Deficit (Cognitive)  mild deficit;impulsivity;awareness of need for assistance;safety precautions awareness  -CL      Recorded by [CL] Ayana Anderson OT 01/13/20 1554      Row Name 01/13/20 1444             Bed Mobility Assessment/Treatment    Bed Mobility Assessment/Treatment  --  -CL      Comment (Bed Mobility)  [facility].   -CL      Recorded by [CL] Ayana Anderson OT 01/13/20 1554      Row Name 01/13/20 1447             Functional Mobility    Functional Mobility- Ind. Level  minimum assist (75% patient effort);verbal cues required  -CL      Functional Mobility- Device  rolling walker  -CL      Functional Mobility-Distance (Feet)  150  -CL      Functional Mobility- Comment  VCs for RW management and safety.   -CL      Recorded by [CL] Ayana Anderson OT 01/13/20 1554      Row Name 01/13/20 1440             Transfer  Assessment/Treatment    Transfer Assessment/Treatment  sit-stand transfer;stand-sit transfer  -CL      Comment (Transfers)  Pt performed x3 reps d/t urinary incontinence.   -CL      Recorded by [CL] Ayana Anderson OT 01/13/20 1554      Row Name 01/13/20 1444             Sit-Stand Transfer    Sit-Stand [place] (Transfers)  contact guard;verbal cues  -CL      Assistive Device (Sit-Stand Transfers)  walker, front-wheeled  -CL      Recorded by [CL] Ayana Anderson OT 01/13/20 1554      Row Name 01/13/20 1444             Stand-Sit Transfer    Stand-Sit [place] (Transfers)  contact guard;verbal cues  -CL      Assistive Device (Stand-Sit Transfers)  walker, front-wheeled  -CL      Recorded by [CL] Ayana Anderson OT 01/13/20 1554      Row Name 01/13/20 1444             ADL Assessment/Intervention    95655 - OT Self Care/Mgmt Minutes  8  -CL      BADL Assessment/Intervention  toileting;lower body dressing  -CL      Recorded by [CL] Ayana Anderson OT 01/13/20 1554      Row Name 01/13/20 1444             Lower Body Dressing Assessment/Training    Lower Body Dressing [place] Level  don;undergarment;maximum assist (25% patient effort);verbal cues  -CL      Lower Body Dressing Position  supported sitting;supported standing  -CL      Recorded by [CL] Ayana Anderson OT 01/13/20 1554      Row Name 01/13/20 1444             Toileting Assessment/Training    [place] Level (Toileting)  adjust/manage clothing;perform perineal hygiene;maximum assist (25% patient effort)  -CL      Toileting Position  supported standing;supported sitting  -CL      Recorded by [CL] Ayana Anderson OT 01/13/20 1554      Row Name 01/13/20 1444             Motor Skills Assessment/Interventions    Additional Documentation  Balance (Group);Therapeutic Exercise (Group)  -CL      Recorded by [CL] Ayana Anderson OT 01/13/20 1554      Row Name 01/13/20 1443             Therapeutic Exercise    44892 - OT Therapeutic Activity Minutes  16  -CL      Recorded  by [CL] Ayana Anderson, BETTIE 01/13/20 1554      Row Name 01/13/20 1444             Balance    Balance  static sitting balance;static standing balance  -CL      Recorded by [CL] Ayana Anderson, OT 01/13/20 1554      Row Name 01/13/20 1444             Static Sitting Balance    Level of [place] (Unsupported Sitting, Static Balance)  supervision  -CL      Sitting Position (Unsupported Sitting, Static Balance)  sitting in chair  -CL      Recorded by [CL] Ayana Anderson, BETTIE 01/13/20 1554      Row Name 01/13/20 1444             Static Standing Balance    Level of [place] (Supported Standing, Static Balance)  contact guard assist  -CL      Assistive Device Utilized (Supported Standing, Static Balance)  walker, rolling  -CL      Recorded by [CL] Ayana Anderson, BETTIE 01/13/20 1554      Row Name 01/13/20 1444             Positioning and Restraints    Pre-Treatment Position  sitting in chair/recliner  -CL      Post Treatment Position  chair  -CL      In Chair  notified nsg;reclined;call light within reach;encouraged to call for assist;exit alarm on;waffle cushion;with nsg;on mechanical lift sling;legs elevated  -CL      Recorded by [CL] Ayana Anderson, OT 01/13/20 1554      Row Name 01/13/20 0945             Pain Assessment    Additional Documentation  Pain Scale: FACES Pre/Post-Treatment (Group)  -AC      Recorded by [AC] Elisha Sal MS CCC-SLP 01/13/20 1309      Row Name 01/13/20 1444             Pain Scale: Numbers Pre/Post-Treatment    Pain Scale: Numbers, Pretreatment  0/10 - no pain  -CL      Pain Scale: Numbers, Post-Treatment  0/10 - no pain  -CL      Recorded by [CL] Ayana Anderson, OT 01/13/20 1554      Row Name 01/13/20 0945             Pain Scale: FACES Pre/Post-Treatment    Pain: FACES Scale, Pretreatment  0-->no hurt  -AC      Pain: FACES Scale, Post-Treatment  0-->no hurt  -AC      Recorded by [AC] Elisha Sal MS CCC-SLP 01/13/20 1019      Row Name 01/13/20 0945             Outcome Summary/Treatment Plan (SLP)     Daily Summary of Progress (SLP)  progress toward functional goals as expected  -AC      Plan for Continued Treatment (SLP)  Cognitive-communication improved since last session, but pt continues to present w/ severe and overt clinical s/sxs aspiration. Safest to cont NPO, alternative means of nutrition. Pt would benefit from cont'd dysphagia tx, as well as cognitive-communication tx focusing on short-term memory and improving speech intelligibility.  -AC      Anticipated Dischage Disposition  anticipate therapy at next level of care  -AC      Patient/Family Concerns, Anticipated Discharge Disposition (SLP)  Pt reported she would like to get therapy closer to home (near [place]) when ready for d/c.  -AC      Recorded by [AC] Elisha Sal, MS CCC-SLP 01/13/20 7398        User Key  (r) = Recorded By, (t) = Taken By, (c) = Cosigned By    Initials Name Effective Dates Discipline    AC Elisha Sal, MS CCC-SLP 07/27/17 -  SLP    Ayana Knight, OT 04/03/18 -  OT           Rehab Goal Summary     Row Name 01/13/20 0945             Oral Nutrition/Hydration Goal 1 (SLP)    Oral Nutrition/Hydration Goal 1, SLP  LTG: Pt will improve swallow function in order to safely return to PO diet w/ no overt s/sxs aspiration/distress w/ 100% acc and no cues  -AC      Time Frame (Oral Nutrition/Hydration Goal 1, SLP)  by discharge  -AC      Progress/Outcomes (Oral Nutrition/Hydration Goal 1, SLP)  continuing progress toward goal  -AC         Oral Nutrition/Hydration Goal 2 (SLP)    Oral Nutrition/Hydration Goal 2, SLP  Pt will tolerate therapeutic trials of thin H2O & puree w/ no overt s/sxs aspiration/distress w/ 70% acc and no cues in order to determine readiness for repeat instrumental eval  -AC      Time Frame (Oral Nutrition/Hydration Goal 2, SLP)  short term goal (STG);by discharge  -AC      Barriers (Oral Nutrition/Hydration Goal 2, SLP)  Wet/gurgly vocal quality at baseline. Provided oral care & encouraged pt to cough to  clear secretions. Eventually able to produce productive cough w/ some bloody secretions (RN notified). Soon after secretions cleared, wet vocal quality quickly returned.  Trialed a few therapeutic ice chips in tx - increased wet-sounding vocal quality noted. DNT further PO 2' severity of s/sxs aspiration/poor secretion mgt.  -AC      Progress/Outcomes (Oral Nutrition/Hydration Goal 2, SLP)  continuing progress toward goal  -AC         Pharyngeal Strengthening Exercise Goal 1 (SLP)    Increase Superior Movement of the Hyolaryngeal Complex  effortful pitch glide (falsetto + pharyngeal squeeze)  -AC      Increase Anterior Movement of the Hyolaryngeal Complex  chin tuck against resistance (CTAR)  -AC      Increase Closure at Entrance to Airway/Closure of Airway at Glottis  supraglottic swallow  -AC      Increase Squeeze/Positive Pressure Generation  hard effortful swallow  -AC      [place]/Accuracy (Pharyngeal Strengthening Goal 1, SLP)  with minimal cues (75-90% accuracy)  -AC      Time Frame (Pharyngeal Strengthening Goal 1, SLP)  short term goal (STG);by discharge  -AC      Barriers (Pharyngeal Strengthening Goal 1, SLP)  Effortful swallow x5, SSG x10 w/ mod cues. Required oral suctioning while completing exercises. Provided handout for independent practice.  -AC      Progress/Outcomes (Pharyngeal Strengthening Goal 1, SLP)  continuing progress toward goal  -AC         Communication Treatment Objective and Progress Goals (SLP)    Motor Speech/Dysarthria Treatment Objectives  Motor Speech/Dysarthria Treatment Objectives (Group)  -AC         Comprehend Questions Goal 1 (SLP)    Improve Ability to Comprehend Questions Goal 1 (SLP)  simple yes/no questions;simple wh questions;90%;with minimal cues (75-90%)  -AC      Time Frame (Comprehend Questions Goal 1, SLP)  short term goal (STG);by discharge  -AC      Progress (Ability to Comprehend Questions Goal 1, SLP)  90%;independently (over 90% accuracy)  -AC       Progress/Outcomes (Comprehend Questions Goal 1, SLP)  goal met  -AC         Follow Directions Goal 2 (SLP)    Improve Ability to Follow Directions Goal 1 (SLP)  2 step commands;90%;with minimal cues (75-90%)  -AC      Time Frame (Follow Directions Goal 1, SLP)  short term goal (STG);by discharge  -AC      Progress (Ability to Follow Directions Goal 1, SLP)  100%;independently (over 90% accuracy)  -AC      Progress/Outcomes (Follow Directions Goal 1, SLP)  goal met  -AC         Cognitive Linguistic Treatment Objectives    Memory Skills Selection  memory skills, SLP goal 1  -AC         Attention Goal 1 (SLP)    Improve Attention by Goal 1 (SLP)  complete selective attention task;90%;with minimal cues (75-90%)  -AC      Time Frame (Attention Goal 1, SLP)  short term goal (STG);by discharge  -AC      Progress/Outcomes (Attention Goal 1, SLP)  goal ongoing  -AC         Memory Skills Goal 1 (SLP)    Improve Memory Skills Through Goal 1 (SLP)  recalling related word lists with an imposed delay;listen to a paragraph and answer questions;recall details of the day;use memory strategies;80%;with minimal cues (75-90%)  -AC      Time Frame (Memory Skills Goal 1, SLP)  short term goal (STG)  -AC      Progress (Memory Skills Goal 1, SLP)  20%;with minimal cues (75-90%)  -AC      Progress/Outcomes (Memory Skills Goal 1, SLP)  continuing progress toward goal  -AC      Comment (Memory Skills Goal 1, SLP)  1/5 words after 5 min delay; recalling details of the day (clinician briefly stepped out of rm to print exercise handout and when returned, pt had forgotten who clinician was; asked same question multiple times during tx).  -AC         Additional Goal 1 (SLP)    Additional Goal 1, SLP  LTG: Improve cog-comm skills in order to participate in care while in hospital setting with 80% accuracy and cues  -AC      Time Frame (Additional Goal 1, SLP)  by discharge  -AC      Progress/Outcomes (Additional Goal 1, SLP)  continuing progress  toward goal  -AC        User Key  (r) = Recorded By, (t) = Taken By, (c) = Cosigned By    Initials Name Provider Type Discipline    AC Elisha Sal, MS CCC-SLP Speech and Language Pathologist SLP            OT Recommendation and Plan     Plan of Care Review  Plan of Care Reviewed With: patient  Plan of Care Reviewed With: patient  Outcome Summary: Pt Min A to ambulate 150 ft w/ RW and Max A for LB ADLs, limited d/t noted confusion though demo good effort w/ session. Recommend cont skilled IPOT POC. Recommend pt DC to SNF.  Outcome Measures     Row Name 01/13/20 1444             How much help from another is currently needed...    Putting on and taking off regular lower body clothing?  2  -CL      Bathing (including washing, rinsing, and drying)  2  -CL      Toileting (which includes using toilet bed pan or urinal)  2  -CL      Putting on and taking off regular upper body clothing  2  -CL      Taking care of personal grooming (such as brushing teeth)  2  -CL      Eating meals  1  -CL      AM-PAC 6 Clicks Score (OT)  11  -CL         Functional Assessment    Outcome Measure Options  AM-PAC 6 Clicks Daily Activity (OT)  -CL        User Key  (r) = Recorded By, (t) = Taken By, (c) = Cosigned By    Initials Name Provider Type    CL Ayana Anderson OT Occupational Therapist           Time Calculation:   Time Calculation- OT     Row Name 01/13/20 1444 01/13/20 0919 01/13/20 0908       Time Calculation- OT    OT Start Time  1444  -CL  --  --    OT Received On  01/13/20  -CL  --  --    OT Goal Re-Cert Due Date  01/19/20 -CL  --  --       Timed Charges    98760 - Gait Training Minutes   --  18  -DIPAK  18  -DIPAK    39356 - OT Therapeutic Activity Minutes  16  -CL  --  --    51682 - OT Self Care/Mgmt Minutes  8  -CL  --  --      User Key  (r) = Recorded By, (t) = Taken By, (c) = Cosigned By    Initials Name Provider Type    Ayana Knight, OT Occupational Therapist    Sherri Doran, PT Physical Therapist        Therapy  Charges for Today     Code Description Service Date Service Provider Modifiers Qty    60295013553 HC OT THERAPEUTIC ACT EA 15 MIN 1/13/2020 Ayana Anderson OT GO 1    01658103835 HC OT SELF CARE/MGMT/TRAIN EA 15 MIN 1/13/2020 Ayana Anderson OT GO 1               Ayana Anderson OT  1/13/2020

## 2020-01-13 NOTE — PROGRESS NOTES
Continued Stay Note  [facility]     Patient Name: Glo Berry  MRN: [id number]  Today's Date: 1/13/2020    Admit Date: 1/6/2020    Discharge Plan     Row Name 01/13/20 1136       Plan    Plan  Update    Plan Comments  Pt remains on cardene gtt. Pt did ambulate 80ft CGAx1 with rw. Plan is to transfer to [facility] in [facility]. CM will follow as needed.        Discharge Codes    No documentation.       Expected Discharge Date and Time     Expected Discharge Date Expected Discharge Time    [place] 10, 2020             Lakisha Momin RN

## 2020-01-13 NOTE — PROGRESS NOTES
Neurology       Patient Care Team:  Philomena Walters MD as PCP - General (Internal Medicine)    Chief complaint: Altered mental state    History: The patient has been sleeping well behaving nicely and making progress with ambulation.    She was able to walk 80 feet recently with physical therapy.    She has been weaned off of the Precedex and is off of the Cardene drip.      Past Medical History:   Diagnosis Date   • COPD (chronic obstructive pulmonary disease) (CMS/[facility])    • Diabetes mellitus (CMS/[facility])    • History of ear injury 1973    Injury of the right eardrum.This has resulted in the headaches   • Hypertension        Vital Signs   Vitals:    01/13/20 1000 01/13/20 1100 01/13/20 1156 01/13/20 1200   BP: 162/96 (!) 160/106  (!) 163/103   BP Location:    Right arm   Patient Position:    Sitting   Pulse: 85 83 80 82   Resp:   20 18   Temp:    98.1 °F (36.7 °C)   TempSrc:    Oral   SpO2: 97% 98% 98% 100%   Weight:       Height:           Physical Exam:   General: Awake and alert              Neuro: Oriented to person place.    Speech is soft and articulate.    She has no focal weakness.        Results Review:  Reviewed  Results from last 7 days   Lab Units 01/13/20  0437   WBC 10*3/mm3 11.69*   HEMOGLOBIN g/dL 9.0*   HEMATOCRIT % 30.0*   PLATELETS 10*3/mm3 115*     Results from last 7 days   Lab Units 01/13/20  0441 01/12/20  0536 01/11/20  0422  01/10/20  0715 01/09/20  0558   SODIUM mmol/L 139 141 143  --  146* 147*   POTASSIUM mmol/L 3.5 3.0* 3.7   < > 3.2* 3.2*   CHLORIDE mmol/L 100 100 106  --  107 108*   CO2 mmol/L 28.0 28.0 24.0  --  20.0* 18.0*   BUN mg/dL 21 22 17  --  12 12   CREATININE mg/dL 0.39* 0.34* 0.36*  --  0.43* 0.54*   CALCIUM mg/dL 10.6* 10.3 10.4  --  9.8 9.2   BILIRUBIN mg/dL 0.2  --   --   --  0.3 0.3   ALK PHOS U/L 96  --   --   --  60 46   ALT (SGPT) U/L 22  --   --   --  17 14   AST (SGOT) U/L 37*  --   --   --  35* 31   GLUCOSE mg/dL 191* 161* 181*  --  125* 110*    < > = values in this  interval not displayed.       Imaging Results (Last 24 Hours)     Procedure Component Value Units Date/Time    XR Chest 1 View [[id number]] Collected:  01/13/20 0856     Updated:  01/13/20 0858    Narrative:       EXAMINATION: XR CHEST 1 VW-01/13/2020:      INDICATION: Respiratory failure; Z74.09-Other reduced mobility;  R13.10-Dysphagia, unspecified; R41.841-Cognitive communication deficit.      COMPARISON: 01/09/2020.     FINDINGS: The cardiac silhouette is normal. There is no acute  inflammatory process, mass or effusion.           Impression:       Chronic change; no acute disease. There has been no  significant change when compared to the previous examination of  01/09/2020.     D:  01/13/2020  E:  01/13/2020                Assessment:  Mild cognitive impairment versus Alzheimer's disease.    Malignant hypertension.    Major depression.    New onset seizures    Plan:  Continue Keppra 500 mg twice daily.    Continue trazodone Risperdal and Zoloft as we are doing.    Comment:  Probably transfer to telemetry tomorrow         I discussed the patients findings and my recommendations with patient, family and nursing staff    Buzz Petersen MD  01/13/20  1:37 PM

## 2020-01-13 NOTE — PLAN OF CARE
Problem: Patient Care Overview  Goal: Plan of Care Review  Flowsheets (Taken 1/13/2020 0913)  Progress: improving  Plan of Care Reviewed With: patient  Outcome Summary: Pt oriented this morning, ambulated with less assistance, 80' CGAx1 with RW. Pt req 1 standing rest break at 40'. Pt req assist to navigate RW, naila with turns. Prior to ambulating pt completed 3 sit to stands from chair, CGA using RW with verbal cues for sequencing. Pt limited by fatigue and congestion. Cont to progress as tolerated.

## 2020-01-13 NOTE — THERAPY TREATMENT NOTE
Patient Name: Glo Berry  : 1955    MRN: [id number]                              Today's Date: 2020       Admit Date: 2020    Visit Dx:     ICD-10-CM ICD-9-CM   1. Impaired mobility and ADLs Z74.09 799.89   2. Dysphagia, unspecified type R13.10 787.20   3. Cognitive communication deficit R41.841 799.52     Patient Active Problem List   Diagnosis   • Uterine prolapse   • Stress incontinence of urine   • Urgency incontinence   • Diarrhea   • Encephalopathy acute   • R/O AIS    • R/O status epilepticus    • Hypertension   • Pancreatic insufficiency   • T2DM on metformin    • Chronic diarrhea   • Lumbar stenosis   • Diabetic neuropathy    • Depression   • Dementia    • Smoker   • Acute respiratory failure    • Encephalopathy     Past Medical History:   Diagnosis Date   • COPD (chronic obstructive pulmonary disease) (CMS/HCC)    • Diabetes mellitus (CMS/HCC)    • History of ear injury 1973    Injury of the right eardrum.This has resulted in the headaches   • Hypertension      Past Surgical History:   Procedure Laterality Date   • BREAST CYST EXCISION     • SINUS SURGERY     • TONSILLECTOMY     • TUBAL ABDOMINAL LIGATION       General Information     Row Name 2007          PT Evaluation Time/Intention    Document Type  therapy note (daily note)  -DIPAK     Mode of Treatment  physical therapy  -     Row Name 20          General Information    Patient Profile Reviewed?  yes  -DIPAK     Existing Precautions/Restrictions  fall;oxygen therapy device and L/min;other (see comments) NG  -     Row Name 20          Cognitive Assessment/Intervention- PT/OT    Orientation Status (Cognition)  oriented x 3  -     Row Name 20          Safety Issues, Functional Mobility    Impairments Affecting Function (Mobility)  balance;cognition;coordination;endurance/activity tolerance;shortness of breath;strength;postural/trunk control  -     Comment, Safety Issues/Impairments  (Mobility)  Impulsive at times but followed all commands this date.   -DIPAK       User Key  (r) = Recorded By, (t) = Taken By, (c) = Cosigned By    Initials Name Provider Type    Sherri Doran PT Physical Therapist        Mobility     Row Name 01/13/20 0908          Bed Mobility Assessment/Treatment    Comment (Bed Mobility)  Deferred, pt UIC.   -DIPAK     Row Name 01/13/20 0908          Transfer Assessment/Treatment    Comment (Transfers)  Sts x3, other staff in room testing chair alarm. Pt then had to use bedpan in chair, stated she could not make it to restroom. After toileting ambulated into hallway and back to chair. Verbal cues for hand placement, sequencing and navigation of RW.   -DIPAK     Row Name 01/13/20 0908          Sit-Stand Transfer    Sit-Stand [place] (Transfers)  verbal cues;contact guard;1 person assist  -DIPAK     Assistive Device (Sit-Stand Transfers)  walker, front-wheeled  -DIPAK     Row Name 01/13/20 0908          Gait/Stairs Assessment/Training    54400 - Gait Training Minutes   18  -DIPAK     Gait/Stairs Assessment/Training  gait/ambulation independence  -DIPAK     [place] Level (Gait)  contact guard;1 person assist;other (see comments) Nsg unhooked from IV.   -DIPAK     Assistive Device (Gait)  walker, front-wheeled  -DIPAK     Distance in Feet (Gait)  80  -DIPAK     Pattern (Gait)  step-to  -DIPAK     Deviations/Abnormal Patterns (Gait)  bilateral deviations;gait speed decreased  -DIPAK     Bilateral Gait Deviations  forward flexed posture;heel strike decreased  -DIPAK     Comment (Gait/Stairs)  VC for upright posture and to stay within RW. 1 standing rest break at 40', instructed to PLB. Assist needed at RW for turns and from deviating towards L side.   -DIPAK       User Key  (r) = Recorded By, (t) = Taken By, (c) = Cosigned By    Initials Name Provider Type    Sherri Doran PT Physical Therapist        Obj/Interventions     Row Name 01/13/20 0911          Therapeutic Exercise    Lower Extremity  (Therapeutic Exercise)  LAQ (long arc quad), bilateral;heel slides, bilateral  -DIPAK     Lower Extremity Range of Motion (Therapeutic Exercise)  hip internal/external rotation, bilateral;ankle dorsiflexion/plantar flexion, bilateral  -DIPAK     Exercise Type (Therapeutic Exercise)  AROM (active range of motion)  -DIPAK     Position (Therapeutic Exercise)  seated  -DIPAK     Sets/Reps (Therapeutic Exercise)  10x  -DIPAK     Comment (Therapeutic Exercise)  Further TE deferred due to pt increased congestion. Nsg aware of pt congestion this AM.   -DIPAK     Row Name 01/13/20 0911          Static Sitting Balance    Level of [place] (Unsupported Sitting, Static Balance)  supervision  -DIPAK     Sitting Position (Unsupported Sitting, Static Balance)  sitting in chair  -DIPAK     Time Able to Maintain Position (Unsupported Sitting, Static Balance)  4 to 5 minutes  -DIPAK     Row Name 01/13/20 0911          Dynamic Sitting Balance    Level of [place], Reaches Outside Midline (Sitting, Dynamic Balance)  contact guard assist  -DIPAK     Sitting Position, Reaches Outside Midline (Sitting, Dynamic Balance)  sitting in chair  -DIPAK     Comment, Reaches Outside Midline (Sitting, Dynamic Balance)  Reached to floor to  tissue.   -DIPAK     Row Name 01/13/20 0911          Static Standing Balance    Level of [place] (Supported Standing, Static Balance)  contact guard assist  -DIPAK     Time Able to Maintain Position (Supported Standing, Static Balance)  30 to 45 seconds  -DIPAK     Assistive Device Utilized (Supported Standing, Static Balance)  walker, rolling  -DIPAK       User Key  (r) = Recorded By, (t) = Taken By, (c) = Cosigned By    Initials Name Provider Type    Sherri Doran, PT Physical Therapist        Goals/Plan    No documentation.       Clinical Impression     Row Name 01/13/20 0913          Pain Assessment    Additional Documentation  Pain Scale: Numbers Pre/Post-Treatment (Group)  -DIPAK     Row Name 01/13/20 0913          Pain Scale:  Numbers Pre/Post-Treatment    Pain Scale: Numbers, Pretreatment  0/10 - no pain  -DIPAK     Pain Scale: Numbers, Post-Treatment  0/10 - no pain  -DIPAK     Row Name 01/13/20 0913          Plan of Care Review    Plan of Care Reviewed With  patient  -DIPAK     Progress  improving  -DIPAK     Outcome Summary  Pt oriented this morning, ambulated with less assistance, 80' CGAx1 with RW. Pt req 1 standing rest break at 40'. Pt req assist to navigate RW, naila with turns. Prior to ambulating pt completed 3 sit to stands from chair, CGA using RW with verbal cues for sequencing. Pt limited by fatigue and congestion. Cont to progress as tolerated.   -DIPAK     Row Name 01/13/20 0913          Physical Therapy Clinical Impression    Criteria for Skilled Interventions Met (PT Clinical Impression)  yes;treatment indicated  -DIPAK     Rehab Potential (PT Clinical Summary)  good, to achieve stated therapy goals  -DIPAK     Row Name 01/13/20 0913          Vital Signs    Pre Systolic BP Rehab  161 NSg ok'd session.   -DIPAK     Pre Treatment Diastolic BP  98  -DIPAK     Post Systolic BP Rehab  171  -DIPAK     Post Treatment Diastolic BP  95  -DIPAK     Pretreatment Heart Rate (beats/min)  94  -DIPAK     Posttreatment Heart Rate (beats/min)  92  -DIPAK     Pre SpO2 (%)  96  -DIPAK     O2 Delivery Pre Treatment  nasal cannula  -DIPAK     Intra SpO2 (%)  88  -DIPAK     O2 Delivery Intra Treatment  nasal cannula  -DPIAK     Post SpO2 (%)  95  -DIPAK     O2 Delivery Post Treatment  nasal cannula  -DIPAK     Pre Patient Position  Sitting  -DIPAK     Intra Patient Position  Standing  -DIPAK     Post Patient Position  Sitting  -DIPAK     Row Name 01/13/20 0913          Positioning and Restraints    Pre-Treatment Position  sitting in chair/recliner  -DIPAK     Post Treatment Position  chair  -DIPAK     In Chair  notified nsg;exit alarm on;reclined;waffle cushion;legs elevated;on mechanical lift sling;call light within reach;encouraged to call for assist;RUE elevated;LUE elevated  -DIPAK       User Key  (r) = Recorded  By, (t) = Taken By, (c) = Cosigned By    Initials Name Provider Type    Sherri Doran, KEVAN Physical Therapist        Outcome Measures     Row Name 01/13/20 0915          How much help from another person do you currently need...    Turning from your back to your side while in flat bed without using bedrails?  3  -DIPAK     Moving from lying on back to sitting on the side of a flat bed without bedrails?  2  -DIPAK     Moving to and from a bed to a chair (including a wheelchair)?  3  -DIPAK     Standing up from a chair using your arms (e.g., wheelchair, bedside chair)?  3  -DIPAK     Climbing 3-5 steps with a railing?  2  -DIPAK     To walk in hospital room?  3  -DIPAK     AM-PAC 6 Clicks Score (PT)  16  -DIPAK     Row Name 01/13/20 0915          Functional Assessment    Outcome Measure Options  AM-PAC 6 Clicks Basic Mobility (PT)  -DIPAK       User Key  (r) = Recorded By, (t) = Taken By, (c) = Cosigned By    Initials Name Provider Type    Sherri Doran, KEVAN Physical Therapist          PT Recommendation and Plan     Outcome Summary/Treatment Plan (PT)  Anticipated Discharge Disposition (PT): skilled nursing facility  Plan of Care Reviewed With: patient  Progress: improving  Outcome Summary: Pt oriented this morning, ambulated with less assistance, 80' CGAx1 with RW. Pt req 1 standing rest break at 40'. Pt req assist to navigate RW, [place] with turns. Prior to ambulating pt completed 3 sit to stands from chair, CGA using RW with verbal cues for sequencing. Pt limited by fatigue and congestion. Cont to progress as tolerated.      Time Calculation:   PT Charges     Row Name 01/13/20 0919 01/13/20 0908          Time Calculation    Start Time  0827  -DIPAK  --     PT Received On  01/13/20  -DIPAK  --     PT Goal Re-Cert Due Date  01/19/20  -DIPAK  --        Time Calculation- PT    Total Timed Code Minutes- PT  39 minute(s)  -DIPAK  --        Timed Charges    92866 - PT Therapeutic Exercise Minutes  6  -DIPAK  --     77873 - Gait Training Minutes   18   -DIPAK  18  -DIPAK     66631 - PT Therapeutic Activity Minutes  15  -DIPAK  --       User Key  (r) = Recorded By, (t) = Taken By, (c) = Cosigned By    Initials Name Provider Type    Sherri Doran, KEVAN Physical Therapist        Therapy Charges for Today     Code Description Service Date Service Provider Modifiers Qty    87393851910  PT THER PROC EA 15 MIN 1/13/2020 Sherri Gonzalez, PT GP 1    [id number] HC GAIT TRAINING EA 15 MIN 1/13/2020 Sherri Gonzalez, PT GP 1    [id number]  PT THERAPEUTIC ACT EA 15 MIN 1/13/2020 Sherri Gonzalez, PT GP 1          PT G-Codes  Outcome Measure Options: AM-PAC 6 Clicks Basic Mobility (PT)  AM-PAC 6 Clicks Score (PT): 16  AM-PAC 6 Clicks Score (OT): 7    Sherri Gonzalez PT  1/13/2020

## 2020-01-13 NOTE — PROGRESS NOTES
Clinical Nutrition     Nutrition Support Assessment  Reason for Visit:   MDR, Follow-up protocol, EN      Patient Name: Glo Berry  YOB: 1955  MRN: [id number]  Date of Encounter: 01/13/20 12:26 PM  Admission date: 1/6/2020       Peptamen AF increased to goal rate @60ml/hr to better meet est kcal + protein needs    Pt may benefit from MVI as TF volume too low to meet RDI's    Nutrition Assessment   Assessment       Hospital Problem List    Encephalopathy acute    R/O AIS     R/O status epilepticus     Hypertension    Pancreatic insufficiency    T2DM on metformin     Chronic diarrhea    Lumbar stenosis    Diabetic neuropathy     Depression    Dementia     Smoker    Acute respiratory failure     Encephalopathy    ARF/VENT 1-6-20     Extubated 1-8-20    PMH: She  has a past medical history of COPD (chronic obstructive pulmonary disease) (CMS/[facility]), Diabetes mellitus (CMS/[facility]), History of ear injury (1973), and Hypertension.   PSxH: She  has a past surgical history that includes Sinus surgery; Tubal ligation; Breast cyst excision; and Tonsillectomy.         Reported/Observed/Food/Nutrition Related History:     Pt sitting up in chair, on nasal cannula, no complaints    per RN: did not pass SLP eval    Labs reviewed     Results from last 7 days   Lab Units 01/13/20 0441 01/12/20  0536 01/11/20  0422  01/10/20  0715 01/09/20  0558   GLUCOSE mg/dL 191* 161* 181*  --  125* 110*   BUN mg/dL 21 22 17  --  12 12   CREATININE mg/dL 0.39* 0.34* 0.36*  --  0.43* 0.54*   SODIUM mmol/L 139 141 143  --  146* 147*   CHLORIDE mmol/L 100 100 106  --  107 108*   POTASSIUM mmol/L 3.5 3.0* 3.7   < > 3.2* 3.2*   PHOSPHORUS mg/dL 3.4 3.8 1.3*  --  2.4* 2.3*   MAGNESIUM mg/dL 1.7 1.3* 1.3*  --  1.7 2.0   ALT (SGPT) U/L 22  --   --   --  17 14    < > = values in this interval not displayed.     Results from last 7 days   Lab Units 01/13/20  0441 01/13/20  0437 01/10/20  0715 01/09/20  0558 01/07/20  0333    ALBUMIN g/dL 3.20*  --  3.40* 3.60  --    PREALBUMIN mg/dL  --  12.1*  --   --   --    CRP mg/dL  --  9.34*  --   --   --    CHOLESTEROL mg/dL  --   --   --   --  108   TRIGLYCERIDES mg/dL 259*  --   --   --  231*       Results from last 7 days   Lab Units 01/13/20  1105 01/13/20  0552 01/12/20  2316 01/12/20  1801 01/12/20  1214 01/12/20  0533   GLUCOSE mg/dL 212* 196* 174* 173* 174* 164*     Lab Results   Lab Value Date/Time    HGBA1C 5.60 01/06/2020 1821         Medications reviewed   Pertinent: pepcid, keppra insulin, heparin      Intake/Ouptut 24 hrs (7:00AM - 6:59 AM)     Intake & Output (last day)       01/12 0701 - 01/13 0700 01/13 0701 - 01/14 0700    I.V. (mL/kg) 1469.9 (23.3)     Other 294     NG/GT 1665 50    IV Piggyback      Total Intake(mL/kg) 3428.9 (54.3) 50 (0.8)    Urine (mL/kg/hr) 200 (0.1) 250 (0.7)    Stool  0    Total Output 200 250    Net +3228.9 -200          Urine Unmeasured Occurrence 8 x 1 x    Stool Unmeasured Occurrence  1 x               GI: 1 bm documented past 24 hours    SKIN: bruised      Current Nutrition Prescription     PO: NPO Diet  Orders Placed This Encounter      Diet, Tube Feeding Tube Feeding Formula: Peptamen AF (Peptide Based, Critical Care, ALI)      EN: Diet, Tube Feeding Tube Feeding Formula: Peptamen AF (Peptide Based, Critical Care, ALI)  Goal rate @55ml/hr, free water 30ml W 2 hours    Intake: 1295ml, 118% goal volume    TF at goal rate provides 1100ml, 1320kcal, 84g protein, total free water, including flush = 1253ml        Nutrition Diagnosis        1-9-20, 1-13  Problem Swallowing difficulty   Etiology Encephalopathy   Signs/Symptoms PER SLP Eval    status: ongoing    1-9-20, 1-13  Problem Altered GI function   Etiology h/o Pancreatic Insuffiency, ? IBS/ clot   Signs/Symptoms Chronic diarrhea   Status: chronic    Nutrition Intervention   1.  Follow treatment progress, Nutrition support order placed    Route: ngt    Peptamen AF increased to goal rate @60ml/hr  to better meet est kcal + protein needs    Pt may benefit from MVI as TF volume too low to meet RDI's       At Target Goal Volume     % Est needs   Volume  1200ml     Energy/kcals 1440kcals 100%   Protein 91g pro 100%   Fiber 5g         Fluid via EN 974mL    Total Fluid  1334    Meets 100% RDI NO            Goal:   General: Nutrition support treatment    EN/PN: Adjust EN      Monitoring/Evaluation:   Per protocol, I&O, Pertinent labs, EN delivery/tolerance, Skin status, GI status, Symptoms    Sharon Mcknight, EDD, CNSC  Time Spent: 30min

## 2020-01-13 NOTE — PROGRESS NOTES
Intensive Care Follow-up     Hospital:  LOS: 7 days   Ms. Glo Berry, 64 y.o. female is followed for:   Encephalopathy acute            History of present illness:   Glo Berry is a 64 y.o. female being transferred via airvac to [facility] on 1/6 from [facility] with altered mentation concerning for stroke vs status epilepticus. Her last known well was 1/5 ~10PM and family reports that she was somewhat restless prior to bed. Upon awakening her family found her altered and took her to the OSH for further evaluation.Per the ED physican, she was awake on arrival but inappropriate with leftward eye deviation, right-sided neglect, facial droop, and questionable decorticate posturing, ~1240 she became unresponsive and was intubated.      Reportedly she received multiple doses of Ativan and Versed, however it is difficult to discern if this was pre/post intubation. CT head negative for acute process and she was transferred to [facility] for higher level of care.     Patient was evaluated by neurology and patient did have pretty significantly elevated blood pressures.  Patient was started on a Cardizem drip.  Patient was extubated couple days after arrival.  She had periods of agitation requiring Precedex.  Also difficult to control blood pressure.  She was thought to have posterior reversible encephalopathy syndrome.    Subjective   Interval History:  Patient came out of Precedex yesterday.  Doing better today according to  and per nursing staff.  Was on nicardipine overnight turned off today morning.  Blood pressure is still running elevated.  Will add patient's home dose of losartan.  On amlodipine and Coreg currently as well.                 The patient's past medical, surgical and social history were reviewed and updated in Epic as appropriate.       Objective     Infusions:    dexmedetomidine 0.2-1.5 mcg/kg/hr Last Rate: Stopped (01/12/20 1311)   niCARdipine 5-15 mg/hr Last Rate: Stopped (01/13/20 0802)      Medications:    amLODIPine 10 mg Oral Q24H   aspirin 81 mg Oral Daily   Or      aspirin 300 mg Rectal Daily   atorvastatin 80 mg Oral Nightly   carvedilol 25 mg Oral BID With Meals   donepezil 5 mg Oral Nightly   famotidine 20 mg Intravenous Q12H   heparin (porcine) 5,000 Units Subcutaneous Q8H   insulin regular 0-7 Units Subcutaneous Q6H   ipratropium-albuterol 3 mL Nebulization 4x Daily - RT   levETIRAcetam 500 mg Oral Q12H   losartan 100 mg Oral Daily   nicotine 1 patch Transdermal Q24H   risperiDONE 1 mg Nasogastric Daily   risperiDONE 2 mg Nasogastric Nightly   sertraline 150 mg Nasogastric Nightly   traZODone 50 mg Nasogastric Nightly       Vital Sign Min/Max for last 24 hours  Temp  Min: 97.6 °F (36.4 °C)  Max: 98.7 °F (37.1 °C)   BP  Min: 124/99  Max: 204/132   Pulse  Min: 80  Max: 146   Resp  Min: 18  Max: 22   SpO2  Min: 87 %  Max: 100 %   Flow (L/min)  Min: 2  Max: 2       Input/Output for last 24 hour shift  01/12 0701 - 01/13 0700  In: 3428.9 [I.V.:1469.9]  Out: 200 [Urine:200]      Objective     General Appearance: Awake, alert, in no acute distress  Lungs:   B/L Breath sounds present with decreased breath sounds on bases, no wheezing heard, no crackles.   Heart: S1 and S2 present, no murmur  Abdomen: Soft, non-tender, no guarding or rigidity, bowel sounds positive.  Extremities: Atraumatic, no cyanosis or clubbing,  no edema, warm to touch.  Neurologic:  Moving all four extremities.  Generalized weak    Results from last 7 days   Lab Units 01/13/20  0437 01/12/20  0536 01/11/20  0422   WBC 10*3/mm3 11.69* 8.62 10.87*   HEMOGLOBIN g/dL 9.0* 8.7* 9.2*   PLATELETS 10*3/mm3 115* 93* 107*     Results from last 7 days   Lab Units 01/13/20  0441 01/12/20  0536 01/11/20  0422   SODIUM mmol/L 139 141 143   POTASSIUM mmol/L 3.5 3.0* 3.7   CO2 mmol/L 28.0 28.0 24.0   BUN mg/dL 21 22 17   CREATININE mg/dL 0.39* 0.34* 0.36*   MAGNESIUM mg/dL 1.7 1.3* 1.3*   PHOSPHORUS mg/dL 3.4 3.8 1.3*   GLUCOSE mg/dL 191*  161* 181*     Estimated Creatinine Clearance: 145.4 mL/min (A) (by C-G formula based on SCr of 0.39 mg/dL (L)).    Results from last 7 days   Lab Units 01/08/20  1615   PH, ARTERIAL pH units 7.369   PCO2, ARTERIAL mm Hg 30.5*   PO2 ART mm Hg 58.5*       Images:   No new    I reviewed the patient's results and images.     Assessment/Plan   Impression        Encephalopathy acute    R/O AIS     R/O status epilepticus     Hypertension    Pancreatic insufficiency    T2DM on metformin     Chronic diarrhea    Lumbar stenosis    Diabetic neuropathy     Depression    Dementia     Smoker    Acute respiratory failure     Encephalopathy       Plan        1.  Patient seems to be recovering from her underlying issues.  Blood pressure is still not under very good control and will add losartan.  Hopefully she can stay off of IV infusion so that we can move her out of ICU.  2.  Speech to evaluate patient and see if she can take oral diet.  Otherwise patient is doing okay on tube feeds currently.  Patient apparently has pancreatic insufficiency and takes supplements as outpatient and will reinstitute those once able to take orally.  3.  Continue antiseizure medications, antidepressants and  antipsychotics as well.  4.  GI and DVT prophylaxis.    PT/OT and ambulate as tolerated.    We will keep in ICU and when shows hemodynamic stability will plan on transferring out of ICU may be tomorrow.    Plan of care and goals reviewed with mulitdisciplinary/antibiotic stewardship team during rounds.   I discussed the patient's findings and my recommendations with family and nursing staff     Time spent 25 (exclusive of procedure time)  including high complexity decision making to assess, manipulate, and support vital organ system failure in this individual who has impairment of one or more vital organ systems such that there is a high probability of imminent or life threatening deterioration in the patient’s condition.      Gurmeet Jules MD,  FCCP  Pulmonary, Critical care and Sleep Medicine

## 2020-01-13 NOTE — PLAN OF CARE
Problem: Patient Care Overview  Goal: Plan of Care Review  Outcome: Ongoing (interventions implemented as appropriate)  Flowsheets (Taken 1/13/2020 0415)  Plan of Care Reviewed With: patient  Outcome Summary: Pt oriented most of shift, disoriented to situation at times. Pt less impulsive but still restless at times. Cardene remains on. Pt still congested with weak cough. Will continue to monitor.     Problem: Stroke (Ischemic) (Adult)  Goal: Signs and Symptoms of Listed Potential Problems Will be Absent, Minimized or Managed (Stroke)  Outcome: Ongoing (interventions implemented as appropriate)     Problem: Skin Injury Risk (Adult)  Goal: Skin Health and Integrity  Outcome: Ongoing (interventions implemented as appropriate)     Problem: Pain, Chronic (Adult)  Goal: Acceptable Pain/Comfort Level and Functional Ability  Outcome: Ongoing (interventions implemented as appropriate)     Problem: Fall Risk (Adult)  Goal: Absence of Fall  Outcome: Ongoing (interventions implemented as appropriate)

## 2020-01-13 NOTE — THERAPY TREATMENT NOTE
Acute Care - Speech Language Pathology Treatment Note  [facility]     Patient Name: Glo Berry  : 1955  MRN: [id number]  Today's Date: 2020         Admit Date: 2020    Visit Dx:      ICD-10-CM ICD-9-CM   1. Encephalopathy acute G93.40 348.30   2. Impaired mobility and ADLs Z74.09 799.89   3. Dysphagia, unspecified type R13.10 787.20   4. Cognitive communication deficit R41.841 799.52     Patient Active Problem List   Diagnosis   • Uterine prolapse   • Stress incontinence of urine   • Urgency incontinence   • Diarrhea   • Encephalopathy acute   • R/O AIS    • R/O status epilepticus    • Hypertension   • Pancreatic insufficiency   • T2DM on metformin    • Chronic diarrhea   • Lumbar stenosis   • Diabetic neuropathy    • Depression   • Dementia    • Smoker   • Acute respiratory failure    • Encephalopathy        Therapy Treatment  Rehabilitation Treatment Summary     Row Name 20 0945             Treatment Time/Intention    Discipline  speech language pathologist  -AC      Document Type  therapy note (daily note)  -AC      Subjective Information  no complaints  -AC      Patient/Family Observations  Pt alert, cooperative. Spouse present near end of session.  -AC      Care Plan Review  evaluation/treatment results reviewed;care plan/treatment goals reviewed;risks/benefits reviewed;current/potential barriers reviewed;patient/other agree to care plan  -AC      Care Plan Review, Other Participant(s)  spouse  -AC      Therapy Frequency (Swallow)  5 days per week  -AC      Therapy Frequency (SLP SLC)  5 days per week  -AC      Patient Effort  good  -AC      Recorded by [AC] Elisha Sal MS CCC-SLP 20 1309      Row Name 20 0945             Pain Assessment    Additional Documentation  Pain Scale: FACES Pre/Post-Treatment (Group)  -AC      Recorded by [AC] Elisha Sal MS CCC-SLP 20 1309      Row Name 20 0945             Pain Scale: FACES Pre/Post-Treatment    Pain:  FACES Scale, Pretreatment  0-->no hurt  -AC      Pain: FACES Scale, Post-Treatment  0-->no hurt  -AC      Recorded by [AC] Elisha Sal, MS CCC-SLP 01/13/20 1309      Row Name 01/13/20 0945             Outcome Summary/Treatment Plan (SLP)    Daily Summary of Progress (SLP)  progress toward functional goals as expected  -AC      Plan for Continued Treatment (SLP)  Cognitive-communication improved since last session, but pt continues to present w/ severe and overt clinical s/sxs aspiration. Safest to cont NPO, alternative means of nutrition. Pt would benefit from cont'd dysphagia tx, as well as cognitive-communication tx focusing on short-term memory and improving speech intelligibility.  -AC      Anticipated Dischage Disposition  anticipate therapy at next level of care  -AC      Patient/Family Concerns, Anticipated Discharge Disposition (SLP)  Pt reported she would like to get therapy closer to home (near [place]) when ready for d/c.  -AC      Recorded by [AC] Elisha Sal, MS CCC-SLP 01/13/20 1309        User Key  (r) = Recorded By, (t) = Taken By, (c) = Cosigned By    Initials Name Effective Dates Discipline     Elisha Sal, MS CCC-SLP 07/27/17 -  SLP          EDUCATION  The patient has been educated in the following areas:   Cognitive Impairment Dysphagia (Swallowing Impairment) Oral Care/Hydration NPO rationale.    SLP Recommendation and Plan  Daily Summary of Progress (SLP): progress toward functional goals as expected     Plan for Continued Treatment (SLP): Cognitive-communication improved since last session, but pt continues to present w/ severe and overt clinical s/sxs aspiration. Safest to cont NPO, alternative means of nutrition. Pt would benefit from cont'd dysphagia tx, as well as cognitive-communication tx focusing on short-term memory and improving speech intelligibility.  Anticipated Dischage Disposition: anticipate therapy at next level of care  Patient/Family Concerns, Anticipated Discharge  Disposition (SLP): Pt reported she would like to get therapy closer to home (near [place]) when ready for d/c.          SLP GOALS     Row Name 01/13/20 0945             Oral Nutrition/Hydration Goal 1 (SLP)    Oral Nutrition/Hydration Goal 1, SLP  LTG: Pt will improve swallow function in order to safely return to PO diet w/ no overt s/sxs aspiration/distress w/ 100% acc and no cues  -AC      Time Frame (Oral Nutrition/Hydration Goal 1, SLP)  by discharge  -AC      Progress/Outcomes (Oral Nutrition/Hydration Goal 1, SLP)  continuing progress toward goal  -AC         Oral Nutrition/Hydration Goal 2 (SLP)    Oral Nutrition/Hydration Goal 2, SLP  Pt will tolerate therapeutic trials of thin H2O & puree w/ no overt s/sxs aspiration/distress w/ 70% acc and no cues in order to determine readiness for repeat instrumental eval  -AC      Time Frame (Oral Nutrition/Hydration Goal 2, SLP)  short term goal (STG);by discharge  -AC      Barriers (Oral Nutrition/Hydration Goal 2, SLP)  Wet/gurgly vocal quality at baseline. Provided oral care & encouraged pt to cough to clear secretions. Eventually able to produce productive cough w/ some bloody secretions (RN notified). Soon after secretions cleared, wet vocal quality quickly returned.  Trialed a few therapeutic ice chips in tx - increased wet-sounding vocal quality noted. DNT further PO 2' severity of s/sxs aspiration/poor secretion mgt.  -AC      Progress/Outcomes (Oral Nutrition/Hydration Goal 2, SLP)  continuing progress toward goal  -AC         Pharyngeal Strengthening Exercise Goal 1 (SLP)    Increase Superior Movement of the Hyolaryngeal Complex  effortful pitch glide (falsetto + pharyngeal squeeze)  -AC      Increase Anterior Movement of the Hyolaryngeal Complex  chin tuck against resistance (CTAR)  -AC      Increase Closure at Entrance to Airway/Closure of Airway at Glottis  supraglottic swallow  -AC      Increase Squeeze/Positive Pressure Generation  hard effortful swallow   -AC      Appling/Accuracy (Pharyngeal Strengthening Goal 1, SLP)  with minimal cues (75-90% accuracy)  -AC      Time Frame (Pharyngeal Strengthening Goal 1, SLP)  short term goal (STG);by discharge  -AC      Barriers (Pharyngeal Strengthening Goal 1, SLP)  Effortful swallow x5, SSG x10 w/ mod cues. Required oral suctioning while completing exercises. Provided handout for independent practice.  -AC      Progress/Outcomes (Pharyngeal Strengthening Goal 1, SLP)  continuing progress toward goal  -AC         Comprehend Questions Goal 1 (SLP)    Improve Ability to Comprehend Questions Goal 1 (SLP)  simple yes/no questions;simple wh questions;90%;with minimal cues (75-90%)  -AC      Time Frame (Comprehend Questions Goal 1, SLP)  short term goal (STG);by discharge  -AC      Progress (Ability to Comprehend Questions Goal 1, SLP)  90%;independently (over 90% accuracy)  -AC      Progress/Outcomes (Comprehend Questions Goal 1, SLP)  goal met  -AC         Follow Directions Goal 2 (SLP)    Improve Ability to Follow Directions Goal 1 (SLP)  2 step commands;90%;with minimal cues (75-90%)  -AC      Time Frame (Follow Directions Goal 1, SLP)  short term goal (STG);by discharge  -AC      Progress (Ability to Follow Directions Goal 1, SLP)  100%;independently (over 90% accuracy)  -AC      Progress/Outcomes (Follow Directions Goal 1, SLP)  goal met  -AC         Attention Goal 1 (SLP)    Improve Attention by Goal 1 (SLP)  complete selective attention task;90%;with minimal cues (75-90%)  -AC      Time Frame (Attention Goal 1, SLP)  short term goal (STG);by discharge  -AC      Progress/Outcomes (Attention Goal 1, SLP)  goal ongoing  -AC         Memory Skills Goal 1 (SLP)    Improve Memory Skills Through Goal 1 (SLP)  recalling related word lists with an imposed delay;listen to a paragraph and answer questions;recall details of the day;use memory strategies;80%;with minimal cues (75-90%)  -AC      Time Frame (Memory Skills Goal 1,  SLP)  short term goal (STG)  -AC      Progress (Memory Skills Goal 1, SLP)  20%;with minimal cues (75-90%)  -AC      Progress/Outcomes (Memory Skills Goal 1, SLP)  continuing progress toward goal  -AC      Comment (Memory Skills Goal 1, SLP)  1/5 words after 5 min delay; recalling details of the day (clinician briefly stepped out of rm to print exercise handout and when returned, pt had forgotten who clinician was; asked same question multiple times during tx).  -AC         Additional Goal 1 (SLP)    Additional Goal 1, SLP  LTG: Improve cog-comm skills in order to participate in care while in hospital setting with 80% accuracy and cues  -AC      Time Frame (Additional Goal 1, SLP)  by discharge  -AC      Progress/Outcomes (Additional Goal 1, SLP)  continuing progress toward goal  -AC        User Key  (r) = Recorded By, (t) = Taken By, (c) = Cosigned By    Initials Name Provider Type    Elisha Hernandez MS CCC-SLP Speech and Language Pathologist              Time Calculation:     Time Calculation- SLP     Row Name 01/13/20 1318             Time Calculation- SLP    SLP Start Time  0945  -AC      SLP Received On  01/13/20  -AC        User Key  (r) = Recorded By, (t) = Taken By, (c) = Cosigned By    Initials Name Provider Type    Elisha Hernandez MS CCC-SLP Speech and Language Pathologist          Therapy Charges for Today     Code Description Service Date Service Provider Modifiers Qty    56146778856 HC ST TREATMENT SWALLOW 2 1/13/2020 Elisha Sal MS CCC-SLP GN 1    55536611852 HC ST TREATMENT SPEECH 2 1/13/2020 Elisha Sal MS CCC-JAS GN 1                     Elisha Sal MS CCC-JAS  1/13/2020

## 2020-01-13 NOTE — PLAN OF CARE
Problem: Patient Care Overview  Goal: Plan of Care Review  Outcome: Ongoing (interventions implemented as appropriate)  Flowsheets (Taken 1/13/2020 7242)  Progress: improving  Plan of Care Reviewed With: patient  Patient Agreement with Plan of Care: agrees  Outcome Summary: Pt Min A to ambulate 150 ft w/ RW and Max A for LB ADLs, limited d/t noted confusion though demo good effort w/ session. Recommend cont skilled IPOT POC. Recommend pt DC to SNF.

## 2020-01-13 NOTE — PLAN OF CARE
Problem: Patient Care Overview  Goal: Plan of Care Review  Outcome: Ongoing (interventions implemented as appropriate)  Flowsheets (Taken 1/13/2020 1316)  Plan of Care Reviewed With: patient; spouse  Note:   SLP treatment completed. Will continue to address dysphagia and cognitive-communication d/o in tx. Please see note for further details and recommendations.

## 2020-01-14 LAB
ANION GAP SERPL CALCULATED.3IONS-SCNC: 12 MMOL/L (ref 5–15)
BASOPHILS # BLD AUTO: 0.04 10*3/MM3 (ref 0–0.2)
BASOPHILS NFR BLD AUTO: 0.4 % (ref 0–1.5)
BUN BLD-MCNC: 21 MG/DL (ref 8–23)
BUN/CREAT SERPL: 52.5 (ref 7–25)
CALCIUM SPEC-SCNC: 10.5 MG/DL (ref 8.6–10.5)
CHLORIDE SERPL-SCNC: 101 MMOL/L (ref 98–107)
CO2 SERPL-SCNC: 24 MMOL/L (ref 22–29)
CREAT BLD-MCNC: 0.4 MG/DL (ref 0.57–1)
DEPRECATED RDW RBC AUTO: 46.6 FL (ref 37–54)
EOSINOPHIL # BLD AUTO: 0.11 10*3/MM3 (ref 0–0.4)
EOSINOPHIL NFR BLD AUTO: 1.2 % (ref 0.3–6.2)
ERYTHROCYTE [DISTWIDTH] IN BLOOD BY AUTOMATED COUNT: 14.4 % (ref 12.3–15.4)
GFR SERPL CREATININE-BSD FRML MDRD: >150 ML/MIN/1.73
GLUCOSE BLD-MCNC: 190 MG/DL (ref 65–99)
GLUCOSE BLDC GLUCOMTR-MCNC: 162 MG/DL (ref 70–130)
GLUCOSE BLDC GLUCOMTR-MCNC: 190 MG/DL (ref 70–130)
GLUCOSE BLDC GLUCOMTR-MCNC: 222 MG/DL (ref 70–130)
HCT VFR BLD AUTO: 27.2 % (ref 34–46.6)
HGB BLD-MCNC: 7.9 G/DL (ref 12–15.9)
IMM GRANULOCYTES # BLD AUTO: 0.2 10*3/MM3 (ref 0–0.05)
IMM GRANULOCYTES NFR BLD AUTO: 2.2 % (ref 0–0.5)
IRON 24H UR-MRATE: 41 MCG/DL (ref 37–145)
LYMPHOCYTES # BLD AUTO: 1.4 10*3/MM3 (ref 0.7–3.1)
LYMPHOCYTES NFR BLD AUTO: 15.2 % (ref 19.6–45.3)
MAGNESIUM SERPL-MCNC: 1.4 MG/DL (ref 1.6–2.4)
MCH RBC QN AUTO: 26.4 PG (ref 26.6–33)
MCHC RBC AUTO-ENTMCNC: 29 G/DL (ref 31.5–35.7)
MCV RBC AUTO: 91 FL (ref 79–97)
MONOCYTES # BLD AUTO: 1.17 10*3/MM3 (ref 0.1–0.9)
MONOCYTES NFR BLD AUTO: 12.7 % (ref 5–12)
NEUTROPHILS # BLD AUTO: 6.27 10*3/MM3 (ref 1.7–7)
NEUTROPHILS NFR BLD AUTO: 68.3 % (ref 42.7–76)
NRBC BLD AUTO-RTO: 0 /100 WBC (ref 0–0.2)
PHOSPHATE SERPL-MCNC: 4.1 MG/DL (ref 2.5–4.5)
PLATELET # BLD AUTO: 96 10*3/MM3 (ref 140–450)
PMV BLD AUTO: 13.8 FL (ref 6–12)
POTASSIUM BLD-SCNC: 4.1 MMOL/L (ref 3.5–5.2)
RBC # BLD AUTO: 2.99 10*6/MM3 (ref 3.77–5.28)
SODIUM BLD-SCNC: 137 MMOL/L (ref 136–145)
WBC NRBC COR # BLD: 9.19 10*3/MM3 (ref 3.4–10.8)

## 2020-01-14 PROCEDURE — 99232 SBSQ HOSP IP/OBS MODERATE 35: CPT | Performed by: PSYCHIATRY & NEUROLOGY

## 2020-01-14 PROCEDURE — 84100 ASSAY OF PHOSPHORUS: CPT | Performed by: INTERNAL MEDICINE

## 2020-01-14 PROCEDURE — 94799 UNLISTED PULMONARY SVC/PX: CPT

## 2020-01-14 PROCEDURE — 97530 THERAPEUTIC ACTIVITIES: CPT

## 2020-01-14 PROCEDURE — 80048 BASIC METABOLIC PNL TOTAL CA: CPT | Performed by: INTERNAL MEDICINE

## 2020-01-14 PROCEDURE — 25010000002 HEPARIN (PORCINE) PER 1000 UNITS: Performed by: INTERNAL MEDICINE

## 2020-01-14 PROCEDURE — 83735 ASSAY OF MAGNESIUM: CPT | Performed by: INTERNAL MEDICINE

## 2020-01-14 PROCEDURE — 83540 ASSAY OF IRON: CPT | Performed by: PSYCHIATRY & NEUROLOGY

## 2020-01-14 PROCEDURE — 92507 TX SP LANG VOICE COMM INDIV: CPT

## 2020-01-14 PROCEDURE — 85007 BL SMEAR W/DIFF WBC COUNT: CPT | Performed by: INTERNAL MEDICINE

## 2020-01-14 PROCEDURE — 85025 COMPLETE CBC W/AUTO DIFF WBC: CPT | Performed by: INTERNAL MEDICINE

## 2020-01-14 PROCEDURE — 92526 ORAL FUNCTION THERAPY: CPT

## 2020-01-14 PROCEDURE — 99232 SBSQ HOSP IP/OBS MODERATE 35: CPT | Performed by: INTERNAL MEDICINE

## 2020-01-14 PROCEDURE — 82962 GLUCOSE BLOOD TEST: CPT

## 2020-01-14 PROCEDURE — 25010000002 MAGNESIUM SULFATE 2 GM/50ML SOLUTION: Performed by: INTERNAL MEDICINE

## 2020-01-14 RX ORDER — RISPERIDONE 1 MG/ML
1 SOLUTION ORAL EVERY 12 HOURS PRN
Status: DISCONTINUED | OUTPATIENT
Start: 2020-01-14 | End: 2020-01-20

## 2020-01-14 RX ADMIN — MAGNESIUM SULFATE 6 G: 2 INJECTION INTRAVENOUS at 05:46

## 2020-01-14 RX ADMIN — HEPARIN SODIUM 5000 UNITS: 5000 INJECTION INTRAVENOUS; SUBCUTANEOUS at 22:35

## 2020-01-14 RX ADMIN — CARVEDILOL 25 MG: 12.5 TABLET, FILM COATED ORAL at 08:35

## 2020-01-14 RX ADMIN — INSULIN HUMAN 3 UNITS: 100 INJECTION, SOLUTION PARENTERAL at 14:38

## 2020-01-14 RX ADMIN — INSULIN HUMAN 2 UNITS: 100 INJECTION, SOLUTION PARENTERAL at 18:05

## 2020-01-14 RX ADMIN — INSULIN HUMAN 2 UNITS: 100 INJECTION, SOLUTION PARENTERAL at 05:45

## 2020-01-14 RX ADMIN — IPRATROPIUM BROMIDE AND ALBUTEROL SULFATE 3 ML: 2.5; .5 SOLUTION RESPIRATORY (INHALATION) at 15:28

## 2020-01-14 RX ADMIN — TRAZODONE HYDROCHLORIDE 50 MG: 50 TABLET ORAL at 20:28

## 2020-01-14 RX ADMIN — IPRATROPIUM BROMIDE AND ALBUTEROL SULFATE 3 ML: 2.5; .5 SOLUTION RESPIRATORY (INHALATION) at 08:23

## 2020-01-14 RX ADMIN — LEVETIRACETAM 500 MG: 500 TABLET, FILM COATED ORAL at 08:36

## 2020-01-14 RX ADMIN — IPRATROPIUM BROMIDE AND ALBUTEROL SULFATE 3 ML: 2.5; .5 SOLUTION RESPIRATORY (INHALATION) at 12:17

## 2020-01-14 RX ADMIN — IPRATROPIUM BROMIDE AND ALBUTEROL SULFATE 3 ML: 2.5; .5 SOLUTION RESPIRATORY (INHALATION) at 20:29

## 2020-01-14 RX ADMIN — LOSARTAN POTASSIUM 100 MG: 50 TABLET, FILM COATED ORAL at 08:36

## 2020-01-14 RX ADMIN — AMLODIPINE BESYLATE 10 MG: 10 TABLET ORAL at 08:36

## 2020-01-14 RX ADMIN — RISPERIDONE 1 MG: 1 SOLUTION ORAL at 08:44

## 2020-01-14 RX ADMIN — NICOTINE 1 PATCH: 21 PATCH, EXTENDED RELEASE TRANSDERMAL at 08:38

## 2020-01-14 RX ADMIN — CARVEDILOL 25 MG: 12.5 TABLET, FILM COATED ORAL at 18:05

## 2020-01-14 RX ADMIN — HEPARIN SODIUM 5000 UNITS: 5000 INJECTION INTRAVENOUS; SUBCUTANEOUS at 14:37

## 2020-01-14 RX ADMIN — DONEPEZIL HYDROCHLORIDE 5 MG: 5 TABLET ORAL at 20:27

## 2020-01-14 RX ADMIN — HYDROCODONE BITARTRATE AND ACETAMINOPHEN 1 TABLET: 5; 325 TABLET ORAL at 10:11

## 2020-01-14 RX ADMIN — FAMOTIDINE 20 MG: 10 INJECTION, SOLUTION INTRAVENOUS at 08:37

## 2020-01-14 RX ADMIN — LEVETIRACETAM 500 MG: 500 TABLET, FILM COATED ORAL at 20:28

## 2020-01-14 RX ADMIN — TRAZODONE HYDROCHLORIDE 50 MG: 50 TABLET ORAL at 20:31

## 2020-01-14 RX ADMIN — ASPIRIN 81 MG 81 MG: 81 TABLET ORAL at 08:35

## 2020-01-14 RX ADMIN — SERTRALINE HYDROCHLORIDE 150 MG: 100 TABLET ORAL at 20:27

## 2020-01-14 RX ADMIN — ATORVASTATIN CALCIUM 80 MG: 40 TABLET, FILM COATED ORAL at 20:28

## 2020-01-14 RX ADMIN — HEPARIN SODIUM 5000 UNITS: 5000 INJECTION INTRAVENOUS; SUBCUTANEOUS at 05:46

## 2020-01-14 RX ADMIN — DEXMEDETOMIDINE HYDROCHLORIDE 1.1 MCG/KG/HR: 100 INJECTION, SOLUTION INTRAVENOUS at 06:38

## 2020-01-14 NOTE — PLAN OF CARE
Problem: Patient Care Overview  Goal: Plan of Care Review  Outcome: Ongoing (interventions implemented as appropriate)  Flowsheets (Taken 1/14/2020 0643)  Progress: improving  Plan of Care Reviewed With: patient  Outcome Summary: Pt increased ambulation distance to 200ft with RW and CGA 1+1. Increased cueing for upright posture and increased step length. Pt required one standing rest break due to fatigue and increased SOA. Required verbal and tactile cues to steer RW; tendency to steer to L. Pt denied any c/o pain. Continue to progress as appropriate.

## 2020-01-14 NOTE — THERAPY TREATMENT NOTE
Patient Name: Glo Berry  : 1955    MRN: [id number]                              Today's Date: 2020       Admit Date: 2020    Visit Dx:     ICD-10-CM ICD-9-CM   1. Encephalopathy acute G93.40 348.30   2. Impaired mobility and ADLs Z74.09 799.89   3. Dysphagia, unspecified type R13.10 787.20   4. Cognitive communication deficit R41.841 799.52     Patient Active Problem List   Diagnosis   • Uterine prolapse   • Stress incontinence of urine   • Urgency incontinence   • Diarrhea   • Encephalopathy acute   • R/O AIS    • R/O status epilepticus    • Hypertension   • Pancreatic insufficiency   • T2DM on metformin    • Chronic diarrhea   • Lumbar stenosis   • Diabetic neuropathy    • Depression   • Dementia    • Smoker   • Acute respiratory failure    • Encephalopathy     Past Medical History:   Diagnosis Date   • COPD (chronic obstructive pulmonary disease) (CMS/HCC)    • Diabetes mellitus (CMS/HCC)    • History of ear injury 1973    Injury of the right eardrum.This has resulted in the headaches   • Hypertension      Past Surgical History:   Procedure Laterality Date   • BREAST CYST EXCISION     • SINUS SURGERY     • TONSILLECTOMY     • TUBAL ABDOMINAL LIGATION       General Information     Row Name 20 1542          PT Evaluation Time/Intention    Document Type  therapy note (daily note)  -KR     Mode of Treatment  physical therapy  -KR     Row Name 20 1542          General Information    Existing Precautions/Restrictions  fall;oxygen therapy device and L/min;other (see comments) NG  -KR     Barriers to Rehab  cognitive status  -KR     Row Name 20 1542          Cognitive Assessment/Intervention- PT/OT    Orientation Status (Cognition)  oriented x 3  -KR     Cognitive Assessment/Intervention Comment  increased lethargy  -KR     Row Name 20 1542          Safety Issues, Functional Mobility    Safety Issues Affecting Function (Mobility)  ability to follow commands;awareness of need  for assistance;insight into deficits/self awareness;safety precautions follow-through/compliance  -KR     Impairments Affecting Function (Mobility)  balance;cognition;coordination;endurance/activity tolerance;postural/trunk control;shortness of breath;strength  -KR       User Key  (r) = Recorded By, (t) = Taken By, (c) = Cosigned By    Initials Name Provider Type    Jovita Villegas PT Physical Therapist        Mobility     Row Name 01/14/20 1543          Bed Mobility Assessment/Treatment    Comment (Bed Mobility)  UIC  -KR     Row Name 01/14/20 1543          Transfer Assessment/Treatment    Comment (Transfers)  VC's for sequencing and safe hand placement.   -KR     Row Name 01/14/20 1543          Sit-Stand Transfer    Sit-Stand [place] (Transfers)  contact guard;verbal cues  -KR     Assistive Device (Sit-Stand Transfers)  walker, front-wheeled  -KR     Row Name 01/14/20 1543          Gait/Stairs Assessment/Training    Gait/Stairs Assessment/Training  gait/ambulation assistive device  -KR     [place] Level (Gait)  contact guard;1 person assist;1 person to manage equipment;verbal cues  -KR     Assistive Device (Gait)  walker, front-wheeled  -KR     Distance in Feet (Gait)  200  -KR     Pattern (Gait)  step-to  -KR     Deviations/Abnormal Patterns (Gait)  base of support, narrow;alla decreased;stride length decreased  -KR     Bilateral Gait Deviations  forward flexed posture;heel strike decreased  -KR     Comment (Gait/Stairs)  Pt increased ambulation distance to 200ft with RW and CGA 1+1. VC's for upright posture and increased step length. Pt required intermittent verbal and tactile cues for proper management of RW; pt with tendency to steer to L. Pt encouraged to cough during ambulation. Required one standing rest break due to fatigue and increased SOA.   -KR       User Key  (r) = Recorded By, (t) = Taken By, (c) = Cosigned By    Initials Name Provider Type    Jovita Villegas PT Physical  Therapist        Obj/Interventions     Row Name 01/14/20 1545          Static Sitting Balance    Level of [place] (Unsupported Sitting, Static Balance)  supervision  -KR     Sitting Position (Unsupported Sitting, Static Balance)  sitting in chair  -KR     Row Name 01/14/20 1545          Static Standing Balance    Level of [place] (Supported Standing, Static Balance)  contact guard assist  -KR     Assistive Device Utilized (Supported Standing, Static Balance)  walker, rolling  -KR     Row Name 01/14/20 1545          Dynamic Standing Balance    Level of [place], Reaches Outside Midline (Standing, Dynamic Balance)  contact guard assist  -KR     Assistive Device Utilized (Supported Standing, Dynamic Balance)  walker, rolling  -KR       User Key  (r) = Recorded By, (t) = Taken By, (c) = Cosigned By    Initials Name Provider Type    Jovita Villegas, PT Physical Therapist        Goals/Plan    No documentation.       Clinical Impression     [facility] Name 01/14/20 1545          Pain Assessment    Additional Documentation  Pain Scale: Numbers Pre/Post-Treatment (Group)  -KR     Row Name 01/14/20 1545          Pain Scale: Numbers Pre/Post-Treatment    Pain Scale: Numbers, Pretreatment  0/10 - no pain  -KR     Pain Scale: Numbers, Post-Treatment  0/10 - no pain  -KR     [facility] Name 01/14/20 1545          Vital Signs    Pre Systolic BP Rehab  132  -KR     Pre Treatment Diastolic BP  96  -KR     Post Systolic BP Rehab  148  -KR     Post Treatment Diastolic BP  80  -KR     Pretreatment Heart Rate (beats/min)  80  -KR     Posttreatment Heart Rate (beats/min)  90  -KR     Pre SpO2 (%)  98  -KR     O2 Delivery Pre Treatment  supplemental O2  -KR     Post SpO2 (%)  99  -KR     O2 Delivery Post Treatment  supplemental O2  -KR     Pre Patient Position  Sitting  -KR     Intra Patient Position  Standing  -KR     Post Patient Position  Sitting  -KR     [facility] Name 01/14/20 1545          Positioning and Restraints    Pre-Treatment  Position  sitting in chair/recliner  -KR     Post Treatment Position  chair  -KR     In Chair  notified nsg;reclined;call light within reach;encouraged to call for assist;exit alarm on;RUE elevated;LUE elevated;legs elevated  -KR       User Key  (r) = Recorded By, (t) = Taken By, (c) = Cosigned By    Initials Name Provider Type    Jovita Villegas PT Physical Therapist        Outcome Measures     Row Name 01/14/20 1546          How much help from another person do you currently need...    Turning from your back to your side while in flat bed without using bedrails?  3  -KR     Moving from lying on back to sitting on the side of a flat bed without bedrails?  3  -KR     Moving to and from a bed to a chair (including a wheelchair)?  3  -KR     Standing up from a chair using your arms (e.g., wheelchair, bedside chair)?  3  -KR     Climbing 3-5 steps with a railing?  2  -KR     To walk in hospital room?  3  -KR     AM-PAC 6 Clicks Score (PT)  17  -KR     Row Name 01/14/20 1546          Functional Assessment    Outcome Measure Options  AM-PAC 6 Clicks Basic Mobility (PT)  -KR       User Key  (r) = Recorded By, (t) = Taken By, (c) = Cosigned By    Initials Name Provider Type    Jovita Villegas, PT Physical Therapist          PT Recommendation and Plan  Planned Therapy Interventions (PT Eval): balance training, bed mobility training, gait training, strengthening, transfer training  Outcome Summary/Treatment Plan (PT)  Anticipated Discharge Disposition (PT): skilled nursing facility  Plan of Care Reviewed With: patient  Progress: improving  Outcome Summary: Pt increased ambulation distance to 200ft with RW and CGA 1+1. Increased cueing for upright posture and increased step length. Pt required one standing rest break due to fatigue and increased SOA. Required verbal and tactile cues to steer RW; tendency to steer to L. Pt denied any c/o pain. Continue to progress as appropriate.     Time Calculation:   PT Charges      Row Name 01/14/20 1358             Time Calculation    Start Time  1358  -KR      PT Received On  01/14/20  -KR      PT Goal Re-Cert Due Date  01/19/20  -KR         Time Calculation- PT    Total Timed Code Minutes- PT  25 minute(s)  -KR         Timed Charges    08294 - PT Therapeutic Activity Minutes  25  -KR        User Key  (r) = Recorded By, (t) = Taken By, (c) = Cosigned By    Initials Name Provider Type    Jovita Villegas, PT Physical Therapist        Therapy Charges for Today     Code Description Service Date Service Provider Modifiers Qty    69627653241 HC PT THERAPEUTIC ACT EA 15 MIN 1/14/2020 Jovita Seo, PT GP 2    20572434257 HC PT THER SUPP EA 15 MIN 1/14/2020 Jovita Seo, PT GP 2          PT G-Codes  Outcome Measure Options: AM-PAC 6 Clicks Basic Mobility (PT)  AM-PAC 6 Clicks Score (PT): 17  AM-PAC 6 Clicks Score (OT): 11    Karen Seo PT  1/14/2020

## 2020-01-14 NOTE — PROGRESS NOTES
Clinical Nutrition     Multidisciplinary Rounds      Patient Name: Glo Berry  Date of Encounter: 01/14/20 11:34 AM  MRN: [id number]  Admission date: 1/6/2020      Reason for visit: MARIELOS. RD to continue to follow per protocol.     Pt sleeping in chair at present    Per RN: oriented x4, but restless    Current diet: NPO Diet  Orders Placed This Encounter      Diet, Tube Feeding Tube Feeding Formula: Peptamen AF (Peptide Based, Critical Care, ALI)  goal rate @60ml/hr, free water 15ml/hr    Intake: 1010ml, 84% goal volume    Intervention:  Follow treatment plan  Care plan reviewed    Follow up:   Per protocol      Sharon Mcknight RD  11:34 AM  Time: 20min

## 2020-01-14 NOTE — PLAN OF CARE
Problem: Patient Care Overview  Goal: Plan of Care Review  Outcome: Ongoing (interventions implemented as appropriate)  Flowsheets (Taken 1/14/2020 9480)  Progress: no change  Plan of Care Reviewed With: patient  Outcome Summary: Pt remains restless and confused; multiple attempts to get out of bed/chair. Precedex gtt remains on to keep pt somewhat calm. VSS.

## 2020-01-14 NOTE — PLAN OF CARE
Problem: Patient Care Overview  Goal: Plan of Care Review  Outcome: Ongoing (interventions implemented as appropriate)  Flowsheets  Taken 1/14/2020 1715 by Sathya Lowe, RN  Progress: improving  Outcome Summary: Pt VSS and oriented x 4. Pt off of precedex and has been calm and cooperative. Ambulated up in chair and worked with PT today.  Taken 1/14/2020 1600 by Sathya Lowe, RN  Plan of Care Reviewed With: patient  Taken 1/14/2020 1552 by Juli Moser MS CCC-SLP  Patient Agreement with Plan of Care: agrees

## 2020-01-14 NOTE — PLAN OF CARE
Pt. VSS ex: SBP remains high. Losartan added. A&O x 4 until approximately 1700, when patient became confused with illogical speech and agitated/restless. Precedex restarted per APRN. Pt. still not able to tolerate PO intake per SLP. Will follow.Tube feeding at goal. Several loose BMs this shift. Pt. ambulated x 3 with PT/OT. Copious oral and subglottal secretions.

## 2020-01-14 NOTE — PLAN OF CARE
Problem: Patient Care Overview  Goal: Plan of Care Review  Outcome: Ongoing (interventions implemented as appropriate)  Flowsheets (Taken 1/14/2020 8850)  Progress: improving  Plan of Care Reviewed With: patient  Patient Agreement with Plan of Care: agrees  Note:   SLP treatment completed. Will continue to address dysphagia and cog/comm deficits. Please see note for further details and recommendations.

## 2020-01-14 NOTE — THERAPY TREATMENT NOTE
Acute Care - Speech Language Pathology Progress Note  [facility]     Patient Name: Glo Berry  : 1955  MRN: [id number]  Today's Date: 2020         Admit Date: 2020    Visit Dx:      ICD-10-CM ICD-9-CM   1. Encephalopathy acute G93.40 348.30   2. Impaired mobility and ADLs Z74.09 799.89   3. Dysphagia, unspecified type R13.10 787.20   4. Cognitive communication deficit R41.841 799.52     Patient Active Problem List   Diagnosis   • Uterine prolapse   • Stress incontinence of urine   • Urgency incontinence   • Diarrhea   • Encephalopathy acute   • R/O AIS    • R/O status epilepticus    • Hypertension   • Pancreatic insufficiency   • T2DM on metformin    • Chronic diarrhea   • Lumbar stenosis   • Diabetic neuropathy    • Depression   • Dementia    • Smoker   • Acute respiratory failure    • Encephalopathy        Therapy Treatment  Rehabilitation Treatment Summary     Row Name 20 1545             Treatment Time/Intention    Discipline  speech language pathologist  -AV      Document Type  therapy note (daily note)  -AV      Subjective Information  no complaints  -AV      Mode of Treatment  speech-language pathology  -AV      Patient/Family Observations  no family present   -AV      Therapy Frequency (Swallow)  5 days per week  -AV      Therapy Frequency (SLP SLC)  5 days per week  -AV      Recorded by [AV] Juli Moser MS CCC-SLP 20 1552      Row Name 20 1545             Pain Assessment    Additional Documentation  Pain Scale: FACES Pre/Post-Treatment (Group)  -AV      Recorded by [AV] Juli Moser MS CCC-SLP 20 1552      Row Name 20 1545             Outcome Summary/Treatment Plan (SLP)    Daily Summary of Progress (SLP)  progress toward functional goals is gradual  -AV      Plan for Continued Treatment (SLP)  cont plan of care, voice hoarse, cough with thins   -AV      Anticipated Dischage Disposition  anticipate therapy at next level of care   -AV      Recorded by [AV] Juli Moser, MS CCC-SLP 01/14/20 5764        User Key  (r) = Recorded By, (t) = Taken By, (c) = Cosigned By    Initials Name Effective Dates Discipline    AV Juli Moser MS CCC-SLP 05/23/19 -  SLP          EDUCATION  The patient has been educated in the following areas:   Cognitive Impairment Communication Impairment.    SLP Recommendation and Plan  Daily Summary of Progress (SLP): progress toward functional goals is gradual     Plan for Continued Treatment (SLP): cont plan of care, voice hoarse, cough with thins   Anticipated Dischage Disposition: anticipate therapy at next level of care             SLP GOALS     Row Name 01/14/20 1500 01/13/20 0945          Oral Nutrition/Hydration Goal 1 (SLP)    Oral Nutrition/Hydration Goal 1, SLP  --  LTG: Pt will improve swallow function in order to safely return to PO diet w/ no overt s/sxs aspiration/distress w/ 100% acc and no cues  -AC     Time Frame (Oral Nutrition/Hydration Goal 1, SLP)  --  by discharge  -AC     Progress/Outcomes (Oral Nutrition/Hydration Goal 1, SLP)  --  continuing progress toward goal  -AC        Oral Nutrition/Hydration Goal 2 (SLP)    Oral Nutrition/Hydration Goal 2, SLP  --  Pt will tolerate therapeutic trials of thin H2O & puree w/ no overt s/sxs aspiration/distress w/ 70% acc and no cues in order to determine readiness for repeat instrumental eval  -AC     Time Frame (Oral Nutrition/Hydration Goal 2, SLP)  --  short term goal (STG);by discharge  -AC     Barriers (Oral Nutrition/Hydration Goal 2, SLP)  --  Wet/gurgly vocal quality at baseline. Provided oral care & encouraged pt to cough to clear secretions. Eventually able to produce productive cough w/ some bloody secretions (RN notified). Soon after secretions cleared, wet vocal quality quickly returned.  Trialed a few therapeutic ice chips in tx - increased wet-sounding vocal quality noted. DNT further PO 2' severity of s/sxs  aspiration/poor secretion mgt.  -AC     Progress/Outcomes (Oral Nutrition/Hydration Goal 2, SLP)  --  continuing progress toward goal  -AC        Pharyngeal Strengthening Exercise Goal 1 (SLP)    Increase Superior Movement of the Hyolaryngeal Complex  --  effortful pitch glide (falsetto + pharyngeal squeeze)  -AC     Increase Anterior Movement of the Hyolaryngeal Complex  --  chin tuck against resistance (CTAR)  -AC     Increase Closure at Entrance to Airway/Closure of Airway at Glottis  --  supraglottic swallow  -AC     Increase Squeeze/Positive Pressure Generation  --  hard effortful swallow  -AC     [place]/Accuracy (Pharyngeal Strengthening Goal 1, SLP)  --  with minimal cues (75-90% accuracy)  -AC     Time Frame (Pharyngeal Strengthening Goal 1, SLP)  --  short term goal (STG);by discharge  -AC     Barriers (Pharyngeal Strengthening Goal 1, SLP)  --  Effortful swallow x5, SSG x10 w/ mod cues. Required oral suctioning while completing exercises. Provided handout for independent practice.  -AC     Progress/Outcomes (Pharyngeal Strengthening Goal 1, SLP)  --  continuing progress toward goal  -AC        Comprehend Questions Goal 1 (SLP)    Improve Ability to Comprehend Questions Goal 1 (SLP)  --  simple yes/no questions;simple wh questions;90%;with minimal cues (75-90%)  -AC     Time Frame (Comprehend Questions Goal 1, SLP)  --  short term goal (STG);by discharge  -AC     Progress (Ability to Comprehend Questions Goal 1, SLP)  --  90%;independently (over 90% accuracy)  -AC     Progress/Outcomes (Comprehend Questions Goal 1, SLP)  --  goal met  -AC        Follow Directions Goal 2 (SLP)    Improve Ability to Follow Directions Goal 1 (SLP)  --  2 step commands;90%;with minimal cues (75-90%)  -AC     Time Frame (Follow Directions Goal 1, SLP)  --  short term goal (STG);by discharge  -AC     Progress (Ability to Follow Directions Goal 1, SLP)  --  100%;independently (over 90% accuracy)  -AC     Progress/Outcomes  (Follow Directions Goal 1, SLP)  --  goal met  -AC        Attention Goal 1 (SLP)    Improve Attention by Goal 1 (SLP)  complete selective attention task;90%;with minimal cues (75-90%)  -AV  complete selective attention task;90%;with minimal cues (75-90%)  -AC     Time Frame (Attention Goal 1, SLP)  short term goal (STG);by discharge  -AV  short term goal (STG);by discharge  -AC     Progress (Attention Goal 1, SLP)  70%;with minimal cues (75-90%)  -AV  --     Progress/Outcomes (Attention Goal 1, SLP)  continuing progress toward goal  -AV  goal ongoing  -AC        Memory Skills Goal 1 (SLP)    Improve Memory Skills Through Goal 1 (SLP)  recalling related word lists with an imposed delay;listen to a paragraph and answer questions;recall details of the day;use memory strategies;80%;with minimal cues (75-90%)  -AV  recalling related word lists with an imposed delay;listen to a paragraph and answer questions;recall details of the day;use memory strategies;80%;with minimal cues (75-90%)  -AC     Time Frame (Memory Skills Goal 1, SLP)  short term goal (STG)  -AV  short term goal (STG)  -AC     Progress (Memory Skills Goal 1, SLP)  40%;with minimal cues (75-90%)  -AV  20%;with minimal cues (75-90%)  -AC     Progress/Outcomes (Memory Skills Goal 1, SLP)  continuing progress toward goal  -AV  continuing progress toward goal  -AC     Comment (Memory Skills Goal 1, SLP)  1/5 words after 5 min delay; recalling details of the day (clinician briefly stepped out of rm to print exercise handout and when returned, pt had forgotten who clinician was; asked same question multiple times during tx).  -AV  1/5 words after 5 min delay; recalling details of the day (clinician briefly stepped out of rm to print exercise handout and when returned, pt had forgotten who clinician was; asked same question multiple times during tx).  -AC        Additional Goal 1 (SLP)    Additional Goal 1, SLP  --  LTG: Improve cog-comm skills in order to  participate in care while in hospital setting with 80% accuracy and cues  -AC     Time Frame (Additional Goal 1, SLP)  --  by discharge  -AC     Progress/Outcomes (Additional Goal 1, SLP)  --  continuing progress toward goal  -AC       User Key  (r) = Recorded By, (t) = Taken By, (c) = Cosigned By    Initials Name Provider Type    Elisha Hernandez, MS CCC-SLP Speech and Language Pathologist    Juli López MS CCC-SLP Speech and Language Pathologist              Time Calculation:     Time Calculation- SLP     Row Name 20 1553             Time Calculation- SLP    SLP Start Time  1530  -AV      SLP Received On  20  -AV        User Key  (r) = Recorded By, (t) = Taken By, (c) = Cosigned By    Initials Name Provider Type    Juli López MS CCC-SLP Speech and Language Pathologist          Therapy Charges for Today     Code Description Service Date Service Provider Modifiers Qty    26017505019 HC ST TREATMENT SPEECH 1 2020 Juli Moser MS CCC-SLP GN 1    41696593260 HC ST TREATMENT SWALLOW 1 2020 Juli Moser MS CCC-SLP GN 1                     Juli D Melissa Ramirez MS CCC-SLP  2020   and Acute Care - Speech Language Pathology   Swallow Progress Note [facility]     Patient Name: Glo Berry  : 1955  MRN: [id number]  Today's Date: 2020               Admit Date: 2020    Visit Dx:      ICD-10-CM ICD-9-CM   1. Encephalopathy acute G93.40 348.30   2. Impaired mobility and ADLs Z74.09 799.89   3. Dysphagia, unspecified type R13.10 787.20   4. Cognitive communication deficit R41.841 799.52     Patient Active Problem List   Diagnosis   • Uterine prolapse   • Stress incontinence of urine   • Urgency incontinence   • Diarrhea   • Encephalopathy acute   • R/O AIS    • R/O status epilepticus    • Hypertension   • Pancreatic insufficiency   • T2DM on metformin    • Chronic diarrhea   • Lumbar stenosis   • Diabetic neuropathy     • Depression   • Dementia    • Smoker   • Acute respiratory failure    • Encephalopathy       Therapy Treatment  Rehabilitation Treatment Summary     Row Name 01/14/20 1545             Treatment Time/Intention    Discipline  speech language pathologist  -AV      Document Type  therapy note (daily note)  -AV      Subjective Information  no complaints  -AV      Mode of Treatment  speech-language pathology  -AV      Patient/Family Observations  no family present   -AV      Therapy Frequency (Swallow)  5 days per week  -AV      Therapy Frequency (SLP SLC)  5 days per week  -AV      Recorded by [AV] Juli Moser MS CCC-SLP 01/14/20 1552      Row Name 01/14/20 1545             Pain Assessment    Additional Documentation  Pain Scale: FACES Pre/Post-Treatment (Group)  -AV      Recorded by [AV] Juli Moser MS CCC-SLP 01/14/20 1552      Row Name 01/14/20 1545             Outcome Summary/Treatment Plan (SLP)    Daily Summary of Progress (SLP)  progress toward functional goals is gradual  -AV      Plan for Continued Treatment (SLP)  cont plan of care, voice hoarse, cough with thins   -AV      Anticipated Dischage Disposition  anticipate therapy at next level of care  -AV      Recorded by [AV] Juli Moser MS CCC-SLP 01/14/20 1552        User Key  (r) = Recorded By, (t) = Taken By, (c) = Cosigned By    Initials Name Effective Dates Discipline    AV Juli Moser MS CCC-SLP 05/23/19 -  SLP          Outcome Summary  Outcome Summary/Treatment Plan (SLP)  Daily Summary of Progress (SLP): progress toward functional goals is gradual (01/14/20 1545 : Juli Moser MS CCC-SLP)  Plan for Continued Treatment (SLP): cont plan of care, voice hoarse, cough with thins  (01/14/20 1545 : Juli Moser MS CCC-SLP)  Anticipated Dischage Disposition: anticipate therapy at next level of care (01/14/20 1545 : Juli Moser [facility]-SLP)      SLP GOALS     Row  Name 01/14/20 1500 01/13/20 0945          Oral Nutrition/Hydration Goal 1 (SLP)    Oral Nutrition/Hydration Goal 1, SLP  --  LTG: Pt will improve swallow function in order to safely return to PO diet w/ no overt s/sxs aspiration/distress w/ 100% acc and no cues  -AC     Time Frame (Oral Nutrition/Hydration Goal 1, SLP)  --  by discharge  -AC     Progress/Outcomes (Oral Nutrition/Hydration Goal 1, SLP)  --  continuing progress toward goal  -AC        Oral Nutrition/Hydration Goal 2 (SLP)    Oral Nutrition/Hydration Goal 2, SLP  --  Pt will tolerate therapeutic trials of thin H2O & puree w/ no overt s/sxs aspiration/distress w/ 70% acc and no cues in order to determine readiness for repeat instrumental eval  -AC     Time Frame (Oral Nutrition/Hydration Goal 2, SLP)  --  short term goal (STG);by discharge  -AC     Barriers (Oral Nutrition/Hydration Goal 2, SLP)  --  Wet/gurgly vocal quality at baseline. Provided oral care & encouraged pt to cough to clear secretions. Eventually able to produce productive cough w/ some bloody secretions (RN notified). Soon after secretions cleared, wet vocal quality quickly returned.  Trialed a few therapeutic ice chips in tx - increased wet-sounding vocal quality noted. DNT further PO 2' severity of s/sxs aspiration/poor secretion mgt.  -AC     Progress/Outcomes (Oral Nutrition/Hydration Goal 2, SLP)  --  continuing progress toward goal  -AC        Pharyngeal Strengthening Exercise Goal 1 (SLP)    Increase Superior Movement of the Hyolaryngeal Complex  --  effortful pitch glide (falsetto + pharyngeal squeeze)  -AC     Increase Anterior Movement of the Hyolaryngeal Complex  --  chin tuck against resistance (CTAR)  -AC     Increase Closure at Entrance to Airway/Closure of Airway at Glottis  --  supraglottic swallow  -AC     Increase Squeeze/Positive Pressure Generation  --  hard effortful swallow  -AC     [place]/Accuracy (Pharyngeal Strengthening Goal 1, SLP)  --  with minimal cues  (75-90% accuracy)  -AC     Time Frame (Pharyngeal Strengthening Goal 1, SLP)  --  short term goal (STG);by discharge  -AC     Barriers (Pharyngeal Strengthening Goal 1, SLP)  --  Effortful swallow x5, SSG x10 w/ mod cues. Required oral suctioning while completing exercises. Provided handout for independent practice.  -AC     Progress/Outcomes (Pharyngeal Strengthening Goal 1, SLP)  --  continuing progress toward goal  -AC        Comprehend Questions Goal 1 (SLP)    Improve Ability to Comprehend Questions Goal 1 (SLP)  --  simple yes/no questions;simple wh questions;90%;with minimal cues (75-90%)  -AC     Time Frame (Comprehend Questions Goal 1, SLP)  --  short term goal (STG);by discharge  -AC     Progress (Ability to Comprehend Questions Goal 1, SLP)  --  90%;independently (over 90% accuracy)  -AC     Progress/Outcomes (Comprehend Questions Goal 1, SLP)  --  goal met  -AC        Follow Directions Goal 2 (SLP)    Improve Ability to Follow Directions Goal 1 (SLP)  --  2 step commands;90%;with minimal cues (75-90%)  -AC     Time Frame (Follow Directions Goal 1, SLP)  --  short term goal (STG);by discharge  -AC     Progress (Ability to Follow Directions Goal 1, SLP)  --  100%;independently (over 90% accuracy)  -AC     Progress/Outcomes (Follow Directions Goal 1, SLP)  --  goal met  -AC        Attention Goal 1 (SLP)    Improve Attention by Goal 1 (SLP)  complete selective attention task;90%;with minimal cues (75-90%)  -AV  complete selective attention task;90%;with minimal cues (75-90%)  -AC     Time Frame (Attention Goal 1, SLP)  short term goal (STG);by discharge  -AV  short term goal (STG);by discharge  -AC     Progress (Attention Goal 1, SLP)  70%;with minimal cues (75-90%)  -AV  --     Progress/Outcomes (Attention Goal 1, SLP)  continuing progress toward goal  -AV  goal ongoing  -AC        Memory Skills Goal 1 (SLP)    Improve Memory Skills Through Goal 1 (SLP)  recalling related word lists with an imposed  delay;listen to a paragraph and answer questions;recall details of the day;use memory strategies;80%;with minimal cues (75-90%)  -AV  recalling related word lists with an imposed delay;listen to a paragraph and answer questions;recall details of the day;use memory strategies;80%;with minimal cues (75-90%)  -AC     Time Frame (Memory Skills Goal 1, SLP)  short term goal (STG)  -AV  short term goal (STG)  -AC     Progress (Memory Skills Goal 1, SLP)  40%;with minimal cues (75-90%)  -AV  20%;with minimal cues (75-90%)  -AC     Progress/Outcomes (Memory Skills Goal 1, SLP)  continuing progress toward goal  -AV  continuing progress toward goal  -AC     Comment (Memory Skills Goal 1, SLP)  1/5 words after 5 min delay; recalling details of the day (clinician briefly stepped out of rm to print exercise handout and when returned, pt had forgotten who clinician was; asked same question multiple times during tx).  -AV  1/5 words after 5 min delay; recalling details of the day (clinician briefly stepped out of rm to print exercise handout and when returned, pt had forgotten who clinician was; asked same question multiple times during tx).  -AC        Additional Goal 1 (SLP)    Additional Goal 1, SLP  --  LTG: Improve cog-comm skills in order to participate in care while in hospital setting with 80% accuracy and cues  -AC     Time Frame (Additional Goal 1, SLP)  --  by discharge  -AC     Progress/Outcomes (Additional Goal 1, SLP)  --  continuing progress toward goal  -AC       User Key  (r) = Recorded By, (t) = Taken By, (c) = Cosigned By    Initials Name Provider Type    AC Elisha Sal, MS CCC-SLP Speech and Language Pathologist    AV Juli Moser MS CCC-SLP Speech and Language Pathologist          EDUCATION  The patient has been educated in the following areas:   Dysphagia (Swallowing Impairment).    SLP Recommendation and Plan  Daily Summary of Progress (SLP): progress toward functional goals is gradual      Plan for Continued Treatment (SLP): cont plan of care, voice hoarse, cough with thins   Anticipated Dischage Disposition: anticipate therapy at next level of care                    Time Calculation:   Time Calculation- SLP     Row Name 01/14/20 1553             Time Calculation- SLP    SLP Start Time  1530  -AV      SLP Received On  01/14/20  -AV        User Key  (r) = Recorded By, (t) = Taken By, (c) = Cosigned By    Initials Name Provider Type    AV Juli Moser, MS CCC-SLP Speech and Language Pathologist          Therapy Charges for Today     Code Description Service Date Service Provider Modifiers Qty    06684300735 HC ST TREATMENT SPEECH 1 1/14/2020 Juli Moser MS CCC-SLP GN 1    [id number] HC ST TREATMENT SWALLOW 1 1/14/2020 Juli Moser MS CCC-SLP GN 1                 Juli Ramirez MS CCC-SLP  1/14/2020

## 2020-01-14 NOTE — PROGRESS NOTES
Neurology       Patient Care Team:  Philomena Walters MD as PCP - General (Internal Medicine)    Chief complaint: Intermittent confusion    History: Patient got a bit agitated last night and was put back on Precedex.    The nurse today indicates it does not seem to make any difference.    Tinges on Resporal milligram in the morning and 2 mg at night.    On Zoloft 150 mg daily.      Past Medical History:   Diagnosis Date   • COPD (chronic obstructive pulmonary disease) (CMS/Formerly [facility])    • Diabetes mellitus (CMS/[facility])    • History of ear injury 1973    Injury of the right eardrum.This has resulted in the headaches   • Hypertension        Vital Signs   Vitals:    01/14/20 0800 01/14/20 0823 01/14/20 0900 01/14/20 1217   BP: (!) 163/137  (!) 152/117    BP Location:       Patient Position:       Pulse: 104 110 98 82   Resp:  20  20   Temp:       TempSrc:       SpO2: 97% 92% 96% 94%   Weight:       Height:           Physical Exam:   General: Awake and alert.  Blood pressure is trending down.  It is low as 130 systolic today.                  Neuro: Conversational.    She laughs at my feeble attempts at humor.    She is moving all extremities well.        Results Review:  Reviewed  Results from last 7 days   Lab Units 01/14/20  0350   WBC 10*3/mm3 9.19   HEMOGLOBIN g/dL 7.9*   HEMATOCRIT % 27.2*   PLATELETS 10*3/mm3 96*     Results from last 7 days   Lab Units 01/14/20  0350 01/13/20  1649 01/13/20  0441 01/12/20  0536  01/10/20  0715 01/09/20  0558   SODIUM mmol/L 137  --  139 141   < > 146* 147*   POTASSIUM mmol/L 4.1 4.6 3.5 3.0*   < > 3.2* 3.2*   CHLORIDE mmol/L 101  --  100 100   < > 107 108*   CO2 mmol/L 24.0  --  28.0 28.0   < > 20.0* 18.0*   BUN mg/dL 21  --  21 22   < > 12 12   CREATININE mg/dL 0.40*  --  0.39* 0.34*   < > 0.43* 0.54*   CALCIUM mg/dL 10.5  --  10.6* 10.3   < > 9.8 9.2   BILIRUBIN mg/dL  --   --  0.2  --   --  0.3 0.3   ALK PHOS U/L  --   --  96  --   --  60 46   ALT (SGPT) U/L  --   --  22  --   --  17  14   AST (SGOT) U/L  --   --  37*  --   --  35* 31   GLUCOSE mg/dL 190*  --  191* 161*   < > 125* 110*    < > = values in this interval not displayed.       Imaging Results (Last 24 Hours)     Procedure Component Value Units Date/Time    XR Chest 1 View [[id number]] Collected:  01/13/20 0856     Updated:  01/13/20 1718    Narrative:       EXAMINATION: XR CHEST 1 VW-01/13/2020:      INDICATION: Respiratory failure; Z74.09-Other reduced mobility;  R13.10-Dysphagia, unspecified; R41.841-Cognitive communication deficit.      COMPARISON: 01/09/2020.     FINDINGS: The cardiac silhouette is normal. There is no acute  inflammatory process, mass or effusion.           Impression:       Chronic change; no acute disease. There has been no  significant change when compared to the previous examination of  01/09/2020.     D:  01/13/2020  E:  01/13/2020     This report was finalized on 1/13/2020 5:15 PM by Dr. Noe Duggan MD.             Assessment:  Intermittent agitation, ICU psychosis most likely    Persisting anemia.    Malignant hypertension, resolved    Major depression    Plan:  Risperidone 1/2mg per NG every 12 hours as needed.    Check iron and total iron-binding capacity.    RAAD Precedex.    DC Cardene    Comment:  Slow but steady improvement.         I discussed the patients findings and my recommendations with patient, family and nursing staff    Buzz Petersen MD  01/14/20  2:06 PM

## 2020-01-15 ENCOUNTER — APPOINTMENT (OUTPATIENT)
Dept: GENERAL RADIOLOGY | Facility: HOSPITAL | Age: 65
End: 2020-01-15

## 2020-01-15 LAB
ANION GAP SERPL CALCULATED.3IONS-SCNC: 11 MMOL/L (ref 5–15)
BASOPHILS # BLD AUTO: 0.05 10*3/MM3 (ref 0–0.2)
BASOPHILS NFR BLD AUTO: 0.4 % (ref 0–1.5)
BUN BLD-MCNC: 22 MG/DL (ref 8–23)
BUN/CREAT SERPL: 43.1 (ref 7–25)
CALCIUM SPEC-SCNC: 11.2 MG/DL (ref 8.6–10.5)
CHLORIDE SERPL-SCNC: 97 MMOL/L (ref 98–107)
CO2 SERPL-SCNC: 30 MMOL/L (ref 22–29)
CREAT BLD-MCNC: 0.51 MG/DL (ref 0.57–1)
DEPRECATED RDW RBC AUTO: 44.4 FL (ref 37–54)
EOSINOPHIL # BLD AUTO: 0.1 10*3/MM3 (ref 0–0.4)
EOSINOPHIL NFR BLD AUTO: 0.8 % (ref 0.3–6.2)
ERYTHROCYTE [DISTWIDTH] IN BLOOD BY AUTOMATED COUNT: 14.4 % (ref 12.3–15.4)
GFR SERPL CREATININE-BSD FRML MDRD: 121 ML/MIN/1.73
GLUCOSE BLD-MCNC: 183 MG/DL (ref 65–99)
GLUCOSE BLDC GLUCOMTR-MCNC: 167 MG/DL (ref 70–130)
GLUCOSE BLDC GLUCOMTR-MCNC: 195 MG/DL (ref 70–130)
GLUCOSE BLDC GLUCOMTR-MCNC: 204 MG/DL (ref 70–130)
GLUCOSE BLDC GLUCOMTR-MCNC: 209 MG/DL (ref 70–130)
GLUCOSE BLDC GLUCOMTR-MCNC: 224 MG/DL (ref 70–130)
HCT VFR BLD AUTO: 28 % (ref 34–46.6)
HGB BLD-MCNC: 8.6 G/DL (ref 12–15.9)
IMM GRANULOCYTES # BLD AUTO: 0.42 10*3/MM3 (ref 0–0.05)
IMM GRANULOCYTES NFR BLD AUTO: 3.4 % (ref 0–0.5)
IRON 24H UR-MRATE: 40 MCG/DL (ref 37–145)
IRON SATN MFR SERPL: 10 % (ref 20–50)
LYMPHOCYTES # BLD AUTO: 1.37 10*3/MM3 (ref 0.7–3.1)
LYMPHOCYTES NFR BLD AUTO: 11.1 % (ref 19.6–45.3)
MAGNESIUM SERPL-MCNC: 1.6 MG/DL (ref 1.6–2.4)
MCH RBC QN AUTO: 26.6 PG (ref 26.6–33)
MCHC RBC AUTO-ENTMCNC: 30.7 G/DL (ref 31.5–35.7)
MCV RBC AUTO: 86.7 FL (ref 79–97)
MONOCYTES # BLD AUTO: 1.55 10*3/MM3 (ref 0.1–0.9)
MONOCYTES NFR BLD AUTO: 12.5 % (ref 5–12)
NEUTROPHILS # BLD AUTO: 8.88 10*3/MM3 (ref 1.7–7)
NEUTROPHILS NFR BLD AUTO: 71.8 % (ref 42.7–76)
NRBC BLD AUTO-RTO: 0.3 /100 WBC (ref 0–0.2)
PHOSPHATE SERPL-MCNC: 4.5 MG/DL (ref 2.5–4.5)
PLAT MORPH BLD: NORMAL
PLATELET # BLD AUTO: 145 10*3/MM3 (ref 140–450)
PMV BLD AUTO: 13.6 FL (ref 6–12)
POTASSIUM BLD-SCNC: 3.8 MMOL/L (ref 3.5–5.2)
RBC # BLD AUTO: 3.23 10*6/MM3 (ref 3.77–5.28)
RBC MORPH BLD: NORMAL
SODIUM BLD-SCNC: 138 MMOL/L (ref 136–145)
TIBC SERPL-MCNC: 386 MCG/DL (ref 298–536)
TRANSFERRIN SERPL-MCNC: 259 MG/DL (ref 200–360)
WBC MORPH BLD: NORMAL
WBC NRBC COR # BLD: 12.37 10*3/MM3 (ref 3.4–10.8)

## 2020-01-15 PROCEDURE — 82962 GLUCOSE BLOOD TEST: CPT

## 2020-01-15 PROCEDURE — 94799 UNLISTED PULMONARY SVC/PX: CPT

## 2020-01-15 PROCEDURE — 97530 THERAPEUTIC ACTIVITIES: CPT

## 2020-01-15 PROCEDURE — 99231 SBSQ HOSP IP/OBS SF/LOW 25: CPT | Performed by: PSYCHIATRY & NEUROLOGY

## 2020-01-15 PROCEDURE — 83540 ASSAY OF IRON: CPT | Performed by: PSYCHIATRY & NEUROLOGY

## 2020-01-15 PROCEDURE — 80048 BASIC METABOLIC PNL TOTAL CA: CPT | Performed by: INTERNAL MEDICINE

## 2020-01-15 PROCEDURE — 85025 COMPLETE CBC W/AUTO DIFF WBC: CPT | Performed by: INTERNAL MEDICINE

## 2020-01-15 PROCEDURE — 83735 ASSAY OF MAGNESIUM: CPT | Performed by: INTERNAL MEDICINE

## 2020-01-15 PROCEDURE — 85007 BL SMEAR W/DIFF WBC COUNT: CPT | Performed by: INTERNAL MEDICINE

## 2020-01-15 PROCEDURE — 25010000002 MAGNESIUM SULFATE 2 GM/50ML SOLUTION: Performed by: INTERNAL MEDICINE

## 2020-01-15 PROCEDURE — 25010000002 HEPARIN (PORCINE) PER 1000 UNITS: Performed by: INTERNAL MEDICINE

## 2020-01-15 PROCEDURE — 97116 GAIT TRAINING THERAPY: CPT

## 2020-01-15 PROCEDURE — 74018 RADEX ABDOMEN 1 VIEW: CPT

## 2020-01-15 PROCEDURE — 84466 ASSAY OF TRANSFERRIN: CPT | Performed by: PSYCHIATRY & NEUROLOGY

## 2020-01-15 PROCEDURE — 84100 ASSAY OF PHOSPHORUS: CPT | Performed by: INTERNAL MEDICINE

## 2020-01-15 PROCEDURE — 99232 SBSQ HOSP IP/OBS MODERATE 35: CPT | Performed by: INTERNAL MEDICINE

## 2020-01-15 RX ADMIN — INSULIN HUMAN 3 UNITS: 100 INJECTION, SOLUTION PARENTERAL at 12:32

## 2020-01-15 RX ADMIN — INSULIN HUMAN 3 UNITS: 100 INJECTION, SOLUTION PARENTERAL at 00:53

## 2020-01-15 RX ADMIN — TRAZODONE HYDROCHLORIDE 50 MG: 50 TABLET ORAL at 20:14

## 2020-01-15 RX ADMIN — INSULIN HUMAN 2 UNITS: 100 INJECTION, SOLUTION PARENTERAL at 23:24

## 2020-01-15 RX ADMIN — CARVEDILOL 25 MG: 12.5 TABLET, FILM COATED ORAL at 17:40

## 2020-01-15 RX ADMIN — HEPARIN SODIUM 5000 UNITS: 5000 INJECTION INTRAVENOUS; SUBCUTANEOUS at 14:51

## 2020-01-15 RX ADMIN — NICOTINE 1 PATCH: 21 PATCH, EXTENDED RELEASE TRANSDERMAL at 08:33

## 2020-01-15 RX ADMIN — IPRATROPIUM BROMIDE AND ALBUTEROL SULFATE 3 ML: 2.5; .5 SOLUTION RESPIRATORY (INHALATION) at 16:03

## 2020-01-15 RX ADMIN — IPRATROPIUM BROMIDE AND ALBUTEROL SULFATE 3 ML: 2.5; .5 SOLUTION RESPIRATORY (INHALATION) at 07:40

## 2020-01-15 RX ADMIN — RISPERIDONE 2 MG: 1 SOLUTION ORAL at 20:15

## 2020-01-15 RX ADMIN — MAGNESIUM SULFATE HEPTAHYDRATE 4 G: 40 INJECTION, SOLUTION INTRAVENOUS at 12:31

## 2020-01-15 RX ADMIN — INSULIN HUMAN 2 UNITS: 100 INJECTION, SOLUTION PARENTERAL at 17:40

## 2020-01-15 RX ADMIN — LEVETIRACETAM 500 MG: 500 TABLET, FILM COATED ORAL at 08:32

## 2020-01-15 RX ADMIN — CARVEDILOL 25 MG: 12.5 TABLET, FILM COATED ORAL at 08:32

## 2020-01-15 RX ADMIN — DONEPEZIL HYDROCHLORIDE 5 MG: 5 TABLET ORAL at 20:14

## 2020-01-15 RX ADMIN — HEPARIN SODIUM 5000 UNITS: 5000 INJECTION INTRAVENOUS; SUBCUTANEOUS at 06:47

## 2020-01-15 RX ADMIN — TRAZODONE HYDROCHLORIDE 50 MG: 50 TABLET ORAL at 20:13

## 2020-01-15 RX ADMIN — RISPERIDONE 1 MG: 1 SOLUTION ORAL at 08:32

## 2020-01-15 RX ADMIN — RISPERIDONE 2 MG: 1 SOLUTION ORAL at 02:00

## 2020-01-15 RX ADMIN — INSULIN HUMAN 3 UNITS: 100 INJECTION, SOLUTION PARENTERAL at 06:47

## 2020-01-15 RX ADMIN — SERTRALINE HYDROCHLORIDE 150 MG: 100 TABLET ORAL at 20:14

## 2020-01-15 RX ADMIN — ATORVASTATIN CALCIUM 80 MG: 40 TABLET, FILM COATED ORAL at 20:13

## 2020-01-15 RX ADMIN — IPRATROPIUM BROMIDE AND ALBUTEROL SULFATE 3 ML: 2.5; .5 SOLUTION RESPIRATORY (INHALATION) at 03:39

## 2020-01-15 RX ADMIN — IPRATROPIUM BROMIDE AND ALBUTEROL SULFATE 3 ML: 2.5; .5 SOLUTION RESPIRATORY (INHALATION) at 19:59

## 2020-01-15 RX ADMIN — ASPIRIN 81 MG 81 MG: 81 TABLET ORAL at 08:32

## 2020-01-15 RX ADMIN — IPRATROPIUM BROMIDE AND ALBUTEROL SULFATE 3 ML: 2.5; .5 SOLUTION RESPIRATORY (INHALATION) at 12:01

## 2020-01-15 RX ADMIN — LOSARTAN POTASSIUM 100 MG: 50 TABLET, FILM COATED ORAL at 08:32

## 2020-01-15 RX ADMIN — HEPARIN SODIUM 5000 UNITS: 5000 INJECTION INTRAVENOUS; SUBCUTANEOUS at 23:24

## 2020-01-15 RX ADMIN — AMLODIPINE BESYLATE 10 MG: 10 TABLET ORAL at 08:32

## 2020-01-15 RX ADMIN — LEVETIRACETAM 500 MG: 500 TABLET, FILM COATED ORAL at 20:13

## 2020-01-15 NOTE — THERAPY TREATMENT NOTE
Patient Name: Glo Berry  : 1955    MRN: [id number]                              Today's Date: 1/15/2020       Admit Date: 2020    Visit Dx:     ICD-10-CM ICD-9-CM   1. Encephalopathy acute G93.40 348.30   2. Impaired mobility and ADLs Z74.09 799.89   3. Dysphagia, unspecified type R13.10 787.20   4. Cognitive communication deficit R41.841 799.52     Patient Active Problem List   Diagnosis   • Uterine prolapse   • Stress incontinence of urine   • Urgency incontinence   • Diarrhea   • Encephalopathy acute   • R/O AIS    • R/O status epilepticus    • Hypertension   • Pancreatic insufficiency   • T2DM on metformin    • Chronic diarrhea   • Lumbar stenosis   • Diabetic neuropathy    • Depression   • Dementia    • Smoker   • Acute respiratory failure    • Encephalopathy     Past Medical History:   Diagnosis Date   • COPD (chronic obstructive pulmonary disease) (CMS/HCC)    • Diabetes mellitus (CMS/HCC)    • History of ear injury 1973    Injury of the right eardrum.This has resulted in the headaches   • Hypertension      Past Surgical History:   Procedure Laterality Date   • BREAST CYST EXCISION     • SINUS SURGERY     • TONSILLECTOMY     • TUBAL ABDOMINAL LIGATION       General Information     Row Name 01/15/20 1618          PT Evaluation Time/Intention    Document Type  therapy note (daily note)  -     Mode of Treatment  physical therapy  -     Row Name 01/15/20 1618          General Information    Existing Precautions/Restrictions  fall;other (see comments) NG  -     Row Name 01/15/20 1618          Cognitive Assessment/Intervention- PT/OT    Orientation Status (Cognition)  oriented to;person;situation  -     Cognitive Assessment/Intervention Comment  noted situational confusion today  -     Row Name 01/15/20 1618          Safety Issues, Functional Mobility    Safety Issues Affecting Function (Mobility)  insight into deficits/self awareness;safety precaution awareness  -       User Key  (r)  = Recorded By, (t) = Taken By, (c) = Cosigned By    Initials Name Provider Type    Bernadette Mills, PT Physical Therapist        Mobility     Row Name 01/15/20 1619          Bed Mobility Assessment/Treatment    Comment (Bed Mobility)  UIC  -LS     Row Name 01/15/20 1619          Transfer Assessment/Treatment    Comment (Transfers)  STS x2: from recliner and BSC.  -LS     Row Name 01/15/20 1619          Sit-Stand Transfer    Sit-Stand [place] (Transfers)  minimum assist (75% patient effort);verbal cues  -LS     Assistive Device (Sit-Stand Transfers)  walker, front-wheeled  -LS     Row Name 01/15/20 1619          Gait/Stairs Assessment/Training    Gait/Stairs Assessment/Training  gait/ambulation independence  -LS     [place] Level (Gait)  minimum assist (75% patient effort);verbal cues  -LS     Assistive Device (Gait)  walker, front-wheeled  -LS     Distance in Feet (Gait)  50  -LS     Deviations/Abnormal Patterns (Gait)  bilateral deviations;alla decreased;stride length decreased;base of support, narrow  -LS     Bilateral Gait Deviations  forward flexed posture;heel strike decreased  -LS     Comment (Gait/Stairs)  Max encouragement for participation today; distance limited by fatigue. VC's for posture and increasing step length; primarily CGA but req occasional assist for appropriate steering of RW.   -LS       User Key  (r) = Recorded By, (t) = Taken By, (c) = Cosigned By    Initials Name Provider Type    Bernadette Mills, PT Physical Therapist        Obj/Interventions     Row Name 01/15/20 1620          Therapeutic Exercise    Lower Extremity (Therapeutic Exercise)  LAQ (long arc quad), bilateral  -LS     Lower Extremity Range of Motion (Therapeutic Exercise)  ankle dorsiflexion/plantar flexion, bilateral;hip flexion/extension, bilateral  -LS     Exercise Type (Therapeutic Exercise)  AAROM (active assistive range of motion)  -LS     Position (Therapeutic Exercise)  seated  -LS     Sets/Reps  (Therapeutic Exercise)  1/10  -LS     Row Name 01/15/20 1620          Static Sitting Balance    Level of [place] (Unsupported Sitting, Static Balance)  contact guard assist  -LS     Sitting Position (Unsupported Sitting, Static Balance)  sitting in chair  -LS     Row Name 01/15/20 1620          Static Standing Balance    Level of [place] (Supported Standing, Static Balance)  contact guard assist  -LS     Assistive Device Utilized (Supported Standing, Static Balance)  walker, rolling  -LS       User Key  (r) = Recorded By, (t) = Taken By, (c) = Cosigned By    Initials Name Provider Type    LS Bernadette Monsivais, PT Physical Therapist        Goals/Plan    No documentation.       Clinical Impression     Row Name 01/15/20 1621          Pain Scale: Numbers Pre/Post-Treatment    Pain Scale: Numbers, Pretreatment  0/10 - no pain  -LS     Pain Scale: Numbers, Post-Treatment  0/10 - no pain  -LS     Row Name 01/15/20 1621          Plan of Care Review    Plan of Care Reviewed With  patient  -LS     Progress  declining  -LS     Outcome Summary  Gait distance limited to 50 total ft, min A, RW due to pt's situational confusion and c/o fatigue. Educated pt re: benefit of progression of PT POC. Will cont to progress mobility as clinically warranted.   -     Row Name 01/15/20 1621          Vital Signs    Pre Systolic BP Rehab  118  -LS     Pre Treatment Diastolic BP  65  -LS     Pretreatment Heart Rate (beats/min)  97  -LS     Pre SpO2 (%)  97  -LS     O2 Delivery Pre Treatment  room air  -LS     O2 Delivery Intra Treatment  room air  -LS     O2 Delivery Post Treatment  room air  -LS     Pre Patient Position  Sitting  -LS     Intra Patient Position  Standing  -LS     Post Patient Position  Sitting  -LS     Row Name 01/15/20 1621          Positioning and Restraints    Pre-Treatment Position  sitting in chair/recliner  -LS     Post Treatment Position  chair  -LS     In Chair  notified nsg;reclined;call light within  reach;encouraged to call for assist;waffle cushion;legs elevated;exit alarm on  -       User Key  (r) = Recorded By, (t) = Taken By, (c) = Cosigned By    Initials Name Provider Type    Bernadette Mills, PT Physical Therapist        Outcome Measures     Row Name 01/15/20 1623          How much help from another person do you currently need...    Turning from your back to your side while in flat bed without using bedrails?  3  -LS     Moving from lying on back to sitting on the side of a flat bed without bedrails?  3  -LS     Moving to and from a bed to a chair (including a wheelchair)?  3  -LS     Standing up from a chair using your arms (e.g., wheelchair, bedside chair)?  3  -LS     Climbing 3-5 steps with a railing?  2  -LS     To walk in hospital room?  3  -LS     AM-PAC 6 Clicks Score (PT)  17  -LS       User Key  (r) = Recorded By, (t) = Taken By, (c) = Cosigned By    Initials Name Provider Type    Bernadette Mills, PT Physical Therapist          PT Recommendation and Plan     Plan of Care Reviewed With: patient  Progress: declining  Outcome Summary: Gait distance limited to 50 total ft, min A, RW due to pt's situational confusion and c/o fatigue. Educated pt re: benefit of progression of PT POC. Will cont to progress mobility as clinically warranted.      Time Calculation:   PT Charges     Row Name 01/15/20 1625             Time Calculation    Start Time  1505  -      PT Received On  01/15/20  -         Time Calculation- PT    Total Timed Code Minutes- PT  23 minute(s)  -         Timed Charges    13352 - PT Therapeutic Exercise Minutes  3  -      00376 - Gait Training Minutes   10  -      77625 - PT Therapeutic Activity Minutes  10  -LS        User Key  (r) = Recorded By, (t) = Taken By, (c) = Cosigned By    Initials Name Provider Type    Bernadette Mills, PT Physical Therapist        Therapy Charges for Today     Code Description Service Date Service Provider Modifiers Qty    45698386770 HC GAIT  TRAINING EA 15 MIN 1/15/2020 Bernadette Monsivais, PT GP 1    34728754937 HC PT THERAPEUTIC ACT EA 15 MIN 1/15/2020 Bernadette Monsivais, PT GP 1          PT G-Codes  Outcome Measure Options: AM-PAC 6 Clicks Basic Mobility (PT)  AM-PAC 6 Clicks Score (PT): 17  AM-PAC 6 Clicks Score (OT): 11    Bernadette Monsivais, PT  1/15/2020

## 2020-01-15 NOTE — PROGRESS NOTES
Neurology       Patient Care Team:  Philomena Walters MD as PCP - General (Internal Medicine)    Chief complaint: Altered mental state    History: Patient started today but is off sedation and off of IV antihypertensives.  Past Medical History:   Diagnosis Date   • COPD (chronic obstructive pulmonary disease) (CMS/[facility])    • Diabetes mellitus (CMS/[facility])    • History of ear injury 1973    Injury of the right eardrum.This has resulted in the headaches   • Hypertension        Vital Signs   Vitals:    01/15/20 1130 01/15/20 1200 01/15/20 1201 01/15/20 1230   BP: 121/68 123/69  122/78   BP Location:       Patient Position:       Pulse: 77  82 87   Resp:   18    Temp:       TempSrc:       SpO2: 91%  94% 92%   Weight:       Height:           Physical Exam:   General: Calm and alert              Neuro: Moving all extremities    Results Review:  No new result  Results from last 7 days   Lab Units 01/15/20  0431   WBC 10*3/mm3 12.37*   HEMOGLOBIN g/dL 8.6*   HEMATOCRIT % 28.0*   PLATELETS 10*3/mm3 145     Results from last 7 days   Lab Units 01/15/20  0431 01/14/20  0350 01/13/20  1649 01/13/20  0441  01/10/20  0715 01/09/20  0558   SODIUM mmol/L 138 137  --  139   < > 146* 147*   POTASSIUM mmol/L 3.8 4.1 4.6 3.5   < > 3.2* 3.2*   CHLORIDE mmol/L 97* 101  --  100   < > 107 108*   CO2 mmol/L 30.0* 24.0  --  28.0   < > 20.0* 18.0*   BUN mg/dL 22 21  --  21   < > 12 12   CREATININE mg/dL 0.51* 0.40*  --  0.39*   < > 0.43* 0.54*   CALCIUM mg/dL 11.2* 10.5  --  10.6*   < > 9.8 9.2   BILIRUBIN mg/dL  --   --   --  0.2  --  0.3 0.3   ALK PHOS U/L  --   --   --  96  --  60 46   ALT (SGPT) U/L  --   --   --  22  --  17 14   AST (SGOT) U/L  --   --   --  37*  --  35* 31   GLUCOSE mg/dL 183* 190*  --  191*   < > 125* 110*    < > = values in this interval not displayed.       Imaging Results (Last 24 Hours)     Procedure Component Value Units Date/Time    XR Abdomen KUB [[id number]] Collected:  01/15/20 0505     Updated:  01/15/20 0504     Narrative:       CR Abdomen 1 Vw    INDICATION:   64-year-old female status post NG tube placement today.    COMPARISON:   None available    FINDINGS:  Single AP radiograph of the abdomen. Nasogastric tube is coiled in the gastric fundus with tip projecting over the mid to distal body of the stomach.    The bowel gas pattern is nonobstructive. No acute osseous abnormalities. No radiopaque foreign body.      Impression:       Nasogastric tube coiled in the gastric fundus with tip projecting over the mid to distal body of the stomach.    Signer Name: Sung Natarajan MD   Signed: 1/15/2020 5:05 AM   Workstation Name: [email or web address]    [facility]          Assessment:  Slowly resolving encephalopathy    Major depression improved    Plan:  Okay to the floor    Comment:  Off to rehab when bed available          I discussed the patients findings and my recommendations with patient, family and primary care team    Buzz Petersen MD  01/15/20  2:22 PM

## 2020-01-15 NOTE — PROGRESS NOTES
Intensive Care Follow-up     Hospital:  LOS: 9 days   Ms. lGo Berry, 64 y.o. female is followed for:   Encephalopathy acute          History of present illness:   Glo Berry is a 64 y.o. female being transferred via airvac to [facility] on 1/6 from [facility] with altered mentation concerning for stroke vs status epilepticus. Her last known well was 1/5 ~10PM and family reports that she was somewhat restless prior to bed. Upon awakening her family found her altered and took her to the OSH for further evaluation.Per the ED physican, she was awake on arrival but inappropriate with leftward eye deviation, right-sided neglect, facial droop, and questionable decorticate posturing, ~1240 she became unresponsive and was intubated.      Reportedly she received multiple doses of Ativan and Versed, however it is difficult to discern if this was pre/post intubation. CT head negative for acute process and she was transferred to [facility] for higher level of care.     Patient was evaluated by neurology and patient did have pretty significantly elevated blood pressures.  Patient was started on a Cardene drip.  Patient was extubated couple days after arrival.  She had periods of agitation requiring Precedex.  Also difficult to control blood pressure.  She was thought to have posterior reversible encephalopathy syndrome.    Subjective   Interval History:  Patient stayed off Precedex overnight.  No periods of agitation.  Remains quite weak.  Weak cough.  Requiring intermittent NT suctioning.  No overt seizures noted currently.  Hemodynamically stable.  Tolerating tube feeds well.                 The patient's past medical, surgical and social history were reviewed and updated in Epic as appropriate.       Objective     Infusions:     Medications:    amLODIPine 10 mg Oral Q24H   aspirin 81 mg Oral Daily   Or      aspirin 300 mg Rectal Daily   atorvastatin 80 mg Oral Nightly   carvedilol 25 mg Oral BID With Meals   donepezil 5 mg  "Oral Nightly   heparin (porcine) 5,000 Units Subcutaneous Q8H   insulin regular 0-7 Units Subcutaneous Q6H   ipratropium-albuterol 3 mL Nebulization 4x Daily - RT   levETIRAcetam 500 mg Oral Q12H   losartan 100 mg Oral Daily   nicotine 1 patch Transdermal Q24H   risperiDONE 1 mg Nasogastric Daily   risperiDONE 2 mg Nasogastric Nightly   sertraline 150 mg Nasogastric Nightly   traZODone 50 mg Nasogastric Nightly       Vital Sign Min/Max for last 24 hours  Temp  Min: 97.9 °F (36.6 °C)  Max: 98 °F (36.7 °C)   BP  Min: 129/71  Max: 183/92   Pulse  Min: 78  Max: 114   Resp  Min: 20  Max: 24   SpO2  Min: 88 %  Max: 99 %   Flow (L/min)  Min: 2  Max: 2       Input/Output for last 24 hour shift  01/14 0701 - 01/15 0700  In: 1427 [I.V.:136]  Out: 100 [Urine:100]      Objective:  Vital signs: (most recent): Blood pressure 130/73, pulse 88, temperature 98 °F (36.7 °C), temperature source Axillary, resp. rate 20, height 162.6 cm (64.02\"), weight 63.3 kg (139 lb 8.8 oz), SpO2 92 %.               General Appearance: Awake, in no acute distress  Lungs:   B/L Breath sounds present with decreased breath sounds on bases, no wheezing heard, no crackles.   Heart: S1 and S2 present, no murmur  Abdomen: Soft, non-tender, no guarding or rigidity, bowel sounds positive.  Extremities: Atraumatic, no edema, warm to touch.  Neurologic:  Moving all four extremities.  Generalized weak    Results from last 7 days   Lab Units 01/15/20  0431 01/14/20  0350 01/13/20  0437   WBC 10*3/mm3 12.37* 9.19 11.69*   HEMOGLOBIN g/dL 8.6* 7.9* 9.0*   PLATELETS 10*3/mm3 145 96* 115*     Results from last 7 days   Lab Units 01/15/20  0431 01/14/20  0350 01/13/20  1649 01/13/20  0441   SODIUM mmol/L 138 137  --  139   POTASSIUM mmol/L 3.8 4.1 4.6 3.5   CO2 mmol/L 30.0* 24.0  --  28.0   BUN mg/dL 22 21  --  21   CREATININE mg/dL 0.51* 0.40*  --  0.39*   MAGNESIUM mg/dL 1.6 1.4*  --  1.7   PHOSPHORUS mg/dL 4.5 4.1  --  3.4   GLUCOSE mg/dL 183* 190*  --  191* "     Estimated Creatinine Clearance: 111.4 mL/min (A) (by C-G formula based on SCr of 0.51 mg/dL (L)).    Results from last 7 days   Lab Units 01/08/20  1615   PH, ARTERIAL pH units 7.369   PCO2, ARTERIAL mm Hg 30.5*   PO2 ART mm Hg 58.5*       Images:   No new    I reviewed the patient's results and images.     Assessment/Plan   Impression        Encephalopathy acute    R/O AIS     R/O status epilepticus     Hypertension    Pancreatic insufficiency    T2DM on metformin     Chronic diarrhea    Lumbar stenosis    Diabetic neuropathy     Depression    Dementia     Smoker    Acute respiratory failure     Encephalopathy       Plan        1.  Patient seems to be recovering from her underlying issues.  Blood pressure is under better control and off nicardipine drip.  Continue current antihypertensives.  2.  Speech following for dysphagia.  Continue enteral nutrition for now.  If unable to take oral diet may need feeding tube placement before transferring to rehab.  3.  Continue antiseizure medications, antidepressants and  antipsychotics as well.  Patient seems to be recovering.  Stayed off Precedex and no.'s of agitation at this point.  4.  GI and DVT prophylaxis.  5.  Magnesium replaced  earlier.  Will recheck in the morning again.  Other electrolytes are stable.  6.  Hemoglobin stable and no acute bleed noted.  Platelet count improved to normal now.  7.  Flutter valve to see if we can help her clear secretions better.    PT/OT and ambulate as tolerated.    We will transfer out of ICU.  Will sign out to hospitalist team.    Plan of care and goals reviewed with mulitdisciplinary/antibiotic stewardship team during rounds.   I discussed the patient's findings and my recommendations with family and nursing staff     Time spent 25 (exclusive of procedure time)  including high complexity decision making to assess, manipulate, and support vital organ system failure in this individual who has impairment of one or more vital  organ systems such that there is a high probability of imminent or life threatening deterioration in the patient’s condition.      Gurmeet Jules MD, FCCP  Pulmonary, Critical care and Sleep Medicine

## 2020-01-15 NOTE — PROGRESS NOTES
Clinical Nutrition     Nutrition Support Assessment  Reason for Visit:   MDR, Follow-up protocol, EN      Patient Name: Glo Berry  YOB: 1955  MRN: [id number]  Date of Encounter: 01/15/20 12:17 PM  Admission date: 1/6/2020    Rec MVI as TF volume to low to meet RDI's    Nutrition Assessment   Assessment       Hospital Problem List    Encephalopathy acute    R/O AIS     R/O status epilepticus     Hypertension    Pancreatic insufficiency    T2DM on metformin     Chronic diarrhea    Lumbar stenosis    Diabetic neuropathy     Depression    Dementia     Smoker    Acute respiratory failure     Encephalopathy    ARF/VENT 1-6-20     Extubated 1-8-20    PMH: She  has a past medical history of COPD (chronic obstructive pulmonary disease) (CMS/[facility]), Diabetes mellitus (CMS/[facility]), History of ear injury (1973), and Hypertension.   PSxH: She  has a past surgical history that includes Sinus surgery; Tubal ligation; Breast cyst excision; and Tonsillectomy.       Reported/Observed/Food/Nutrition Related History:     Pt resting in bed, about to get her breathing treatment, wants her hair washed    Per RN:  pulled out ngt last night, has been tolerating TF      Labs reviewed     Results from last 7 days   Lab Units 01/15/20  0431 01/14/20  0350 01/13/20  1649 01/13/20  0441  01/10/20  0715 01/09/20  0558   GLUCOSE mg/dL 183* 190*  --  191*   < > 125* 110*   BUN mg/dL 22 21  --  21   < > 12 12   CREATININE mg/dL 0.51* 0.40*  --  0.39*   < > 0.43* 0.54*   SODIUM mmol/L 138 137  --  139   < > 146* 147*   CHLORIDE mmol/L 97* 101  --  100   < > 107 108*   POTASSIUM mmol/L 3.8 4.1 4.6 3.5   < > 3.2* 3.2*   PHOSPHORUS mg/dL 4.5 4.1  --  3.4   < > 2.4* 2.3*   MAGNESIUM mg/dL 1.6 1.4*  --  1.7   < > 1.7 2.0   ALT (SGPT) U/L  --   --   --  22  --  17 14    < > = values in this interval not displayed.     Results from last 7 days   Lab Units 01/13/20  0441 01/13/20  0437 01/10/20  0715 01/09/20  0558   ALBUMIN  g/dL 3.20*  --  3.40* 3.60   PREALBUMIN mg/dL  --  12.1*  --   --    CRP mg/dL  --  9.34*  --   --    TRIGLYCERIDES mg/dL 259*  --   --   --        Results from last 7 days   Lab Units 01/15/20  0620 01/15/20  0010 01/14/20  1747 01/14/20  1152 01/14/20  0544 01/13/20  2316   GLUCOSE mg/dL 204* 224* 162* 222* 190* 197*     Lab Results   Lab Value Date/Time    HGBA1C 5.60 01/06/2020 1821         Medications reviewed   Pertinent:hepari, insulin, keppra      Intake/Ouptut 24 hrs (7:00AM - 6:59 AM)     Intake & Output (last day)       01/14 0701 - 01/15 0700 01/15 0701 - 01/16 0700    I.V. (mL/kg) 136 (2.1)     Other 793 30    NG/ 60    Total Intake(mL/kg) 1427 (22.5) 90 (1.4)    Urine (mL/kg/hr) 100 (0.1)     Stool      Total Output 100     Net +1327 +90          Urine Unmeasured Occurrence 5 x                GI: wnl    SKIN:  excoriated/ bruised    Current Nutrition Prescription     PO: NPO Diet  Orders Placed This Encounter      Diet, Tube Feeding Tube Feeding Formula: Peptamen AF (Peptide Based, Critical Care, ALI)  goal rate @60ml/hr, free water 30ml Q 2 hours    Intake: 378ml, 32% goal volume       Nutrition Diagnosis     1-9-20, 1-15  Problem Swallowing difficulty   Etiology Encephalopathy   Signs/Symptoms PER SLP Eval    status: ongoing     1-9-20, 1-15  Problem Altered GI function   Etiology h/o Pancreatic Insuffiency, ? IBS/ clot   Signs/Symptoms Chronic diarrhea PTA   Status: chronic    Nutrition Intervention   1.  Follow treatment progress     Rec MVI as TF volume to low to meet RDI's    Pt does not appear to be having any significant diarrhea on EN,  If advances to po diet may need to resume creon supplementation if indicated    Goal:   General: Nutrition support treatment    EN/PN: Maintain EN      Monitoring/Evaluation:   Per protocol, I&O, Pertinent labs, EN delivery/tolerance, Skin status, GI status, Symptoms, Swallow function    Sharon L Purdom, RD, CNSC  Time Spent: 30min

## 2020-01-15 NOTE — PLAN OF CARE
Problem: Patient Care Overview  Goal: Plan of Care Review  Outcome: Ongoing (interventions implemented as appropriate)  Flowsheets (Taken 1/15/2020 0617)  Progress: improving  Plan of Care Reviewed With: patient  Patient Agreement with Plan of Care: agrees  Outcome Summary: Pt more cooperative overnight, was able to sleep on and off. She pulled and broke the NG briddle, NG was not displaced and was rebriddled with KUB to verify placement before resuming feeding. Still confused to situation and time, and restless. Lung sounds are coarse with rhonchi, nebs help but pt is unable to produce anything with cough. VSS. WCTM.

## 2020-01-15 NOTE — PLAN OF CARE
Problem: Patient Care Overview  Goal: Plan of Care Review  Outcome: Ongoing (interventions implemented as appropriate)  Flowsheets  Taken 1/15/2020 1803 by Sathya Lowe, RN  Progress: improving  Outcome Summary: Pt VSS throughout shift. Oriented x 3 with disorientation to situation. Remains restless with impulsiveness to get up. Walked with PT/OT and sat up in chair for most of the day.  Taken 1/15/2020 1621 by Bernadette Monsivais PT  Plan of Care Reviewed With: patient     Problem: Stroke (Ischemic) (Adult)  Goal: Signs and Symptoms of Listed Potential Problems Will be Absent, Minimized or Managed (Stroke)  Outcome: Ongoing (interventions implemented as appropriate)  Flowsheets (Taken 1/14/2020 0410 by Carri García, RN)  Problems Assessed (Stroke (Ischemic)): all  Problems Assessed (Stroke (Ischemic)): none     Problem: Fall Risk (Adult)  Goal: Absence of Fall  Outcome: Ongoing (interventions implemented as appropriate)  Flowsheets (Taken 1/15/2020 1803)  Absence of Fall: making progress toward outcome

## 2020-01-15 NOTE — PLAN OF CARE
Problem: Patient Care Overview  Goal: Plan of Care Review  Outcome: Ongoing (interventions implemented as appropriate)  Flowsheets (Taken 1/15/2020 1621)  Progress: declining  Plan of Care Reviewed With: patient  Outcome Summary: Gait distance limited to 50 total ft, min A, RW due to pt's situational confusion and c/o fatigue. Educated pt re: benefit of progression of PT POC. Will cont to progress mobility as clinically warranted.

## 2020-01-15 NOTE — PROGRESS NOTES
Continued Stay Note  [facility]     Patient Name: Glo Berry  MRN: [id number]  Today's Date: 1/15/2020    Admit Date: 1/6/2020    Discharge Plan     Row Name 01/15/20 1156       Plan    Plan  Update    Plan Comments  Discussed pt in MDR. Pt will have orders to transfer to floor. Speech is following they are going to continue TF will need peg before going to rehab. Plan is go to [facility] in [facility] . CM will continue to follow.        Discharge Codes    No documentation.       Expected Discharge Date and Time     Expected Discharge Date Expected Discharge Time    Obinna 10, 2020             Lakisha Momin RN

## 2020-01-16 LAB
ANION GAP SERPL CALCULATED.3IONS-SCNC: 12 MMOL/L (ref 5–15)
BASOPHILS # BLD AUTO: 0.05 10*3/MM3 (ref 0–0.2)
BASOPHILS NFR BLD AUTO: 0.4 % (ref 0–1.5)
BUN BLD-MCNC: 31 MG/DL (ref 8–23)
BUN/CREAT SERPL: 51.7 (ref 7–25)
CALCIUM SPEC-SCNC: 10.6 MG/DL (ref 8.6–10.5)
CHLORIDE SERPL-SCNC: 99 MMOL/L (ref 98–107)
CO2 SERPL-SCNC: 27 MMOL/L (ref 22–29)
CREAT BLD-MCNC: 0.6 MG/DL (ref 0.57–1)
DEPRECATED RDW RBC AUTO: 45.7 FL (ref 37–54)
EOSINOPHIL # BLD AUTO: 0.09 10*3/MM3 (ref 0–0.4)
EOSINOPHIL NFR BLD AUTO: 0.8 % (ref 0.3–6.2)
ERYTHROCYTE [DISTWIDTH] IN BLOOD BY AUTOMATED COUNT: 15.1 % (ref 12.3–15.4)
GFR SERPL CREATININE-BSD FRML MDRD: 101 ML/MIN/1.73
GLUCOSE BLD-MCNC: 225 MG/DL (ref 65–99)
GLUCOSE BLDC GLUCOMTR-MCNC: 169 MG/DL (ref 70–130)
GLUCOSE BLDC GLUCOMTR-MCNC: 197 MG/DL (ref 70–130)
GLUCOSE BLDC GLUCOMTR-MCNC: 213 MG/DL (ref 70–130)
GLUCOSE BLDC GLUCOMTR-MCNC: 232 MG/DL (ref 70–130)
HCT VFR BLD AUTO: 26.2 % (ref 34–46.6)
HGB BLD-MCNC: 8 G/DL (ref 12–15.9)
IMM GRANULOCYTES # BLD AUTO: 0.37 10*3/MM3 (ref 0–0.05)
IMM GRANULOCYTES NFR BLD AUTO: 3.2 % (ref 0–0.5)
LYMPHOCYTES # BLD AUTO: 1.56 10*3/MM3 (ref 0.7–3.1)
LYMPHOCYTES NFR BLD AUTO: 13.5 % (ref 19.6–45.3)
MAGNESIUM SERPL-MCNC: 1.4 MG/DL (ref 1.6–2.4)
MCH RBC QN AUTO: 26.8 PG (ref 26.6–33)
MCHC RBC AUTO-ENTMCNC: 30.5 G/DL (ref 31.5–35.7)
MCV RBC AUTO: 87.6 FL (ref 79–97)
MONOCYTES # BLD AUTO: 1.13 10*3/MM3 (ref 0.1–0.9)
MONOCYTES NFR BLD AUTO: 9.8 % (ref 5–12)
NEUTROPHILS # BLD AUTO: 8.34 10*3/MM3 (ref 1.7–7)
NEUTROPHILS NFR BLD AUTO: 72.3 % (ref 42.7–76)
NRBC BLD AUTO-RTO: 0 /100 WBC (ref 0–0.2)
PLATELET # BLD AUTO: 146 10*3/MM3 (ref 140–450)
PMV BLD AUTO: 13.9 FL (ref 6–12)
POTASSIUM BLD-SCNC: 3.2 MMOL/L (ref 3.5–5.2)
POTASSIUM BLD-SCNC: 4.1 MMOL/L (ref 3.5–5.2)
RBC # BLD AUTO: 2.99 10*6/MM3 (ref 3.77–5.28)
SODIUM BLD-SCNC: 138 MMOL/L (ref 136–145)
WBC NRBC COR # BLD: 11.54 10*3/MM3 (ref 3.4–10.8)

## 2020-01-16 PROCEDURE — 82962 GLUCOSE BLOOD TEST: CPT

## 2020-01-16 PROCEDURE — 84132 ASSAY OF SERUM POTASSIUM: CPT | Performed by: INTERNAL MEDICINE

## 2020-01-16 PROCEDURE — 97116 GAIT TRAINING THERAPY: CPT

## 2020-01-16 PROCEDURE — 92526 ORAL FUNCTION THERAPY: CPT

## 2020-01-16 PROCEDURE — 85025 COMPLETE CBC W/AUTO DIFF WBC: CPT | Performed by: INTERNAL MEDICINE

## 2020-01-16 PROCEDURE — 97530 THERAPEUTIC ACTIVITIES: CPT

## 2020-01-16 PROCEDURE — 97110 THERAPEUTIC EXERCISES: CPT

## 2020-01-16 PROCEDURE — 25010000002 HEPARIN (PORCINE) PER 1000 UNITS: Performed by: INTERNAL MEDICINE

## 2020-01-16 PROCEDURE — 99232 SBSQ HOSP IP/OBS MODERATE 35: CPT | Performed by: PSYCHIATRY & NEUROLOGY

## 2020-01-16 PROCEDURE — 94799 UNLISTED PULMONARY SVC/PX: CPT

## 2020-01-16 PROCEDURE — 83735 ASSAY OF MAGNESIUM: CPT | Performed by: INTERNAL MEDICINE

## 2020-01-16 PROCEDURE — 99232 SBSQ HOSP IP/OBS MODERATE 35: CPT | Performed by: INTERNAL MEDICINE

## 2020-01-16 PROCEDURE — 80048 BASIC METABOLIC PNL TOTAL CA: CPT | Performed by: INTERNAL MEDICINE

## 2020-01-16 RX ORDER — TRAZODONE HYDROCHLORIDE 100 MG/1
100 TABLET ORAL NIGHTLY
Status: DISCONTINUED | OUTPATIENT
Start: 2020-01-16 | End: 2020-01-22 | Stop reason: HOSPADM

## 2020-01-16 RX ORDER — LIDOCAINE 50 MG/G
1 PATCH TOPICAL
Status: DISCONTINUED | OUTPATIENT
Start: 2020-01-16 | End: 2020-01-22 | Stop reason: HOSPADM

## 2020-01-16 RX ADMIN — RISPERIDONE 2 MG: 1 SOLUTION ORAL at 21:52

## 2020-01-16 RX ADMIN — INSULIN HUMAN 3 UNITS: 100 INJECTION, SOLUTION PARENTERAL at 13:44

## 2020-01-16 RX ADMIN — HYDROCODONE BITARTRATE AND ACETAMINOPHEN 1 TABLET: 5; 325 TABLET ORAL at 10:55

## 2020-01-16 RX ADMIN — HEPARIN SODIUM 5000 UNITS: 5000 INJECTION INTRAVENOUS; SUBCUTANEOUS at 13:45

## 2020-01-16 RX ADMIN — CARVEDILOL 25 MG: 12.5 TABLET, FILM COATED ORAL at 18:57

## 2020-01-16 RX ADMIN — LOSARTAN POTASSIUM 100 MG: 50 TABLET, FILM COATED ORAL at 10:06

## 2020-01-16 RX ADMIN — AMLODIPINE BESYLATE 10 MG: 10 TABLET ORAL at 10:07

## 2020-01-16 RX ADMIN — IPRATROPIUM BROMIDE AND ALBUTEROL SULFATE 3 ML: 2.5; .5 SOLUTION RESPIRATORY (INHALATION) at 07:55

## 2020-01-16 RX ADMIN — DONEPEZIL HYDROCHLORIDE 5 MG: 5 TABLET ORAL at 21:41

## 2020-01-16 RX ADMIN — ASPIRIN 81 MG 81 MG: 81 TABLET ORAL at 10:06

## 2020-01-16 RX ADMIN — NICOTINE 1 PATCH: 21 PATCH, EXTENDED RELEASE TRANSDERMAL at 10:08

## 2020-01-16 RX ADMIN — IPRATROPIUM BROMIDE AND ALBUTEROL SULFATE 3 ML: 2.5; .5 SOLUTION RESPIRATORY (INHALATION) at 19:01

## 2020-01-16 RX ADMIN — POTASSIUM CHLORIDE 40 MEQ: 1.5 POWDER, FOR SOLUTION ORAL at 10:09

## 2020-01-16 RX ADMIN — HEPARIN SODIUM 5000 UNITS: 5000 INJECTION INTRAVENOUS; SUBCUTANEOUS at 21:40

## 2020-01-16 RX ADMIN — LIDOCAINE 1 PATCH: 50 PATCH TOPICAL at 13:45

## 2020-01-16 RX ADMIN — RISPERIDONE 1 MG: 1 SOLUTION ORAL at 10:07

## 2020-01-16 RX ADMIN — IPRATROPIUM BROMIDE AND ALBUTEROL SULFATE 3 ML: 2.5; .5 SOLUTION RESPIRATORY (INHALATION) at 15:46

## 2020-01-16 RX ADMIN — INSULIN HUMAN 2 UNITS: 100 INJECTION, SOLUTION PARENTERAL at 05:39

## 2020-01-16 RX ADMIN — LEVETIRACETAM 500 MG: 500 TABLET, FILM COATED ORAL at 21:41

## 2020-01-16 RX ADMIN — LEVETIRACETAM 500 MG: 500 TABLET, FILM COATED ORAL at 10:06

## 2020-01-16 RX ADMIN — TRAZODONE HYDROCHLORIDE 100 MG: 100 TABLET ORAL at 21:41

## 2020-01-16 RX ADMIN — HEPARIN SODIUM 5000 UNITS: 5000 INJECTION INTRAVENOUS; SUBCUTANEOUS at 05:40

## 2020-01-16 RX ADMIN — INSULIN HUMAN 2 UNITS: 100 INJECTION, SOLUTION PARENTERAL at 23:51

## 2020-01-16 RX ADMIN — SERTRALINE HYDROCHLORIDE 150 MG: 100 TABLET ORAL at 21:41

## 2020-01-16 RX ADMIN — IPRATROPIUM BROMIDE AND ALBUTEROL SULFATE 3 ML: 2.5; .5 SOLUTION RESPIRATORY (INHALATION) at 12:50

## 2020-01-16 RX ADMIN — CARVEDILOL 25 MG: 12.5 TABLET, FILM COATED ORAL at 10:06

## 2020-01-16 RX ADMIN — ATORVASTATIN CALCIUM 80 MG: 40 TABLET, FILM COATED ORAL at 21:40

## 2020-01-16 RX ADMIN — INSULIN HUMAN 2 UNITS: 100 INJECTION, SOLUTION PARENTERAL at 18:55

## 2020-01-16 NOTE — PROGRESS NOTES
Neurology       Patient Care Team:  Philomena Walters MD as PCP - General (Internal Medicine)    Chief complaint: Altered mental status    History:  Patient still sleeping poorly.    Prior to admission she is getting 100 mg of trazodone.    In hospital she is only getting 50.    She is been having some hallucinations and delusions particularly at night.    During the day she is fine.    Having no side effects from Aricept 5 mg daily.    Still complains of low back pain and has not been recently that reevaluated for swallowing.  Past Medical History:   Diagnosis Date   • COPD (chronic obstructive pulmonary disease) (CMS/[facility])    • Diabetes mellitus (CMS/[facility])    • History of ear injury 1973    Injury of the right eardrum.This has resulted in the headaches   • Hypertension        Vital Signs   Vitals:    01/15/20 2356 01/16/20 0514 01/16/20 0737 01/16/20 0755   BP: 120/64 124/72 129/75    BP Location: Right arm Right arm Left arm    Patient Position: Lying Lying Lying    Pulse: 93  98 90   Resp: 16 18 18    Temp: 97.5 °F (36.4 °C) 98.3 °F (36.8 °C) 98.1 °F (36.7 °C)    TempSrc: Oral Oral Oral    SpO2: 94% 97% 98% 98%   Weight:  64.2 kg (141 lb 8 oz)     Height:           Physical Exam:   General: Awake and alert              Neuro: Oriented to person and place.    Speech is articulate with normal content.    She moves all extremities well.        Results Review:  Reviewed  Results from last 7 days   Lab Units 01/16/20  0532   WBC 10*3/mm3 11.54*   HEMOGLOBIN g/dL 8.0*   HEMATOCRIT % 26.2*   PLATELETS 10*3/mm3 146     Results from last 7 days   Lab Units 01/16/20  0532 01/15/20  0431 01/14/20  0350  01/13/20  0441  01/10/20  0715   SODIUM mmol/L 138 138 137  --  139   < > 146*   POTASSIUM mmol/L 3.2* 3.8 4.1   < > 3.5   < > 3.2*   CHLORIDE mmol/L 99 97* 101  --  100   < > 107   CO2 mmol/L 27.0 30.0* 24.0  --  28.0   < > 20.0*   BUN mg/dL 31* 22 21  --  21   < > 12   CREATININE mg/dL 0.60 0.51* 0.40*  --  0.39*   < >  0.43*   CALCIUM mg/dL 10.6* 11.2* 10.5  --  10.6*   < > 9.8   BILIRUBIN mg/dL  --   --   --   --  0.2  --  0.3   ALK PHOS U/L  --   --   --   --  96  --  60   ALT (SGPT) U/L  --   --   --   --  22  --  17   AST (SGOT) U/L  --   --   --   --  37*  --  35*   GLUCOSE mg/dL 225* 183* 190*  --  191*   < > 125*    < > = values in this interval not displayed.       Imaging Results (Last 24 Hours)     ** No results found for the last 24 hours. 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    Low back pain.    Dysphagia secondary to prolonged intubation.        Plan:  Speech to reevaluate for swallowing.    Up in a chair twice daily.    Lidocaine patch daily.    Increase trazodone to 100 mg at bedtime.    Increase Aricept to 10 mg daily.        Comment:  Patient still has not quite turned the corner.         I discussed the patients findings and my recommendations with patient, family and primary care team    Buzz Petersen MD  01/16/20  12:26 PM

## 2020-01-16 NOTE — PROGRESS NOTES
Continued Stay Note  [facility]     Patient Name: Glo Berry  MRN: [id number]  Today's Date: 1/16/2020    Admit Date: 1/6/2020    Discharge Plan     Row Name 01/16/20 1537       Plan    Plan  In facility rehab    Plan Comments  I spoke with patient and her spouse, Dipak who was at her bedside. I reviewed the possible transfer plans that the former  had started, with plans for patient going to [facility] in [facility]. They are both agreeable for this plan. Once the tube feeding plans are addressed, facility will initiate the precertification with her insurance.     Final Discharge Disposition Code  03 - skilled nursing facility (SNF)        Discharge Codes    No documentation.       Expected Discharge Date and Time     Expected Discharge Date Expected Discharge Time    Jan 18, 2020             Shelley Crandall RN

## 2020-01-16 NOTE — PLAN OF CARE
Problem: Patient Care Overview  Goal: Plan of Care Review  Outcome: Ongoing (interventions implemented as appropriate)  Flowsheets (Taken 1/16/2020 9272)  Plan of Care Reviewed With: patient;spouse  Note:   SLP treatment completed. Will plan to repeat FEES tomorrow. Please see note for further details and recommendations.

## 2020-01-16 NOTE — PROGRESS NOTES
[facility] Medicine Services  PROGRESS NOTE    Patient Name: Glo Berry  : 1955  MRN: [id number]    Date of Admission: 2020  Primary Care Physician: Philomena Walters MD    Subjective   Subjective     CC:  Dysphasia    HPI:  Tells me her strength is improving in general    Review of Systems  Gen- No fevers, chills  CV- No chest pain, palpitations  Resp- No cough, dyspnea  GI- No N/V/D, abd pain      All other systems reviewed and are negative.    Objective   Objective     Vital Signs:   Temp:  [97.5 °F (36.4 °C)-98.3 °F (36.8 °C)] 98.1 °F (36.7 °C)  Heart Rate:  [] 73  Resp:  [16-18] 18  BP: (116-131)/() 129/75  Total (NIH Stroke Scale): 6     Physical Exam:  Constitutional -no acute distress, frail, sitting up in chair  HEENT-NCAT, mucous membranes moist, NG in place.  CV-RRR, S1 S2 normal, no m/r/g  Resp-CTAB, no wheezes, rhonchi or rales  Abd-soft, non-tender, non-distended, normo active bowel sounds  Ext-No lower extremity cyanosis, clubbing or edema bilaterally  Neuro-alert, speech clear, moves all extremities   Psych-normal affect   Skin- No rash on exposed UE or LE bilaterally      Results Reviewed:  Results from last 7 days   Lab Units 20  0532 01/15/20  0431 01/14/20  035   WBC 10*3/mm3 11.54* 12.37* 9.19   HEMOGLOBIN g/dL 8.0* 8.6* 7.9*   HEMATOCRIT % 26.2* 28.0* 27.2*   PLATELETS 10*3/mm3 146 145 96*     Results from last 7 days   Lab Units 20  0532 01/15/20  0431 20  0350  20  0441  01/10/20  0715   SODIUM mmol/L 138 138 137  --  139   < > 146*   POTASSIUM mmol/L 3.2* 3.8 4.1   < > 3.5   < > 3.2*   CHLORIDE mmol/L 99 97* 101  --  100   < > 107   CO2 mmol/L 27.0 30.0* 24.0  --  28.0   < > 20.0*   BUN mg/dL 31* 22 21  --  21   < > 12   CREATININE mg/dL 0.60 0.51* 0.40*  --  0.39*   < > 0.43*   GLUCOSE mg/dL 225* 183* 190*  --  191*   < > 125*   CALCIUM mg/dL 10.6* 11.2* 10.5  --  10.6*   < > 9.8   ALT (SGPT) U/L  --   --   --    --  22  --  17   AST (SGOT) U/L  --   --   --   --  37*  --  35*    < > = values in this interval not displayed.     Estimated Creatinine Clearance: 96 mL/min (by C-G formula based on SCr of 0.6 mg/dL).    Microbiology Results Abnormal     None          Imaging Results (Last 24 Hours)     ** No results found for the last 24 hours. **          Results for orders placed during the hospital encounter of 01/06/20   Adult Transthoracic Echo Complete W/ Cont if Necessary Per Protocol (With Agitated Saline)    Narrative · There is calcification of the aortic valve.  · Peak velocity of the flow distal to the aortic valve is 217.0 cm/s.  · Estimated EF = 70%.  · Left ventricular systolic function is normal.          I have reviewed the medications:  Scheduled Meds:  amLODIPine 10 mg Oral Q24H   aspirin 81 mg Oral Daily   Or      aspirin 300 mg Rectal Daily   atorvastatin 80 mg Oral Nightly   carvedilol 25 mg Oral BID With Meals   donepezil 5 mg Oral Nightly   heparin (porcine) 5,000 Units Subcutaneous Q8H   insulin regular 0-7 Units Subcutaneous Q6H   ipratropium-albuterol 3 mL Nebulization 4x Daily - RT   levETIRAcetam 500 mg Oral Q12H   lidocaine 1 patch Transdermal Q24H   losartan 100 mg Oral Daily   nicotine 1 patch Transdermal Q24H   risperiDONE 1 mg Nasogastric Daily   risperiDONE 2 mg Nasogastric Nightly   sertraline 150 mg Nasogastric Nightly   traZODone 100 mg Nasogastric Nightly     Continuous Infusions:   PRN Meds:.HYDROcodone-acetaminophen  •  ipratropium-albuterol  •  labetalol  •  magnesium sulfate  •  magnesium sulfate  •  potassium chloride  •  potassium chloride  •  potassium phosphate  •  potassium phosphate  •  risperiDONE  •  traZODone    Assessment/Plan   Assessment & Plan     Active Hospital Problems    Diagnosis  POA   • **Encephalopathy acute [G93.40]  Yes   • R/O AIS  [I63.9]  Yes   • R/O status epilepticus  [G40.901]  Yes   • Hypertension [I10]  Yes   • Pancreatic insufficiency [K86.89]  Yes   •  T2DM on metformin  [E11.9]  Yes   • Chronic diarrhea [K52.9]  Yes   • Lumbar stenosis [M48.061]  Yes   • Diabetic neuropathy  [E11.40]  Yes   • Depression [F32.9]  Yes   • Dementia  [F03.90]  Yes   • Smoker [F17.200]  Yes   • Acute respiratory failure  [J96.00]  Yes   • Encephalopathy [G93.40]  Yes      Resolved Hospital Problems   No resolved problems to display.        Brief Hospital Course to date:  Glo Berry is a 64 y.o. female transferred from the outside hospital for mentation    Acute encephalopathy  -May have been secondary to malignant hypertension as blood pressure elevated on admission here in the 180s.  Discussed possible etiologies with neurology Dr. Petersen who felt the patient's presentation may actually represent a PRES-like syndrome  -EEG without epileptic changes, however continuing Keppra for possible underlying new onset seizure  -Mental status improving.  -Continue risperidone at night and additional risperidone as needed as needed     Possible new onset seizures  -Continue Keppra, follow-up with neurology outpatient in 6 weeks    Malignant hypertension  -Resolved    Acute respiratory failure  -Resolved    Dysphagia  -Speech therapy plans for fees evaluation tomorrow    Depression  -Zoloft    Dementia  -Continue Aricept    DM 2  -Continue insulin therapy  -A1c 5.6    Tobacco abuse    Hypokalemia  -Replace both potassium and magnesium as needed    Anemia  -Hemoglobin 8.0, normocytic, with normal iron levels but slightly low iron saturation.  B12 and folate levels unremarkable  - needs follow up with PCP, and repeat cbc tomorrow and weekly while at rehab.    ICA stenosis  -Left ICA stenosis noted on duplex exam of approximately 50 to 69%, however only 30% stenoses noted on CT Terri of the neck.  -Continue medical therapy      DVT Prophylaxis: heparin    Disposition: I expect the patient to be discharged rehab/snf soon.    CODE STATUS:   Code Status and Medical Interventions:   Ordered at:  01/06/20 1647     Code Status:    CPR     Medical Interventions (Level of Support Prior to Arrest):    Full         Electronically signed by Jason Busby MD, 01/16/20, 6:07 PM.

## 2020-01-16 NOTE — PLAN OF CARE
Problem: Patient Care Overview  Goal: Plan of Care Review  Outcome: Ongoing (interventions implemented as appropriate)  Flowsheets (Taken 1/16/2020 9749)  Outcome Summary: Pt progressing with ADLs and mobility. pt CGA for txfr, mobility to chair. Pt supv set up for grooming tasks and UE ex this date.Pt. continues to fatigue easily, no signs of confusion. Continue to recommend SNF at this time.

## 2020-01-16 NOTE — PLAN OF CARE
Problem: Patient Care Overview  Goal: Plan of Care Review  Outcome: Ongoing (interventions implemented as appropriate)  Flowsheets  Taken 1/16/2020 1620  Plan of Care Reviewed With: patient;spouse  Outcome Summary: PROGRESSING WITH MOBILITY. NEEDS ENCOURAGEMENT. LIMITED BY C/O FATIGUE. AMBULATED 45 FEET WITH R WALKER AND CGA. RECOMMEND SNF AT D/C.   Taken 1/16/2020 1626  Patient Agreement with Plan of Care: agrees

## 2020-01-16 NOTE — THERAPY TREATMENT NOTE
Patient Name: Glo Berry  : 1955    MRN: [id number]                              Today's Date: 2020       Admit Date: 2020    Visit Dx:     ICD-10-CM ICD-9-CM   1. Encephalopathy acute G93.40 348.30   2. Impaired mobility and ADLs Z74.09 799.89   3. Dysphagia, unspecified type R13.10 787.20   4. Cognitive communication deficit R41.841 799.52     Patient Active Problem List   Diagnosis   • Uterine prolapse   • Stress incontinence of urine   • Urgency incontinence   • Diarrhea   • Encephalopathy acute   • R/O AIS    • R/O status epilepticus    • Hypertension   • Pancreatic insufficiency   • T2DM on metformin    • Chronic diarrhea   • Lumbar stenosis   • Diabetic neuropathy    • Depression   • Dementia    • Smoker   • Acute respiratory failure    • Encephalopathy     Past Medical History:   Diagnosis Date   • COPD (chronic obstructive pulmonary disease) (CMS/HCC)    • Diabetes mellitus (CMS/HCC)    • History of ear injury 1973    Injury of the right eardrum.This has resulted in the headaches   • Hypertension      Past Surgical History:   Procedure Laterality Date   • BREAST CYST EXCISION     • SINUS SURGERY     • TONSILLECTOMY     • TUBAL ABDOMINAL LIGATION       General Information     Row Name 20 1615          PT Evaluation Time/Intention    Document Type  therapy note (daily note)  -CD     Mode of Treatment  physical therapy  -CD     Row Name 20 1615          General Information    Patient Profile Reviewed?  yes  -CD     Existing Precautions/Restrictions  fall;oxygen therapy device and L/min NG TUBE.   -CD     Row Name 20 1615          Cognitive Assessment/Intervention- PT/OT    Orientation Status (Cognition)  oriented to;person;place;situation  -CD     Row Name 20 1615          Safety Issues, Functional Mobility    Safety Issues Affecting Function (Mobility)  insight into deficits/self awareness;safety precaution awareness;safety precautions follow-through/compliance   -CD     Impairments Affecting Function (Mobility)  balance;coordination;cognition;endurance/activity tolerance;shortness of breath;strength  -CD     Comment, Safety Issues/Impairments (Mobility)  FOLLOWING ALL COMMANDS,   -CD       User Key  (r) = Recorded By, (t) = Taken By, (c) = Cosigned By    Initials Name Provider Type    CD Izzy Barrios PT Physical Therapist        Mobility     Row Name 01/16/20 1616          Bed Mobility Assessment/Treatment    Comment (Bed Mobility)  PT UIC.   -CD     Row Name 01/16/20 1616          Transfer Assessment/Treatment    Comment (Transfers)  STS FROM RECLINER AND BS.   -CD     Row Name 01/16/20 1616          Sit-Stand Transfer    Sit-Stand Palm (Transfers)  minimum assist (75% patient effort);verbal cues  -CD     Assistive Device (Sit-Stand Transfers)  walker, front-wheeled  -CD     Row Name 01/16/20 1616          Gait/Stairs Assessment/Training    Gait/Stairs Assessment/Training  gait/ambulation independence  -CD     Palm Level (Gait)  minimum assist (75% patient effort);verbal cues  -CD     Assistive Device (Gait)  walker, front-wheeled  -CD     Distance in Feet (Gait)  45 FEET.   -CD     Pattern (Gait)  step-to  -CD     Deviations/Abnormal Patterns (Gait)  bilateral deviations;base of support, narrow;stride length decreased;gait speed decreased  -CD     Bilateral Gait Deviations  forward flexed posture;heel strike decreased  -CD     Comment (Gait/Stairs)  DISTANCE LIMITED BY FATIGUE. FOLLOWED CLOSELY WITH RECLINER. CUES FOR UPRIGHT POSTURE AND INCREASED STEP LENGTH. PT ON CONTINUOUS TUBE FEED.   -CD       User Key  (r) = Recorded By, (t) = Taken By, (c) = Cosigned By    Initials Name Provider Type    Izzy Carrillo PT Physical Therapist        Obj/Interventions     Row Name 01/16/20 1618          Therapeutic Exercise    Lower Extremity (Therapeutic Exercise)  LAQ (long arc quad), bilateral;marching while seated  -CD     Lower Extremity Range of Motion  (Therapeutic Exercise)  ankle dorsiflexion/plantar flexion, bilateral  -CD     Exercise Type (Therapeutic Exercise)  AROM (active range of motion)  -CD     Position (Therapeutic Exercise)  seated  -CD     Sets/Reps (Therapeutic Exercise)  1 SET OF 1O REPS.   -CD     Row Name 01/16/20 1618          Static Sitting Balance    Level of Minerva (Unsupported Sitting, Static Balance)  contact guard assist  -CD     Sitting Position (Unsupported Sitting, Static Balance)  sitting in chair  -CD     Time Able to Maintain Position (Unsupported Sitting, Static Balance)  more than 5 minutes  -CD     Comment (Unsupported Sitting, Static Balance)  CUES TO MAINTAIN UPRIGHT POSTURE AND FORWARD LEAN TO KEEP FEET ON FLOOR.   -CD     Row Name 01/16/20 1618          Dynamic Sitting Balance    Level of Minerva, Reaches Outside Midline (Sitting, Dynamic Balance)  minimal assist, 75% patient effort  -CD     Sitting Position, Reaches Outside Midline (Sitting, Dynamic Balance)  sitting in chair EDGE OF RECLINER AND COMMODE.   -CD     Row Name 01/16/20 1618          Static Standing Balance    Level of Minerva (Supported Standing, Static Balance)  contact guard assist  -CD     Assistive Device Utilized (Supported Standing, Static Balance)  walker, rolling  -CD     Row Name 01/16/20 1618          Dynamic Standing Balance    Level of Minerva, Reaches Outside Midline (Standing, Dynamic Balance)  contact guard assist  -CD     Assistive Device Utilized (Supported Standing, Dynamic Balance)  walker, rolling  -CD       User Key  (r) = Recorded By, (t) = Taken By, (c) = Cosigned By    Initials Name Provider Type    CD Izzy Barrios PT Physical Therapist        Goals/Plan    No documentation.       Clinical Impression     Row Name 01/16/20 1620          Pain Assessment    Additional Documentation  Pain Scale: FACES Pre/Post-Treatment (Group)  -CD     Row Name 01/16/20 1620          Pain Scale: Numbers Pre/Post-Treatment    Pain  Location - Orientation  lower  -CD     Pain Location  back  -CD     Pre/Post Treatment Pain Comment  TOLERATED GAIT IN MORROW AND THER EX.   -CD     Pain Intervention(s)  Ambulation/increased activity;Repositioned  -CD     Row Name 01/16/20 1620          Pain Scale: FACES Pre/Post-Treatment    Pain: FACES Scale, Pretreatment  2-->hurts little bit  -CD     Pain: FACES Scale, Post-Treatment  2-->hurts little bit  -CD     Row Name 01/16/20 1620          Plan of Care Review    Plan of Care Reviewed With  patient;spouse  -CD     Progress  improving  -CD     Outcome Summary  PROGRESSING WITH MOBILITY. NEEDS ENCOURAGEMENT. LIMITED BY C/O FATIGUE. AMBULATED 45 FEET WITH R WALKER AND CGA. RECOMMEND SNF AT D/C.   -CD     Row Name 01/16/20 1620          Physical Therapy Clinical Impression    Patient/Family Goals Statement (PT Clinical Impression)  TO GO HOME.   -CD     Criteria for Skilled Interventions Met (PT Clinical Impression)  yes  -CD     Rehab Potential (PT Clinical Summary)  good, to achieve stated therapy goals  -CD     Row Name 01/16/20 1620          Vital Signs    Pre Systolic BP Rehab  116  -CD     Pre Treatment Diastolic BP  64  -CD     Post Systolic BP Rehab  98  -CD     Post Treatment Diastolic BP  61  -CD     Posttreatment Heart Rate (beats/min)  71  -CD     Pre SpO2 (%)  92  -CD     O2 Delivery Pre Treatment  supplemental O2  -CD     O2 Delivery Intra Treatment  supplemental O2  -CD     Post SpO2 (%)  93  -CD     O2 Delivery Post Treatment  supplemental O2  -CD     Pre Patient Position  Sitting  -CD     Intra Patient Position  Standing  -CD     Post Patient Position  Sitting  -CD     Row Name 01/16/20 1620          Positioning and Restraints    Pre-Treatment Position  sitting in chair/recliner  -CD     Post Treatment Position  chair  -CD     In Chair  reclined;call light within reach;encouraged to call for assist;exit alarm on;legs elevated;with family/caregiver  -CD       User Key  (r) = Recorded By, (t) =  Taken By, (c) = Cosigned By    Initials Name Provider Type    Izzy Carrillo, PT Physical Therapist        Outcome Measures     Row Name 01/16/20 1622          How much help from another person do you currently need...    Turning from your back to your side while in flat bed without using bedrails?  3  -CD     Moving from lying on back to sitting on the side of a flat bed without bedrails?  3  -CD     Moving to and from a bed to a chair (including a wheelchair)?  3  -CD     Standing up from a chair using your arms (e.g., wheelchair, bedside chair)?  3  -CD     Climbing 3-5 steps with a railing?  2  -CD     To walk in hospital room?  3  -CD     AM-PAC 6 Clicks Score (PT)  17  -CD     Row Name 01/16/20 1622          Modified Craven Scale    Modified Craven Scale  4 - Moderately severe disability.  Unable to walk without assistance, and unable to attend to own bodily needs without assistance.  -CD     Row Name 01/16/20 1622          Functional Assessment    Outcome Measure Options  AM-PAC 6 Clicks Basic Mobility (PT);Modified Craven  -CD       User Key  (r) = Recorded By, (t) = Taken By, (c) = Cosigned By    Initials Name Provider Type    Izzy Carrillo, PT Physical Therapist          PT Recommendation and Plan     Outcome Summary/Treatment Plan (PT)  Anticipated Discharge Disposition (PT): skilled nursing facility  Plan of Care Reviewed With: patient, spouse  Progress: improving  Outcome Summary: PROGRESSING WITH MOBILITY. NEEDS ENCOURAGEMENT. LIMITED BY C/O FATIGUE. AMBULATED 45 FEET WITH R WALKER AND CGA. RECOMMEND SNF AT D/C.      Time Calculation:   PT Charges     Row Name 01/16/20 1627             Time Calculation    Start Time  1435  -CD      PT Received On  01/16/20  -CD      PT Goal Re-Cert Due Date  01/19/20  -CD         Time Calculation- PT    Total Timed Code Minutes- PT  30 minute(s)  -CD         Timed Charges    59657 - PT Therapeutic Exercise Minutes  10  -CD      77286 - Gait Training Minutes   10   -CD      81421 - PT Therapeutic Activity Minutes  10  -CD        User Key  (r) = Recorded By, (t) = Taken By, (c) = Cosigned By    Initials Name Provider Type    Izzy Carrillo, KEVAN Physical Therapist        Therapy Charges for Today     Code Description Service Date Service Provider Modifiers Qty    06140547792  PT THER PROC EA 15 MIN 1/16/2020 Izzy Barrios, PT GP 1    [id number] HC GAIT TRAINING EA 15 MIN 1/16/2020 Izzy Barrios, PT GP 1          PT G-Codes  Outcome Measure Options: AM-PAC 6 Clicks Basic Mobility (PT), Modified Peach  AM-PAC 6 Clicks Score (PT): 17  AM-PAC 6 Clicks Score (OT): 12  Modified Fer Scale: 4 - Moderately severe disability.  Unable to walk without assistance, and unable to attend to own bodily needs without assistance.    Izzy Barrios, PT  1/16/2020

## 2020-01-16 NOTE — PAYOR COMM NOTE
"Dionna Preston RN   Phone [phone]  Fax [phone]      Glo Berry (64 y.o. Female)     Date of Birth Social Security Number Address Home Phone MRN    1955  [address] [address] [phone] [id number]    Druze Marital Status          None        Admission Date Admission Type Admitting Provider Attending Provider Department, Room/Bed    1/6/20 Urgent Jason Busby MD Sloan, Walker E, MD [facility] 3F, S305/1    Discharge Date Discharge Disposition Discharge Destination                       Attending Provider:  Jason Busby MD    Allergies:  Codeine    Isolation:  None   Infection:  None   Code Status:  CPR    Ht:  162.6 cm (64.02\")   Wt:  64.2 kg (141 lb 8 oz)    Admission Cmt:  None   Principal Problem:  Encephalopathy acute [G93.40]                 Active Insurance as of 1/6/2020     Primary Coverage     Payor Plan Insurance Group Employer/Plan Group    [employer] EMPLOYEE [id number]     Payor Plan Address Payor Plan Phone Number Payor Plan Fax Number Effective Dates    [address] [phone]  1/1/2015 - None Entered    [place] [address]       Subscriber Name Subscriber Birth Date Member ID       GLO BRERY 1955 [id number]                 Emergency Contacts      (Rel.) Home Phone Work Phone Mobile Phone    JANENE BERRY (Spouse) [phone] -- [phone]    ayo berry (Son) [phone] -- --    Sendy Berry (Daughter) -- -- [phone]            Vital Signs (last day)     Date/Time   Temp   Temp src   Pulse   Resp   BP   Patient Position   SpO2    01/16/20 1250   --   --   61   --   --   --   95    01/16/20 0755   --   --   90   --   --   --   98    01/16/20 0737   98.1 (36.7)   Oral   98   18   129/75   Lying   98    01/16/20 0514   98.3 (36.8)   Oral   --   18   124/72   Lying   97    01/15/20 2356   97.5 (36.4)   Oral   93   16   120/64   Lying   94    01/15/20 " 2121   97.5 (36.4)   Oral   98   18   131/77   Sitting   90    01/15/20 1959   --   --   --   18   --   --   --    01/15/20 1930   98.2 (36.8)   Axillary   99   18   (!) 127/109   Lying   96    01/15/20 1900   --   --   111   --   116/90   --   93    01/15/20 1700   --   --   96   --   137/83   --   93    01/15/20 1630   --   --   90   --   114/62   --   91    01/15/20 1603   --   --   91   20   --   --   92    01/15/20 1600   --   --   83   --   127/78   --   91    01/15/20 1530   --   --   90   --   121/72   --   93    01/15/20 1500   --   --   99   --   --   --   94    01/15/20 1430   --   --   87   --   118/65   --   93    01/15/20 1400   --   --   80   --   115/67   --   92    01/15/20 1330   --   --   92   --   121/81   --   93    01/15/20 1300   --   --   87   --   (!) 171/33   --   93    01/15/20 1230   --   --   87   --   122/78   --   92    01/15/20 1201   --   --   82   18   --   --   94    01/15/20 1200   --   --   --   --   123/69   --   --    01/15/20 1130   --   --   77   --   121/68   --   91    01/15/20 1100   --   --   77   --   117/60   --   94    01/15/20 1030   --   --   74   --   127/74   --   94    01/15/20 1000   --   --   73   --   --   --   91    01/15/20 0930   --   --   76   --   (!) 139/116   --   94    01/15/20 0900   --   --   88   --   130/73   --   92    01/15/20 0830   --   --   92   --   147/95   --   92    01/15/20 0800   --   --   112   --   153/93   --   93    01/15/20 0740   --   --   --   20   --   --   --    01/15/20 0730   --   --   96   --   (!) 156/110   --   94    01/15/20 0700   --   --   101   --   144/91   --   94    01/15/20 0630   --   --   114   --   134/77   --   92    01/15/20 0600   --   --   97   20   132/88   Lying   90    01/15/20 0530   --   --   106   --   (!) 169/153   --   99    01/15/20 0500   --   --   102   --   155/98   --   92    01/15/20 0430   --   --   102   --   163/94   --   92    01/15/20 0400   98 (36.7)   Axillary   105   24   (!) 152/107    Lying   97    01/15/20 0339   --   --   105   22   --   --   --    01/15/20 0200   --   --   98   20   --   Lying   91    01/15/20 0130   --   --   106   --   141/80   --   96    01/15/20 0100   --   --   112   --   168/99   --   94    01/15/20 0030   --   --   107   --   (!) 153/111   --   92    01/15/20 0000   --   --   100   20   152/89   Lying   93              Current Facility-Administered Medications   Medication Dose Route Frequency Provider Last Rate Last Dose   • amLODIPine (NORVASC) tablet 10 mg  10 mg Oral Q24H Gurmeet Jules MD   10 mg at 01/16/20 1007   • aspirin chewable tablet 81 mg  81 mg Oral Daily Gurmeet Jules MD   81 mg at 01/16/20 1006    Or   • aspirin suppository 300 mg  300 mg Rectal Daily Gurmeet Jules MD   300 mg at 01/07/20 0839   • atorvastatin (LIPITOR) tablet 80 mg  80 mg Oral Nightly Gurmeet Jules MD   80 mg at 01/15/20 2013   • carvedilol (COREG) tablet 25 mg  25 mg Oral BID With Meals Gurmeet Jules MD   25 mg at 01/16/20 1006   • donepezil (ARICEPT) tablet 5 mg  5 mg Oral Nightly Buzz Petersen MD   5 mg at 01/15/20 2014   • heparin (porcine) 5000 UNIT/ML injection 5,000 Units  5,000 Units Subcutaneous Q8H Gurmeet Jules MD   5,000 Units at 01/16/20 1345   • HYDROcodone-acetaminophen (NORCO) 5-325 MG per tablet 1 tablet  1 tablet Oral Q6H PRN Gurmeet Jules MD   1 tablet at 01/16/20 1055   • insulin regular (humuLIN R,novoLIN R) injection 0-7 Units  0-7 Units Subcutaneous Q6H Gurmeet Jules MD   3 Units at 01/16/20 1344   • ipratropium-albuterol (DUO-NEB) nebulizer solution 3 mL  3 mL Nebulization 4x Daily - RT Gurmeet Jules MD   3 mL at 01/16/20 1250   • ipratropium-albuterol (DUO-NEB) nebulizer solution 3 mL  3 mL Nebulization Q4H PRN Gurmeet Jules MD   3 mL at 01/15/20 0339   • labetalol (NORMODYNE,TRANDATE) injection 20 mg  20 mg Intravenous Q10 Min PRN Gurmeet Jules MD   20 mg at 01/12/20 1606   • levETIRAcetam (KEPPRA) tablet 500 mg   500 mg Oral Q12H Gurmeet Jules MD   500 mg at 01/16/20 1006   • lidocaine (LIDODERM) 5 % 1 patch  1 patch Transdermal Q24H Bzuz Petersen MD   1 patch at 01/16/20 1345   • losartan (COZAAR) tablet 100 mg  100 mg Oral Daily Gurmeet Jules MD   100 mg at 01/16/20 1006   • magnesium sulfate 2g/50 mL (PREMIX) infusion  6 g Intravenous PRN Gurmeet Jules MD   6 g at 01/14/20 0546   • magnesium sulfate 4g/100mL (PREMIX) infusion  4 g Intravenous PRN Gurmeet Jules MD   4 g at 01/15/20 1231   • nicotine (NICODERM CQ) 21 MG/24HR patch 1 patch  1 patch Transdermal Q24H Gurmeet Jules MD   1 patch at 01/16/20 1008   • potassium chloride (KLOR-CON) packet 40 mEq  40 mEq Oral PRN Gurmeet Jules MD   40 mEq at 01/16/20 1009   • potassium chloride 10 mEq in 100 mL IVPB  10 mEq Intravenous Q1H PRN Gurmeet Jules MD       • potassium phosphate 15 mmol in sodium chloride 0.9 % 100 mL infusion  15 mmol Intravenous PRN Gurmeet Jules MD       • potassium phosphate 30 mmol in sodium chloride 0.9 % 500 mL infusion  30 mmol Intravenous PRN Gurmeet Jules MD   30 mmol at 01/11/20 1344   • risperiDONE (risperDAL) 1 MG/ML oral solution 1 mg  1 mg Nasogastric Daily Gurmeet Jules MD   1 mg at 01/16/20 1007   • risperiDONE (risperDAL) 1 MG/ML oral solution 1 mg  1 mg Oral Q12H PRN Gurmeet Jules MD       • risperiDONE (risperDAL) 1 MG/ML oral solution 2 mg  2 mg Nasogastric Nightly Gurmeet Jules MD   2 mg at 01/15/20 2015   • sertraline (ZOLOFT) tablet 150 mg  150 mg Nasogastric Nightly Gurmeet Jules MD   150 mg at 01/15/20 2014   • traZODone (DESYREL) tablet 100 mg  100 mg Nasogastric Nightly Buzz Petersen MD       • traZODone (DESYREL) tablet 50 mg  50 mg Oral Nightly PRN Gurmeet Jules MD   50 mg at 01/15/20 2014     Lab Results (last 24 hours)     Procedure Component Value Units Date/Time    POC Glucose Once [[id number]]  (Abnormal) Collected:  01/16/20 1130    Specimen:  Blood  Updated:  01/16/20 1137     Glucose 213 mg/dL     CBC & Differential [[id number]] Collected:  01/16/20 0532    Specimen:  Blood Updated:  01/16/20 0630    Narrative:       The following orders were created for panel order CBC & Differential.  Procedure                               Abnormality         Status                     ---------                               -----------         ------                     CBC Auto Differential[[id number]]        Abnormal            Final result                 Please view results for these tests on the individual orders.    CBC Auto Differential [[id number]]  (Abnormal) Collected:  01/16/20 0532    Specimen:  Blood Updated:  01/16/20 0630     WBC 11.54 10*3/mm3      RBC 2.99 10*6/mm3      Hemoglobin 8.0 g/dL      Hematocrit 26.2 %      MCV 87.6 fL      MCH 26.8 pg      MCHC 30.5 g/dL      RDW 15.1 %      RDW-SD 45.7 fl      MPV 13.9 fL      Platelets 146 10*3/mm3      Neutrophil % 72.3 %      Lymphocyte % 13.5 %      Monocyte % 9.8 %      Eosinophil % 0.8 %      Basophil % 0.4 %      Immature Grans % 3.2 %      Neutrophils, Absolute 8.34 10*3/mm3      Lymphocytes, Absolute 1.56 10*3/mm3      Monocytes, Absolute 1.13 10*3/mm3      Eosinophils, Absolute 0.09 10*3/mm3      Basophils, Absolute 0.05 10*3/mm3      Immature Grans, Absolute 0.37 10*3/mm3      nRBC 0.0 /100 WBC     Basic Metabolic Panel [[id number]]  (Abnormal) Collected:  01/16/20 0532    Specimen:  Blood Updated:  01/16/20 0628     Glucose 225 mg/dL      BUN 31 mg/dL      Creatinine 0.60 mg/dL      Sodium 138 mmol/L      Potassium 3.2 mmol/L      Chloride 99 mmol/L      CO2 27.0 mmol/L      Calcium 10.6 mg/dL      eGFR Non African Amer 101 mL/min/1.73      BUN/Creatinine Ratio 51.7     Anion Gap 12.0 mmol/L     Narrative:       GFR Normal >60  Chronic Kidney Disease <60  Kidney Failure <15      Magnesium [[id number]]  (Abnormal) Collected:  01/16/20 0532    Specimen:  Blood Updated:  01/16/20 0628     Magnesium 1.4  mg/dL     POC Glucose Once [[id number]]  (Abnormal) Collected:  01/16/20 0547    Specimen:  Blood Updated:  01/16/20 0559     Glucose 232 mg/dL     POC Glucose Once [[id number]]  (Abnormal) Collected:  01/15/20 2317    Specimen:  Blood Updated:  01/15/20 2319     Glucose 195 mg/dL     POC Glucose Once [[id number]]  (Abnormal) Collected:  01/15/20 1728    Specimen:  Blood Updated:  01/15/20 1738     Glucose 167 mg/dL         Imaging Results (Last 24 Hours)     ** No results found for the last 24 hours. **           Physician Progress Notes (last 24 hours) (Notes from 01/15/20 1535 through 01/16/20 1535)      Buzz Petersen MD at 01/16/20 1226          Neurology       Patient Care Team:  Philomena Walters MD as PCP - General (Internal Medicine)    Chief complaint: Altered mental status    History:  Patient still sleeping poorly.    Prior to admission she is getting 100 mg of trazodone.    In hospital she is only getting 50.    She is been having some hallucinations and delusions particularly at night.    During the day she is fine.    Having no side effects from Aricept 5 mg daily.    Still complains of low back pain and has not been recently that reevaluated for swallowing.  Past Medical History:   Diagnosis Date   • COPD (chronic obstructive pulmonary disease) (CMS/[facility])    • Diabetes mellitus (CMS/[facility])    • History of ear injury 1973    Injury of the right eardrum.This has resulted in the headaches   • Hypertension        Vital Signs   Vitals:    01/15/20 2356 01/16/20 0514 01/16/20 0737 01/16/20 0755   BP: 120/64 124/72 129/75    BP Location: Right arm Right arm Left arm    Patient Position: Lying Lying Lying    Pulse: 93  98 90   Resp: 16 18 18    Temp: 97.5 °F (36.4 °C) 98.3 °F (36.8 °C) 98.1 °F (36.7 °C)    TempSrc: Oral Oral Oral    SpO2: 94% 97% 98% 98%   Weight:  64.2 kg (141 lb 8 oz)     Height:           Physical Exam:   General: Awake and alert              Neuro: Oriented to person and place.    Speech  is articulate with normal content.    She moves all extremities well.        Results Review:  Reviewed  Results from last 7 days   Lab Units 01/16/20  0532   WBC 10*3/mm3 11.54*   HEMOGLOBIN g/dL 8.0*   HEMATOCRIT % 26.2*   PLATELETS 10*3/mm3 146     Results from last 7 days   Lab Units 01/16/20  0532 01/15/20  0431 01/14/20  0350  01/13/20  0441  01/10/20  0715   SODIUM mmol/L 138 138 137  --  139   < > 146*   POTASSIUM mmol/L 3.2* 3.8 4.1   < > 3.5   < > 3.2*   CHLORIDE mmol/L 99 97* 101  --  100   < > 107   CO2 mmol/L 27.0 30.0* 24.0  --  28.0   < > 20.0*   BUN mg/dL 31* 22 21  --  21   < > 12   CREATININE mg/dL 0.60 0.51* 0.40*  --  0.39*   < > 0.43*   CALCIUM mg/dL 10.6* 11.2* 10.5  --  10.6*   < > 9.8   BILIRUBIN mg/dL  --   --   --   --  0.2  --  0.3   ALK PHOS U/L  --   --   --   --  96  --  60   ALT (SGPT) U/L  --   --   --   --  22  --  17   AST (SGOT) U/L  --   --   --   --  37*  --  35*   GLUCOSE mg/dL 225* 183* 190*  --  191*   < > 125*    < > = values in this interval not displayed.       Imaging Results (Last 24 Hours)     ** No results found for the last 24 hours. 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    Low back pain.    Dysphagia secondary to prolonged intubation.        Plan:  Speech to reevaluate for swallowing.    Up in a chair twice daily.    Lidocaine patch daily.    Increase trazodone to 100 mg at bedtime.    Increase Aricept to 10 mg daily.        Comment:  Patient still has not quite turned the corner.         I discussed the patients findings and my recommendations with patient, family and primary care team    Buzz Petersen MD  01/16/20  12:26 PM          Electronically signed by Buzz Petersen MD at 01/16/20 1228       Gurmeet Jules MD   Physician   Pulmonology   Progress Notes   Signed   Date of Service:  01/15/20 1105   Creation Time:  01/15/20 1105            Signed        Expand All Collapse All       Show:Clear all  [x]Manual[x]Template[x]Copied    Added by:  [x]Gurmeet Jules MD    []Tavia for details  Intensive Care Follow-up      Hospital:  LOS: 9 days   Ms. Glo Berry, 64 y.o. female is followed for:   Encephalopathy acute         Subjective         History of present illness:   Glo Berry is a 64 y.o. female being transferred via airvac to [facility] on 1/6 from [facility] with altered mentation concerning for stroke vs status epilepticus. Her last known well was 1/5 ~10PM and family reports that she was somewhat restless prior to bed. Upon awakening her family found her altered and took her to the OSH for further evaluation.Per the ED physican, she was awake on arrival but inappropriate with leftward eye deviation, right-sided neglect, facial droop, and questionable decorticate posturing, ~1240 she became unresponsive and was intubated.      Reportedly she received multiple doses of Ativan and Versed, however it is difficult to discern if this was pre/post intubation. CT head negative for acute process and she was transferred to [facility] for higher level of care.     Patient was evaluated by neurology and patient did have pretty significantly elevated blood pressures.  Patient was started on a Cardene drip.  Patient was extubated couple days after arrival.  She had periods of agitation requiring Precedex.  Also difficult to control blood pressure.  She was thought to have posterior reversible encephalopathy syndrome.     Subjective   Interval History:  Patient stayed off Precedex overnight.  No periods of agitation.  Remains quite weak.  Weak cough.  Requiring intermittent NT suctioning.  No overt seizures noted currently.  Hemodynamically stable.  Tolerating tube feeds well.                        The patient's past medical, surgical and social history were reviewed and updated in Epic as appropriate.        Objective      Objective      Infusions:  Medications:     amLODIPine 10 mg Oral Q24H  "  aspirin 81 mg Oral Daily   Or         aspirin 300 mg Rectal Daily   atorvastatin 80 mg Oral Nightly   carvedilol 25 mg Oral BID With Meals   donepezil 5 mg Oral Nightly   heparin (porcine) 5,000 Units Subcutaneous Q8H   insulin regular 0-7 Units Subcutaneous Q6H   ipratropium-albuterol 3 mL Nebulization 4x Daily - RT   levETIRAcetam 500 mg Oral Q12H   losartan 100 mg Oral Daily   nicotine 1 patch Transdermal Q24H   risperiDONE 1 mg Nasogastric Daily   risperiDONE 2 mg Nasogastric Nightly   sertraline 150 mg Nasogastric Nightly   traZODone 50 mg Nasogastric Nightly         Vital Sign Min/Max for last 24 hours  Temp  Min: 97.9 °F (36.6 °C)  Max: 98 °F (36.7 °C)   BP  Min: 129/71  Max: 183/92   Pulse  Min: 78  Max: 114   Resp  Min: 20  Max: 24   SpO2  Min: 88 %  Max: 99 %   Flow (L/min)  Min: 2  Max: 2        Input/Output for last 24 hour shift  01/14 0701 - 01/15 0700  In: 1427 [I.V.:136]  Out: 100 [Urine:100]   Objective:  Vital signs: (most recent): Blood pressure 130/73, pulse 88, temperature 98 °F (36.7 °C), temperature source Axillary, resp. rate 20, height 162.6 cm (64.02\"), weight 63.3 kg (139 lb 8.8 oz), SpO2 92 %.                   General Appearance: Awake, in no acute distress  Lungs:   B/L Breath sounds present with decreased breath sounds on bases, no wheezing heard, no crackles.   Heart: S1 and S2 present, no murmur  Abdomen: Soft, non-tender, no guarding or rigidity, bowel sounds positive.  Extremities: Atraumatic, no edema, warm to touch.  Neurologic:  Moving all four extremities.  Generalized weak            Results from last 7 days   Lab Units 01/15/20  0431 01/14/20  0350 01/13/20  0437   WBC 10*3/mm3 12.37* 9.19 11.69*   HEMOGLOBIN g/dL 8.6* 7.9* 9.0*   PLATELETS 10*3/mm3 145 96* 115*              Results from last 7 days   Lab Units 01/15/20  0431 01/14/20  0350 01/13/20  1649 01/13/20  0441   SODIUM mmol/L 138 137  --  139   POTASSIUM mmol/L 3.8 4.1 4.6 3.5   CO2 mmol/L 30.0* 24.0  --  28.0 "   BUN mg/dL 22 21  --  21   CREATININE mg/dL 0.51* 0.40*  --  0.39*   MAGNESIUM mg/dL 1.6 1.4*  --  1.7   PHOSPHORUS mg/dL 4.5 4.1  --  3.4   GLUCOSE mg/dL 183* 190*  --  191*      Estimated Creatinine Clearance: 111.4 mL/min (A) (by C-G formula based on SCr of 0.51 mg/dL (L)).          Results from last 7 days   Lab Units 01/08/20  1615   PH, ARTERIAL pH units 7.369   PCO2, ARTERIAL mm Hg 30.5*   PO2 ART mm Hg 58.5*         Images:   No new     I reviewed the patient's results and images.         Assessment/Plan      Impression         Encephalopathy acute    R/O AIS     R/O status epilepticus     Hypertension    Pancreatic insufficiency    T2DM on metformin     Chronic diarrhea    Lumbar stenosis    Diabetic neuropathy     Depression    Dementia     Smoker    Acute respiratory failure     Encephalopathy         Plan         1.  Patient seems to be recovering from her underlying issues.  Blood pressure is under better control and off nicardipine drip.  Continue current antihypertensives.  2.  Speech following for dysphagia.  Continue enteral nutrition for now.  If unable to take oral diet may need feeding tube placement before transferring to rehab.  3.  Continue antiseizure medications, antidepressants and  antipsychotics as well.  Patient seems to be recovering.  Stayed off Precedex and no.'s of agitation at this point.  4.  GI and DVT prophylaxis.  5.  Magnesium replaced  earlier.  Will recheck in the morning again.  Other electrolytes are stable.  6.  Hemoglobin stable and no acute bleed noted.  Platelet count improved to normal now.  7.  Flutter valve to see if we can help her clear secretions better.     PT/OT and ambulate as tolerated.     We will transfer out of ICU.  Will sign out to hospitalist team.     Plan of care and goals reviewed with mulitdisciplinary/antibiotic stewardship team during rounds.   I discussed the patient's findings and my recommendations with family and nursing staff      Time spent  25 (exclusive of procedure time)  including high complexity decision making to assess, manipulate, and support vital organ system failure in this individual who has impairment of one or more vital organ systems such that there is a high probability of imminent or life threatening deterioration in the patient’s condition.        Gurmeet Jules MD, [facility]  Pulmonary, Critical care and Sleep Medicine

## 2020-01-16 NOTE — THERAPY TREATMENT NOTE
Acute Care - Occupational Therapy Treatment Note  [facility]     Patient Name: Glo Berry  : 1955  MRN: [id number]  Today's Date: 2020             Admit Date: 2020       ICD-10-CM ICD-9-CM   1. Encephalopathy acute G93.40 348.30   2. Impaired mobility and ADLs Z74.09 799.89   3. Dysphagia, unspecified type R13.10 787.20   4. Cognitive communication deficit R41.841 799.52     Patient Active Problem List   Diagnosis   • Uterine prolapse   • Stress incontinence of urine   • Urgency incontinence   • Diarrhea   • Encephalopathy acute   • R/O AIS    • R/O status epilepticus    • Hypertension   • Pancreatic insufficiency   • T2DM on metformin    • Chronic diarrhea   • Lumbar stenosis   • Diabetic neuropathy    • Depression   • Dementia    • Smoker   • Acute respiratory failure    • Encephalopathy     Past Medical History:   Diagnosis Date   • COPD (chronic obstructive pulmonary disease) (CMS/[facility])    • Diabetes mellitus (CMS/[facility])    • History of ear injury 1973    Injury of the right eardrum.This has resulted in the headaches   • Hypertension      Past Surgical History:   Procedure Laterality Date   • BREAST CYST EXCISION     • SINUS SURGERY     • TONSILLECTOMY     • TUBAL ABDOMINAL LIGATION         Therapy Treatment    Rehabilitation Treatment Summary     Row Name 20 1344             Treatment Time/Intention    Discipline  occupational therapist  -AN      Document Type  therapy note (daily note)  -AN      Subjective Information  no complaints  -AN      Mode of Treatment  occupational therapy  -AN      Patient/Family Observations  pt supine in bed, NG tube, bed alarm, O2NC; spouse present  -AN      Care Plan Review  care plan/treatment goals reviewed;risks/benefits reviewed  -AN      Patient Effort  good  -AN      Existing Precautions/Restrictions  fall;oxygen therapy device and L/min;other (see comments) NG feed continuous  -AN      Recorded by [AN] Ifeoma Olivarez OT 20 9515      Row  Name 01/16/20 1344             Vital Signs    Pre Systolic BP Rehab  119  -AN      Pre Treatment Diastolic BP  77  -AN      Post Systolic BP Rehab  116  -AN      Post Treatment Diastolic BP  64  -AN      Pretreatment Heart Rate (beats/min)  88  -AN      Posttreatment Heart Rate (beats/min)  86  -AN      Post SpO2 (%)  96  -AN      O2 Delivery Post Treatment  supplemental O2  -AN      Recorded by [AN] Ifeoma Olivarez, OT 01/16/20 1425      Row Name 01/16/20 1344             Cognitive Assessment/Intervention- PT/OT    Affect/Mental Status (Cognitive)  WFL  -AN      Orientation Status (Cognition)  oriented to;person;place;situation  -AN      Personal Safety Interventions  fall prevention program maintained  -AN      Recorded by [AN] Ifeoma Olivarez OT 01/16/20 1425      Row Name 01/16/20 1344             Bed Mobility Assessment/Treatment    Scooting/Bridging [place] (Bed Mobility)  moderate assist (50% patient effort) to EOB  -AN      Supine-Sit [place] (Bed Mobility)  minimum assist (75% patient effort)  -AN      Assistive Device (Bed Mobility)  bed rails;head of bed elevated  -AN      Recorded by [AN] Ifeoma Olivarez OT 01/16/20 1425      Row Name 01/16/20 1344             Functional Mobility    Functional Mobility- Ind. Level  minimum assist (75% patient effort)  -AN      Functional Mobility-Distance (Feet)  6  -AN      Recorded by [AN] Ifeoma Olivarez, OT 01/16/20 1425      Row Name 01/16/20 1344             Sit-Stand Transfer    Sit-Stand [place] (Transfers)  minimum assist (75% patient effort)  -AN      Recorded by [AN] Ifeoma Olivarez OT 01/16/20 1425      Row Name 01/16/20 1344             Stand-Sit Transfer    Stand-Sit [place] (Transfers)  minimum assist (75% patient effort);verbal cues  -AN      Recorded by [AN] Ifeoma Olivarez OT 01/16/20 1425      Row Name 01/16/20 1344             Grooming Assessment/Training    [place] Level (Grooming)  wash face, hands;hair care,  combing/brushing;supervision;set up  -AN      Grooming Position  supported sitting  -AN      Recorded by [AN] Ifeoma Olivarez, OT 01/16/20 1425      Row Name 01/16/20 1344             Therapeutic Exercise    26723 - OT Therapeutic Exercise Minutes  8  -AN      49348 - OT Therapeutic Activity Minutes  16  -AN      Recorded by [AN] Ifeoma Olivarez, OT 01/16/20 1425      Row Name 01/16/20 1344             Therapeutic Exercise    Upper Extremity Range of Motion (Therapeutic Exercise)  shoulder horizontal abduction/adduction, bilateral;forearm supination/pronation, bilateral;elbow flexion/extension, bilateral;wrist flexion/extension, bilateral  -AN      Lower Extremity Range of Motion (Therapeutic Exercise)  knee flexion/extension, bilateral;ankle dorsiflexion/plantar flexion, bilateral  -AN      Sets/Reps (Therapeutic Exercise)  1/10  -AN      Recorded by [AN] Ifeoma Olivarez, OT 01/16/20 1425      Row Name 01/16/20 1344             Static Sitting Balance    Level of [place] (Unsupported Sitting, Static Balance)  contact guard assist  -AN      Sitting Position (Unsupported Sitting, Static Balance)  sitting on edge of bed  -AN      Time Able to Maintain Position (Unsupported Sitting, Static Balance)  3 to 4 minutes  -AN      Recorded by [AN] Ifeoma Olivarez, OT 01/16/20 1425      Row Name 01/16/20 1344             Dynamic Sitting Balance    Level of [place], Reaches Outside Midline (Sitting, Dynamic Balance)  minimal assist, 75% patient effort  -AN      Sitting Position, Reaches Outside Midline (Sitting, Dynamic Balance)  sitting on edge of bed  -AN      Comment, Reaches Outside Midline (Sitting, Dynamic Balance)  R/L weight shift  -AN      Recorded by [AN] Ifeoma Olivarez, OT 01/16/20 1425      Row Name 01/16/20 1344             Static Standing Balance    Level of [place] (Supported Standing, Static Balance)  contact guard assist  -AN      Time Able to Maintain Position (Supported Standing, Static Balance)  2  to 3 minutes  -AN      Recorded by [AN] Ifeoma Olivarez, OT 01/16/20 1425      Row Name 01/16/20 1344             Positioning and Restraints    Pre-Treatment Position  in bed  -AN      Post Treatment Position  chair  -AN      In Chair  reclined;call light within reach;encouraged to call for assist;exit alarm on;legs elevated  -AN      Recorded by [AN] Ifeoma Olivarez, OT 01/16/20 1425      Row Name 01/16/20 1344             Pain Scale: Numbers Pre/Post-Treatment    Pain Location - Side  Left  -AN      Pain Location - Orientation  posterior  -AN      Pain Location  shoulder  -AN      Pain Intervention(s)  Repositioned  -AN      Recorded by [AN] Ifeoma Olivarez, OT 01/16/20 1425      Row Name 01/16/20 1344             Sensory Assessment/Intervention    Additional Documentation  Vision Assessment/Intervention (Group)  -AN      Recorded by [AN] Ifeoma Olivarez, OT 01/16/20 1425      Row Name 01/16/20 1344             Vision Assessment/Intervention    Visual Treatment Interventions  scanning activities/strategies;searching activities/strategies  -AN      Recorded by [AN] Ifeoma Olivarez, OT 01/16/20 1425      Row Name 01/16/20 1344             Plan of Care Review    Plan of Care Reviewed With  patient  -AN      Recorded by [AN] Ifeoma Olivarez, OT 01/16/20 1425      Row Name 01/16/20 1344             Outcome Summary/Treatment Plan (OT)    Daily Summary of Progress (OT)  progress toward functional goals is good  -AN      Anticipated Discharge Disposition (OT)  skilled nursing facility  -AN      Recorded by [AN] Ifeoma Olivarez, OT 01/16/20 1425        User Key  (r) = Recorded By, (t) = Taken By, (c) = Cosigned By    Initials Name Effective Dates Discipline    AN Ifeoma Olivarez, OT 06/22/15 -  OT                 OT Recommendation and Plan  Outcome Summary/Treatment Plan (OT)  Daily Summary of Progress (OT): progress toward functional goals is good  Anticipated Discharge Disposition (OT): skilled nursing facility  Daily Summary of  Progress (OT): progress toward functional goals is good  Plan of Care Review  Plan of Care Reviewed With: patient  Plan of Care Reviewed With: patient  Outcome Summary: Pt progressing with ADLs and mobility. pt CGA for txfr, mobility to chair. Pt supv set up for grooming tasks and UE ex this date.Pt. continues to fatigue easily, no signs of confusion. Continue to recommend SNF at this time.  Outcome Measures     Row Name 01/16/20 1344 01/13/20 1444          How much help from another is currently needed...    Putting on and taking off regular lower body clothing?  2  -AN  2  -CL     Bathing (including washing, rinsing, and drying)  2  -AN  2  -CL     Toileting (which includes using toilet bed pan or urinal)  2  -AN  2  -CL     Putting on and taking off regular upper body clothing  2  -AN  2  -CL     Taking care of personal grooming (such as brushing teeth)  3  -AN  2  -CL     Eating meals  1 NPO, tube feed  -AN  1  -CL     AM-PAC 6 Clicks Score (OT)  12  -AN  11  -CL        Functional Assessment    Outcome Measure Options  --  AM-PAC 6 Clicks Daily Activity (OT)  -CL       User Key  (r) = Recorded By, (t) = Taken By, (c) = Cosigned By    Initials Name Provider Type    Ifeoma Siddiqui OT Occupational Therapist    CL Ayana Anderson OT Occupational Therapist           Time Calculation:   Time Calculation- OT     Row Name 01/16/20 1429 01/16/20 1344          Time Calculation- OT    OT Start Time  1344  -AN  --     Total Timed Code Minutes- OT  24 minute(s)  -AN  --     OT Received On  01/16/20  -AN  --     OT Goal Re-Cert Due Date  01/19/20  -AN  --        Timed Charges    38761 - OT Therapeutic Exercise Minutes  --  8  -AN     10065 - OT Therapeutic Activity Minutes  --  16  -AN       User Key  (r) = Recorded By, (t) = Taken By, (c) = Cosigned By    Initials Name Provider Type    Ifeoma Siddiqui OT Occupational Therapist        Therapy Charges for Today     Code Description Service Date Service Provider Modifiers  Qty    49745961138 HC OT THER PROC EA 15 MIN 1/16/2020 Ifeoma Olivarez OT GO 1    90936501381 HC OT THERAPEUTIC ACT EA 15 MIN 1/16/2020 Ifeoma Olivarez OT GO 1               Ifeoma Olivarez, BETTIE  1/16/2020

## 2020-01-17 ENCOUNTER — ANCILLARY PROCEDURE (OUTPATIENT)
Dept: SPEECH THERAPY | Facility: HOSPITAL | Age: 65
End: 2020-01-17

## 2020-01-17 LAB
ANION GAP SERPL CALCULATED.3IONS-SCNC: 10 MMOL/L (ref 5–15)
BUN BLD-MCNC: 38 MG/DL (ref 8–23)
BUN/CREAT SERPL: 61.3 (ref 7–25)
CALCIUM SPEC-SCNC: 11.4 MG/DL (ref 8.6–10.5)
CHLORIDE SERPL-SCNC: 101 MMOL/L (ref 98–107)
CO2 SERPL-SCNC: 28 MMOL/L (ref 22–29)
CREAT BLD-MCNC: 0.62 MG/DL (ref 0.57–1)
DEPRECATED RDW RBC AUTO: 50 FL (ref 37–54)
ERYTHROCYTE [DISTWIDTH] IN BLOOD BY AUTOMATED COUNT: 15.1 % (ref 12.3–15.4)
GFR SERPL CREATININE-BSD FRML MDRD: 97 ML/MIN/1.73
GLUCOSE BLD-MCNC: 209 MG/DL (ref 65–99)
GLUCOSE BLDC GLUCOMTR-MCNC: 194 MG/DL (ref 70–130)
GLUCOSE BLDC GLUCOMTR-MCNC: 196 MG/DL (ref 70–130)
GLUCOSE BLDC GLUCOMTR-MCNC: 243 MG/DL (ref 70–130)
GLUCOSE BLDC GLUCOMTR-MCNC: 265 MG/DL (ref 70–130)
HCT VFR BLD AUTO: 27.2 % (ref 34–46.6)
HGB BLD-MCNC: 7.8 G/DL (ref 12–15.9)
MAGNESIUM SERPL-MCNC: 1.7 MG/DL (ref 1.6–2.4)
MCH RBC QN AUTO: 27.6 PG (ref 26.6–33)
MCHC RBC AUTO-ENTMCNC: 28.7 G/DL (ref 31.5–35.7)
MCV RBC AUTO: 96.1 FL (ref 79–97)
PLATELET # BLD AUTO: 139 10*3/MM3 (ref 140–450)
PMV BLD AUTO: 13.5 FL (ref 6–12)
POTASSIUM BLD-SCNC: 3.8 MMOL/L (ref 3.5–5.2)
RBC # BLD AUTO: 2.83 10*6/MM3 (ref 3.77–5.28)
SODIUM BLD-SCNC: 139 MMOL/L (ref 136–145)
WBC NRBC COR # BLD: 11.37 10*3/MM3 (ref 3.4–10.8)

## 2020-01-17 PROCEDURE — 83735 ASSAY OF MAGNESIUM: CPT

## 2020-01-17 PROCEDURE — 99232 SBSQ HOSP IP/OBS MODERATE 35: CPT | Performed by: PHYSICIAN ASSISTANT

## 2020-01-17 PROCEDURE — 82962 GLUCOSE BLOOD TEST: CPT

## 2020-01-17 PROCEDURE — 94799 UNLISTED PULMONARY SVC/PX: CPT

## 2020-01-17 PROCEDURE — 97116 GAIT TRAINING THERAPY: CPT

## 2020-01-17 PROCEDURE — 80048 BASIC METABOLIC PNL TOTAL CA: CPT | Performed by: INTERNAL MEDICINE

## 2020-01-17 PROCEDURE — 92612 ENDOSCOPY SWALLOW (FEES) VID: CPT

## 2020-01-17 PROCEDURE — 99232 SBSQ HOSP IP/OBS MODERATE 35: CPT | Performed by: INTERNAL MEDICINE

## 2020-01-17 PROCEDURE — 97530 THERAPEUTIC ACTIVITIES: CPT

## 2020-01-17 PROCEDURE — 85027 COMPLETE CBC AUTOMATED: CPT | Performed by: INTERNAL MEDICINE

## 2020-01-17 PROCEDURE — 25010000002 HEPARIN (PORCINE) PER 1000 UNITS: Performed by: INTERNAL MEDICINE

## 2020-01-17 RX ORDER — FERROUS SULFATE 325(65) MG
325 TABLET ORAL
Status: DISCONTINUED | OUTPATIENT
Start: 2020-01-18 | End: 2020-01-19 | Stop reason: CLARIF

## 2020-01-17 RX ADMIN — NICOTINE 1 PATCH: 21 PATCH, EXTENDED RELEASE TRANSDERMAL at 09:38

## 2020-01-17 RX ADMIN — ATORVASTATIN CALCIUM 80 MG: 40 TABLET, FILM COATED ORAL at 20:32

## 2020-01-17 RX ADMIN — HEPARIN SODIUM 5000 UNITS: 5000 INJECTION INTRAVENOUS; SUBCUTANEOUS at 20:31

## 2020-01-17 RX ADMIN — LIDOCAINE 1 PATCH: 50 PATCH TOPICAL at 09:34

## 2020-01-17 RX ADMIN — IPRATROPIUM BROMIDE AND ALBUTEROL SULFATE 3 ML: 2.5; .5 SOLUTION RESPIRATORY (INHALATION) at 15:50

## 2020-01-17 RX ADMIN — DONEPEZIL HYDROCHLORIDE 5 MG: 5 TABLET ORAL at 20:31

## 2020-01-17 RX ADMIN — INSULIN HUMAN 3 UNITS: 100 INJECTION, SOLUTION PARENTERAL at 06:15

## 2020-01-17 RX ADMIN — INSULIN HUMAN 4 UNITS: 100 INJECTION, SOLUTION PARENTERAL at 13:26

## 2020-01-17 RX ADMIN — ASPIRIN 81 MG 81 MG: 81 TABLET ORAL at 09:34

## 2020-01-17 RX ADMIN — INSULIN HUMAN 2 UNITS: 100 INJECTION, SOLUTION PARENTERAL at 19:32

## 2020-01-17 RX ADMIN — HEPARIN SODIUM 5000 UNITS: 5000 INJECTION INTRAVENOUS; SUBCUTANEOUS at 15:41

## 2020-01-17 RX ADMIN — RISPERIDONE 2 MG: 1 SOLUTION ORAL at 20:35

## 2020-01-17 RX ADMIN — IPRATROPIUM BROMIDE AND ALBUTEROL SULFATE 3 ML: 2.5; .5 SOLUTION RESPIRATORY (INHALATION) at 20:38

## 2020-01-17 RX ADMIN — IPRATROPIUM BROMIDE AND ALBUTEROL SULFATE 3 ML: 2.5; .5 SOLUTION RESPIRATORY (INHALATION) at 07:51

## 2020-01-17 RX ADMIN — TRAZODONE HYDROCHLORIDE 100 MG: 100 TABLET ORAL at 20:32

## 2020-01-17 RX ADMIN — LEVETIRACETAM 500 MG: 500 TABLET, FILM COATED ORAL at 20:31

## 2020-01-17 RX ADMIN — LOSARTAN POTASSIUM 100 MG: 50 TABLET, FILM COATED ORAL at 09:34

## 2020-01-17 RX ADMIN — SERTRALINE HYDROCHLORIDE 150 MG: 100 TABLET ORAL at 20:30

## 2020-01-17 RX ADMIN — CARVEDILOL 25 MG: 12.5 TABLET, FILM COATED ORAL at 19:21

## 2020-01-17 RX ADMIN — AMLODIPINE BESYLATE 10 MG: 10 TABLET ORAL at 09:34

## 2020-01-17 RX ADMIN — CARVEDILOL 25 MG: 12.5 TABLET, FILM COATED ORAL at 09:34

## 2020-01-17 RX ADMIN — IPRATROPIUM BROMIDE AND ALBUTEROL SULFATE 3 ML: 2.5; .5 SOLUTION RESPIRATORY (INHALATION) at 11:34

## 2020-01-17 RX ADMIN — RISPERIDONE 1 MG: 1 SOLUTION ORAL at 15:48

## 2020-01-17 RX ADMIN — HEPARIN SODIUM 5000 UNITS: 5000 INJECTION INTRAVENOUS; SUBCUTANEOUS at 06:16

## 2020-01-17 RX ADMIN — LEVETIRACETAM 500 MG: 500 TABLET, FILM COATED ORAL at 09:34

## 2020-01-17 NOTE — PLAN OF CARE
Problem: Patient Care Overview  Goal: Plan of Care Review  Outcome: Ongoing (interventions implemented as appropriate)  Flowsheets  Taken 1/17/2020 1329 by Geovanna Byrnes MS CCC-SLP  Progress: improving  Taken 1/16/2020 2010 by Juliana Vila RN  Plan of Care Reviewed With: patient  Taken 1/16/2020 1626 by Izzy Barrios PT  Patient Agreement with Plan of Care: agrees  Note:   SLP re-evaluation completed. Will continue to address dysphagia. Please see note for further details and recommendations.

## 2020-01-17 NOTE — PLAN OF CARE
Problem: Patient Care Overview  Goal: Plan of Care Review  Outcome: Ongoing (interventions implemented as appropriate)  Flowsheets  Taken 1/17/2020 1425  Plan of Care Reviewed With: patient;daughter;family  Outcome Summary: INCREASED DISTANCE AMBULATED TO 76 FEET WITH R WALKER AND MIN ASSIST. NEEDS CUES FOR SAFETY AWARENESS WITH R WALKER.  RECOMMEND SNF AT D/C.   Taken 1/16/2020 1626  Patient Agreement with Plan of Care: agrees

## 2020-01-17 NOTE — THERAPY TREATMENT NOTE
Patient Name: Glo Berry  : 1955    MRN: [id number]                              Today's Date: 2020       Admit Date: 2020    Visit Dx:     ICD-10-CM ICD-9-CM   1. Encephalopathy acute G93.40 348.30   2. Impaired mobility and ADLs Z74.09 799.89   3. Dysphagia, unspecified type R13.10 787.20   4. Cognitive communication deficit R41.841 799.52     Patient Active Problem List   Diagnosis   • Uterine prolapse   • Stress incontinence of urine   • Urgency incontinence   • Diarrhea   • Encephalopathy acute   • R/O AIS    • R/O status epilepticus    • Hypertension   • Pancreatic insufficiency   • T2DM on metformin    • Chronic diarrhea   • Lumbar stenosis   • Diabetic neuropathy    • Depression   • Dementia    • Smoker   • Acute respiratory failure    • Encephalopathy     Past Medical History:   Diagnosis Date   • COPD (chronic obstructive pulmonary disease) (CMS/HCC)    • Diabetes mellitus (CMS/HCC)    • History of ear injury 1973    Injury of the right eardrum.This has resulted in the headaches   • Hypertension      Past Surgical History:   Procedure Laterality Date   • BREAST CYST EXCISION     • SINUS SURGERY     • TONSILLECTOMY     • TUBAL ABDOMINAL LIGATION       General Information     Row Name 20 1412          PT Evaluation Time/Intention    Document Type  therapy note (daily note)  -CD     Mode of Treatment  physical therapy  -CD     Row Name 20 1412          General Information    Patient Profile Reviewed?  yes  -CD     Existing Precautions/Restrictions  fall;oxygen therapy device and L/min NG TUBE.   -CD     Barriers to Rehab  medically complex;cognitive status  -CD     Row Name 20 1412          Cognitive Assessment/Intervention- PT/OT    Orientation Status (Cognition)  oriented to;person  -CD     Cognitive Assessment/Intervention Comment  SLOW TO RESPOND INITIALLY, LETHARGIC AND DIFFICULT TO AROUSE UNTIL SITTING EOB.   -CD     Row Name 20 1412          Safety Issues,  Functional Mobility    Safety Issues Affecting Function (Mobility)  insight into deficits/self awareness;safety precaution awareness;safety precautions follow-through/compliance  -CD     Impairments Affecting Function (Mobility)  balance;coordination;endurance/activity tolerance;shortness of breath;strength  -CD     Comment, Safety Issues/Impairments (Mobility)  FOLLOWING ALL COMMANDS.   -CD       User Key  (r) = Recorded By, (t) = Taken By, (c) = Cosigned By    Initials Name Provider Type    CD Izzy Barrios, PT Physical Therapist        Mobility     Row Name 01/17/20 1418          Bed Mobility Assessment/Treatment    Supine-Sit [place] (Bed Mobility)  minimum assist (75% patient effort);verbal cues  -CD     Assistive Device (Bed Mobility)  bed rails;head of bed elevated  -CD     Row Name 01/17/20 1418          Transfer Assessment/Treatment    Comment (Transfers)  REPEATED CUES FOR HAND PLACEMENT FOR SAFETY WITH R WALKER.   -CD     Row Name 01/17/20 1418          Sit-Stand Transfer    Sit-Stand [place] (Transfers)  verbal cues;contact guard X 3 REPS  -CD     Assistive Device (Sit-Stand Transfers)  walker, front-wheeled  -CD     Row Name 01/17/20 1418          Gait/Stairs Assessment/Training    Gait/Stairs Assessment/Training  gait/ambulation independence  -CD     [place] Level (Gait)  minimum assist (75% patient effort)  -CD     Assistive Device (Gait)  walker, front-wheeled  -CD     Distance in Feet (Gait)  76 FEET WITH ONE SITTING REST BREAK.   -CD     Deviations/Abnormal Patterns (Gait)  bilateral deviations;base of support, narrow;stride length decreased;gait speed decreased  -CD     Bilateral Gait Deviations  forward flexed posture;heel strike decreased  -CD     Comment (Gait/Stairs)  LIMITED BY FATIGUE.   -CD       User Key  (r) = Recorded By, (t) = Taken By, (c) = Cosigned By    Initials Name Provider Type    Izzy Carrillo PT Physical Therapist        Obj/Interventions     Row Name  01/17/20 1423          Static Sitting Balance    Level of [place] (Unsupported Sitting, Static Balance)  contact guard assist  -CD     Sitting Position (Unsupported Sitting, Static Balance)  sitting on edge of bed AND COMMODE  -CD     Row Name 01/17/20 1423          Dynamic Sitting Balance    Level of [place], Reaches Outside Midline (Sitting, Dynamic Balance)  contact guard assist  -CD     Sitting Position, Reaches Outside Midline (Sitting, Dynamic Balance)  sitting on edge of bed AND COMMODE DURING HYGIENE.   -CD     Comment, Reaches Outside Midline (Sitting, Dynamic Balance)  WEIGHT SHIFT R/L.   -CD     Row Name 01/17/20 1423          Static Standing Balance    Level of [place] (Supported Standing, Static Balance)  contact guard assist  -CD     Assistive Device Utilized (Supported Standing, Static Balance)  walker, rolling  -CD     Row Name 01/17/20 1423          Dynamic Standing Balance    Level of [place], Reaches Outside Midline (Standing, Dynamic Balance)  minimal assist, 75% patient effort  -CD     Assistive Device Utilized (Supported Standing, Dynamic Balance)  walker, rolling  -CD       User Key  (r) = Recorded By, (t) = Taken By, (c) = Cosigned By    Initials Name Provider Type    CD Izzy Barrios PT Physical Therapist        Goals/Plan    No documentation.       Clinical Impression     Row Name 01/17/20 1425          Pain Scale: Numbers Pre/Post-Treatment    Pain Scale: Numbers, Pretreatment  0/10 - no pain  -CD     Pain Scale: Numbers, Post-Treatment  0/10 - no pain  -CD     Row Name 01/17/20 1425          Plan of Care Review    Plan of Care Reviewed With  patient;daughter;family  -CD     Progress  improving  -CD     Outcome Summary  INCREASED DISTANCE AMBULATED TO 76 FEET WITH R WALKER AND MIN ASSIST. NEEDS CUES FOR SAFETY AWARENESS WITH R WALKER.  RECOMMEND SNF AT D/C.   -CD     Row Name 01/17/20 1425          Physical Therapy Clinical Impression    Patient/Family Goals Statement  (PT Clinical Impression)  TO GO HOME.   -CD     Criteria for Skilled Interventions Met (PT Clinical Impression)  yes  -CD     Rehab Potential (PT Clinical Summary)  good, to achieve stated therapy goals  -CD     Row Name 01/17/20 1425          Vital Signs    Pre Systolic BP Rehab  -- VSS. NSG CLEARED FOR TREATMENT.   -CD     Pre Patient Position  Supine  -CD     Intra Patient Position  Standing  -CD     Post Patient Position  Sitting  -CD     Row Name 01/17/20 1425          Positioning and Restraints    Pre-Treatment Position  in bed  -CD     Post Treatment Position  chair  -CD     In Chair  reclined;call light within reach;encouraged to call for assist;exit alarm on;with family/caregiver;[place] cushion;legs elevated;notified nsg  -CD       User Key  (r) = Recorded By, (t) = Taken By, (c) = Cosigned By    Initials Name Provider Type    CD Izzy Barrios, PT Physical Therapist        Outcome Measures     Row Name 01/17/20 1428          How much help from another person do you currently need...    Turning from your back to your side while in flat bed without using bedrails?  3  -CD     Moving from lying on back to sitting on the side of a flat bed without bedrails?  3  -CD     Moving to and from a bed to a chair (including a wheelchair)?  3  -CD     Standing up from a chair using your arms (e.g., wheelchair, bedside chair)?  3  -CD     Climbing 3-5 steps with a railing?  2  -CD     To walk in hospital room?  3  -CD     AM-PAC 6 Clicks Score (PT)  17  -CD     Row Name 01/17/20 1428          Modified Fer Scale    Modified [place] Scale  4 - Moderately severe disability.  Unable to walk without assistance, and unable to attend to own bodily needs without assistance.  -CD     Row Name 01/17/20 1428          Functional Assessment    Outcome Measure Options  AM-PAC 6 Clicks Basic Mobility (PT);Modified [place]  -CD       User Key  (r) = Recorded By, (t) = Taken By, (c) = Cosigned By    Initials Name Provider Type    CD  Izzy Barrios PT Physical Therapist          PT Recommendation and Plan     Outcome Summary/Treatment Plan (PT)  Anticipated Discharge Disposition (PT): skilled nursing facility  Plan of Care Reviewed With: patient, daughter, family  Progress: improving  Outcome Summary: INCREASED DISTANCE AMBULATED TO 76 FEET WITH R WALKER AND MIN ASSIST. NEEDS CUES FOR SAFETY AWARENESS WITH R WALKER.  RECOMMEND SNF AT D/C.      Time Calculation:   PT Charges     Row Name 01/17/20 1429             Time Calculation    Start Time  1333  -CD      PT Received On  01/17/20  -CD      PT Goal Re-Cert Due Date  01/19/20  -CD         Time Calculation- PT    Total Timed Code Minutes- PT  33 minute(s)  -CD         Timed Charges    67807 - Gait Training Minutes   20  -CD      53660 - PT Therapeutic Activity Minutes  10  -CD        User Key  (r) = Recorded By, (t) = Taken By, (c) = Cosigned By    Initials Name Provider Type    CD Izzy Barrios, PT Physical Therapist        Therapy Charges for Today     Code Description Service Date Service Provider Modifiers Qty    18423767590 HC PT THER PROC EA 15 MIN 1/16/2020 Izzy Barrios, PT GP 1    09655247025 HC GAIT TRAINING EA 15 MIN 1/16/2020 Izzy Barrios, PT GP 1    79477108725 HC GAIT TRAINING EA 15 MIN 1/17/2020 Izzy Barrios, PT GP 1    76276992558 HC PT THERAPEUTIC ACT EA 15 MIN 1/17/2020 Izzy Barrios, PT GP 1    07629438433 HC PT THER SUPP EA 15 MIN 1/17/2020 Izzy Barrios, PT GP 2          PT G-Codes  Outcome Measure Options: AM-PAC 6 Clicks Basic Mobility (PT), Modified [place]  AM-PAC 6 Clicks Score (PT): 17  AM-PAC 6 Clicks Score (OT): 12  Modified [place] Scale: 4 - Moderately severe disability.  Unable to walk without assistance, and unable to attend to own bodily needs without assistance.    Izzy Barrios, PT  1/17/2020

## 2020-01-17 NOTE — THERAPY RE-EVALUATION
Acute Care - Speech Language Pathology   Swallow Re-Evaluation [facility]     Patient Name: Glo Berry  : 1955  MRN: [id number]  Today's Date: 2020               Admit Date: 2020    Visit Dx:     ICD-10-CM ICD-9-CM   1. Encephalopathy acute G93.40 348.30   2. Impaired mobility and ADLs Z74.09 799.89   3. Dysphagia, unspecified type R13.10 787.20   4. Cognitive communication deficit R41.841 799.52     Patient Active Problem List   Diagnosis   • Uterine prolapse   • Stress incontinence of urine   • Urgency incontinence   • Diarrhea   • Encephalopathy acute   • R/O AIS    • R/O status epilepticus    • Hypertension   • Pancreatic insufficiency   • T2DM on metformin    • Chronic diarrhea   • Lumbar stenosis   • Diabetic neuropathy    • Depression   • Dementia    • Smoker   • Acute respiratory failure    • Encephalopathy     Past Medical History:   Diagnosis Date   • COPD (chronic obstructive pulmonary disease) (CMS/HCC)    • Diabetes mellitus (CMS/HCC)    • History of ear injury 1973    Injury of the right eardrum.This has resulted in the headaches   • Hypertension      Past Surgical History:   Procedure Laterality Date   • BREAST CYST EXCISION     • SINUS SURGERY     • TONSILLECTOMY     • TUBAL ABDOMINAL LIGATION          SWALLOW EVALUATION (last 72 hours)      SLP Adult Swallow Evaluation     Row Name 20 1300                   Rehab Evaluation    Document Type  re-evaluation  -AW        Subjective Information  no complaints  -AW        Patient Observations  alert;cooperative  -AW        Symptoms Noted During/After Treatment  none  -AW           General Information    Patient Profile Reviewed  yes  -AW        Current Method of Nutrition  NPO;nasogastric feedings  -AW           Pain Scale: Numbers Pre/Post-Treatment    Pain Scale: Numbers, Pretreatment  0/10 - no pain  -AW        Pain Scale: Numbers, Post-Treatment  0/10 - no pain  -AW           Fiberoptic Endoscopic Evaluation of Swallowing  (FEES)    Risks/Benefits Reviewed  risks/benefits explained;patient;family;agreed to eval  -AW        Nasal Entry  left:  -AW        Special Considerations  scope #[id number]  -AW           Anatomy and Physiology    Anatomic Considerations  no anatomic structural deviation  -AW        Velopharyngeal  WFL  -AW        Base of Tongue  symmetrical  -AW        Epiglottis  WFL  -AW        Laryngeal Function Breathing  symmetrical  -AW        Laryngeal Function Phonation  symmetrical  -AW        Laryngeal Function to Breath Hold  TVF contact  -AW        Secretion Rating Scale (Kamar et al. 1996)  2- secretions initially outside the vestibule but later entered the vestibule  -AW        Secretion Description  thick;bloody;continuous;clear  -AW        Ice Chips  elicited swallow;partially cleared secretions  -AW        Spontaneous Swallow  frequency adequate  -AW        Sensory  sensed scope  -AW        Utensils Used  Spoon  -AW        Consistencies Trialed  thin liquids;nectar-thick liquids;pudding/puree;Spoon  -AW           FEES Interpretation    Oral Phase  WFL  -AW           Initiation of Pharyngeal Swallow    Initiation of Pharyngeal Swallow  bolus in pyriform sinuses  -AW        Pharyngeal Phase  impaired pharyngeal phase of swallowing  -AW        Penetration During the Swallow  thin liquids;nectar-thick liquids;pudding/puree  -AW        Aspiration After the Swallow  thin liquids;pudding/puree;nectar-thick liquids  -AW        Response to Aspiration  inconsistent response;weak cough/throat clear  -AW        Rosenbek's Scale  8-->Level 8  -AW        Residue  valleculae;pyriform sinuses  -AW        Response to Residue  unable to clear residue  -AW        FEES Summary  FEES complete. Continues with severe pharyngeal dysphagia. Pt aspirated all consistencies after the swallow 2' residue mixing with copious secretions and rolling towards airway. Pt not safe to resume diet. Will need a long term nutrition source. Aspiration was  mostly silent, some intermittent throat clera noted.   -AW           Clinical Impression    SLP Swallowing Diagnosis  severe;pharyngeal dysfunction  -AW        Functional Impact  risk of aspiration/pneumonia  -AW        Rehab Potential/Prognosis, Swallowing  adequate, monitor progress closely  -AW        Swallow Criteria for Skilled Therapeutic Interventions Met  demonstrates skilled criteria  -AW           Recommendations    Therapy Frequency (Swallow)  5 days per week  -AW        Predicted Duration Therapy Intervention (Days)  until discharge  -AW        SLP Diet Recommendation  long term alternate methods of nutrition/hydration;NPO;other (see comments) ice chips 3x a day after oral (4-5 ice chips per session)  -AW        SLP Rec. for Method of Medication Administration  meds via alternate route  -AW        Anticipated Dischage Disposition  anticipate therapy at next level of care  -AW          User Key  (r) = Recorded By, (t) = Taken By, (c) = Cosigned By    Initials Name Effective Dates    Geovanna Reyez, MS CCC-SLP 06/22/15 -           EDUCATION  The patient has been educated in the following areas:   Dysphagia (Swallowing Impairment).    SLP Recommendation and Plan  SLP Swallowing Diagnosis: severe, pharyngeal dysfunction  SLP Diet Recommendation: long term alternate methods of nutrition/hydration, NPO, other (see comments)(ice chips 3x a day after oral (4-5 ice chips per session))     SLP Rec. for Method of Medication Administration: meds via alternate route           Swallow Criteria for Skilled Therapeutic Interventions Met: demonstrates skilled criteria  Anticipated Dischage Disposition: anticipate therapy at next level of care  Rehab Potential/Prognosis, Swallowing: adequate, monitor progress closely  Therapy Frequency (Swallow): 5 days per week  Predicted Duration Therapy Intervention (Days): until discharge       Progress: improving    SLP GOALS     Row Name 01/16/20 1445 01/14/20 1500           Oral Nutrition/Hydration Goal 1 (SLP)    Oral Nutrition/Hydration Goal 1, SLP  LTG: Pt will improve swallow function in order to safely return to PO diet w/ no overt s/sxs aspiration/distress w/ 100% acc and no cues  -AC  --     Time Frame (Oral Nutrition/Hydration Goal 1, SLP)  by discharge  -AC  --     Progress/Outcomes (Oral Nutrition/Hydration Goal 1, SLP)  good progress toward goal  -AC  --        Oral Nutrition/Hydration Goal 2 (SLP)    Oral Nutrition/Hydration Goal 2, SLP  Pt will tolerate therapeutic trials of thin H2O & puree w/ no overt s/sxs aspiration/distress w/ 70% acc and no cues in order to determine readiness for repeat instrumental eval  -AC  --     Time Frame (Oral Nutrition/Hydration Goal 2, SLP)  short term goal (STG);by discharge  -AC  --     Barriers (Oral Nutrition/Hydration Goal 2, SLP)  Trialed ice chips, thin via tsp, puree. No overt s/s aspiration w/ ice or thin. Multiple swallows noted w/ puree.  -AC  --     Progress/Outcomes (Oral Nutrition/Hydration Goal 2, SLP)  good progress toward goal  -AC  --        Pharyngeal Strengthening Exercise Goal 1 (SLP)    Increase Superior Movement of the Hyolaryngeal Complex  effortful pitch glide (falsetto + pharyngeal squeeze)  -AC  --     Increase Anterior Movement of the Hyolaryngeal Complex  chin tuck against resistance (CTAR)  -AC  --     Increase Closure at Entrance to Airway/Closure of Airway at Glottis  supraglottic swallow  -AC  --     Increase Squeeze/Positive Pressure Generation  hard effortful swallow  -AC  --     [place]/Accuracy (Pharyngeal Strengthening Goal 1, SLP)  with minimal cues (75-90% accuracy)  -AC  --     Time Frame (Pharyngeal Strengthening Goal 1, SLP)  short term goal (STG);by discharge  -AC  --     Barriers (Pharyngeal Strengthening Goal 1, SLP)  Pt admitted that she hadn't been practicing excs. Reviewed effortful swallow and SSG. Performed x3 each.  -AC  --     Progress/Outcomes (Pharyngeal Strengthening Goal 1, SLP)   continuing progress toward goal  -AC  --        Attention Goal 1 (SLP)    Improve Attention by Goal 1 (SLP)  --  complete selective attention task;90%;with minimal cues (75-90%)  -AV     Time Frame (Attention Goal 1, SLP)  --  short term goal (STG);by discharge  -AV     Progress (Attention Goal 1, SLP)  --  70%;with minimal cues (75-90%)  -AV     Progress/Outcomes (Attention Goal 1, SLP)  --  continuing progress toward goal  -AV        Memory Skills Goal 1 (SLP)    Improve Memory Skills Through Goal 1 (SLP)  --  recalling related word lists with an imposed delay;listen to a paragraph and answer questions;recall details of the day;use memory strategies;80%;with minimal cues (75-90%)  -AV     Time Frame (Memory Skills Goal 1, SLP)  --  short term goal (STG)  -AV     Progress (Memory Skills Goal 1, SLP)  --  40%;with minimal cues (75-90%)  -AV     Progress/Outcomes (Memory Skills Goal 1, SLP)  --  continuing progress toward goal  -AV     Comment (Memory Skills Goal 1, SLP)  --  1/5 words after 5 min delay; recalling details of the day (clinician briefly stepped out of rm to print exercise handout and when returned, pt had forgotten who clinician was; asked same question multiple times during tx).  -AV       User Key  (r) = Recorded By, (t) = Taken By, (c) = Cosigned By    Initials Name Provider Type    Elisha Hernandez, MS CCC-SLP Speech and Language Pathologist    AV Juli Moser MS CCC-SLP Speech and Language Pathologist             Time Calculation:   Time Calculation- SLP     Row Name 01/17/20 1329             Time Calculation- SLP    SLP Start Time  1030  -AW      SLP Received On  01/17/20  -        User Key  (r) = Recorded By, (t) = Taken By, (c) = Cosigned By    Initials Name Provider Type    Geovanna Reyez, MS CCC-SLP Speech and Language Pathologist          Therapy Charges for Today     Code Description Service Date Service Provider Modifiers Qty    [id number] HC ST FIBEROPTIC ENDO EVAL  JESSICA 7 1/17/2020 Geovanna Byrnes, MS CCC-SLP GN 1               Geovanna Byrnes, MS CCC-SLP  1/17/2020

## 2020-01-17 NOTE — PROGRESS NOTES
Continued Stay Note  [facility]     Patient Name: Glo Berry  MRN: [id number]  Today's Date: 1/17/2020    Admit Date: 1/6/2020    Discharge Plan     Row Name 01/17/20 1607       Plan    Plan  [facility] in [place]    Plan Comments      I spoke with patient this afternoon to let her know facility will start precert with her insurance closer to time of transfer. I will see again on Monday.         Final Discharge Disposition Code  03 - skilled nursing facility (SNF)        Discharge Codes    No documentation.       Expected Discharge Date and Time     Expected Discharge Date Expected Discharge Time    Jan 21, 2020             Shelley Crandall RN

## 2020-01-17 NOTE — PROGRESS NOTES
"                  Clinical Nutrition     Nutrition Support Followup  Reason for Visit:   Follow-up protocol, EN    Patient Name: Glo Berry  YOB: 1955  MRN: [id number]  Date of Encounter: 01/17/20 8:35 AM  Admission date: 1/6/2020    Nutrition Assessment   Assessment     Hospital Problem List  Altered mental status, improving    Additional conditions, tests, procedures this admission  Malignant HTN, ?PRES  Seizures  Encephalopathy  Acute respiratory failure, improving  ?Pre-existing dementia  Dysphagia  Anemia  Thrombocytopenia, improving  New onset seizure vs TIA/both?  Depression, improving    (1/6) intubated  (1/8) extubated  (1/9) SLP eval - NPO, alternate route  (1/10) SLP eval - NPO, temporary alternate methods of nutrition/hydration  (1/10) SLP FEES - NPO, temporary alternate methods of nutrition/hydration  (1/13) SLP tx - Safest to cont NPO, alternative means of nutrition  (1/16) SLP tx - Will plan to repeat FEES tomorrow to determine if pt safe for PO  (1/17) SLP FEES pending    PMH/PSxH  HTN  DM2  Tubular adenoma  Chronic diarrhea  IBS  Pancreatic insufficiency  Dementia  Tobacco use  COPD    CCY    Reported/Observed/Food/Nutrition Related History:      RD notes patient off of floor for SLP repeat FEES evaluation to assess for PO advancement. Patient tolerating EN regimen at goal rate following transfer out of ICU. Revisit adjustment to standard formula if patient requires long term enteral feedings.    Per I/O documentation:  Last BM x2 1/16    Anthropometrics     Height: 162.6 cm (64.02\")  Last filed wt: Weight: 64.2 kg (141 lb 8 oz) (01/16/20 0514)  Weight Method: Bed scale    BMI: BMI (Calculated): 25.2  Overweight: 25.0-29.9kg/m2     Ideal Body Weight (IBW) (kg): 55.04  Admission wt: 146 lb 9.7 oz  Method obtained: bed scale weight per charting on admission 1/6    Last 15 Recorded Weights   View Complete Flowsheet   Weight Weight (kg) Weight (lbs) Weight Method VISIT REPORT " "  1/16/2020 64.184 kg 141 lb 8 oz - -   1/15/2020 63.3 kg 139 lb 8.8 oz Bed scale -   1/14/2020 63 kg 138 lb 14.2 oz Bed scale -   1/13/2020 63.2 kg 139 lb 5.3 oz Bed scale -   1/12/2020 58.7 kg 129 lb 6.6 oz Bed scale -   1/11/2020 64.7 kg 142 lb 10.2 oz Bed scale -   1/10/2020 63.5 kg 139 lb 15.9 oz Bed scale -   1/9/2020 64.3 kg 141 lb 12.1 oz - -   1/7/2020 66.5 kg 146 lb 9.7 oz - -   1/7/2020 66.5 kg 146 lb 9.7 oz - -   1/7/2020 66.5 kg 146 lb 9.7 oz - -   1/7/2020 66.5 kg 146 lb 9.7 oz Bed scale -   1/6/2020 66.5 kg 146 lb 9.7 oz Bed scale -   4/4/2019 57.153 kg 126 lb - Report   2/21/2019 55.339 kg 122 lb - Report     Labs reviewed     Results from last 7 days   Lab Units 01/17/20  0410 01/16/20  2244 01/16/20  0532 01/15/20  0431 01/14/20  0350  01/13/20  0441   GLUCOSE mg/dL 209*  --  225* 183* 190*  --  191*   BUN mg/dL 38*  --  31* 22 21  --  21   CREATININE mg/dL 0.62  --  0.60 0.51* 0.40*  --  0.39*   SODIUM mmol/L 139  --  138 138 137  --  139   CHLORIDE mmol/L 101  --  99 97* 101  --  100   POTASSIUM mmol/L 3.8 4.1 3.2* 3.8 4.1   < > 3.5   PHOSPHORUS mg/dL  --   --   --  4.5 4.1  --  3.4   MAGNESIUM mg/dL  --   --  1.4* 1.6 1.4*  --  1.7   ALT (SGPT) U/L  --   --   --   --   --   --  22    < > = values in this interval not displayed.     Results from last 7 days   Lab Units 01/13/20 0441 01/13/20  0437   ALBUMIN g/dL 3.20*  --    PREALBUMIN mg/dL  --  12.1*   CRP mg/dL  --  9.34*   TRIGLYCERIDES mg/dL 259*  --        Results from last 7 days   Lab Units 01/17/20  0557 01/17/20  0145 01/16/20  2346 01/16/20  1753 01/16/20  1130 01/16/20  0547   GLUCOSE mg/dL 243* 196* 197* 169* 213* 232*     Lab Results   Lab Value Date/Time    HGBA1C 5.60 01/06/2020 1821       Medications reviewed   Pertinent: insulin, keppra, zoloft, trazodone      Needs Assessment (1/17)     Height used 162.6 cm (64\")   Weight used 66 kgs (146 lbs)         Estimated need Method/Equation used Result    Energy/Calorie need    ~1600 " kcals/d 25 kcal/kg actBW  MSJ: 1198 x 1.3  1650 kcal  1557 kcal             Protein   ~73 g/day 1.0 - 1.2 g/kg actBW 66 - 79 g        Fiber 25 - 30 g/kg    Fluid 25 - 30 mL/kcal 1650 - 1980 mL          Current Nutrition Prescription     PO: NPO Diet  EN: Diet, Tube Feeding Tube Feeding Formula: Peptamen AF (Peptide Based, Critical Care, ALI)    Goal rate: 60 mL  Route: NGT    Evaluation of Received Nutrient/Fluid Intake last day:  ?discrepancy in documentation from 1/16 - 2017 mL     At Target Goal Volume  Received per I/O's    % Est needs   Volume  1200 ml 1418 mL      Modulars        Energy/kcals 1440 kcals 1702 kcals 118%   Protein  91 g pro 108 g pro 119%               Fiber 7 g 8.5 g    Fluid   W/Free water 973 ml  1333 ml 1150 ml  1424 ml              Nutrition Diagnosis     1/9 updated 1/13, 1/15  Problem Altered GI function   Etiology Pancreatic insufficiency / IBS   Signs/Symptoms History of chronic diarrhea   Status: resolving, no diarrhea    1/9 updated 1/13, 1/15  Problem Swallowing difficulty   Etiology Dysphagia / encephalopathy   Signs/Symptoms SLP eval 1/13 rec, NPO/alt method nutrition   Status: ongoing    1/7  Problem Inadequate protein intake    Etiology Clinical status / intubated   Signs/Symptoms NPO   Status: EN started 1/13    Nutrition Intervention   1.  Follow treatment progress, Care plan reviewed  2. RD to continue to monitor patient EN tolerance and SLP evaluations for possible transition to PO diet. Will adjust EN regimen as medically appropriate      Goal:   General: Nutrition support treatment  EN/PN: Tolerate EN at goal, Deliver estimated needs    Monitoring/Evaluation:   Per protocol, I&O, Pertinent labs, EN delivery/tolerance, Weight, Symptoms, Swallow function    Will Continue to follow per protocol    Jordana Mckeon RDN, LD  Time Spent: 35 minutes

## 2020-01-17 NOTE — CONSULTS
Patient Name:  Glo Berry  YOB: 1955  [id number]       Patient Care Team:  Philomena Walters MD as PCP - General (Internal Medicine)      General Surgery Consult Note     Date of Consultation: 01/17/20    Consulting Physician - Jason Busby MD    Reason for Consult - Feeding difficulty     Subjective     I have been asked to see  Glo Berry , a 64 y.o. female in consultation for feeding difficulty.  She was transferred to our institution on 1/6/2020 with mentation problems.  She has been diagnosed with an acute encephalopathy possibly related malignant hypertension.  There is also possibility of a MI ES-like syndrome.  She also has evidence of new onset seizures and is currently on Keppra.  She is recovering, but remains without the ability to speak.  She has been seen by speech pathology and is felt unsafe for p.o. intake.    Due to her illness, she is unable to participate in her history, so all history is obtained from the hospital chart and her family. We have been asked to see her for long term  feeding access.        Allergy:   Allergies   Allergen Reactions   • Codeine        Medications:    amLODIPine 10 mg Oral Q24H   aspirin 81 mg Oral Daily   Or      aspirin 300 mg Rectal Daily   atorvastatin 80 mg Oral Nightly   carvedilol 25 mg Oral BID With Meals   donepezil 5 mg Oral Nightly   heparin (porcine) 5,000 Units Subcutaneous Q8H   insulin regular 0-7 Units Subcutaneous Q6H   ipratropium-albuterol 3 mL Nebulization 4x Daily - RT   levETIRAcetam 500 mg Oral Q12H   lidocaine 1 patch Transdermal Q24H   losartan 100 mg Oral Daily   nicotine 1 patch Transdermal Q24H   risperiDONE 1 mg Nasogastric Daily   risperiDONE 2 mg Nasogastric Nightly   sertraline 150 mg Nasogastric Nightly   traZODone 100 mg Nasogastric Nightly        No current facility-administered medications on file prior to encounter.      Current Outpatient Medications on File Prior to Encounter   Medication Sig   •  amLODIPine (NORVASC) 5 MG tablet    • carvedilol (COREG) 25 MG tablet Take 25 mg by mouth 2 (Two) Times a Day With Meals.   • colesevelam (WELCHOL) 625 MG tablet 625 mg 2 (Two) Times a Day With Meals.   • cyanocobalamin 1000 MCG/ML injection inject contents of 1 vial intramuscularly MONTHLY   • dicyclomine (BENTYL) 20 MG tablet Take 20 mg by mouth 2 (Two) Times a Day.   • donepezil (ARICEPT) 5 MG tablet Take 5 mg by mouth Every Night.   • fenofibrate 160 MG tablet Take 160 mg by mouth Daily.   • losartan (COZAAR) 100 MG tablet Take 100 mg by mouth Daily.   • metFORMIN (GLUCOPHAGE) 1000 MG tablet Take 1,000 mg by mouth Daily With Breakfast.   • Pancrelipase, Lip-Prot-Amyl, (CREON) 92986 units capsule delayed-release particles Take 2 by mouth 30 minutes before meals and 1 before a snack   • potassium chloride (K-DUR,KLOR-CON) 20 MEQ CR tablet Take 20 mEq by mouth Daily.   • prochlorperazine (COMPAZINE) 10 MG tablet Take  by mouth Every 8 (Eight) Hours As Needed.   • sertraline (ZOLOFT) 100 MG tablet Take 50 mg by mouth 2 (Two) Times a Day.   • topiramate (TOPAMAX) 50 MG tablet Take 50 mg by mouth Daily.   • traZODone (DESYREL) 100 MG tablet Take 100 mg by mouth Every Night.   • venlafaxine XR (EFFEXOR-XR) 150 MG 24 hr capsule Take 150 mg by mouth Daily.       PMHx:   Past Medical History:   Diagnosis Date   • COPD (chronic obstructive pulmonary disease) (CMS/HCC)    • Diabetes mellitus (CMS/HCC)    • History of ear injury 1973    Injury of the right eardrum.This has resulted in the headaches   • Hypertension        Past Surgical History:  Past Surgical History:   Procedure Laterality Date   • BREAST CYST EXCISION     • SINUS SURGERY     • TONSILLECTOMY     • TUBAL ABDOMINAL LIGATION           Family History: Noncontributory     Social History:     Tobacco use: 1 ppd     EtOH use : None    Illicit drug use: None      Review of Systems   Review of systems could not be obtained due to   patient nonverbal.              "  Objective     Physical Exam:      Vital Signs  /56 (BP Location: Left arm, Patient Position: Lying)   Pulse 64   Temp 98.7 °F (37.1 °C) (Oral)   Resp 16   Ht 162.6 cm (64.02\")   Wt 64.2 kg (141 lb 8 oz)   SpO2 98%   BMI 24.28 kg/m²     Intake/Output Summary (Last 24 hours) at 1/17/2020 1453  Last data filed at 1/17/2020 0500  Gross per 24 hour   Intake 2380 ml   Output --   Net 2380 ml         Physical Exam:    Head: Normocephalic, atraumatic.   Eyes: Pupils equal, round, react to light and accommodation.   Mouth: Oral mucosa without lesions,  nasoenteral tube in place  Neck: No masses, lymphadenopathy or carotid bruits bilaterally   CV: Rhythm  and rate regular , no  murmurs, rubs or gallops  Lungs: Clear  to auscultation bilaterally   Abdomen: Bowel sounds positive  , soft,   Groin : No obvious hernias bilaterally   Extremities:  No cyanosis, clubbing or edema bilaterally  Lymphatics: No abnormal lymphadenopathy appreciated         Results Review: I have personally reviewed all of the recent lab and imaging results available at this time.  Laboratory exam shows a basic metabolic panel with an elevated glucose of 209.  White count is 11.3 with a hemoglobin 7.8 platelet count 139.       Assessment/Plan     Assessment and Plan:      Feeding Difficulty - We will plan for PEG Monday 1/20/20. The risks and benefits were discussed with the family, and they agreed to proceed.    Hospital Problem List     * (Principal) Encephalopathy acute    R/O AIS     R/O status epilepticus     Hypertension (Chronic)        Pancreatic insufficiency (Chronic)    T2DM on metformin  (Chronic)        Chronic diarrhea (Chronic)    Overview Signed 1/6/2020  3:34 PM by Aubree Luis APRN     Followed by Dr. Wright          Lumbar stenosis (Chronic)    Diabetic neuropathy  (Chronic)                Dementia  (Chronic)            Acute respiratory failure     Encephalopathy                I discussed the patient's findings and my " recommendations with the patient and/or family, as well as the primary team     Milton Cerda MD  01/17/20  2:53 PM        Please note that portions of this note were completed with a voice recognition program. Efforts were made to edit the dictations, but occasionally words are mistranscribed.

## 2020-01-17 NOTE — PROGRESS NOTES
"Glo Berry    Subjective     CC: altered mental status     History of Present Illness     Glo Berry is followed with [facility]. Nursing notes hallucinations and delusions overnight, though she did not require any PRN medications. She states that she feels well this morning and denies any new concerns.    There have been no other changes in the patient's interval history since Dr. Petersen's last note yesterday, 1/16/2020.    I have reviewed and confirmed the past family, social and medical history as accurate on 1/17/2020.     Review of Systems   Constitutional: Negative.    HENT: Negative.    Eyes: Negative.    Respiratory: Negative.    Cardiovascular: Negative.    Gastrointestinal: Negative.    Endocrine: Negative.    Genitourinary: Negative.    Musculoskeletal: Negative.    Skin: Negative.    Allergic/Immunologic: Negative.    Hematological: Negative.    Psychiatric/Behavioral: Negative.        Objective     /81 (BP Location: Left arm, Patient Position: Lying)   Pulse 84   Temp 97.9 °F (36.6 °C) (Oral)   Resp 16   Ht 162.6 cm (64.02\")   Wt 64.2 kg (141 lb 8 oz)   SpO2 97%   BMI 24.28 kg/m²     Physical Exam   Neurological: She has normal strength. She has a normal Finger-Nose-Finger Test.   Psychiatric: Her speech is normal.        Neurologic Exam     Mental Status   Oriented to person.   (Oriented to state, city, county, and hospital)  Disoriented to year and day. Oriented to month, date and season.   Registration: recalls 3 of 3 objects. Recall at 5 minutes: recalls 1 of 3 objects.   Attention: 4/5 sequencing.   Speech: speech is normal   Level of consciousness: alert  Able to repeat. Normal comprehension.     Cranial Nerves   Cranial nerves II through XII intact.     Motor Exam   Muscle bulk: normal  Overall muscle tone: normal    Strength   Strength 5/5 throughout.     Sensory Exam   Light touch normal.     Gait, Coordination, and Reflexes     Coordination   Finger to nose coordination: " normal    Tremor   Resting tremor: absent      Assessment/Plan       Glo Berry is followed with encephalopathy related to malignant hypertension. She will continue on her current medications unchanged. She will be discharged to [facility], once her feeding tube plans are addressed, which is quite reasonable. I would recommend that she follow up in our memory clinic 4-6 weeks following her discharge.

## 2020-01-17 NOTE — NURSING NOTE
Patients K+ level was 3.2 @ 0530, first dose of K+ 40 meq was given @ 1009. Second dose wasn't given. Spoke to Don in pharmacy @ 1468, pharmacy ordered a STAT draw. K+ level was 3.8, no K+ ordered.

## 2020-01-18 LAB
GLUCOSE BLDC GLUCOMTR-MCNC: 186 MG/DL (ref 70–130)
GLUCOSE BLDC GLUCOMTR-MCNC: 193 MG/DL (ref 70–130)
GLUCOSE BLDC GLUCOMTR-MCNC: 215 MG/DL (ref 70–130)
GLUCOSE BLDC GLUCOMTR-MCNC: 216 MG/DL (ref 70–130)
GLUCOSE BLDC GLUCOMTR-MCNC: 225 MG/DL (ref 70–130)
MAGNESIUM SERPL-MCNC: 1.6 MG/DL (ref 1.6–2.4)
POTASSIUM BLD-SCNC: 4.4 MMOL/L (ref 3.5–5.2)

## 2020-01-18 PROCEDURE — 84132 ASSAY OF SERUM POTASSIUM: CPT | Performed by: INTERNAL MEDICINE

## 2020-01-18 PROCEDURE — 99232 SBSQ HOSP IP/OBS MODERATE 35: CPT | Performed by: PSYCHIATRY & NEUROLOGY

## 2020-01-18 PROCEDURE — 83735 ASSAY OF MAGNESIUM: CPT | Performed by: INTERNAL MEDICINE

## 2020-01-18 PROCEDURE — 25010000002 HEPARIN (PORCINE) PER 1000 UNITS: Performed by: INTERNAL MEDICINE

## 2020-01-18 PROCEDURE — 99232 SBSQ HOSP IP/OBS MODERATE 35: CPT | Performed by: INTERNAL MEDICINE

## 2020-01-18 PROCEDURE — 94799 UNLISTED PULMONARY SVC/PX: CPT

## 2020-01-18 PROCEDURE — 82962 GLUCOSE BLOOD TEST: CPT

## 2020-01-18 RX ORDER — CEFAZOLIN SODIUM 2 G/100ML
2 INJECTION, SOLUTION INTRAVENOUS
Status: COMPLETED | OUTPATIENT
Start: 2020-01-20 | End: 2020-01-20

## 2020-01-18 RX ADMIN — INSULIN HUMAN 3 UNITS: 100 INJECTION, SOLUTION PARENTERAL at 00:30

## 2020-01-18 RX ADMIN — IPRATROPIUM BROMIDE AND ALBUTEROL SULFATE 3 ML: 2.5; .5 SOLUTION RESPIRATORY (INHALATION) at 19:23

## 2020-01-18 RX ADMIN — CARVEDILOL 25 MG: 12.5 TABLET, FILM COATED ORAL at 18:53

## 2020-01-18 RX ADMIN — LIDOCAINE 1 PATCH: 50 PATCH TOPICAL at 08:29

## 2020-01-18 RX ADMIN — SERTRALINE HYDROCHLORIDE 150 MG: 100 TABLET ORAL at 21:11

## 2020-01-18 RX ADMIN — RISPERIDONE 2 MG: 1 SOLUTION ORAL at 21:11

## 2020-01-18 RX ADMIN — LEVETIRACETAM 500 MG: 500 TABLET, FILM COATED ORAL at 08:28

## 2020-01-18 RX ADMIN — IPRATROPIUM BROMIDE AND ALBUTEROL SULFATE 3 ML: 2.5; .5 SOLUTION RESPIRATORY (INHALATION) at 13:42

## 2020-01-18 RX ADMIN — LOSARTAN POTASSIUM 100 MG: 50 TABLET, FILM COATED ORAL at 08:28

## 2020-01-18 RX ADMIN — INSULIN HUMAN 3 UNITS: 100 INJECTION, SOLUTION PARENTERAL at 14:00

## 2020-01-18 RX ADMIN — TRAZODONE HYDROCHLORIDE 100 MG: 100 TABLET ORAL at 21:11

## 2020-01-18 RX ADMIN — HEPARIN SODIUM 5000 UNITS: 5000 INJECTION INTRAVENOUS; SUBCUTANEOUS at 05:52

## 2020-01-18 RX ADMIN — MAGNESIUM SULFATE HEPTAHYDRATE 4 G: 40 INJECTION, SOLUTION INTRAVENOUS at 16:34

## 2020-01-18 RX ADMIN — HEPARIN SODIUM 5000 UNITS: 5000 INJECTION INTRAVENOUS; SUBCUTANEOUS at 14:00

## 2020-01-18 RX ADMIN — DONEPEZIL HYDROCHLORIDE 5 MG: 5 TABLET ORAL at 21:11

## 2020-01-18 RX ADMIN — NICOTINE 1 PATCH: 21 PATCH, EXTENDED RELEASE TRANSDERMAL at 08:28

## 2020-01-18 RX ADMIN — IPRATROPIUM BROMIDE AND ALBUTEROL SULFATE 3 ML: 2.5; .5 SOLUTION RESPIRATORY (INHALATION) at 16:47

## 2020-01-18 RX ADMIN — FERROUS SULFATE TAB 325 MG (65 MG ELEMENTAL FE) 325 MG: 325 (65 FE) TAB at 08:28

## 2020-01-18 RX ADMIN — TRAZODONE HYDROCHLORIDE 50 MG: 50 TABLET ORAL at 00:24

## 2020-01-18 RX ADMIN — HEPARIN SODIUM 5000 UNITS: 5000 INJECTION INTRAVENOUS; SUBCUTANEOUS at 21:10

## 2020-01-18 RX ADMIN — RISPERIDONE 1 MG: 1 SOLUTION ORAL at 08:38

## 2020-01-18 RX ADMIN — INSULIN HUMAN 2 UNITS: 100 INJECTION, SOLUTION PARENTERAL at 18:53

## 2020-01-18 RX ADMIN — INSULIN HUMAN 3 UNITS: 100 INJECTION, SOLUTION PARENTERAL at 05:52

## 2020-01-18 RX ADMIN — ASPIRIN 81 MG 81 MG: 81 TABLET ORAL at 08:28

## 2020-01-18 RX ADMIN — ATORVASTATIN CALCIUM 80 MG: 40 TABLET, FILM COATED ORAL at 21:11

## 2020-01-18 RX ADMIN — HYDROCODONE BITARTRATE AND ACETAMINOPHEN 1 TABLET: 5; 325 TABLET ORAL at 21:10

## 2020-01-18 RX ADMIN — AMLODIPINE BESYLATE 10 MG: 10 TABLET ORAL at 08:28

## 2020-01-18 RX ADMIN — IPRATROPIUM BROMIDE AND ALBUTEROL SULFATE 3 ML: 2.5; .5 SOLUTION RESPIRATORY (INHALATION) at 07:35

## 2020-01-18 RX ADMIN — CARVEDILOL 25 MG: 12.5 TABLET, FILM COATED ORAL at 08:28

## 2020-01-18 RX ADMIN — LEVETIRACETAM 500 MG: 500 TABLET, FILM COATED ORAL at 21:11

## 2020-01-18 RX ADMIN — HYDROCODONE BITARTRATE AND ACETAMINOPHEN 1 TABLET: 5; 325 TABLET ORAL at 08:28

## 2020-01-18 NOTE — PROGRESS NOTES
Subjective:    CC: Glo Berry is seen today for altered mental status    HPI:  Patient did not have any acute events overnight.  She denies having any hallucinations since yesterday.      Current Facility-Administered Medications:   •  amLODIPine (NORVASC) tablet 10 mg, 10 mg, Oral, Q24H, Gurmeet Jules MD, 10 mg at 01/18/20 0828  •  aspirin chewable tablet 81 mg, 81 mg, Oral, Daily, 81 mg at 01/18/20 0828 **OR** aspirin suppository 300 mg, 300 mg, Rectal, Daily, Gurmeet Jules MD, 300 mg at 01/07/20 0839  •  atorvastatin (LIPITOR) tablet 80 mg, 80 mg, Oral, Nightly, Gurmeet Jules MD, 80 mg at 01/17/20 2032  •  carvedilol (COREG) tablet 25 mg, 25 mg, Oral, BID With Meals, Gurmeet Jules MD, 25 mg at 01/18/20 0828  •  [START ON 1/20/2020] ceFAZolin in dextrose (ANCEF) IVPB solution 2 g, 2 g, Intravenous, 60 Min Pre-Op, Milton Cerda MD  •  donepezil (ARICEPT) tablet 5 mg, 5 mg, Oral, Nightly, Buzz Petersen MD, 5 mg at 01/17/20 2031  •  ferrous sulfate tablet 325 mg, 325 mg, Oral, Daily With Breakfast, Jason Busby MD, 325 mg at 01/18/20 0828  •  heparin (porcine) 5000 UNIT/ML injection 5,000 Units, 5,000 Units, Subcutaneous, Q8H, Gurmeet Jules MD, 5,000 Units at 01/18/20 0552  •  HYDROcodone-acetaminophen (NORCO) 5-325 MG per tablet 1 tablet, 1 tablet, Oral, Q6H PRN, Gurmeet Jules MD, 1 tablet at 01/18/20 0828  •  insulin regular (humuLIN R,novoLIN R) injection 0-7 Units, 0-7 Units, Subcutaneous, Q6H, Gurmeet Jules MD, 3 Units at 01/18/20 0552  •  ipratropium-albuterol (DUO-NEB) nebulizer solution 3 mL, 3 mL, Nebulization, 4x Daily - RT, Gurmeet Jules MD, 3 mL at 01/18/20 0735  •  ipratropium-albuterol (DUO-NEB) nebulizer solution 3 mL, 3 mL, Nebulization, Q4H PRN, Gurmeet Jules MD, 3 mL at 01/15/20 0339  •  labetalol (NORMODYNE,TRANDATE) injection 20 mg, 20 mg, Intravenous, Q10 Min PRN, Gurmeet Jules MD, 20 mg at 01/12/20 1606  •  levETIRAcetam (KEPPRA) tablet  500 mg, 500 mg, Oral, Q12H, Gurmeet Jules MD, 500 mg at 01/18/20 0828  •  lidocaine (LIDODERM) 5 % 1 patch, 1 patch, Transdermal, Q24H, Buzz Petersen MD, 1 patch at 01/18/20 0829  •  losartan (COZAAR) tablet 100 mg, 100 mg, Oral, Daily, Gurmeet Jules MD, 100 mg at 01/18/20 0828  •  magnesium sulfate 2g/50 mL (PREMIX) infusion, 6 g, Intravenous, PRN, Gurmeet Jules MD, 6 g at 01/14/20 0546  •  magnesium sulfate 4g/100mL (PREMIX) infusion, 4 g, Intravenous, PRN, Gurmeet Jules MD, 4 g at 01/15/20 1231  •  nicotine (NICODERM CQ) 21 MG/24HR patch 1 patch, 1 patch, Transdermal, Q24H, Gurmeet Jules MD, 1 patch at 01/18/20 0828  •  potassium chloride (KLOR-CON) packet 40 mEq, 40 mEq, Oral, PRN, Gurmeet Jules MD, 40 mEq at 01/16/20 1009  •  potassium chloride 10 mEq in 100 mL IVPB, 10 mEq, Intravenous, Q1H PRN, Gurmeet Jules MD  •  potassium phosphate 15 mmol in sodium chloride 0.9 % 100 mL infusion, 15 mmol, Intravenous, PRN, Gurmeet Jules MD  •  potassium phosphate 30 mmol in sodium chloride 0.9 % 500 mL infusion, 30 mmol, Intravenous, PRN, Gurmeet Jules MD, 30 mmol at 01/11/20 1344  •  risperiDONE (risperDAL) 1 MG/ML oral solution 1 mg, 1 mg, Nasogastric, Daily, Gurmeet Jules MD, 1 mg at 01/18/20 0838  •  risperiDONE (risperDAL) 1 MG/ML oral solution 1 mg, 1 mg, Oral, Q12H PRN, Gurmeet Jules MD  •  risperiDONE (risperDAL) 1 MG/ML oral solution 2 mg, 2 mg, Nasogastric, Nightly, uGrmeet Jules MD, 2 mg at 01/17/20 2035  •  sertraline (ZOLOFT) tablet 150 mg, 150 mg, Nasogastric, Nightly, Gurmeet Jules MD, 150 mg at 01/17/20 2030  •  traZODone (DESYREL) tablet 100 mg, 100 mg, Nasogastric, Nightly, Buzz Petersen MD, 100 mg at 01/17/20 2032  •  traZODone (DESYREL) tablet 50 mg, 50 mg, Oral, Nightly PRN, Gurmeet Jules MD, 50 mg at 01/18/20 0024       Past Medical History:   Diagnosis Date   • COPD (chronic obstructive pulmonary disease) (CMS/[facility])    • Diabetes  "mellitus (CMS/[facility])    • History of ear injury 1973    Injury of the right eardrum.This has resulted in the headaches   • Hypertension         Past Surgical History:   Procedure Laterality Date   • BREAST CYST EXCISION     • SINUS SURGERY     • TONSILLECTOMY     • TUBAL ABDOMINAL LIGATION          Family History   Problem Relation Age of Onset   • Aortic aneurysm Mother    • Lung disease Father    • Parkinsonism Father    • Heart disease Other         Social History     Socioeconomic History   • Marital status:      Spouse name: Not on file   • Number of children: Not on file   • Years of education: Not on file   • Highest education level: Not on file   Tobacco Use   • Smoking status: Current Every Day Smoker     Packs/day: 1.00     Types: Cigarettes   • Smokeless tobacco: Never Used   Substance and Sexual Activity   • Alcohol use: No       Review of Systems Pertinent items are noted in HPI, all other systems reviewed and negative     Objective:    /66 (BP Location: Left arm, Patient Position: Lying)   Pulse 73   Temp 97.6 °F (36.4 °C) (Axillary)   Resp 18   Ht 162.6 cm (64.02\")   Wt 64.2 kg (141 lb 8 oz)   SpO2 100%   BMI 24.28 kg/m²       Neurology Exam:    General appearance: Sitting up in bed, has a NG tube    Mental status: Alert, awake and oriented to time place and person.  Could tell me the month, name of the hospital and her date of birth    Recent and Remote memory: Intact.    Attention span and Concentration: Normal.     Language and Speech: Intact- No dysarthria.    Fluency, Naming, Repetition and Comprehension:  Intact    Cranial Nerves:   CN II: Visual fields are full. Intact. Fundi - Normal, No papilledema, Pupils - ALLISON  CN III, IV and VI: Extraocular movements are intact. Normal saccades.   CN V: Facial sensation is intact.   CN VII: Muscles of facial expression reveal no asymmetry. Intact.   CN VIII: Hearing is intact. Whispered voice intact.   CN IX and X: Palate elevates " symmetrically. Intact  CN XI: Shoulder shrug is intact.   CN XII: Tongue is midline without evidence of atrophy or fasciculation.     Motor:  Right UE muscle strength 5/5. Normal tone.     Left UE muscle strength 5/5. Normal tone.      Right LE muscle strength5/5. Normal tone.     Left LE muscle strength 5/5. Normal tone.      Sensory: Normal light touch, vibration and pinprick sensation bilaterally.    DTRs: 2+ bilaterally in upper and lower extremities.    Babinski: Negative bilaterally.    Coordination: Normal finger-to-nose, heel to shin B/L.    Gait: Deferred    Ophthalmoscopic examination-no papilledema noted    Cardiac examination -normal rate and rhythm    Labs:  Most recent labs have been reviewed.    Results from last 7 days   Lab Units 01/17/20  0410   WBC 10*3/mm3 11.37*   HEMOGLOBIN g/dL 7.8*   HEMATOCRIT % 27.2*   PLATELETS 10*3/mm3 139*     Results from last 7 days   Lab Units 01/17/20  0410   SODIUM mmol/L 139   POTASSIUM mmol/L 3.8   CHLORIDE mmol/L 101   CO2 mmol/L 28.0   BUN mg/dL 38*   CREATININE mg/dL 0.62   CALCIUM mg/dL 11.4*       Radiology: No radiology results for the last day    EEG showed moderate generalized slowing but no epileptiform activity    Assessment and Plan:  64-year-old female that presented with altered mental status in the setting of malignant hypertension  -Has improved significantly  -Blood pressure much better controlled now  -Continue Risperdal 1 mg at night and Keppra 500 mg twice a day for now for seizure prophylaxis.  -Patient is awaiting discharge to nursing home with PEG tube placement  -Neurology to sign off.  Call if needed    She will follow-up in the memory care clinic in 4 to 6 weeks.  If no seizures till then the Keppra could be tapered and stopped    Bridget Ortiz MD 01/18/20 11:20 AM

## 2020-01-18 NOTE — PLAN OF CARE
Problem: Patient Care Overview  Goal: Plan of Care Review  Outcome: Ongoing (interventions implemented as appropriate)  Flowsheets (Taken 1/18/2020 0106)  Progress: improving  Plan of Care Reviewed With: patient  Outcome Summary: Pt alert, disoriented to time, situation. VSS. O2 @ 2LPM. NSR per monitor. NG tube in place in right nare at 78. Peptamen AF TF going continously per pump at 60mL/hr with 30mL flush q2h. Pt tolerating well. Will continue to monitor.

## 2020-01-18 NOTE — PLAN OF CARE
Problem: Patient Care Overview  Goal: Plan of Care Review  Outcome: Ongoing (interventions implemented as appropriate)   Pt more oriented today than yesterday. Pt tends to wax and wane through day.  Pt prefers sitting up in chair vs laying in bed. [place] utilized. Pt tolerating tube feedings well at 60ml/hr.  Magnesium replaced.  signed consent for PEG on Monday.

## 2020-01-18 NOTE — PROGRESS NOTES
[facility] Medicine Services  PROGRESS NOTE    Patient Name: Glo Berry  : 1955  MRN: [id number]    Date of Admission: 2020  Primary Care Physician: Philomena Walters MD    Subjective   Subjective     CC:  Dysphasia    HPI:  Patient feels okay today, still some generalized weakness.  Agreeable to proceed with PEG tube placement on Monday    Review of Systems  Gen- No fevers, chills  CV- No chest pain, palpitations  Resp- No cough, dyspnea  GI- No N/V/D, abd pain        All other systems reviewed and are negative.    Objective   Objective     Vital Signs:   Temp:  [97.2 °F (36.2 °C)-98.2 °F (36.8 °C)] 98.2 °F (36.8 °C)  Heart Rate:  [51-99] 63  Resp:  [16-22] 18  BP: (104-123)/(59-68) 113/59  Total (NIH Stroke Scale): 6     Physical Exam:  Constitutional -frail female, sitting up in bed  HEENT-NCAT, mucous membranes moist, Keofeed tube bridled in place  CV-RRR, S1 S2 normal, no m/r/g  Resp-grossly clear bilaterally  Abd-soft, non-tender, non-distended, normo active bowel sounds  Ext-No lower extremity cyanosis, clubbing or edema bilaterally  Neuro-alert, speech clear, moves all extremities   Psych-normal affect   Skin- No rash on exposed UE or LE bilaterally          Results Reviewed:  Results from last 7 days   Lab Units 20  04120  0532 01/15/20  0431   WBC 10*3/mm3 11.37* 11.54* 12.37*   HEMOGLOBIN g/dL 7.8* 8.0* 8.6*   HEMATOCRIT % 27.2* 26.2* 28.0*   PLATELETS 10*3/mm3 139* 146 145     Results from last 7 days   Lab Units 20  1201 20  0410 20  2244 20  0532 01/15/20  0431  20  0441   SODIUM mmol/L  --  139  --  138 138   < > 139   POTASSIUM mmol/L 4.4 3.8 4.1 3.2* 3.8   < > 3.5   CHLORIDE mmol/L  --  101  --  99 97*   < > 100   CO2 mmol/L  --  28.0  --  27.0 30.0*   < > 28.0   BUN mg/dL  --  38*  --  31* 22   < > 21   CREATININE mg/dL  --  0.62  --  0.60 0.51*   < > 0.39*   GLUCOSE mg/dL  --  209*  --  225* 183*   < > 191*      CALCIUM mg/dL  --  11.4*  --  10.6* 11.2*   < > 10.6*   ALT (SGPT) U/L  --   --   --   --   --   --  22   AST (SGOT) U/L  --   --   --   --   --   --  37*    < > = values in this interval not displayed.     Estimated Creatinine Clearance: 92.9 mL/min (by C-G formula based on SCr of 0.62 mg/dL).    Microbiology Results Abnormal     None          Imaging Results (Last 24 Hours)     ** No results found for the last 24 hours. **          Results for orders placed during the hospital encounter of 01/06/20   Adult Transthoracic Echo Complete W/ Cont if Necessary Per Protocol (With Agitated Saline)    Narrative · There is calcification of the aortic valve.  · Peak velocity of the flow distal to the aortic valve is 217.0 cm/s.  · Estimated EF = 70%.  · Left ventricular systolic function is normal.          I have reviewed the medications:  Scheduled Meds:    amLODIPine 10 mg Oral Q24H   aspirin 81 mg Oral Daily   Or      aspirin 300 mg Rectal Daily   atorvastatin 80 mg Oral Nightly   carvedilol 25 mg Oral BID With Meals   [START ON 1/20/2020] ceFAZolin 2 g Intravenous 60 Min Pre-Op   donepezil 5 mg Oral Nightly   ferrous sulfate 325 mg Oral Daily With Breakfast   heparin (porcine) 5,000 Units Subcutaneous Q8H   insulin regular 0-7 Units Subcutaneous Q6H   ipratropium-albuterol 3 mL Nebulization 4x Daily - RT   levETIRAcetam 500 mg Oral Q12H   lidocaine 1 patch Transdermal Q24H   losartan 100 mg Oral Daily   nicotine 1 patch Transdermal Q24H   risperiDONE 1 mg Nasogastric Daily   risperiDONE 2 mg Nasogastric Nightly   sertraline 150 mg Nasogastric Nightly   traZODone 100 mg Nasogastric Nightly     Continuous Infusions:   PRN Meds:.HYDROcodone-acetaminophen  •  ipratropium-albuterol  •  labetalol  •  magnesium sulfate  •  magnesium sulfate  •  potassium chloride  •  potassium chloride  •  potassium phosphate  •  potassium phosphate  •  risperiDONE  •  traZODone    Assessment/Plan   Assessment & Plan     Active Hospital  Problems    Diagnosis  POA   • **Encephalopathy acute [G93.40]  Yes   • R/O AIS  [I63.9]  Yes   • R/O status epilepticus  [G40.901]  Yes   • Hypertension [I10]  Yes   • Pancreatic insufficiency [K86.89]  Yes   • T2DM on metformin  [E11.9]  Yes   • Chronic diarrhea [K52.9]  Yes   • Lumbar stenosis [M48.061]  Yes   • Diabetic neuropathy  [E11.40]  Yes   • Depression [F32.9]  Yes   • Dementia  [F03.90]  Yes   • Smoker [F17.200]  Yes   • Acute respiratory failure  [J96.00]  Yes   • Encephalopathy [G93.40]  Yes      Resolved Hospital Problems   No resolved problems to display.        Brief Hospital Course to date:  Glo Berry is a 64 y.o. female transferred from the outside hospital for mentation    Acute encephalopathy  -May have been secondary to malignant hypertension as blood pressure elevated on admission here in the 180s.  Discussed possible etiologies with neurology Dr. Petersen who felt the patient's presentation may actually represent a PRES-like syndrome  -Blood pressure now well controlled with systolics in the 120s  -EEG without epileptic changes, however continuing Keppra for possible underlying new onset seizure  -Mental status improving.  -Continue risperidone at night and additional risperidone prn    Possible new onset seizures  -Continue Keppra  - follow up Neurology memory care clinic in 4-6 weeks. If no further seizures, keppra coud be tapered and stopped    Malignant hypertension  -Resolved    Acute respiratory failure  -Resolved    Dysphagia  -Continued severe dysphagia on speech evaluation Friday.    -General Surgery Dr Cerda follows, PEG tube Monday likely.  Depression  -Zoloft    Dementia  -Continue Aricept    DM 2  -Continue insulin therapy  -A1c 5.6    Tobacco abuse    Hypokalemia  -Replace both potassium and magnesium as needed    Anemia  -Hemoglobin 7.8, normocytic, with normal iron levels but slightly low iron saturation.  B12 and folate levels unremarkable  -Recommend weekly CBC while at  rehab    ICA stenosis  -Left ICA stenosis noted on duplex exam of approximately 50 to 69%, however only 30% stenoses noted on CT Angio of the neck.  -Continue medical therapy      DVT Prophylaxis: heparin    Disposition: I expect the patient to be discharged to rehab/SNF early next week after PEG tube placed    CODE STATUS:   Code Status and Medical Interventions:   Ordered at: 01/06/20 1647     Code Status:    CPR     Medical Interventions (Level of Support Prior to Arrest):    Full         Electronically signed by Jason Busby MD, 01/18/20, 6:11 PM.

## 2020-01-18 NOTE — PROGRESS NOTES
"Patient Name:  Glo Berry  YOB: 1955  [id number]    Surgery Progress Note    Date of visit: 1/18/2020    Subjective   Subjective: Stable, no new issues.         Objective     Objective:     /66 (BP Location: Left arm, Patient Position: Lying)   Pulse 80   Temp 97.6 °F (36.4 °C) (Axillary)   Resp 18   Ht 162.6 cm (64.02\")   Wt 64.2 kg (141 lb 8 oz)   SpO2 100%   BMI 24.28 kg/m²     Intake/Output Summary (Last 24 hours) at 1/18/2020 0902  Last data filed at 1/18/2020 0843  Gross per 24 hour   Intake 2719 ml   Output 100 ml   Net 2619 ml       CV:  Rhythm  regular and rate regular   L:  Clear  to auscultation bilaterally   Abd:  Bowel sounds positive , soft, nontender  Ext:  No cyanosis, clubbing, edema    Recent labs that are back at this time have been reviewed.        Assessment/Plan     Assessment/ Plan:    Hospital Problem List     * (Principal) Encephalopathy acute - Plan PEG for Monday    R/O AIS     R/O status epilepticus     Hypertension (Chronic)        Pancreatic insufficiency (Chronic)    T2DM on metformin  (Chronic)        Chronic diarrhea (Chronic)    Overview Signed 1/6/2020  3:34 PM by Aubree Luis APRN     Followed by Dr. Wright          Lumbar stenosis (Chronic)    Diabetic neuropathy  (Chronic)        Depression (Chronic)        Dementia  (Chronic)        Smoker    Acute respiratory failure     Encephalopathy              Milton Cerda MD  1/18/2020  9:02 AM      "

## 2020-01-19 LAB
GLUCOSE BLDC GLUCOMTR-MCNC: 183 MG/DL (ref 70–130)
GLUCOSE BLDC GLUCOMTR-MCNC: 205 MG/DL (ref 70–130)
GLUCOSE BLDC GLUCOMTR-MCNC: 212 MG/DL (ref 70–130)
GLUCOSE BLDC GLUCOMTR-MCNC: 242 MG/DL (ref 70–130)
MAGNESIUM SERPL-MCNC: 2.1 MG/DL (ref 1.6–2.4)

## 2020-01-19 PROCEDURE — 94799 UNLISTED PULMONARY SVC/PX: CPT

## 2020-01-19 PROCEDURE — 82962 GLUCOSE BLOOD TEST: CPT

## 2020-01-19 PROCEDURE — 25010000002 HEPARIN (PORCINE) PER 1000 UNITS: Performed by: INTERNAL MEDICINE

## 2020-01-19 PROCEDURE — 83735 ASSAY OF MAGNESIUM: CPT | Performed by: INTERNAL MEDICINE

## 2020-01-19 PROCEDURE — 99232 SBSQ HOSP IP/OBS MODERATE 35: CPT | Performed by: INTERNAL MEDICINE

## 2020-01-19 RX ORDER — FERROUS SULFATE 300 MG/5ML
300 LIQUID (ML) ORAL DAILY
Status: DISCONTINUED | OUTPATIENT
Start: 2020-01-19 | End: 2020-01-22 | Stop reason: HOSPADM

## 2020-01-19 RX ORDER — LEVETIRACETAM 100 MG/ML
500 SOLUTION ORAL EVERY 12 HOURS SCHEDULED
Status: DISCONTINUED | OUTPATIENT
Start: 2020-01-19 | End: 2020-01-22 | Stop reason: HOSPADM

## 2020-01-19 RX ADMIN — MINERAL SUPPLEMENT IRON 300 MG / 5 ML STRENGTH LIQUID 100 PER BOX UNFLAVORED 300 MG: at 13:16

## 2020-01-19 RX ADMIN — INSULIN HUMAN 2 UNITS: 100 INJECTION, SOLUTION PARENTERAL at 18:38

## 2020-01-19 RX ADMIN — RISPERIDONE 2 MG: 1 SOLUTION ORAL at 20:11

## 2020-01-19 RX ADMIN — IPRATROPIUM BROMIDE AND ALBUTEROL SULFATE 3 ML: 2.5; .5 SOLUTION RESPIRATORY (INHALATION) at 15:14

## 2020-01-19 RX ADMIN — IPRATROPIUM BROMIDE AND ALBUTEROL SULFATE 3 ML: 2.5; .5 SOLUTION RESPIRATORY (INHALATION) at 21:03

## 2020-01-19 RX ADMIN — HYDROCODONE BITARTRATE AND ACETAMINOPHEN 1 TABLET: 5; 325 TABLET ORAL at 13:16

## 2020-01-19 RX ADMIN — LOSARTAN POTASSIUM 100 MG: 50 TABLET, FILM COATED ORAL at 10:20

## 2020-01-19 RX ADMIN — NICOTINE 1 PATCH: 21 PATCH, EXTENDED RELEASE TRANSDERMAL at 10:20

## 2020-01-19 RX ADMIN — LEVETIRACETAM 500 MG: 100 SOLUTION ORAL at 10:33

## 2020-01-19 RX ADMIN — INSULIN HUMAN 3 UNITS: 100 INJECTION, SOLUTION PARENTERAL at 05:43

## 2020-01-19 RX ADMIN — CARVEDILOL 25 MG: 12.5 TABLET, FILM COATED ORAL at 10:21

## 2020-01-19 RX ADMIN — HEPARIN SODIUM 5000 UNITS: 5000 INJECTION INTRAVENOUS; SUBCUTANEOUS at 20:13

## 2020-01-19 RX ADMIN — RISPERIDONE 1 MG: 1 SOLUTION ORAL at 10:37

## 2020-01-19 RX ADMIN — SERTRALINE HYDROCHLORIDE 150 MG: 100 TABLET ORAL at 20:13

## 2020-01-19 RX ADMIN — LEVETIRACETAM 500 MG: 100 SOLUTION ORAL at 20:11

## 2020-01-19 RX ADMIN — TRAZODONE HYDROCHLORIDE 100 MG: 100 TABLET ORAL at 20:13

## 2020-01-19 RX ADMIN — IPRATROPIUM BROMIDE AND ALBUTEROL SULFATE 3 ML: 2.5; .5 SOLUTION RESPIRATORY (INHALATION) at 11:14

## 2020-01-19 RX ADMIN — AMLODIPINE BESYLATE 10 MG: 10 TABLET ORAL at 10:20

## 2020-01-19 RX ADMIN — HEPARIN SODIUM 5000 UNITS: 5000 INJECTION INTRAVENOUS; SUBCUTANEOUS at 05:42

## 2020-01-19 RX ADMIN — IPRATROPIUM BROMIDE AND ALBUTEROL SULFATE 3 ML: 2.5; .5 SOLUTION RESPIRATORY (INHALATION) at 07:09

## 2020-01-19 RX ADMIN — HEPARIN SODIUM 5000 UNITS: 5000 INJECTION INTRAVENOUS; SUBCUTANEOUS at 13:29

## 2020-01-19 RX ADMIN — HYDROCODONE BITARTRATE AND ACETAMINOPHEN 1 TABLET: 5; 325 TABLET ORAL at 20:13

## 2020-01-19 RX ADMIN — DONEPEZIL HYDROCHLORIDE 5 MG: 5 TABLET ORAL at 20:12

## 2020-01-19 RX ADMIN — ATORVASTATIN CALCIUM 80 MG: 40 TABLET, FILM COATED ORAL at 20:13

## 2020-01-19 RX ADMIN — ASPIRIN 81 MG 81 MG: 81 TABLET ORAL at 10:21

## 2020-01-19 RX ADMIN — LIDOCAINE 1 PATCH: 50 PATCH TOPICAL at 10:18

## 2020-01-19 RX ADMIN — INSULIN HUMAN 3 UNITS: 100 INJECTION, SOLUTION PARENTERAL at 00:27

## 2020-01-19 RX ADMIN — INSULIN HUMAN 3 UNITS: 100 INJECTION, SOLUTION PARENTERAL at 13:17

## 2020-01-19 NOTE — PLAN OF CARE
Problem: Patient Care Overview  Goal: Plan of Care Review  Outcome: Ongoing (interventions implemented as appropriate)  Flowsheets (Taken 1/19/2020 0149)  Progress: improving  Plan of Care Reviewed With: patient  Outcome Summary: Pt A&O x4. VSS. O2 @ 2LPM via NC. NSR per monitor. NG tube in place in right nare at 79. Peptamen AF TF going continuously per pump at 60mL/hr with 30mL flush q2h. Pt tolerating well. Pt resting comfortably at this time. Will continue to monitor.

## 2020-01-19 NOTE — PROGRESS NOTES
[facility] Medicine Services  PROGRESS NOTE    Patient Name: Glo Berry  : 1955  MRN: [id number]    Date of Admission: 2020  Primary Care Physician: Philomena Walters MD    Subjective   Subjective     CC:  Dysphasia    HPI:  Till has some generalized weakness.  Agreeable to PEG tube placement tomorrow    Review of Systems  Gen- No fevers, chills  CV- No chest pain, palpitations  Resp- No cough, dyspnea  GI- No N/V/D, abd pain            All other systems reviewed and are negative.    Objective   Objective     Vital Signs:   Temp:  [97.4 °F (36.3 °C)-98.1 °F (36.7 °C)] 97.4 °F (36.3 °C)  Heart Rate:  [50-73] 50  Resp:  [16-18] 18  BP: ()/(55-71) 96/70  Total (NIH Stroke Scale): 6     Physical Exam:  Constitutional -frail female, in bed  HEENT-NCAT, mucous membranes moist, Keofeed bridled in place  CV-RRR, S1 S2 normal, no m/r/g  Resp-CTAB, no wheezes, rhonchi or rales  Abd-soft, non-tender, non-distended, normo active bowel sounds  Ext-No lower extremity cyanosis, clubbing or edema bilaterally  Neuro-alert, speech clear, moves all extremities   Psych-normal affect   Skin- No rash on exposed UE or LE bilaterally          Results Reviewed:  Results from last 7 days   Lab Units 20  04120  0532 01/15/20  0431   WBC 10*3/mm3 11.37* 11.54* 12.37*   HEMOGLOBIN g/dL 7.8* 8.0* 8.6*   HEMATOCRIT % 27.2* 26.2* 28.0*   PLATELETS 10*3/mm3 139* 146 145     Results from last 7 days   Lab Units 20  1201 20  0410 20  2244 20  0532 01/15/20  0431  20  0441   SODIUM mmol/L  --  139  --  138 138   < > 139   POTASSIUM mmol/L 4.4 3.8 4.1 3.2* 3.8   < > 3.5   CHLORIDE mmol/L  --  101  --  99 97*   < > 100   CO2 mmol/L  --  28.0  --  27.0 30.0*   < > 28.0   BUN mg/dL  --  38*  --  31* 22   < > 21   CREATININE mg/dL  --  0.62  --  0.60 0.51*   < > 0.39*   GLUCOSE mg/dL  --  209*  --  225* 183*   < > 191*   CALCIUM mg/dL  --  11.4*  --  10.6* 11.2*    < > 10.6*   ALT (SGPT) U/L  --   --   --   --   --   --  22   AST (SGOT) U/L  --   --   --   --   --   --  37*    < > = values in this interval not displayed.     Estimated Creatinine Clearance: 90.7 mL/min (by C-G formula based on SCr of 0.62 mg/dL).    Microbiology Results Abnormal     None          Imaging Results (Last 24 Hours)     ** No results found for the last 24 hours. **          Results for orders placed during the hospital encounter of 01/06/20   Adult Transthoracic Echo Complete W/ Cont if Necessary Per Protocol (With Agitated Saline)    Narrative · There is calcification of the aortic valve.  · Peak velocity of the flow distal to the aortic valve is 217.0 cm/s.  · Estimated EF = 70%.  · Left ventricular systolic function is normal.          I have reviewed the medications:  Scheduled Meds:    amLODIPine 10 mg Oral Q24H   aspirin 81 mg Oral Daily   Or      aspirin 300 mg Rectal Daily   atorvastatin 80 mg Oral Nightly   carvedilol 25 mg Oral BID With Meals   [START ON 1/20/2020] ceFAZolin 2 g Intravenous 60 Min Pre-Op   donepezil 5 mg Oral Nightly   ferrous sulfate 300 mg Oral Daily   heparin (porcine) 5,000 Units Subcutaneous Q8H   insulin regular 0-7 Units Subcutaneous Q6H   ipratropium-albuterol 3 mL Nebulization 4x Daily - RT   levETIRAcetam 500 mg Oral Q12H   lidocaine 1 patch Transdermal Q24H   losartan 100 mg Oral Daily   nicotine 1 patch Transdermal Q24H   risperiDONE 1 mg Nasogastric Daily   risperiDONE 2 mg Nasogastric Nightly   sertraline 150 mg Nasogastric Nightly   traZODone 100 mg Nasogastric Nightly     Continuous Infusions:   PRN Meds:.HYDROcodone-acetaminophen  •  ipratropium-albuterol  •  labetalol  •  magnesium sulfate  •  magnesium sulfate  •  potassium chloride  •  potassium chloride  •  potassium phosphate  •  potassium phosphate  •  risperiDONE  •  traZODone    Assessment/Plan   Assessment & Plan     Active Hospital Problems    Diagnosis  POA   • **Encephalopathy acute [G93.40]   Yes   • R/O AIS  [I63.9]  Yes   • R/O status epilepticus  [G40.901]  Yes   • Hypertension [I10]  Yes   • Pancreatic insufficiency [K86.89]  Yes   • T2DM on metformin  [E11.9]  Yes   • Chronic diarrhea [K52.9]  Yes   • Lumbar stenosis [M48.061]  Yes   • Diabetic neuropathy  [E11.40]  Yes   • Depression [F32.9]  Yes   • Dementia  [F03.90]  Yes   • Smoker [F17.200]  Yes   • Acute respiratory failure  [J96.00]  Yes   • Encephalopathy [G93.40]  Yes      Resolved Hospital Problems   No resolved problems to display.        Brief Hospital Course to date:  Glo Berry is a 64 y.o. female transferred from the outside hospital for mentation    Acute encephalopathy  -May have been secondary to malignant hypertension as blood pressure elevated on admission here in the 180s.  Discussed possible etiologies with neurology Dr. Petersen who felt the patient's presentation may actually represent a PRES-like syndrome  -Blood pressure now well controlled with systolics in the 120s  -EEG without epileptic changes, however continuing Keppra for possible underlying new onset seizure  -Mental status improving.  -Continue risperidone at night and additional risperidone prn    Possible new onset seizures  -Continue Keppra  - follow up Neurology memory care clinic in 4-6 weeks. If no further seizures, keppra coud be tapered and stopped    Malignant hypertension  -Resolved    Acute respiratory failure  -Resolved    Dysphagia  -Continued severe dysphagia on speech evaluation Friday.    -General Surgery Dr Cerda follows, PEG tube will likely be placed tomorrow    Depression  -Zoloft    Dementia  -Continue Aricept    DM 2  -Continue insulin therapy  -A1c 5.6    Tobacco abuse    Hypokalemia  -Replace both potassium and magnesium as needed    Anemia  -Hemoglobin 7.8, normocytic, with normal iron levels but slightly low iron saturation.  B12 and folate levels unremarkable  -Recommend weekly CBC while at rehab    ICA stenosis  -Left ICA stenosis  noted on duplex exam of approximately 50 to 69%, however only 30% stenoses noted on CT Angio of the neck.  -Continue medical therapy      DVT Prophylaxis: heparin    Disposition: I expect the patient to be discharged to rehab/SNF early next week after PEG tube placed    CODE STATUS:   Code Status and Medical Interventions:   Ordered at: 01/06/20 1647     Code Status:    CPR     Medical Interventions (Level of Support Prior to Arrest):    Full         Electronically signed by Jason Busby MD, 01/19/20, 6:56 PM.

## 2020-01-20 LAB
GLUCOSE BLDC GLUCOMTR-MCNC: 172 MG/DL (ref 70–130)
GLUCOSE BLDC GLUCOMTR-MCNC: 173 MG/DL (ref 70–130)
GLUCOSE BLDC GLUCOMTR-MCNC: 202 MG/DL (ref 70–130)
GLUCOSE BLDC GLUCOMTR-MCNC: 207 MG/DL (ref 70–130)

## 2020-01-20 PROCEDURE — 25010000003 CEFAZOLIN IN DEXTROSE 2-4 GM/100ML-% SOLUTION: Performed by: SURGERY

## 2020-01-20 PROCEDURE — 0DH63UZ INSERTION OF FEEDING DEVICE INTO STOMACH, PERCUTANEOUS APPROACH: ICD-10-PCS | Performed by: SURGERY

## 2020-01-20 PROCEDURE — 82962 GLUCOSE BLOOD TEST: CPT

## 2020-01-20 PROCEDURE — 25010000002 HEPARIN (PORCINE) PER 1000 UNITS: Performed by: SURGERY

## 2020-01-20 PROCEDURE — 25010000002 FENTANYL CITRATE (PF) 100 MCG/2ML SOLUTION: Performed by: SURGERY

## 2020-01-20 PROCEDURE — 99233 SBSQ HOSP IP/OBS HIGH 50: CPT | Performed by: INTERNAL MEDICINE

## 2020-01-20 PROCEDURE — 25010000002 ONDANSETRON PER 1 MG: Performed by: INTERNAL MEDICINE

## 2020-01-20 PROCEDURE — 25010000002 MIDAZOLAM PER 1 MG: Performed by: SURGERY

## 2020-01-20 RX ORDER — SODIUM CHLORIDE, SODIUM LACTATE, POTASSIUM CHLORIDE, CALCIUM CHLORIDE 600; 310; 30; 20 MG/100ML; MG/100ML; MG/100ML; MG/100ML
9 INJECTION, SOLUTION INTRAVENOUS CONTINUOUS
Status: DISCONTINUED | OUTPATIENT
Start: 2020-01-20 | End: 2020-01-22 | Stop reason: HOSPADM

## 2020-01-20 RX ORDER — MIDAZOLAM HYDROCHLORIDE 1 MG/ML
INJECTION INTRAMUSCULAR; INTRAVENOUS AS NEEDED
Status: COMPLETED | OUTPATIENT
Start: 2020-01-20 | End: 2020-01-20

## 2020-01-20 RX ORDER — PANTOPRAZOLE SODIUM 40 MG/10ML
40 INJECTION, POWDER, LYOPHILIZED, FOR SOLUTION INTRAVENOUS ONCE
Status: CANCELLED | OUTPATIENT
Start: 2020-01-20

## 2020-01-20 RX ORDER — FAMOTIDINE 20 MG/1
20 TABLET, FILM COATED ORAL ONCE
Status: DISCONTINUED | OUTPATIENT
Start: 2020-01-20 | End: 2020-01-20 | Stop reason: HOSPADM

## 2020-01-20 RX ORDER — FENTANYL CITRATE 50 UG/ML
INJECTION, SOLUTION INTRAMUSCULAR; INTRAVENOUS AS NEEDED
Status: COMPLETED | OUTPATIENT
Start: 2020-01-20 | End: 2020-01-20

## 2020-01-20 RX ORDER — ONDANSETRON 2 MG/ML
4 INJECTION INTRAMUSCULAR; INTRAVENOUS EVERY 6 HOURS PRN
Status: DISCONTINUED | OUTPATIENT
Start: 2020-01-20 | End: 2020-01-22 | Stop reason: HOSPADM

## 2020-01-20 RX ORDER — SODIUM CHLORIDE 0.9 % (FLUSH) 0.9 %
10 SYRINGE (ML) INJECTION EVERY 12 HOURS SCHEDULED
Status: DISCONTINUED | OUTPATIENT
Start: 2020-01-20 | End: 2020-01-20 | Stop reason: HOSPADM

## 2020-01-20 RX ORDER — IPRATROPIUM BROMIDE AND ALBUTEROL SULFATE 2.5; .5 MG/3ML; MG/3ML
3 SOLUTION RESPIRATORY (INHALATION) EVERY 4 HOURS PRN
Status: DISCONTINUED | OUTPATIENT
Start: 2020-01-20 | End: 2020-01-20 | Stop reason: SDUPTHER

## 2020-01-20 RX ORDER — SODIUM CHLORIDE 0.9 % (FLUSH) 0.9 %
10 SYRINGE (ML) INJECTION AS NEEDED
Status: DISCONTINUED | OUTPATIENT
Start: 2020-01-20 | End: 2020-01-20 | Stop reason: HOSPADM

## 2020-01-20 RX ORDER — LIDOCAINE HYDROCHLORIDE 10 MG/ML
0.5 INJECTION, SOLUTION EPIDURAL; INFILTRATION; INTRACAUDAL; PERINEURAL ONCE AS NEEDED
Status: DISCONTINUED | OUTPATIENT
Start: 2020-01-20 | End: 2020-01-20 | Stop reason: HOSPADM

## 2020-01-20 RX ORDER — FAMOTIDINE 10 MG/ML
20 INJECTION, SOLUTION INTRAVENOUS ONCE
Status: DISCONTINUED | OUTPATIENT
Start: 2020-01-20 | End: 2020-01-20 | Stop reason: HOSPADM

## 2020-01-20 RX ADMIN — TRAZODONE HYDROCHLORIDE 100 MG: 100 TABLET ORAL at 21:56

## 2020-01-20 RX ADMIN — INSULIN HUMAN 2 UNITS: 100 INJECTION, SOLUTION PARENTERAL at 00:23

## 2020-01-20 RX ADMIN — MINERAL SUPPLEMENT IRON 300 MG / 5 ML STRENGTH LIQUID 100 PER BOX UNFLAVORED 300 MG: at 16:40

## 2020-01-20 RX ADMIN — RISPERIDONE 2 MG: 1 SOLUTION ORAL at 22:18

## 2020-01-20 RX ADMIN — SERTRALINE HYDROCHLORIDE 150 MG: 100 TABLET ORAL at 21:56

## 2020-01-20 RX ADMIN — MIDAZOLAM 2 MG: 1 INJECTION INTRAMUSCULAR; INTRAVENOUS at 12:29

## 2020-01-20 RX ADMIN — HEPARIN SODIUM 5000 UNITS: 5000 INJECTION INTRAVENOUS; SUBCUTANEOUS at 21:56

## 2020-01-20 RX ADMIN — ATORVASTATIN CALCIUM 80 MG: 40 TABLET, FILM COATED ORAL at 21:54

## 2020-01-20 RX ADMIN — ASPIRIN 81 MG 81 MG: 81 TABLET ORAL at 16:40

## 2020-01-20 RX ADMIN — CARVEDILOL 25 MG: 12.5 TABLET, FILM COATED ORAL at 18:40

## 2020-01-20 RX ADMIN — FENTANYL CITRATE 50 MCG: 50 INJECTION, SOLUTION INTRAMUSCULAR; INTRAVENOUS at 12:29

## 2020-01-20 RX ADMIN — AMLODIPINE BESYLATE 10 MG: 10 TABLET ORAL at 10:21

## 2020-01-20 RX ADMIN — DONEPEZIL HYDROCHLORIDE 5 MG: 5 TABLET ORAL at 21:54

## 2020-01-20 RX ADMIN — INSULIN HUMAN 3 UNITS: 100 INJECTION, SOLUTION PARENTERAL at 06:20

## 2020-01-20 RX ADMIN — SODIUM CHLORIDE, POTASSIUM CHLORIDE, SODIUM LACTATE AND CALCIUM CHLORIDE 9 ML/HR: 600; 310; 30; 20 INJECTION, SOLUTION INTRAVENOUS at 12:54

## 2020-01-20 RX ADMIN — HEPARIN SODIUM 5000 UNITS: 5000 INJECTION INTRAVENOUS; SUBCUTANEOUS at 16:40

## 2020-01-20 RX ADMIN — ONDANSETRON 4 MG: 2 INJECTION INTRAMUSCULAR; INTRAVENOUS at 10:13

## 2020-01-20 RX ADMIN — LEVETIRACETAM 500 MG: 100 SOLUTION ORAL at 22:19

## 2020-01-20 RX ADMIN — CEFAZOLIN SODIUM 2 G: 2 INJECTION, SOLUTION INTRAVENOUS at 12:30

## 2020-01-20 RX ADMIN — LOSARTAN POTASSIUM 100 MG: 50 TABLET, FILM COATED ORAL at 16:40

## 2020-01-20 RX ADMIN — CARVEDILOL 25 MG: 12.5 TABLET, FILM COATED ORAL at 10:21

## 2020-01-20 NOTE — BRIEF OP NOTE
ESOPHAGOGASTRODUODENOSCOPY WITH PERCUTANEOUS ENDOSCOPIC GASTROSTOMY TUBE INSERTION WITH ANESTHESIA  Progress Note    Glo Berry  1/20/2020    Pre-op Diagnosis:   Feeding difficulty       Post-Op Diagnosis Codes:   Same    Procedure/CPT® Codes:      Procedure(s):  ESOPHAGOGASTRODUODENOSCOPY WITH PERCUTANEOUS ENDOSCOPIC GASTROSTOMY TUBE INSERTION    Surgeon(s):  Milton Cerda MD    Anesthesia: Sedation    Staff:   Monitor/Sedation Nurse: Sharon Mantilla RN  Documenter: Jojo Klein RN  Endo Technician: Melissa Fragoso    Estimated Blood Loss: minimal    Urine Voided: * No values recorded between 1/20/2020 12:22 PM and 1/20/2020 12:42 PM *    Specimens:                None          Drains:   NG/OG Tube Nasogastric Right nostril (Active)   Placement Verification X-ray 1/18/2020  8:28 AM   Site Assessment Clean;Intact;Dry 1/20/2020  4:28 AM   Securement nasal septal tie 1/20/2020  4:28 AM   Secured at (cm) 78 1/20/2020 12:30 AM   Status Clamped 1/15/2020 10:00 PM   Surrounding Skin Dry;Intact 1/20/2020  4:28 AM   Tube Feeding Frequency Continuous 1/19/2020  8:43 PM   Tube Feeding Product Peptamen AF 1/19/2020  8:43 PM   Tube Feeding Strength Full strength 1/19/2020  8:43 PM   Tube Feeding Method Continuous per pump 1/19/2020  8:43 PM   Tube Feeding Rate (mL) 60 mL 1/19/2020  8:43 PM   Tube Feeding Bag Changed No 1/19/2020  8:43 PM   Tube Feeding Residual (mL) 300 mL 1/20/2020 12:00 PM   Tube Feeding Residual Returned (mL) 0 mL 1/20/2020 12:00 PM   Feeding Tube Flushed With Sterile water 1/19/2020  8:43 PM   Flush/ Irrigation Intake (mL) 80 1/20/2020 12:30 AM   Tube Feeding Intake (mL) 325 1/20/2020 12:30 AM   Intake (mL) 60 mL 1/18/2020  4:10 PM       [REMOVED] External Urinary Catheter (Removed)   Site Assessment Clean;Skin intact 1/8/2020 12:00 AM   Application/Removal external catheter applied 1/8/2020 12:00 AM   Collection Container Wall suction 1/8/2020 12:00 AM   Wall suction (mmHG) 80 mmHG  1/8/2020 12:00 AM   Securement Method Securing device 1/8/2020 12:00 AM   Output (mL) 100 mL 1/8/2020 12:00 AM       [REMOVED] External Urinary Catheter (Removed)   Application/Removal external catheter changed 1/8/2020  9:00 PM   Collection Container Wall suction 1/9/2020 12:00 AM   Securement Method Securing device 1/9/2020 12:00 AM   Output (mL) 300 mL 1/8/2020  6:00 PM       [REMOVED] External Urinary Catheter (Removed)   Site Assessment Clean;Skin intact 1/12/2020  6:00 AM   Application/Removal external catheter removed 1/12/2020  6:33 AM   Collection Container Wall suction 1/12/2020  8:00 AM   Wall suction (mmHG) 80 mmHG 1/12/2020  8:00 AM   Securement Method Securing device 1/12/2020  8:00 AM   Output (mL) 100 mL 1/12/2020  4:00 AM       [REMOVED] External Urinary Catheter (Removed)   Site Assessment Clean;Skin intact 1/14/2020 12:00 AM   Application/Removal external catheter removed 1/14/2020  2:00 AM   Collection Container Wall suction 1/14/2020 12:00 AM   Wall suction (mmHG) 70 mmHG 1/14/2020 12:00 AM   Securement Method Securing device 1/14/2020 12:00 AM       [REMOVED] External Urinary Catheter (Removed)   Site Assessment Clean;Skin intact 1/20/2020  4:28 AM   Application/Removal external catheter changed;skin care provided 1/20/2020  7:00 AM   Collection Container Wall suction 1/20/2020  4:28 AM   Wall suction (mmHG) 80 mmHG 1/20/2020  4:28 AM   Securement Method Securing device 1/20/2020  4:28 AM   Output (mL) 200 mL 1/20/2020 12:56 AM       Findings: 20 Fr PEG at 5.5 cm    Complications: None      Milton Cerda MD     Date: 1/20/2020  Time: 12:42 PM

## 2020-01-20 NOTE — NURSING NOTE
Pt will not arouse after procedure. She will open her eyes on command but not answering questions. This is how she was on the floor pre procedure.

## 2020-01-20 NOTE — PROGRESS NOTES
"Patient Name:  Glo Berry  YOB: 1955  [id number]    Surgery Post - Operative Note    Date of visit: 1/20/2020    Subjective   Subjective: Feels OK, no complainst       Objective     Objective:    /94   Pulse 82   Temp 98.3 °F (36.8 °C) (Oral)   Resp 18   Ht 162.6 cm (64.02\")   Wt 62.7 kg (138 lb 3.2 oz)   SpO2 95%   BMI 23.71 kg/m²     CV:  Rate  regular and rhythm  regular  L:  Clear  to auscultation bilaterally   ABD:  Soft, appropriately tender. PEG clean, dry and intact   EXT:  No cyanosis, clubbing or edema         Assessment/Plan     Assessment/ Plan: Doing well after EGD/PEG. Continue Pulmonary toilet. Resume TF tonight.    Hospital Problem List     * (Principal) Encephalopathy acute    R/O AIS     R/O status epilepticus     Hypertension (Chronic)        Pancreatic insufficiency (Chronic)    T2DM on metformin  (Chronic)        Chronic diarrhea (Chronic)    Overview Signed 1/6/2020  3:34 PM by Aubree Luis APRN     Followed by Dr. Wright          Lumbar stenosis (Chronic)    Diabetic neuropathy  (Chronic)        Depression (Chronic)        Dementia  (Chronic)        Smoker    Acute respiratory failure     Encephalopathy              Milton Cerda MD  1/20/2020  5:06 PM    " Graft Donor Site Bandage (Optional-Leave Blank If You Don't Want In Note): Steri-strips and a pressure bandage were applied to the donor site.

## 2020-01-20 NOTE — PROGRESS NOTES
Continued Stay Note  [facility]     Patient Name: Glo Berry  MRN: [id number]  Today's Date: 1/20/2020    Admit Date: 1/6/2020    Discharge Plan     Row Name 01/20/20 0841       Plan    Plan  [facility]    Patient/Family in Agreement with Plan  yes    Plan Comments  Plan is [facility], when PEG is placed thy will start precert. CM will contine to follow.    Final Discharge Disposition Code  03 - skilled nursing facility (SNF)        Discharge Codes    No documentation.       Expected Discharge Date and Time     Expected Discharge Date Expected Discharge Time    Jan 21, 2020             Redd Car RN

## 2020-01-20 NOTE — PAYOR COMM NOTE
"Dionna Preston RN   Phone [phone]  Fax [phone]      Glo Berry (64 y.o. Female)     Date of Birth Social Security Number Address Home Phone MRN    1955  [id number] [facility] [address] [phone] [id number]    Judaism Marital Status          None        Admission Date Admission Type Admitting Provider Attending Provider Department, Room/Bed    1/6/20 Urgent Estella Billingsley MD Anciro, Audree, MD [facility] 3F, S320/1    Discharge Date Discharge Disposition Discharge Destination                       Attending Provider:  Estella Billingsley MD    Allergies:  Codeine    Isolation:  None   Infection:  None   Code Status:  CPR    Ht:  162.6 cm (64.02\")   Wt:  62.7 kg (138 lb 3.2 oz)    Admission Cmt:  None   Principal Problem:  Encephalopathy acute [G93.40]                 Active Insurance as of 1/6/2020     Primary Coverage     Payor Plan Insurance Group Employer/Plan Group    [employer] EMPLOYEE [id number]     Payor Plan Address Payor Plan Phone Number Payor Plan Fax Number Effective Dates    [address] [phone]  1/1/2015 - None Entered    [place] [address]       Subscriber Name Subscriber Birth Date Member ID       GLO BERRY 1955 [id number]                 Emergency Contacts      (Rel.) Home Phone Work Phone Mobile Phone    JANENE BERRY (Spouse) [phone] -- [phone]    ayo berry (Son) [phone] -- --    Sendy Berry (Daughter) -- -- [phone]            Vital Signs (last 3 days)     Date/Time   Temp   Temp src   Pulse   Resp   BP   Patient Position   SpO2    01/20/20 1021   --   --   78   --   162/85   --   --    01/20/20 0700   97.2 (36.2)   Axillary   86   19   154/76   Lying   94    01/20/20 0506   97.3 (36.3)   Oral   73   18   158/78   Lying   92    01/19/20 2103   --   --   77   16   --   --   90    01/19/20 1859   97.5 (36.4)   Oral   69   18   114/71   Lying   --    " 01/19/20 1516   --   --   50   --   --   --   --    01/19/20 1501   97.4 (36.3)   Oral   57   18   96/70   Lying   95    01/19/20 1150   97.6 (36.4)   Oral   59   18   110/55   Lying   93    01/19/20 1114   --   --   61   --   --   --   92    01/19/20 1018   --   --   64   --   130/71   --   --    01/19/20 0709   --   --   71   --   --   --   --    01/19/20 0700   97.6 (36.4)   Axillary   69   18   126/66   Lying   100    01/19/20 0540   97.7 (36.5)   Axillary   63   16   122/63   Lying   97    01/19/20 0009   97.9 (36.6)   Axillary   69   16   116/57   Lying   100    01/18/20 1951   98.1 (36.7)   Oral   67   18   123/65   Sitting   98    01/18/20 1923   --   --   73   18   --   --   100    01/18/20 1700   --   --   69   --   --   --   --    01/18/20 1647   --   --   63   --   --   --   100    01/18/20 1500   98.2 (36.8)   Oral   59   18   113/59   Sitting   99    01/18/20 1342   --   --   67   --   --   --   100    01/18/20 1300   --   --   66   --   --   --   --    01/18/20 1100   98.1 (36.7)   Oral   51   16   120/67   Sitting   99    01/18/20 1000   --   --   73   --   --   --   100    01/18/20 0900   --   --   74   --   --   --   --    01/18/20 0800   --   --   99   --   --   --   95    01/18/20 0735   97.6 (36.4)   Axillary   80   18   115/66   Lying   100    01/18/20 0417   97.2 (36.2)   Axillary   75   16   118/68   Lying   90    01/18/20 0025   97.5 (36.4)   Axillary   80   16   104/67   Lying   92    01/17/20 2038   --   --   59   20   --   --   94    01/17/20 1958   97.5 (36.4)   Axillary   74   22   123/66   Lying   95    01/17/20 1800   --   --   68   --   --   --   91    01/17/20 1620   98 (36.7)   Oral   71   18   112/61   Sitting   96    01/17/20 1550   --   --   --   --   --   --   95    01/17/20 1400   --   --   71   --   --   --   98    01/17/20 1200   --   --   59   --   --   --   97    01/17/20 1151   98.7 (37.1)   Oral   64   16   107/56   Lying   98    01/17/20 1134   --   --   61   20   --    --   93    01/17/20 1000   --   --   82   --   --   --   97    01/17/20 0800   --   --   74   --   --   --   95    01/17/20 0751   --   --   84   16   --   --   97    01/17/20 0734   97.9 (36.6)   Oral   76   18   142/81   Lying   98    01/17/20 0428   97.9 (36.6)   Oral   79   16   125/79   Lying   92              Current Facility-Administered Medications   Medication Dose Route Frequency Provider Last Rate Last Dose   • amLODIPine (NORVASC) tablet 10 mg  10 mg Oral Q24H Gurmeet Jules MD   10 mg at 01/20/20 1021   • aspirin chewable tablet 81 mg  81 mg Oral Daily Gurmeet Jules MD   81 mg at 01/19/20 1021    Or   • aspirin suppository 300 mg  300 mg Rectal Daily Gurmeet Jules MD   300 mg at 01/07/20 0839   • atorvastatin (LIPITOR) tablet 80 mg  80 mg Oral Nightly Gurmeet Jules MD   80 mg at 01/19/20 2013   • carvedilol (COREG) tablet 25 mg  25 mg Oral BID With Meals Gurmeet Jules MD   25 mg at 01/20/20 1021   • ceFAZolin in dextrose (ANCEF) IVPB solution 2 g  2 g Intravenous 60 Min Pre-Op Milton Cerda MD       • donepezil (ARICEPT) tablet 5 mg  5 mg Oral Nightly Buzz Petersen MD   5 mg at 01/19/20 2012   • ferrous sulfate 300 (60 Fe) MG/5ML syrup 300 mg  300 mg Oral Daily Maximiliano Daniels [facility]   300 mg at 01/19/20 1316   • heparin (porcine) 5000 UNIT/ML injection 5,000 Units  5,000 Units Subcutaneous Q8H Gurmeet Jules MD   Stopped at 01/20/20 0600   • insulin regular (humuLIN R,novoLIN R) injection 0-7 Units  0-7 Units Subcutaneous Q6H Gurmeet Jules MD   3 Units at 01/20/20 0620   • ipratropium-albuterol (DUO-NEB) nebulizer solution 3 mL  3 mL Nebulization Q4H PRN Gurmeet Jules MD   3 mL at 01/15/20 0339   • labetalol (NORMODYNE,TRANDATE) injection 20 mg  20 mg Intravenous Q10 Min PRN Gurmeet Jules MD   20 mg at 01/12/20 1606   • levETIRAcetam (KEPPRA) 100 MG/ML solution 500 mg  500 mg Oral Q12H Maximiliano Daniels, [facility]   500 mg at 01/19/20 2011   • lidocaine (LIDODERM) 5 %  1 patch  1 patch Transdermal Q24H Buzz Petersen MD   1 patch at 01/19/20 1018   • losartan (COZAAR) tablet 100 mg  100 mg Oral Daily Gurmeet Jules MD   100 mg at 01/19/20 1020   • magnesium sulfate 2g/50 mL (PREMIX) infusion  6 g Intravenous PRN Gurmeet Jules MD   6 g at 01/14/20 0546   • magnesium sulfate 4g/100mL (PREMIX) infusion  4 g Intravenous PRN Gurmeet Jules MD   4 g at 01/18/20 1634   • nicotine (NICODERM CQ) 21 MG/24HR patch 1 patch  1 patch Transdermal Q24H Gurmeet Jules MD   1 patch at 01/19/20 1020   • ondansetron (ZOFRAN) injection 4 mg  4 mg Intravenous Q6H PRN Estella Billingsley MD   4 mg at 01/20/20 1013   • potassium chloride (KLOR-CON) packet 40 mEq  40 mEq Oral PRN Gurmeet Jules MD   40 mEq at 01/16/20 1009   • potassium chloride 10 mEq in 100 mL IVPB  10 mEq Intravenous Q1H PRN Gurmeet Jules MD       • potassium phosphate 15 mmol in sodium chloride 0.9 % 100 mL infusion  15 mmol Intravenous PRN Gurmeet Jules MD       • potassium phosphate 30 mmol in sodium chloride 0.9 % 500 mL infusion  30 mmol Intravenous PRN Gurmeet Jules MD   30 mmol at 01/11/20 1344   • risperiDONE (risperDAL) 1 MG/ML oral solution 1 mg  1 mg Nasogastric Daily Gurmeet Jules MD   1 mg at 01/19/20 1037   • risperiDONE (risperDAL) 1 MG/ML oral solution 1 mg  1 mg Oral Q12H PRN Gurmeet Jules MD       • risperiDONE (risperDAL) 1 MG/ML oral solution 2 mg  2 mg Nasogastric Nightly Gurmeet Jules MD   2 mg at 01/19/20 2011   • sertraline (ZOLOFT) tablet 150 mg  150 mg Nasogastric Nightly Gurmeet Jules MD   150 mg at 01/19/20 2013   • traZODone (DESYREL) tablet 100 mg  100 mg Nasogastric Nightly Buzz Petersen MD   100 mg at 01/19/20 2013   • traZODone (DESYREL) tablet 50 mg  50 mg Oral Nightly PRN Gurmeet Jules MD   50 mg at 01/18/20 0024     Lab Results (last 72 hours)     Procedure Component Value Units Date/Time    POC Glucose Once [[id number]]  (Abnormal) Collected:   01/20/20 1105    Specimen:  Blood Updated:  01/20/20 1107     Glucose 202 mg/dL     POC Glucose Once [[id number]]  (Abnormal) Collected:  01/20/20 0617    Specimen:  Blood Updated:  01/20/20 0623     Glucose 207 mg/dL     POC Glucose Once [[id number]]  (Abnormal) Collected:  01/20/20 0021    Specimen:  Blood Updated:  01/20/20 0032     Glucose 172 mg/dL     POC Glucose Once [[id number]]  (Abnormal) Collected:  01/19/20 1740    Specimen:  Blood Updated:  01/19/20 1747     Glucose 183 mg/dL     POC Glucose Once [[id number]]  (Abnormal) Collected:  01/19/20 1149    Specimen:  Blood Updated:  01/19/20 1153     Glucose 242 mg/dL     POC Glucose Once [[id number]]  (Abnormal) Collected:  01/19/20 0545    Specimen:  Blood Updated:  01/19/20 0546     Glucose 212 mg/dL     Magnesium [[id number]]  (Normal) Collected:  01/19/20 0444    Specimen:  Blood Updated:  01/19/20 0533     Magnesium 2.1 mg/dL     POC Glucose Once [[id number]]  (Abnormal) Collected:  01/19/20 0008    Specimen:  Blood Updated:  01/19/20 0009     Glucose 205 mg/dL     POC Glucose Once [[id number]]  (Abnormal) Collected:  01/18/20 1822    Specimen:  Blood Updated:  01/18/20 1824     Glucose 186 mg/dL     Potassium [[id number]]  (Normal) Collected:  01/18/20 1201    Specimen:  Blood Updated:  01/18/20 1225     Potassium 4.4 mmol/L     Magnesium [[id number]]  (Normal) Collected:  01/18/20 1201    Specimen:  Blood Updated:  01/18/20 1225     Magnesium 1.6 mg/dL     POC Glucose Once [[id number]]  (Abnormal) Collected:  01/18/20 1201    Specimen:  Blood Updated:  01/18/20 1204     Glucose 216 mg/dL     POC Glucose Once [[id number]]  (Abnormal) Collected:  01/18/20 0742    Specimen:  Blood Updated:  01/18/20 0749     Glucose 193 mg/dL     POC Glucose Once [[id number]]  (Abnormal) Collected:  01/18/20 0553    Specimen:  Blood Updated:  01/18/20 0554     Glucose 215 mg/dL     POC Glucose Once [[id number]]  (Abnormal) Collected:  01/18/20 0031    Specimen:  Blood Updated:   "01/18/20 0032     Glucose 225 mg/dL     POC Glucose Once [[id number]]  (Abnormal) Collected:  01/17/20 1826    Specimen:  Blood Updated:  01/17/20 1828     Glucose 194 mg/dL     POC Glucose Once [[id number]]  (Abnormal) Collected:  01/17/20 1224    Specimen:  Blood Updated:  01/17/20 1234     Glucose 265 mg/dL           Imaging Results (Last 72 Hours)     Procedure Component Value Units Date/Time    Fiberoptic Endo (fees) [[id number]] Resulted:  01/17/20 1125     Updated:  01/17/20 1125    Narrative:       This procedure was auto-finalized with no dictation required.           Physician Progress Notes (last 72 hours) (Notes from 01/17/20 1123 through 01/20/20 1123)      Milton Cerda MD at 01/20/20 0625          Patient Name:  Glo Berry  YOB: 1955  [id number]    Surgery Progress Note    Date of visit: 1/20/2020    Subjective   Subjective: Stable overnight.        Objective     Objective:     /78 (BP Location: Left arm, Patient Position: Lying)   Pulse 73   Temp 97.3 °F (36.3 °C) (Oral)   Resp 18   Ht 162.6 cm (64.02\")   Wt 62.7 kg (138 lb 3.2 oz)   SpO2 92%   BMI 23.71 kg/m²      Intake/Output Summary (Last 24 hours) at 1/20/2020 0625  Last data filed at 1/20/2020 0056  Gross per 24 hour   Intake 1416 ml   Output 1100 ml   Net 316 ml       CV:  Rhythm  regular and rate regular   L:  Clear  to auscultation bilaterally   Abd:  Bowel sounds positive , soft, nontender  Ext:  No cyanosis, clubbing, edema    Recent labs that are back at this time have been reviewed.       Assessment/Plan     Assessment/ Plan:    Hospital Problem List     * (Principal) Encephalopathy acute - For PEG today.      R/O AIS     R/O status epilepticus     Hypertension (Chronic)        Pancreatic insufficiency (Chronic)    T2DM on metformin  (Chronic)        Chronic diarrhea (Chronic)    Overview Signed 1/6/2020  3:34 PM by Aubree Luis APRN     Followed by Dr. Wright          Lumbar stenosis (Chronic)    " Diabetic neuropathy  (Chronic)        Depression (Chronic)        Dementia  (Chronic)        Smoker    Acute respiratory failure     Encephalopathy              Milton Cerda MD  2020  6:25 AM        Electronically signed by Milton Cerda MD at 20 0626     Jason Busby MD at 20 1855              [facility] Medicine Services  PROGRESS NOTE    Patient Name: Glo Berry  : 1955  MRN: [id number]    Date of Admission: 2020  Primary Care Physician: Philomena Walters MD    Subjective   Subjective     CC:  Dysphasia    HPI:  Till has some generalized weakness.  Agreeable to PEG tube placement tomorrow    Review of Systems  Gen- No fevers, chills  CV- No chest pain, palpitations  Resp- No cough, dyspnea  GI- No N/V/D, abd pain            All other systems reviewed and are negative.    Objective   Objective     Vital Signs:   Temp:  [97.4 °F (36.3 °C)-98.1 °F (36.7 °C)] 97.4 °F (36.3 °C)  Heart Rate:  [50-73] 50  Resp:  [16-18] 18  BP: ()/(55-71) 96/70  Total (NIH Stroke Scale): 6     Physical Exam:  Constitutional -frail female, in bed  HEENT-NCAT, mucous membranes moist, Keofeed bridled in place  CV-RRR, S1 S2 normal, no m/r/g  Resp-CTAB, no wheezes, rhonchi or rales  Abd-soft, non-tender, non-distended, normo active bowel sounds  Ext-No lower extremity cyanosis, clubbing or edema bilaterally  Neuro-alert, speech clear, moves all extremities   Psych-normal affect   Skin- No rash on exposed UE or LE bilaterally          Results Reviewed:  Results from last 7 days   Lab Units 20  0410 20  0532 01/15/20  0431   WBC 10*3/mm3 11.37* 11.54* 12.37*   HEMOGLOBIN g/dL 7.8* 8.0* 8.6*   HEMATOCRIT % 27.2* 26.2* 28.0*   PLATELETS 10*3/mm3 139* 146 145     Results from last 7 days   Lab Units 20  1201 20  0410 20  2244 20  0532 01/15/20  0431  20  0441   SODIUM mmol/L  --  139  --  138 138   < > 139   POTASSIUM mmol/L 4.4  3.8 4.1 3.2* 3.8   < > 3.5   CHLORIDE mmol/L  --  101  --  99 97*   < > 100   CO2 mmol/L  --  28.0  --  27.0 30.0*   < > 28.0   BUN mg/dL  --  38*  --  31* 22   < > 21   CREATININE mg/dL  --  0.62  --  0.60 0.51*   < > 0.39*   GLUCOSE mg/dL  --  209*  --  225* 183*   < > 191*   CALCIUM mg/dL  --  11.4*  --  10.6* 11.2*   < > 10.6*   ALT (SGPT) U/L  --   --   --   --   --   --  22   AST (SGOT) U/L  --   --   --   --   --   --  37*    < > = values in this interval not displayed.     Estimated Creatinine Clearance: 90.7 mL/min (by C-G formula based on SCr of 0.62 mg/dL).    Microbiology Results Abnormal     None          Imaging Results (Last 24 Hours)     ** No results found for the last 24 hours. **          Results for orders placed during the hospital encounter of 01/06/20   Adult Transthoracic Echo Complete W/ Cont if Necessary Per Protocol (With Agitated Saline)    Narrative · There is calcification of the aortic valve.  · Peak velocity of the flow distal to the aortic valve is 217.0 cm/s.  · Estimated EF = 70%.  · Left ventricular systolic function is normal.          I have reviewed the medications:  Scheduled Meds:    amLODIPine 10 mg Oral Q24H   aspirin 81 mg Oral Daily   Or      aspirin 300 mg Rectal Daily   atorvastatin 80 mg Oral Nightly   carvedilol 25 mg Oral BID With Meals   [START ON 1/20/2020] ceFAZolin 2 g Intravenous 60 Min Pre-Op   donepezil 5 mg Oral Nightly   ferrous sulfate 300 mg Oral Daily   heparin (porcine) 5,000 Units Subcutaneous Q8H   insulin regular 0-7 Units Subcutaneous Q6H   ipratropium-albuterol 3 mL Nebulization 4x Daily - RT   levETIRAcetam 500 mg Oral Q12H   lidocaine 1 patch Transdermal Q24H   losartan 100 mg Oral Daily   nicotine 1 patch Transdermal Q24H   risperiDONE 1 mg Nasogastric Daily   risperiDONE 2 mg Nasogastric Nightly   sertraline 150 mg Nasogastric Nightly   traZODone 100 mg Nasogastric Nightly     Continuous Infusions:   PRN Meds:.HYDROcodone-acetaminophen  •   ipratropium-albuterol  •  labetalol  •  magnesium sulfate  •  magnesium sulfate  •  potassium chloride  •  potassium chloride  •  potassium phosphate  •  potassium phosphate  •  risperiDONE  •  traZODone    Assessment/Plan   Assessment & Plan     Active Hospital Problems    Diagnosis  POA   • **Encephalopathy acute [G93.40]  Yes   • R/O AIS  [I63.9]  Yes   • R/O status epilepticus  [G40.901]  Yes   • Hypertension [I10]  Yes   • Pancreatic insufficiency [K86.89]  Yes   • T2DM on metformin  [E11.9]  Yes   • Chronic diarrhea [K52.9]  Yes   • Lumbar stenosis [M48.061]  Yes   • Diabetic neuropathy  [E11.40]  Yes   • Depression [F32.9]  Yes   • Dementia  [F03.90]  Yes   • Smoker [F17.200]  Yes   • Acute respiratory failure  [J96.00]  Yes   • Encephalopathy [G93.40]  Yes      Resolved Hospital Problems   No resolved problems to display.        Brief Hospital Course to date:  Glo Berry is a 64 y.o. female transferred from the outside hospital for mentation    Acute encephalopathy  -May have been secondary to malignant hypertension as blood pressure elevated on admission here in the 180s.  Discussed possible etiologies with neurology Dr. Petersen who felt the patient's presentation may actually represent a PRES-like syndrome  -Blood pressure now well controlled with systolics in the 120s  -EEG without epileptic changes, however continuing Keppra for possible underlying new onset seizure  -Mental status improving.  -Continue risperidone at night and additional risperidone prn    Possible new onset seizures  -Continue Keppra  - follow up Neurology memory care clinic in 4-6 weeks. If no further seizures, keppra coud be tapered and stopped    Malignant hypertension  -Resolved    Acute respiratory failure  -Resolved    Dysphasia  -Continued severe dysphagia on speech evaluation Friday.    -General Surgery Dr Cerda follows, PEG tube will likely be placed tomorrow    Depression  -Zoloft    Dementia  -Continue Aricept    DM  2  -Continue insulin therapy  -A1c 5.6    Tobacco abuse    Hypokalemia  -Replace both potassium and magnesium as needed    Anemia  -Hemoglobin 7.8, normocytic, with normal iron levels but slightly low iron saturation.  B12 and folate levels unremarkable  -Recommend weekly CBC while at rehab    ICA stenosis  -Left ICA stenosis noted on duplex exam of approximately 50 to 69%, however only 30% stenoses noted on CT Angio of the neck.  -Continue medical therapy      DVT Prophylaxis: heparin    Disposition: I expect the patient to be discharged to rehab/SNF early next week after PEG tube placed    CODE STATUS:   Code Status and Medical Interventions:   Ordered at: 20 1647     Code Status:    CPR     Medical Interventions (Level of Support Prior to Arrest):    Full         Electronically signed by Jason Busby MD, 20, 6:56 PM.        Electronically signed by Jason Busby MD at 20 1858     Jason Busby MD at 20 1811              [facility] Medicine Services  PROGRESS NOTE    Patient Name: Glo Berry  : 1955  MRN: [id number]    Date of Admission: 2020  Primary Care Physician: Philomena Walters MD    Subjective   Subjective     CC:  Dysphasia    HPI:  Patient feels okay today, still some generalized weakness.  Agreeable to proceed with PEG tube placement on Monday    Review of Systems  Gen- No fevers, chills  CV- No chest pain, palpitations  Resp- No cough, dyspnea  GI- No N/V/D, abd pain        All other systems reviewed and are negative.    Objective   Objective     Vital Signs:   Temp:  [97.2 °F (36.2 °C)-98.2 °F (36.8 °C)] 98.2 °F (36.8 °C)  Heart Rate:  [51-99] 63  Resp:  [16-22] 18  BP: (104-123)/(59-68) 113/59  Total (NIH Stroke Scale): 6     Physical Exam:  Constitutional -frail female, sitting up in bed  HEENT-NCAT, mucous membranes moist, Keofeed tube bridled in place  CV-RRR, S1 S2 normal, no m/r/g  Resp-grossly clear bilaterally  Abd-soft,  non-tender, non-distended, normo active bowel sounds  Ext-No lower extremity cyanosis, clubbing or edema bilaterally  Neuro-alert, speech clear, moves all extremities   Psych-normal affect   Skin- No rash on exposed UE or LE bilaterally          Results Reviewed:  Results from last 7 days   Lab Units 01/17/20  0410 01/16/20  0532 01/15/20  0431   WBC 10*3/mm3 11.37* 11.54* 12.37*   HEMOGLOBIN g/dL 7.8* 8.0* 8.6*   HEMATOCRIT % 27.2* 26.2* 28.0*   PLATELETS 10*3/mm3 139* 146 145     Results from last 7 days   Lab Units 01/18/20  1201 01/17/20  0410 01/16/20  2244 01/16/20 0532 01/15/20  0431 01/13/20 0441   SODIUM mmol/L  --  139  --  138 138   < > 139   POTASSIUM mmol/L 4.4 3.8 4.1 3.2* 3.8   < > 3.5   CHLORIDE mmol/L  --  101  --  99 97*   < > 100   CO2 mmol/L  --  28.0  --  27.0 30.0*   < > 28.0   BUN mg/dL  --  38*  --  31* 22   < > 21   CREATININE mg/dL  --  0.62  --  0.60 0.51*   < > 0.39*   GLUCOSE mg/dL  --  209*  --  225* 183*   < > 191*   CALCIUM mg/dL  --  11.4*  --  10.6* 11.2*   < > 10.6*   ALT (SGPT) U/L  --   --   --   --   --   --  22   AST (SGOT) U/L  --   --   --   --   --   --  37*    < > = values in this interval not displayed.     Estimated Creatinine Clearance: 92.9 mL/min (by C-G formula based on SCr of 0.62 mg/dL).    Microbiology Results Abnormal     None          Imaging Results (Last 24 Hours)     ** No results found for the last 24 hours. **          Results for orders placed during the hospital encounter of 01/06/20   Adult Transthoracic Echo Complete W/ Cont if Necessary Per Protocol (With Agitated Saline)    Narrative · There is calcification of the aortic valve.  · Peak velocity of the flow distal to the aortic valve is 217.0 cm/s.  · Estimated EF = 70%.  · Left ventricular systolic function is normal.          I have reviewed the medications:  Scheduled Meds:    amLODIPine 10 mg Oral Q24H   aspirin 81 mg Oral Daily   Or      aspirin 300 mg Rectal Daily   atorvastatin 80 mg Oral  Nightly   carvedilol 25 mg Oral BID With Meals   [START ON 1/20/2020] ceFAZolin 2 g Intravenous 60 Min Pre-Op   donepezil 5 mg Oral Nightly   ferrous sulfate 325 mg Oral Daily With Breakfast   heparin (porcine) 5,000 Units Subcutaneous Q8H   insulin regular 0-7 Units Subcutaneous Q6H   ipratropium-albuterol 3 mL Nebulization 4x Daily - RT   levETIRAcetam 500 mg Oral Q12H   lidocaine 1 patch Transdermal Q24H   losartan 100 mg Oral Daily   nicotine 1 patch Transdermal Q24H   risperiDONE 1 mg Nasogastric Daily   risperiDONE 2 mg Nasogastric Nightly   sertraline 150 mg Nasogastric Nightly   traZODone 100 mg Nasogastric Nightly     Continuous Infusions:   PRN Meds:.HYDROcodone-acetaminophen  •  ipratropium-albuterol  •  labetalol  •  magnesium sulfate  •  magnesium sulfate  •  potassium chloride  •  potassium chloride  •  potassium phosphate  •  potassium phosphate  •  risperiDONE  •  traZODone    Assessment/Plan   Assessment & Plan     Active Hospital Problems    Diagnosis  POA   • **Encephalopathy acute [G93.40]  Yes   • R/O AIS  [I63.9]  Yes   • R/O status epilepticus  [G40.901]  Yes   • Hypertension [I10]  Yes   • Pancreatic insufficiency [K86.89]  Yes   • T2DM on metformin  [E11.9]  Yes   • Chronic diarrhea [K52.9]  Yes   • Lumbar stenosis [M48.061]  Yes   • Diabetic neuropathy  [E11.40]  Yes   • Depression [F32.9]  Yes   • Dementia  [F03.90]  Yes   • Smoker [F17.200]  Yes   • Acute respiratory failure  [J96.00]  Yes   • Encephalopathy [G93.40]  Yes      Resolved Hospital Problems   No resolved problems to display.        Brief Hospital Course to date:  Glo Berry is a 64 y.o. female transferred from the outside hospital for mentation    Acute encephalopathy  -May have been secondary to malignant hypertension as blood pressure elevated on admission here in the 180s.  Discussed possible etiologies with neurology Dr. Petersen who felt the patient's presentation may actually represent a PRES-like syndrome  -Blood  pressure now well controlled with systolics in the 120s  -EEG without epileptic changes, however continuing Keppra for possible underlying new onset seizure  -Mental status improving.  -Continue risperidone at night and additional risperidone prn    Possible new onset seizures  -Continue Keppra  - follow up Neurology memory care clinic in 4-6 weeks. If no further seizures, keppra coud be tapered and stopped    Malignant hypertension  -Resolved    Acute respiratory failure  -Resolved    Dysphasia  -Continued severe dysphagia on speech evaluation Friday.    -General Surgery Dr Cerda follows, PEG tube Monday likely.  Depression  -Zoloft    Dementia  -Continue Aricept    DM 2  -Continue insulin therapy  -A1c 5.6    Tobacco abuse    Hypokalemia  -Replace both potassium and magnesium as needed    Anemia  -Hemoglobin 7.8, normocytic, with normal iron levels but slightly low iron saturation.  B12 and folate levels unremarkable  -Recommend weekly CBC while at rehab    ICA stenosis  -Left ICA stenosis noted on duplex exam of approximately 50 to 69%, however only 30% stenoses noted on CT Angio of the neck.  -Continue medical therapy      DVT Prophylaxis: heparin    Disposition: I expect the patient to be discharged to rehab/SNF early next week after PEG tube placed    CODE STATUS:   Code Status and Medical Interventions:   Ordered at: 01/06/20 1647     Code Status:    CPR     Medical Interventions (Level of Support Prior to Arrest):    Full         Electronically signed by Jason Busby MD, 01/18/20, 6:11 PM.        Electronically signed by Jason Busby MD at 01/18/20 2822     Bridget Ortiz MD at 01/18/20 1120          Subjective:    CC: Glo Berry is seen today for altered mental status    HPI:  Patient did not have any acute events overnight.  She denies having any hallucinations since yesterday.      Current Facility-Administered Medications:   •  amLODIPine (NORVASC) tablet 10 mg, 10 mg, Oral, Q24H, Jorge A,  Gurmeet IVEY MD, 10 mg at 01/18/20 0828  •  aspirin chewable tablet 81 mg, 81 mg, Oral, Daily, 81 mg at 01/18/20 0828 **OR** aspirin suppository 300 mg, 300 mg, Rectal, Daily, Gurmeet Jules MD, 300 mg at 01/07/20 0839  •  atorvastatin (LIPITOR) tablet 80 mg, 80 mg, Oral, Nightly, Gurmeet Jules MD, 80 mg at 01/17/20 2032  •  carvedilol (COREG) tablet 25 mg, 25 mg, Oral, BID With Meals, Gurmeet Jules MD, 25 mg at 01/18/20 0828  •  [START ON 1/20/2020] ceFAZolin in dextrose (ANCEF) IVPB solution 2 g, 2 g, Intravenous, 60 Min Pre-Op, Milton Cerda MD  •  donepezil (ARICEPT) tablet 5 mg, 5 mg, Oral, Nightly, Buzz Petersen MD, 5 mg at 01/17/20 2031  •  ferrous sulfate tablet 325 mg, 325 mg, Oral, Daily With Breakfast, Jason Busby MD, 325 mg at 01/18/20 0828  •  heparin (porcine) 5000 UNIT/ML injection 5,000 Units, 5,000 Units, Subcutaneous, Q8H, Gurmeet Jules MD, 5,000 Units at 01/18/20 0552  •  HYDROcodone-acetaminophen (NORCO) 5-325 MG per tablet 1 tablet, 1 tablet, Oral, Q6H PRN, Gurmete Jules MD, 1 tablet at 01/18/20 0828  •  insulin regular (humuLIN R,novoLIN R) injection 0-7 Units, 0-7 Units, Subcutaneous, Q6H, Gurmeet Jules MD, 3 Units at 01/18/20 0552  •  ipratropium-albuterol (DUO-NEB) nebulizer solution 3 mL, 3 mL, Nebulization, 4x Daily - RT, Gurmeet Jules MD, 3 mL at 01/18/20 0735  •  ipratropium-albuterol (DUO-NEB) nebulizer solution 3 mL, 3 mL, Nebulization, Q4H PRN, Gurmeet Jules MD, 3 mL at 01/15/20 0339  •  labetalol (NORMODYNE,TRANDATE) injection 20 mg, 20 mg, Intravenous, Q10 Min PRN, Gurmeet Jules MD, 20 mg at 01/12/20 1606  •  levETIRAcetam (KEPPRA) tablet 500 mg, 500 mg, Oral, Q12H, Gurmeet Jules MD, 500 mg at 01/18/20 0828  •  lidocaine (LIDODERM) 5 % 1 patch, 1 patch, Transdermal, Q24H, Buzz Petersen MD, 1 patch at 01/18/20 0829  •  losartan (COZAAR) tablet 100 mg, 100 mg, Oral, Daily, Gurmeet Jules MD, 100 mg at 01/18/20 0828  •   magnesium sulfate 2g/50 mL (PREMIX) infusion, 6 g, Intravenous, PRN, Gurmeet Jules MD, 6 g at 01/14/20 0546  •  magnesium sulfate 4g/100mL (PREMIX) infusion, 4 g, Intravenous, PRN, Gurmeet Jules MD, 4 g at 01/15/20 1231  •  nicotine (NICODERM CQ) 21 MG/24HR patch 1 patch, 1 patch, Transdermal, Q24H, Gurmeet Jules MD, 1 patch at 01/18/20 0828  •  potassium chloride (KLOR-CON) packet 40 mEq, 40 mEq, Oral, PRN, Gurmeet Jules MD, 40 mEq at 01/16/20 1009  •  potassium chloride 10 mEq in 100 mL IVPB, 10 mEq, Intravenous, Q1H PRN, Gurmeet Jules MD  •  potassium phosphate 15 mmol in sodium chloride 0.9 % 100 mL infusion, 15 mmol, Intravenous, PRN, Gurmeet Jules MD  •  potassium phosphate 30 mmol in sodium chloride 0.9 % 500 mL infusion, 30 mmol, Intravenous, PRN, Gurmeet Jules MD, 30 mmol at 01/11/20 1344  •  risperiDONE (risperDAL) 1 MG/ML oral solution 1 mg, 1 mg, Nasogastric, Daily, Gurmeet Jules MD, 1 mg at 01/18/20 0838  •  risperiDONE (risperDAL) 1 MG/ML oral solution 1 mg, 1 mg, Oral, Q12H PRN, Gurmeet Jules MD  •  risperiDONE (risperDAL) 1 MG/ML oral solution 2 mg, 2 mg, Nasogastric, Nightly, Gurmeet Jules MD, 2 mg at 01/17/20 2035  •  sertraline (ZOLOFT) tablet 150 mg, 150 mg, Nasogastric, Nightly, Gurmeet Jules MD, 150 mg at 01/17/20 2030  •  traZODone (DESYREL) tablet 100 mg, 100 mg, Nasogastric, Nightly, Buzz Petersen MD, 100 mg at 01/17/20 2032  •  traZODone (DESYREL) tablet 50 mg, 50 mg, Oral, Nightly PRN, Gurmeet Jules MD, 50 mg at 01/18/20 0024       Past Medical History:   Diagnosis Date   • COPD (chronic obstructive pulmonary disease) (CMS/[facility])    • Diabetes mellitus (CMS/[facility])    • History of ear injury 1973    Injury of the right eardrum.This has resulted in the headaches   • Hypertension         Past Surgical History:   Procedure Laterality Date   • BREAST CYST EXCISION     • SINUS SURGERY     • TONSILLECTOMY     • TUBAL ABDOMINAL LIGATION       "    Family History   Problem Relation Age of Onset   • Aortic aneurysm Mother    • Lung disease Father    • Parkinsonism Father    • Heart disease Other         Social History     Socioeconomic History   • Marital status:      Spouse name: Not on file   • Number of children: Not on file   • Years of education: Not on file   • Highest education level: Not on file   Tobacco Use   • Smoking status: Current Every Day Smoker     Packs/day: 1.00     Types: Cigarettes   • Smokeless tobacco: Never Used   Substance and Sexual Activity   • Alcohol use: No       Review of Systems Pertinent items are noted in HPI, all other systems reviewed and negative     Objective:    /66 (BP Location: Left arm, Patient Position: Lying)   Pulse 73   Temp 97.6 °F (36.4 °C) (Axillary)   Resp 18   Ht 162.6 cm (64.02\")   Wt 64.2 kg (141 lb 8 oz)   SpO2 100%   BMI 24.28 kg/m²        Neurology Exam:    General appearance: Sitting up in bed, has a NG tube    Mental status: Alert, awake and oriented to time place and person.  Could tell me the month, name of the hospital and her date of birth    Recent and Remote memory: Intact.    Attention span and Concentration: Normal.     Language and Speech: Intact- No dysarthria.    Fluency, Naming, Repetition and Comprehension:  Intact    Cranial Nerves:   CN II: Visual fields are full. Intact. Fundi - Normal, No papilledema, Pupils - ALLISON  CN III, IV and VI: Extraocular movements are intact. Normal saccades.   CN V: Facial sensation is intact.   CN VII: Muscles of facial expression reveal no asymmetry. Intact.   CN VIII: Hearing is intact. Whispered voice intact.   CN IX and X: Palate elevates symmetrically. Intact  CN XI: Shoulder shrug is intact.   CN XII: Tongue is midline without evidence of atrophy or fasciculation.     Motor:  Right UE muscle strength 5/5. Normal tone.     Left UE muscle strength 5/5. Normal tone.      Right LE muscle strength5/5. Normal tone.     Left LE muscle " "strength 5/5. Normal tone.      Sensory: Normal light touch, vibration and pinprick sensation bilaterally.    DTRs: 2+ bilaterally in upper and lower extremities.    Babinski: Negative bilaterally.    Coordination: Normal finger-to-nose, heel to shin B/L.    Gait: Deferred    Ophthalmoscopic examination-no papilledema noted    Cardiac examination -normal rate and rhythm    Labs:  Most recent labs have been reviewed.    Results from last 7 days   Lab Units 01/17/20  0410   WBC 10*3/mm3 11.37*   HEMOGLOBIN g/dL 7.8*   HEMATOCRIT % 27.2*   PLATELETS 10*3/mm3 139*     Results from last 7 days   Lab Units 01/17/20  0410   SODIUM mmol/L 139   POTASSIUM mmol/L 3.8   CHLORIDE mmol/L 101   CO2 mmol/L 28.0   BUN mg/dL 38*   CREATININE mg/dL 0.62   CALCIUM mg/dL 11.4*       Radiology: No radiology results for the last day    EEG showed moderate generalized slowing but no epileptiform activity    Assessment and Plan:  64-year-old female that presented with altered mental status in the setting of malignant hypertension  -Has improved significantly  -Blood pressure much better controlled now  -Continue Risperdal 1 mg at night and Keppra 500 mg twice a day for now for seizure prophylaxis.  -Patient is awaiting discharge to nursing home with PEG tube placement  -Neurology to sign off.  Call if needed    She will follow-up in the memory care clinic in 4 to 6 weeks.  If no seizures till then the Keppra could be tapered and stopped    Bridget Ortiz MD 01/18/20 11:20 AM        Electronically signed by Bridget Ortiz MD at 01/18/20 1123     Milton Cerda MD at 01/18/20 0902          Patient Name:  Glo Berry  YOB: 1955  [id number]    Surgery Progress Note    Date of visit: 1/18/2020    Subjective   Subjective: Stable, no new issues.        Objective     Objective:     /66 (BP Location: Left arm, Patient Position: Lying)   Pulse 80   Temp 97.6 °F (36.4 °C) (Axillary)   Resp 18   Ht 162.6 cm (64.02\")  "  Wt 64.2 kg (141 lb 8 oz)   SpO2 100%   BMI 24.28 kg/m²      Intake/Output Summary (Last 24 hours) at 2020 0902  Last data filed at 2020 0843  Gross per 24 hour   Intake 2719 ml   Output 100 ml   Net 2619 ml       CV:  Rhythm  regular and rate regular   L:  Clear  to auscultation bilaterally   Abd:  Bowel sounds positive , soft, nontender  Ext:  No cyanosis, clubbing, edema    Recent labs that are back at this time have been reviewed.       Assessment/Plan     Assessment/ Plan:    Hospital Problem List     * (Principal) Encephalopathy acute - Plan PEG for Monday    R/O AIS     R/O status epilepticus     Hypertension (Chronic)        Pancreatic insufficiency (Chronic)    T2DM on metformin  (Chronic)        Chronic diarrhea (Chronic)    Overview Signed 2020  3:34 PM by uAbree Luis APRN     Followed by Dr. Wright          Lumbar stenosis (Chronic)    Diabetic neuropathy  (Chronic)        Depression (Chronic)        Dementia  (Chronic)        Smoker    Acute respiratory failure     Encephalopathy              Milton Cerda MD  2020  9:02 AM        Electronically signed by Milton Cerda MD at 20 0903     Jason Busby MD at 20 1848              [facility] Medicine Services  PROGRESS NOTE    Patient Name: Glo Berry  : 1955  MRN: [id number]    Date of Admission: 2020  Primary Care Physician: Philomena Walters MD    Subjective   Subjective     CC:  Dysphasia    HPI:  Patient failed her swallowing study today, and both she and her family say it is time for a PEG tube    Review of Systems  Gen- No fevers, chills  CV- No chest pain, palpitations  Resp- No cough, dyspnea  GI- No N/V/D, abd pain          All other systems reviewed and are negative.    Objective   Objective     Vital Signs:   Temp:  [97.9 °F (36.6 °C)-98.7 °F (37.1 °C)] 98 °F (36.7 °C)  Heart Rate:  [59-84] 68  Resp:  [16-20] 18  BP: (107-142)/(56-81) 112/61  Total (NIH  Stroke Scale): 6     Physical Exam:  Constitutional -frail female in bed  HEENT-NCAT, mucous membranes moist, Keofeed in place  CV-RRR, S1 S2 normal, no m/r/g  Resp-grossly clear bilaterally  Abd-soft, non-tender, non-distended, normo active bowel sounds  Ext-No lower extremity cyanosis, clubbing or edema bilaterally  Neuro-alert, speech clear, moves all extremities   Psych-flat affect   Skin- No rash on exposed UE or LE bilaterally        Results Reviewed:  Results from last 7 days   Lab Units 01/17/20 0410 01/16/20  0532 01/15/20  0431   WBC 10*3/mm3 11.37* 11.54* 12.37*   HEMOGLOBIN g/dL 7.8* 8.0* 8.6*   HEMATOCRIT % 27.2* 26.2* 28.0*   PLATELETS 10*3/mm3 139* 146 145     Results from last 7 days   Lab Units 01/17/20  0410 01/16/20  2244 01/16/20  0532 01/15/20  0431  01/13/20  0441   SODIUM mmol/L 139  --  138 138   < > 139   POTASSIUM mmol/L 3.8 4.1 3.2* 3.8   < > 3.5   CHLORIDE mmol/L 101  --  99 97*   < > 100   CO2 mmol/L 28.0  --  27.0 30.0*   < > 28.0   BUN mg/dL 38*  --  31* 22   < > 21   CREATININE mg/dL 0.62  --  0.60 0.51*   < > 0.39*   GLUCOSE mg/dL 209*  --  225* 183*   < > 191*   CALCIUM mg/dL 11.4*  --  10.6* 11.2*   < > 10.6*   ALT (SGPT) U/L  --   --   --   --   --  22   AST (SGOT) U/L  --   --   --   --   --  37*    < > = values in this interval not displayed.     Estimated Creatinine Clearance: 92.9 mL/min (by C-G formula based on SCr of 0.62 mg/dL).    Microbiology Results Abnormal     None          Imaging Results (Last 24 Hours)     Procedure Component Value Units Date/Time    Fiberoptic Endo (fees) [[id number]] Resulted:  01/17/20 1125     Updated:  01/17/20 1125    Narrative:       This procedure was auto-finalized with no dictation required.          Results for orders placed during the hospital encounter of 01/06/20   Adult Transthoracic Echo Complete W/ Cont if Necessary Per Protocol (With Agitated Saline)    Narrative · There is calcification of the aortic valve.  · Peak velocity of the  flow distal to the aortic valve is 217.0 cm/s.  · Estimated EF = 70%.  · Left ventricular systolic function is normal.          I have reviewed the medications:  Scheduled Meds:    amLODIPine 10 mg Oral Q24H   aspirin 81 mg Oral Daily   Or      aspirin 300 mg Rectal Daily   atorvastatin 80 mg Oral Nightly   carvedilol 25 mg Oral BID With Meals   donepezil 5 mg Oral Nightly   heparin (porcine) 5,000 Units Subcutaneous Q8H   insulin regular 0-7 Units Subcutaneous Q6H   ipratropium-albuterol 3 mL Nebulization 4x Daily - RT   levETIRAcetam 500 mg Oral Q12H   lidocaine 1 patch Transdermal Q24H   losartan 100 mg Oral Daily   nicotine 1 patch Transdermal Q24H   risperiDONE 1 mg Nasogastric Daily   risperiDONE 2 mg Nasogastric Nightly   sertraline 150 mg Nasogastric Nightly   traZODone 100 mg Nasogastric Nightly     Continuous Infusions:   PRN Meds:.HYDROcodone-acetaminophen  •  ipratropium-albuterol  •  labetalol  •  magnesium sulfate  •  magnesium sulfate  •  potassium chloride  •  potassium chloride  •  potassium phosphate  •  potassium phosphate  •  risperiDONE  •  traZODone    Assessment/Plan   Assessment & Plan     Active Hospital Problems    Diagnosis  POA   • **Encephalopathy acute [G93.40]  Yes   • R/O AIS  [I63.9]  Yes   • R/O status epilepticus  [G40.901]  Yes   • Hypertension [I10]  Yes   • Pancreatic insufficiency [K86.89]  Yes   • T2DM on metformin  [E11.9]  Yes   • Chronic diarrhea [K52.9]  Yes   • Lumbar stenosis [M48.061]  Yes   • Diabetic neuropathy  [E11.40]  Yes   • Depression [F32.9]  Yes   • Dementia  [F03.90]  Yes   • Smoker [F17.200]  Yes   • Acute respiratory failure  [J96.00]  Yes   • Encephalopathy [G93.40]  Yes      Resolved Hospital Problems   No resolved problems to display.        Brief Hospital Course to date:  Glo Berry is a 64 y.o. female transferred from the outside hospital for mentation    Acute encephalopathy  -May have been secondary to malignant hypertension as blood pressure  elevated on admission here in the 180s.  Discussed possible etiologies with neurology Dr. Petersen who felt the patient's presentation may actually represent a PRES-like syndrome  -EEG without epileptic changes, however continuing Keppra for possible underlying new onset seizure  -Mental status improving.  -Continue risperidone at night and additional risperidone as needed as needed     Possible new onset seizures  -Continue Keppra, follow-up with neurology outpatient in 6 weeks    Malignant hypertension  -Resolved    Acute respiratory failure  -Resolved    Dysphasia  -Continued severe dysphagia on speech evaluation today.  Patient says that she would like a PEG tube placed.  I discussed the patient with general surgery Dr. Cerda who will evaluate for a PEG tube with likely placement Monday    Depression  -Zoloft    Dementia  -Continue Aricept    DM 2  -Continue insulin therapy  -A1c 5.6    Tobacco abuse    Hypokalemia  -Replace both potassium and magnesium as needed    Anemia  -Hemoglobin 7.8, normocytic, with normal iron levels but slightly low iron saturation.  B12 and folate levels unremarkable  -Recommend weekly CBC while at rehab    ICA stenosis  -Left ICA stenosis noted on duplex exam of approximately 50 to 69%, however only 30% stenoses noted on CT Angio of the neck.  -Continue medical therapy      DVT Prophylaxis: heparin    Disposition: I expect the patient to be discharged to rehab/SNF early next week after PEG tube placed    CODE STATUS:   Code Status and Medical Interventions:   Ordered at: 01/06/20 1647     Code Status:    CPR     Medical Interventions (Level of Support Prior to Arrest):    Full         Electronically signed by Jason Busby MD, 01/17/20, 6:49 PM.        Electronically signed by Jason Busby MD at 01/17/20 2053          Consult Notes (last 72 hours) (Notes from 01/17/20 1123 through 01/20/20 1123)      Milton Cerda MD at 01/17/20 1453          Patient Name:  Glo PETERSON  Kristi  YOB: 1955  [id number]       Patient Care Team:  Philomena Walters MD as PCP - General (Internal Medicine)      General Surgery Consult Note     Date of Consultation: 01/17/20    Consulting Physician - Jason Busby MD    Reason for Consult - Feeding difficulty     Subjective     I have been asked to see  Glo Berry , a 64 y.o. female in consultation for feeding difficulty.  She was transferred to our institution on 1/6/2020 with mentation problems.  She has been diagnosed with an acute encephalopathy possibly related malignant hypertension.  There is also possibility of a TN ES-like syndrome.  She also has evidence of new onset seizures and is currently on Keppra.  She is recovering, but remains without the ability to speak.  She has been seen by speech pathology and is felt unsafe for p.o. intake.    Due to her illness, she is unable to participate in her history, so all history is obtained from the hospital chart and her family. We have been asked to see her for long term  feeding access.        Allergy:   Allergies   Allergen Reactions   • Codeine        Medications:    amLODIPine 10 mg Oral Q24H   aspirin 81 mg Oral Daily   Or      aspirin 300 mg Rectal Daily   atorvastatin 80 mg Oral Nightly   carvedilol 25 mg Oral BID With Meals   donepezil 5 mg Oral Nightly   heparin (porcine) 5,000 Units Subcutaneous Q8H   insulin regular 0-7 Units Subcutaneous Q6H   ipratropium-albuterol 3 mL Nebulization 4x Daily - RT   levETIRAcetam 500 mg Oral Q12H   lidocaine 1 patch Transdermal Q24H   losartan 100 mg Oral Daily   nicotine 1 patch Transdermal Q24H   risperiDONE 1 mg Nasogastric Daily   risperiDONE 2 mg Nasogastric Nightly   sertraline 150 mg Nasogastric Nightly   traZODone 100 mg Nasogastric Nightly        No current facility-administered medications on file prior to encounter.      Current Outpatient Medications on File Prior to Encounter   Medication Sig   • amLODIPine (NORVASC) 5 MG  tablet    • carvedilol (COREG) 25 MG tablet Take 25 mg by mouth 2 (Two) Times a Day With Meals.   • colesevelam (WELCHOL) 625 MG tablet 625 mg 2 (Two) Times a Day With Meals.   • cyanocobalamin 1000 MCG/ML injection inject contents of 1 vial intramuscularly MONTHLY   • dicyclomine (BENTYL) 20 MG tablet Take 20 mg by mouth 2 (Two) Times a Day.   • donepezil (ARICEPT) 5 MG tablet Take 5 mg by mouth Every Night.   • fenofibrate 160 MG tablet Take 160 mg by mouth Daily.   • losartan (COZAAR) 100 MG tablet Take 100 mg by mouth Daily.   • metFORMIN (GLUCOPHAGE) 1000 MG tablet Take 1,000 mg by mouth Daily With Breakfast.   • Pancrelipase, Lip-Prot-Amyl, (CREON) 58641 units capsule delayed-release particles Take 2 by mouth 30 minutes before meals and 1 before a snack   • potassium chloride (K-DUR,KLOR-CON) 20 MEQ CR tablet Take 20 mEq by mouth Daily.   • prochlorperazine (COMPAZINE) 10 MG tablet Take  by mouth Every 8 (Eight) Hours As Needed.   • sertraline (ZOLOFT) 100 MG tablet Take 50 mg by mouth 2 (Two) Times a Day.   • topiramate (TOPAMAX) 50 MG tablet Take 50 mg by mouth Daily.   • traZODone (DESYREL) 100 MG tablet Take 100 mg by mouth Every Night.   • venlafaxine XR (EFFEXOR-XR) 150 MG 24 hr capsule Take 150 mg by mouth Daily.       PMHx:   Past Medical History:   Diagnosis Date   • COPD (chronic obstructive pulmonary disease) (CMS/HCC)    • Diabetes mellitus (CMS/HCC)    • History of ear injury 1973    Injury of the right eardrum.This has resulted in the headaches   • Hypertension        Past Surgical History:  Past Surgical History:   Procedure Laterality Date   • BREAST CYST EXCISION     • SINUS SURGERY     • TONSILLECTOMY     • TUBAL ABDOMINAL LIGATION           Family History: Noncontributory     Social History:     Tobacco use: 1 ppd     EtOH use : None    Illicit drug use: None      Review of Systems   Review of systems could not be obtained due to   patient nonverbal.              Objective     Physical  "Exam:      Vital Signs  /56 (BP Location: Left arm, Patient Position: Lying)   Pulse 64   Temp 98.7 °F (37.1 °C) (Oral)   Resp 16   Ht 162.6 cm (64.02\")   Wt 64.2 kg (141 lb 8 oz)   SpO2 98%   BMI 24.28 kg/m²      Intake/Output Summary (Last 24 hours) at 1/17/2020 1453  Last data filed at 1/17/2020 0500  Gross per 24 hour   Intake 2380 ml   Output --   Net 2380 ml         Physical Exam:    Head: Normocephalic, atraumatic.   Eyes: Pupils equal, round, react to light and accommodation.   Mouth: Oral mucosa without lesions,  nasoenteral tube in place  Neck: No masses, lymphadenopathy or carotid bruits bilaterally   CV: Rhythm  and rate regular , no  murmurs, rubs or gallops  Lungs: Clear  to auscultation bilaterally   Abdomen: Bowel sounds positive  , soft,   Groin : No obvious hernias bilaterally   Extremities:  No cyanosis, clubbing or edema bilaterally  Lymphatics: No abnormal lymphadenopathy appreciated         Results Review: I have personally reviewed all of the recent lab and imaging results available at this time.  Laboratory exam shows a basic metabolic panel with an elevated glucose of 209.  White count is 11.3 with a hemoglobin 7.8 platelet count 139.      Assessment/Plan     Assessment and Plan:      Feeding Difficulty - We will plan for PEG Monday 1/20/20. The risks and benefits were discussed with the family, and they agreed to proceed.    Hospital Problem List     * (Principal) Encephalopathy acute    R/O AIS     R/O status epilepticus     Hypertension (Chronic)        Pancreatic insufficiency (Chronic)    T2DM on metformin  (Chronic)        Chronic diarrhea (Chronic)    Overview Signed 1/6/2020  3:34 PM by Aubree Luis APRN     Followed by Dr. Wright          Lumbar stenosis (Chronic)    Diabetic neuropathy  (Chronic)                Dementia  (Chronic)            Acute respiratory failure     Encephalopathy                I discussed the patient's findings and my recommendations with the " patient and/or family, as well as the primary team     Milton Cerda MD  01/17/20  2:53 PM        Please note that portions of this note were completed with a voice recognition program. Efforts were made to edit the dictations, but occasionally words are mistranscribed.      Electronically signed by Milton Cerda MD at 01/17/20 5633

## 2020-01-20 NOTE — PROGRESS NOTES
"Patient Name:  Glo Berry  YOB: 1955  [id number]    Surgery Progress Note    Date of visit: 1/20/2020    Subjective   Subjective: Stable overnight.         Objective     Objective:     /78 (BP Location: Left arm, Patient Position: Lying)   Pulse 73   Temp 97.3 °F (36.3 °C) (Oral)   Resp 18   Ht 162.6 cm (64.02\")   Wt 62.7 kg (138 lb 3.2 oz)   SpO2 92%   BMI 23.71 kg/m²     Intake/Output Summary (Last 24 hours) at 1/20/2020 0625  Last data filed at 1/20/2020 0056  Gross per 24 hour   Intake 1416 ml   Output 1100 ml   Net 316 ml       CV:  Rhythm  regular and rate regular   L:  Clear  to auscultation bilaterally   Abd:  Bowel sounds positive , soft, nontender  Ext:  No cyanosis, clubbing, edema    Recent labs that are back at this time have been reviewed.        Assessment/Plan     Assessment/ Plan:    Hospital Problem List     * (Principal) Encephalopathy acute - For PEG today.      R/O AIS     R/O status epilepticus     Hypertension (Chronic)        Pancreatic insufficiency (Chronic)    T2DM on metformin  (Chronic)        Chronic diarrhea (Chronic)    Overview Signed 1/6/2020  3:34 PM by Aubree Luis APRN     Followed by Dr. Wright          Lumbar stenosis (Chronic)    Diabetic neuropathy  (Chronic)        Depression (Chronic)        Dementia  (Chronic)        Smoker    Acute respiratory failure     Encephalopathy              Milton Cerda MD  1/20/2020  6:25 AM      "

## 2020-01-20 NOTE — PLAN OF CARE
Problem: Patient Care Overview  Goal: Plan of Care Review  Outcome: Ongoing (interventions implemented as appropriate)  Flowsheets (Taken 1/20/2020 0150)  Progress: improving  Plan of Care Reviewed With: patient  Outcome Summary: Pt A&O x4. VSS. RA. NSR per monitor. NG tube in place in right nare at 78. Peptamen AF TF going continuously per pump at 60mL/hr with 30mL flush q2h. Tube feedings turned off at midnight per NPO order for surgery in am. Planned peg tub placement in am. Will continue to monitor pt.

## 2020-01-20 NOTE — PROGRESS NOTES
Clinical Nutrition     Nutrition Support Followup  Reason for Visit:   Follow-up protocol, EN    Patient Name: Glo Berry  YOB: 1955  MRN: [id number]  Date of Encounter: 01/20/20 8:04 AM  Admission date: 1/6/2020    Nutrition Assessment   Assessment     Hospital Problem List  Altered mental status, improving    Additional conditions, tests, procedures this admission  Malignant HTN, ?PRES  Seizures  Encephalopathy, resolved  Acute respiratory failure, improving  ?Pre-existing dementia  Dysphagia  Anemia  Thrombocytopenia, improving  New onset seizure vs TIA/both?  Depression, improving    (1/6) intubated  (1/8) extubated  (1/9) SLP eval - NPO, alternate route  (1/10) SLP eval - NPO, temporary alternate methods of nutrition/hydration  (1/10) SLP FEES - NPO, temporary alternate methods of nutrition/hydration  (1/13) SLP tx - Safest to cont NPO, alternative means of nutrition  (1/16) SLP tx - Will plan to repeat FEES tomorrow to determine if pt safe for PO  (1/17) SLP re-eval - long term alternate methods of nutrition/hydration, NPO, other (see comments)  (1/20) s/p PEG placement  (1/20) EN order adjusted to Isosource 1.5 @ 50 mL, GV = 1100 mL/d    PMH/PSxH  HTN  DM2  Tubular adenoma  Chronic diarrhea  IBS  Pancreatic insufficiency  Dementia  Tobacco use  COPD    CCY    Reported/Observed/Food/Nutrition Related History:      RD notes patient failed SLP re-eval 1/17. Plan for PEG placement today. EN turned off at midnight. Patients  in room reports patient sleepy this AM, was complaining of nausea.  with questions about EN, answered husbands questions and discussed transition to standard formula for long term nutrition support. He states patients had a lot of GI issues but denies any allergies or intolerances. Spoke with RN about transitioning patient to standard formula. Patient with nausea this AM but no emesis. RN reports plan to initiate bowel care to encourage BM.  "Standard formula will provide ~17g fiber at goal rate.    Per I/O documentation:  Last BM x2 1/16    Anthropometrics     Height: 162.6 cm (64.02\")  Last filed wt: Weight: 62.7 kg (138 lb 3.2 oz) (01/19/20 0540)  Weight Method: Bed scale    BMI: BMI (Calculated): 25.2  Overweight: 25.0-29.9kg/m2     Ideal Body Weight (IBW) (kg): 55.04  Admission wt: 146 lb 9.7 oz  Method obtained: bed scale weight per charting on admission 1/6    Last 15 Recorded Weights   View Complete Flowsheet   Weight Weight (kg) Weight (lbs) Weight Method VISIT REPORT   1/16/2020 64.184 kg 141 lb 8 oz - -   1/15/2020 63.3 kg 139 lb 8.8 oz Bed scale -   1/14/2020 63 kg 138 lb 14.2 oz Bed scale -   1/13/2020 63.2 kg 139 lb 5.3 oz Bed scale -   1/12/2020 58.7 kg 129 lb 6.6 oz Bed scale -   1/11/2020 64.7 kg 142 lb 10.2 oz Bed scale -   1/10/2020 63.5 kg 139 lb 15.9 oz Bed scale -   1/9/2020 64.3 kg 141 lb 12.1 oz - -   1/7/2020 66.5 kg 146 lb 9.7 oz - -   1/7/2020 66.5 kg 146 lb 9.7 oz - -   1/7/2020 66.5 kg 146 lb 9.7 oz - -   1/7/2020 66.5 kg 146 lb 9.7 oz Bed scale -   1/6/2020 66.5 kg 146 lb 9.7 oz Bed scale -   4/4/2019 57.153 kg 126 lb - Report   2/21/2019 55.339 kg 122 lb - Report     Labs reviewed     Results from last 7 days   Lab Units 01/19/20  0444 01/18/20  1201 01/17/20  0410 01/16/20  2244 01/16/20  0532 01/15/20  0431 01/14/20  0350   GLUCOSE mg/dL  --   --  209*  --  225* 183* 190*   BUN mg/dL  --   --  38*  --  31* 22 21   CREATININE mg/dL  --   --  0.62  --  0.60 0.51* 0.40*   SODIUM mmol/L  --   --  139  --  138 138 137   CHLORIDE mmol/L  --   --  101  --  99 97* 101   POTASSIUM mmol/L  --  4.4 3.8 4.1 3.2* 3.8 4.1   PHOSPHORUS mg/dL  --   --   --   --   --  4.5 4.1   MAGNESIUM mg/dL 2.1 1.6 1.7  --  1.4* 1.6 1.4*           Invalid input(s): PLAT    Results from last 7 days   Lab Units 01/20/20  0617 01/20/20  0021 01/19/20  1740 01/19/20  1149 01/19/20  0545 01/19/20  0008   GLUCOSE mg/dL 207* 172* 183* 242* 212* 205*     Lab " "Results   Lab Value Date/Time    HGBA1C 5.60 01/06/2020 1821       Medications reviewed   Pertinent: iron, insulin, keppra, risperdal, zoloft, trazodone  GTT: LR @ 9 mL/hr    Needs Assessment (1/17)     Height used 162.6 cm (64\")   Weight used 66 kgs (146 lbs)         Estimated need Method/Equation used Result    Energy/Calorie need    ~1600 kcals/d 25 kcal/kg actBW  MSJ: 1198 x 1.3  1650 kcal  1557 kcal             Protein   ~73 g/day 1.0 - 1.2 g/kg actBW 66 - 79 g        Fiber 25 - 30 g/kg    Fluid 25 - 30 mL/kcal 1650 - 1980 mL          Current Nutrition Prescription     PO: NPO Diet  EN: Diet, Tube Feeding Tube Feeding Formula: Peptamen AF (Peptide Based, Critical Care, ALI)    Goal rate: 60 mL  Route: NGT    Evaluation of Received Nutrient/Fluid Intake last 2 day average - 1/18 - 1/19:     At Target Goal Volume  Received per I/O's    % Est needs   Volume  1200 ml 1189 mL      Modulars        Energy/kcals 1440 kcals 1427 kcals 99%   Protein  91 g pro 90 g pro 99%               Fiber 7 g 7 g    Fluid   W/Free water 973 ml  1333 ml 964 ml  1325 ml              Nutrition Diagnosis     1/9 updated 1/13, 1/15  Problem Altered GI function   Etiology Pancreatic insufficiency / IBS   Signs/Symptoms History of chronic diarrhea   Status: resolving, no diarrhea    1/9 updated 1/13, 1/15, 1/17, 1/20  Problem Swallowing difficulty   Etiology Dysphagia / encephalopathy   Signs/Symptoms SLP eval 1/17 rec, long term NPO/alt method nutrition   Status: ongoing    1/7  Problem Inadequate protein intake    Etiology Clinical status / intubated   Signs/Symptoms NPO   Status: EN started 1/13    Nutrition Intervention   1.  Follow treatment progress, Care plan reviewed  2. Plan for PEG placement today following failed SLP eval. RD recommends adjustment to standard, fiber containing formula for long term enteral nutrition support. Rec: Isosource 1.5 to start at 25 mL adv as rasheeda by 25 mL Q6H to goal rate of 50 mL/hr, free water @ 30 mL " Q2H.    Route: PEG     At Target Goal Volume     % Est needs   Volume  1100 ml     Energy/kcals 1650 kcals 103%   Protein 75 g pro 100%   Fiber 17 g         Fluid via  mL    Total Fluid  770 mL    Meets 100% RDI Yes        3. Weekly Nutrition panel, CRP and Prealbumin ordered to start Monday 1/27. Nutrition panel, CRP and Prealbumin ordered to be completed 1/21  4. RD to continue to monitor patient EN tolerance, labs, SLP evals, and adjust nutrition support regimen as medically appropriate      Goal:   General: Nutrition support treatment  EN/PN: Adjust EN    Monitoring/Evaluation:   Per protocol, I&O, Pertinent labs, EN delivery/tolerance, Weight, Symptoms, Swallow function    Will Continue to follow per protocol    Jordana Mckeon RDN, LD  Time Spent: 35 minutes

## 2020-01-20 NOTE — PROGRESS NOTES
Continued Stay Note  [facility]     Patient Name: Glo Berry  MRN: [id number]  Today's Date: 1/20/2020    Admit Date: 1/6/2020    Discharge Plan     Row Name 01/20/20 1503       Plan    Plan  [facility]    Patient/Family in Agreement with Plan  yes    Plan Comments  CM called [facility] with [facility] and left a voicemail to let her know that PEG has been placed and to start precert. CM will continue to follow.    Final Discharge Disposition Code  03 - skilled nursing facility (SNF)        Discharge Codes    No documentation.       Expected Discharge Date and Time     Expected Discharge Date Expected Discharge Time    Jan 21, 2020             Redd Car RN

## 2020-01-20 NOTE — PROGRESS NOTES
[facility] Medicine Services  PROGRESS NOTE    Patient Name: Glo Berry  : 1955  MRN: [id number]    Date of Admission: 2020  Primary Care Physician: Philomena Walters MD    Subjective   Subjective     CC:  Follow-up for dysphasia    HPI:  This morning she is complaining of some nausea, has been dry heaving on exam.  Says she feels a sour taste in her mouth.  Has not had a bowel movement in several days.    Review of Systems  Gen- No fevers, chills  CV- No chest pain, palpitations  Resp- No cough, dyspnea  GI- No N/V/D, abd pain   All other systems reviewed and are negative.    Objective   Objective     Vital Signs:   Temp:  [96.1 °F (35.6 °C)-98.7 °F (37.1 °C)] 97.2 °F (36.2 °C)  Heart Rate:  [50-86] 76  Resp:  [14-22] 14  BP: ()/(59-85) 116/63  FiO2 (%):  [21 %-23 %] 22 %  Total (NIH Stroke Scale): 6     Physical Exam:  Constitutional: Appears to be in distress, will not open her eyes, appears nauseous  HENT: NCAT, mucous membranes moist  Respiratory: Clear to auscultation bilaterally, respiratory effort normal   Cardiovascular: RRR, no murmurs, no lower extremity edema  Gastrointestinal: Positive bowel sounds, soft, nontender, no distention without rebound or guarding  Psychiatric: Uncooperative with interview today due to nausea  Neurologic: Cranial Nerves grossly intact to confrontation, speech clear  Skin: No rashes      Results Reviewed:  Results from last 7 days   Lab Units 20  0410 20  0532 01/15/20  0431   WBC 10*3/mm3 11.37* 11.54* 12.37*   HEMOGLOBIN g/dL 7.8* 8.0* 8.6*   HEMATOCRIT % 27.2* 26.2* 28.0*   PLATELETS 10*3/mm3 139* 146 145     Results from last 7 days   Lab Units 20  1201 20  0410 20  2244 20  0532 01/15/20  0431   SODIUM mmol/L  --  139  --  138 138   POTASSIUM mmol/L 4.4 3.8 4.1 3.2* 3.8   CHLORIDE mmol/L  --  101  --  99 97*   CO2 mmol/L  --  28.0  --  27.0 30.0*   BUN mg/dL  --  38*  --  31* 22      CREATININE mg/dL  --  0.62  --  0.60 0.51*   GLUCOSE mg/dL  --  209*  --  225* 183*   CALCIUM mg/dL  --  11.4*  --  10.6* 11.2*     Estimated Creatinine Clearance: 90.7 mL/min (by C-G formula based on SCr of 0.62 mg/dL).    Microbiology Results Abnormal     None          Imaging Results (Last 24 Hours)     ** No results found for the last 24 hours. **          Results for orders placed during the hospital encounter of 01/06/20   Adult Transthoracic Echo Complete W/ Cont if Necessary Per Protocol (With Agitated Saline)    Narrative · There is calcification of the aortic valve.  · Peak velocity of the flow distal to the aortic valve is 217.0 cm/s.  · Estimated EF = 70%.  · Left ventricular systolic function is normal.          I have reviewed the medications:  Scheduled Meds:  amLODIPine 10 mg Oral Q24H   aspirin 81 mg Oral Daily   Or      aspirin 300 mg Rectal Daily   atorvastatin 80 mg Oral Nightly   carvedilol 25 mg Oral BID With Meals   donepezil 5 mg Oral Nightly   famotidine 20 mg Intravenous Once   famotidine 20 mg Oral Once   ferrous sulfate 300 mg Oral Daily   heparin (porcine) 5,000 Units Subcutaneous Q8H   insulin regular 0-7 Units Subcutaneous Q6H   levETIRAcetam 500 mg Oral Q12H   lidocaine 1 patch Transdermal Q24H   losartan 100 mg Oral Daily   nicotine 1 patch Transdermal Q24H   risperiDONE 1 mg Nasogastric Daily   risperiDONE 2 mg Nasogastric Nightly   sertraline 150 mg Nasogastric Nightly   sodium chloride 10 mL Intravenous Q12H   traZODone 100 mg Nasogastric Nightly     Continuous Infusions:  lactated ringers 9 mL/hr Last Rate: 9 mL/hr (01/20/20 1254)     PRN Meds:.ipratropium-albuterol  •  labetalol  •  lidocaine PF 1%  •  magnesium sulfate  •  magnesium sulfate  •  ondansetron  •  potassium chloride  •  potassium chloride  •  potassium phosphate  •  potassium phosphate  •  risperiDONE  •  sodium chloride  •  traZODone    Assessment/Plan   Assessment & Plan     Active Hospital Problems     Diagnosis  POA   • **Encephalopathy acute [G93.40]  Yes   • R/O AIS  [I63.9]  Yes   • R/O status epilepticus  [G40.901]  Yes   • Hypertension [I10]  Yes   • Pancreatic insufficiency [K86.89]  Yes   • T2DM on metformin  [E11.9]  Yes   • Chronic diarrhea [K52.9]  Yes   • Lumbar stenosis [M48.061]  Yes   • Diabetic neuropathy  [E11.40]  Yes   • Depression [F32.9]  Yes   • Dementia  [F03.90]  Yes   • Smoker [F17.200]  Yes   • Acute respiratory failure  [J96.00]  Yes   • Encephalopathy [G93.40]  Yes      Resolved Hospital Problems   No resolved problems to display.        Brief Hospital Course to date:  Glo Berry is a 64 y.o. female With a past medical history significant for hypertension, type 2 diabetes on metformin, pancreatic insufficiency, lumbar stenosis, dementia, depression, and tobacco use who was admitted on January 6, 2020 for acute encephalopathy secondary to malignant hypertension and possible PRES syndrome.    All problems new to me today    Dysphagia  -Continues have severe dysphagia on speech evaluation, plan for PEG tube placement today    Acute encephalopathy- resolved  OR ES syndrome  -Improved with better blood pressure control-continue amlodipine 10 mg, carvedilol 25 mg twice daily, losartan 100 mg daily  -Continue risperidone and trazodone at night    Possible new onset seizures  -Continue Keppra and follow-up with neurology memory care clinic in 4 to 6 weeks    Type 2 diabetes  - Continue sliding scale    Tobacco use-continue nicotine patch  Dementia-continue Aricept 5 mg q. night  Acute respiratory failure-resolved    DVT Prophylaxis: Heparin    Disposition: I expect the patient to be discharged pending PEG tube placement.    CODE STATUS:   Code Status and Medical Interventions:   Ordered at: 01/06/20 1647     Code Status:    CPR     Medical Interventions (Level of Support Prior to Arrest):    Full         Electronically signed by Estella Billingsley MD, 01/20/20, 1:33 PM.

## 2020-01-20 NOTE — OP NOTE
"Operative Report    Patient Name:  Glo Berry  YOB: 1955  [id number]    1/20/2020        PREOPERATIVE DIAGNOSIS:  Feeding difficulty (R63.3)      POSTOPERATIVE DIAGNOSIS: Same      PROCEDURE PERFORMED: EGD with PEG placement (88615)      SURGEON: Milton Cerda MD      ASSISTANT: None       ANESTHESIA: Sedation with 2 mg Versed and 50 mcg Fentanyl.     I was present, directed and supervised the entire sedation and monitoring of this patient for a grand total of 15 minutes.      SPECIMENS: None      FINDINGS: 20 [place] PEG tube placed at the 5.5 cm level      INDICATIONS:       The patient is a 64 y.o. year old female with a history of feeding difficulty who we have been asked to see for long term feeding access. The risks and benefits of EGD with PEG placement were discussed at length with the patient and the patient's family and they agreed to proceed.      DESCRIPTION OF PROCEDURE:     After obtaining informed consent, the patient was taken to the endoscopy suite. They were placed in modified Oliveira position, and after appropriate sedation as detailed above, a bite block was placed into the patient's mouth. The endoscope was advanced into the mouth and into the esophagus without difficulty. It was advanced into the stomach, and into the duodenum, which was normal  . The scope was then returned to the stomach, and the stomach was maximally insufflated. An appropriate site for PEG placement was then determined by palpation, transillumination, and the \"safe track\" needle technique. This area was prepped and draped in standard sterile fashion, and after infiltrating the skin with local anesthetic, a 7mm skin incision was made in this location. A needle was advanced through this incision and into the stomach under endoscopic guidance. A wire was then placed through the needle, grasped with the endoscope, and brought out through the mouth. At this point, using the Ponsky Pull technique, a 20 " D[place] PEG tube was brought through the abdominal wall in this location. The endoscope was again advanced through the mouth and esophagus and into the stomach, where the PEG bumper could be seen abutting the anterior gastric wall in standard fashion without bleeding. The endoscope was then used to desufflate the stomach, and removed from the patient's mouth without difficulty. The PEG tube was secured at the 5.5 cm level, with the bumper at the 6 cm level. It was sutured to the skin, dressed in standard sterile fashion, and placed to gravity drainage. The patient tolerated the procedure well. They were then transported to the recovery room in stable condition. All sponge and needle counts were correct times two at the completion of the case. There were no immediate complications.       Milton Cerda MD  1/20/2020  12:43 PM

## 2020-01-21 LAB
ALBUMIN SERPL-MCNC: 3.1 G/DL (ref 3.5–5.2)
ALP SERPL-CCNC: 64 U/L (ref 39–117)
ALT SERPL W P-5'-P-CCNC: 32 U/L (ref 1–33)
ANION GAP SERPL CALCULATED.3IONS-SCNC: 13 MMOL/L (ref 5–15)
AST SERPL-CCNC: 37 U/L (ref 1–32)
BILIRUB SERPL-MCNC: 0.3 MG/DL (ref 0.2–1.2)
BUN BLD-MCNC: 26 MG/DL (ref 8–23)
CALCIUM SPEC-SCNC: 10.1 MG/DL (ref 8.6–10.5)
CHLORIDE SERPL-SCNC: 99 MMOL/L (ref 98–107)
CHOLEST SERPL-MCNC: 66 MG/DL (ref 0–200)
CO2 SERPL-SCNC: 28 MMOL/L (ref 22–29)
CREAT BLD-MCNC: 0.55 MG/DL (ref 0.57–1)
CRP SERPL-MCNC: 0.46 MG/DL (ref 0–0.5)
GLUCOSE BLD-MCNC: 193 MG/DL (ref 65–99)
GLUCOSE BLDC GLUCOMTR-MCNC: 205 MG/DL (ref 70–130)
GLUCOSE BLDC GLUCOMTR-MCNC: 212 MG/DL (ref 70–130)
GLUCOSE BLDC GLUCOMTR-MCNC: 216 MG/DL (ref 70–130)
GLUCOSE BLDC GLUCOMTR-MCNC: 224 MG/DL (ref 70–130)
MAGNESIUM SERPL-MCNC: 1.8 MG/DL (ref 1.6–2.4)
PHOSPHATE SERPL-MCNC: 4.1 MG/DL (ref 2.5–4.5)
POTASSIUM BLD-SCNC: 3.6 MMOL/L (ref 3.5–5.2)
POTASSIUM BLD-SCNC: 4.6 MMOL/L (ref 3.5–5.2)
PREALB SERPL-MCNC: 21 MG/DL (ref 20–40)
PROT SERPL-MCNC: 6.4 G/DL (ref 6–8.5)
SODIUM BLD-SCNC: 140 MMOL/L (ref 136–145)
TRIGL SERPL-MCNC: 130 MG/DL (ref 0–150)

## 2020-01-21 PROCEDURE — 92526 ORAL FUNCTION THERAPY: CPT

## 2020-01-21 PROCEDURE — 84100 ASSAY OF PHOSPHORUS: CPT

## 2020-01-21 PROCEDURE — 80053 COMPREHEN METABOLIC PANEL: CPT

## 2020-01-21 PROCEDURE — 92507 TX SP LANG VOICE COMM INDIV: CPT

## 2020-01-21 PROCEDURE — 86140 C-REACTIVE PROTEIN: CPT

## 2020-01-21 PROCEDURE — 97530 THERAPEUTIC ACTIVITIES: CPT

## 2020-01-21 PROCEDURE — 82962 GLUCOSE BLOOD TEST: CPT

## 2020-01-21 PROCEDURE — 83735 ASSAY OF MAGNESIUM: CPT

## 2020-01-21 PROCEDURE — 84132 ASSAY OF SERUM POTASSIUM: CPT | Performed by: INTERNAL MEDICINE

## 2020-01-21 PROCEDURE — 25010000002 HEPARIN (PORCINE) PER 1000 UNITS: Performed by: SURGERY

## 2020-01-21 PROCEDURE — 84478 ASSAY OF TRIGLYCERIDES: CPT

## 2020-01-21 PROCEDURE — 82465 ASSAY BLD/SERUM CHOLESTEROL: CPT

## 2020-01-21 PROCEDURE — 97116 GAIT TRAINING THERAPY: CPT

## 2020-01-21 PROCEDURE — 99232 SBSQ HOSP IP/OBS MODERATE 35: CPT | Performed by: NURSE PRACTITIONER

## 2020-01-21 PROCEDURE — 63710000001 INSULIN DETEMIR PER 5 UNITS: Performed by: NURSE PRACTITIONER

## 2020-01-21 PROCEDURE — 84134 ASSAY OF PREALBUMIN: CPT

## 2020-01-21 RX ORDER — ACETAMINOPHEN 325 MG/1
650 TABLET ORAL EVERY 6 HOURS PRN
Status: DISCONTINUED | OUTPATIENT
Start: 2020-01-21 | End: 2020-01-22 | Stop reason: HOSPADM

## 2020-01-21 RX ADMIN — ATORVASTATIN CALCIUM 80 MG: 40 TABLET, FILM COATED ORAL at 20:36

## 2020-01-21 RX ADMIN — INSULIN DETEMIR 5 UNITS: 100 INJECTION, SOLUTION SUBCUTANEOUS at 20:38

## 2020-01-21 RX ADMIN — POTASSIUM CHLORIDE 40 MEQ: 1.5 POWDER, FOR SOLUTION ORAL at 14:52

## 2020-01-21 RX ADMIN — ASPIRIN 81 MG 81 MG: 81 TABLET ORAL at 09:33

## 2020-01-21 RX ADMIN — INSULIN HUMAN 3 UNITS: 100 INJECTION, SOLUTION PARENTERAL at 06:14

## 2020-01-21 RX ADMIN — ACETAMINOPHEN 650 MG: 325 TABLET ORAL at 20:35

## 2020-01-21 RX ADMIN — CARVEDILOL 25 MG: 12.5 TABLET, FILM COATED ORAL at 18:06

## 2020-01-21 RX ADMIN — INSULIN HUMAN 3 UNITS: 100 INJECTION, SOLUTION PARENTERAL at 18:06

## 2020-01-21 RX ADMIN — LIDOCAINE 1 PATCH: 50 PATCH TOPICAL at 09:35

## 2020-01-21 RX ADMIN — RISPERIDONE 1 MG: 1 SOLUTION ORAL at 09:35

## 2020-01-21 RX ADMIN — MAGNESIUM SULFATE HEPTAHYDRATE 4 G: 40 INJECTION, SOLUTION INTRAVENOUS at 09:50

## 2020-01-21 RX ADMIN — HEPARIN SODIUM 5000 UNITS: 5000 INJECTION INTRAVENOUS; SUBCUTANEOUS at 06:14

## 2020-01-21 RX ADMIN — ACETAMINOPHEN 650 MG: 325 TABLET ORAL at 09:50

## 2020-01-21 RX ADMIN — RISPERIDONE 2 MG: 1 SOLUTION ORAL at 20:37

## 2020-01-21 RX ADMIN — MINERAL SUPPLEMENT IRON 300 MG / 5 ML STRENGTH LIQUID 100 PER BOX UNFLAVORED 300 MG: at 09:36

## 2020-01-21 RX ADMIN — SERTRALINE HYDROCHLORIDE 150 MG: 100 TABLET ORAL at 20:35

## 2020-01-21 RX ADMIN — INSULIN HUMAN 3 UNITS: 100 INJECTION, SOLUTION PARENTERAL at 13:13

## 2020-01-21 RX ADMIN — LEVETIRACETAM 500 MG: 100 SOLUTION ORAL at 20:36

## 2020-01-21 RX ADMIN — LOSARTAN POTASSIUM 100 MG: 50 TABLET, FILM COATED ORAL at 09:33

## 2020-01-21 RX ADMIN — INSULIN HUMAN 3 UNITS: 100 INJECTION, SOLUTION PARENTERAL at 01:49

## 2020-01-21 RX ADMIN — NICOTINE 1 PATCH: 21 PATCH, EXTENDED RELEASE TRANSDERMAL at 09:34

## 2020-01-21 RX ADMIN — HEPARIN SODIUM 5000 UNITS: 5000 INJECTION INTRAVENOUS; SUBCUTANEOUS at 20:39

## 2020-01-21 RX ADMIN — DONEPEZIL HYDROCHLORIDE 5 MG: 5 TABLET ORAL at 20:34

## 2020-01-21 RX ADMIN — CARVEDILOL 25 MG: 12.5 TABLET, FILM COATED ORAL at 09:33

## 2020-01-21 RX ADMIN — HEPARIN SODIUM 5000 UNITS: 5000 INJECTION INTRAVENOUS; SUBCUTANEOUS at 13:16

## 2020-01-21 RX ADMIN — AMLODIPINE BESYLATE 10 MG: 10 TABLET ORAL at 09:33

## 2020-01-21 RX ADMIN — POTASSIUM CHLORIDE 40 MEQ: 1.5 POWDER, FOR SOLUTION ORAL at 09:49

## 2020-01-21 RX ADMIN — LEVETIRACETAM 500 MG: 100 SOLUTION ORAL at 09:35

## 2020-01-21 RX ADMIN — TRAZODONE HYDROCHLORIDE 100 MG: 100 TABLET ORAL at 20:35

## 2020-01-21 NOTE — PLAN OF CARE
Problem: Patient Care Overview  Goal: Plan of Care Review  Outcome: Ongoing (interventions implemented as appropriate)  Flowsheets (Taken 1/21/2020 1410 by Izzy Barrios, PT)  Plan of Care Reviewed With: son;patient  Note:   SLP treatment completed. Will continue to address cognition and dysphagia in treatment. Please see note for further details and recommendations.

## 2020-01-21 NOTE — PROGRESS NOTES
Continued Stay Note  [facility]     Patient Name: Glo Berry  MRN: [id number]  Today's Date: 1/21/2020    Admit Date: 1/6/2020    Discharge Plan     Row Name 01/21/20 0930       Plan    Plan  [facility]    Patient/Family in Agreement with Plan  yes    Plan Comments  Spoke with patient at bedside. Plan is [facility] pending insurance. CM will continue to follow.    Final Discharge Disposition Code  03 - skilled nursing facility (SNF)        Discharge Codes    No documentation.       Expected Discharge Date and Time     Expected Discharge Date Expected Discharge Time    Jan 21, 2020             Redd Car RN

## 2020-01-21 NOTE — PROGRESS NOTES
[facility] Medicine Services  PROGRESS NOTE    Patient Name: Glo Berry  : 1955  MRN: [id number]    Date of Admission: 2020  Primary Care Physician: Philomena Walters MD    Subjective   Subjective     CC:  Follow-up for dysphasia    HPI:  Pt is seen resting up in bed in NAD.  Awake and alert.  Son at bs.  Pt is chronically ill appearing.  Poor dentition.  States she feels okay, but very weak, mild occ SOA with exertion and mild nausea.  No vomiting or diarrhea.  Pt states she is tolerating TF okay.  No new issues.      Review of Systems  Gen- No fevers, chills  CV- No chest pain, palpitations  Resp- No cough, mild intermittent exertional dyspnea  GI- positive mild intermittent nausea, no V/D, no abd pain   All other systems reviewed and are negative.    Objective   Objective     Vital Signs:   Temp:  [96.3 °F (35.7 °C)-98.6 °F (37 °C)] 98.6 °F (37 °C)  Heart Rate:  [] 101  Resp:  [16-18] 16  BP: (100-177)/(55-94) 100/55  Total (NIH Stroke Scale): 6     Physical Exam:  Constitutional: Appears to be in distress, will not open her eyes, appears nauseous  HENT: NCAT, dry mucous membranes and lips  Respiratory: Clear to auscultation bilaterally A/P with rare exp wheeze, respiratory effort normal on RA with sats 97%  Cardiovascular: RRR to mild sinus tach per tele, no murmurs, no lower extremity edema  Gastrointestinal: Positive bowel sounds, soft, nontender, no distention. No rebound or guarding  Psychiatric: calm, pleasant, appropriate affect currently  Neurologic: Awake and alert.  Oriented to person, place, year and general events.  Follows commands.  Cranial Nerves grossly intact to confrontation, speech clear  Skin: No rashes      Results Reviewed:  Results from last 7 days   Lab Units 20  0410 20  0532 01/15/20  0431   WBC 10*3/mm3 11.37* 11.54* 12.37*   HEMOGLOBIN g/dL 7.8* 8.0* 8.6*   HEMATOCRIT % 27.2* 26.2* 28.0*   PLATELETS 10*3/mm3 139* 146 145          Results from last 7 days   Lab Units 01/21/20  0434 01/18/20  1201 01/17/20  0410  01/16/20  0532   SODIUM mmol/L 140  --  139  --  138   POTASSIUM mmol/L 3.6 4.4 3.8   < > 3.2*   CHLORIDE mmol/L 99  --  101  --  99   CO2 mmol/L 28.0  --  28.0  --  27.0   BUN mg/dL 26*  --  38*  --  31*   CREATININE mg/dL 0.55*  --  0.62  --  0.60   GLUCOSE mg/dL 193*  --  209*  --  225*   CALCIUM mg/dL 10.1  --  11.4*  --  10.6*   ALT (SGPT) U/L 32  --   --   --   --    AST (SGOT) U/L 37*  --   --   --   --     < > = values in this interval not displayed.     Estimated Creatinine Clearance: 102.3 mL/min (A) (by C-G formula based on SCr of 0.55 mg/dL (L)).    Microbiology Results Abnormal     None          Imaging Results (Last 24 Hours)     ** No results found for the last 24 hours. **          Results for orders placed during the hospital encounter of 01/06/20   Adult Transthoracic Echo Complete W/ Cont if Necessary Per Protocol (With Agitated Saline)    Narrative · There is calcification of the aortic valve.  · Peak velocity of the flow distal to the aortic valve is 217.0 cm/s.  · Estimated EF = 70%.  · Left ventricular systolic function is normal.          I have reviewed the medications:  Scheduled Meds:    amLODIPine 10 mg Oral Q24H   aspirin 81 mg Oral Daily   Or      aspirin 300 mg Rectal Daily   atorvastatin 80 mg Oral Nightly   carvedilol 25 mg Oral BID With Meals   donepezil 5 mg Oral Nightly   ferrous sulfate 300 mg Oral Daily   heparin (porcine) 5,000 Units Subcutaneous Q8H   insulin detemir 5 Units Subcutaneous Nightly   insulin regular 0-7 Units Subcutaneous Q6H   levETIRAcetam 500 mg Oral Q12H   lidocaine 1 patch Transdermal Q24H   losartan 100 mg Oral Daily   nicotine 1 patch Transdermal Q24H   risperiDONE 1 mg Nasogastric Daily   risperiDONE 2 mg Nasogastric Nightly   sertraline 150 mg Nasogastric Nightly   traZODone 100 mg Nasogastric Nightly     Continuous Infusions:    lactated ringers 9 mL/hr Last Rate: 9  mL/hr (01/20/20 1254)     PRN Meds:.•  acetaminophen  •  ipratropium-albuterol  •  labetalol  •  magnesium sulfate  •  magnesium sulfate  •  ondansetron  •  potassium chloride  •  potassium chloride  •  potassium phosphate  •  potassium phosphate    Assessment/Plan   Assessment & Plan     Active Hospital Problems    Diagnosis  POA   • **Encephalopathy acute [G93.40]  Yes   • R/O AIS  [I63.9]  Yes   • R/O status epilepticus  [G40.901]  Yes   • Hypertension [I10]  Yes   • Pancreatic insufficiency [K86.89]  Yes   • T2DM on metformin  [E11.9]  Yes   • Chronic diarrhea [K52.9]  Yes   • Lumbar stenosis [M48.061]  Yes   • Diabetic neuropathy  [E11.40]  Yes   • Depression [F32.9]  Yes   • Dementia  [F03.90]  Yes   • Smoker [F17.200]  Yes   • Acute respiratory failure  [J96.00]  Yes   • Encephalopathy [G93.40]  Yes      Resolved Hospital Problems   No resolved problems to display.        Brief Hospital Course to date:  Glo Berry is a 64 y.o. female With a past medical history significant for hypertension, type 2 diabetes on metformin, pancreatic insufficiency, lumbar stenosis, dementia, depression, and tobacco use who was admitted on January 6, 2020 for acute encephalopathy secondary to malignant hypertension and possible PRES syndrome.    Assessment/plan:    All problems new to me today    Dysphagia  -Continues have severe dysphagia on speech evaluation, s/p PEG tube placement and Tube feeding infusing.   --dietician following   --needs good oral care...    Acute encephalopathy- resolved  PRES syndrome  -Improved with better blood pressure control-continue amlodipine 10 mg, carvedilol 25 mg twice daily, losartan 100 mg daily  -Continue risperidone and trazodone at night  --stable     Possible new onset seizures  -Continue Keppra and follow-up with neurology memory care clinic in 4 to 6 weeks    Type 2 diabetes  - Continue sliding scale  --running in lower 200s fairly consistently on TF  Will add low dose levimer  tonight.  NPO otherwise.    --otherwise will continue Q6 accuchecks and SSI with R as current.      Tobacco use-continue nicotine patch  Dementia  -son states pt with mild memory issues for a little while but really not confused except with onset of PRES as above and in ICU  -continue Aricept 5 mg q. Night    Acute respiratory failure-resolved  --stable, oxygen and nebs as needed    DVT Prophylaxis: Heparin    Disposition: I expect the patient to be discharged tolerating PEG tube feedings and placement. CM on board.    CODE STATUS:   Code Status and Medical Interventions:   Ordered at: 01/06/20 1227     Code Status:    CPR     Medical Interventions (Level of Support Prior to Arrest):    Full         Electronically signed by HORACIO Nguyen, 01/21/20, 2:49 PM.

## 2020-01-21 NOTE — PLAN OF CARE
Problem: Patient Care Overview  Goal: Plan of Care Review  Outcome: Ongoing (interventions implemented as appropriate)  Flowsheets  Taken 1/21/2020 1410  Plan of Care Reviewed With: son;patient  Patient Agreement with Plan of Care: agrees  Taken 1/21/2020 1400  Outcome Summary: PT LIMITED BY FATIGUE, WEAKNESS. AMBULATED 20 FEET WITH R WALKER AND MIN ASSIST. PT ENCOURAGED TO GET UP TO BEDSIDE COMMODE WITH NSG ASSIST VS PUREWICK/BEDPAN AND NSG NOTIFIED. RECOMMEND SNF AT D/C.

## 2020-01-21 NOTE — PROGRESS NOTES
"Patient Name:  Glo Berry  YOB: 1955  [id number]    Surgery Progress Note    Date of visit: 1/21/2020    Subjective   Subjective: Feels OK, tolerating TF         Objective     Objective:     /68 (BP Location: Right arm, Patient Position: Lying)   Pulse 101   Temp 97.7 °F (36.5 °C) (Oral)   Resp 18   Ht 162.6 cm (64.02\")   Wt 62.7 kg (138 lb 3.2 oz)   SpO2 93%   BMI 23.71 kg/m²     Intake/Output Summary (Last 24 hours) at 1/21/2020 0722  Last data filed at 1/20/2020 2230  Gross per 24 hour   Intake 50 ml   Output 300 ml   Net -250 ml       CV:  Rhythm  regular and rate regular   L:  Clear  to auscultation bilaterally   Abd:  Bowel sounds positive , soft, nontender. PEG c/d/i  Ext:  No cyanosis, clubbing, edema    Recent labs that are back at this time have been reviewed.        Assessment/Plan     Assessment/ Plan:    Hospital Problem List     * (Principal) Encephalopathy acute - Doing well after PEG. Will sign off, please call with questions.      R/O AIS     R/O status epilepticus     Hypertension (Chronic)        Pancreatic insufficiency (Chronic)    T2DM on metformin  (Chronic)        Chronic diarrhea (Chronic)    Overview Signed 1/6/2020  3:34 PM by Aubree Luis APRN     Followed by Dr. Wright          Lumbar stenosis (Chronic)    Diabetic neuropathy  (Chronic)        Depression (Chronic)        Dementia  (Chronic)        Smoker    Acute respiratory failure     Encephalopathy              Milton Cerda MD  1/21/2020  7:22 AM      "

## 2020-01-21 NOTE — THERAPY TREATMENT NOTE
Acute Care - Speech Language Pathology Treatment Note  [facility]     Patient Name: Glo Berry  : 1955  MRN: [id number]  Today's Date: 2020         Admit Date: 2020    Visit Dx:      ICD-10-CM ICD-9-CM   1. Encephalopathy acute G93.40 348.30   2. Impaired mobility and ADLs Z74.09 799.89   3. Dysphagia, unspecified type R13.10 787.20   4. Cognitive communication deficit R41.841 799.52     Patient Active Problem List   Diagnosis   • Uterine prolapse   • Stress incontinence of urine   • Urgency incontinence   • Diarrhea   • Encephalopathy acute   • R/O AIS    • R/O status epilepticus    • Hypertension   • Pancreatic insufficiency   • T2DM on metformin    • Chronic diarrhea   • Lumbar stenosis   • Diabetic neuropathy    • Depression   • Dementia    • Smoker   • Acute respiratory failure    • Encephalopathy        Therapy Treatment  Rehabilitation Treatment Summary     Row Name 20 1000             Treatment Time/Intention    Discipline  speech language pathologist  -CH      Document Type  therapy note (daily note)  -CH      Subjective Information  no complaints  -CH      Mode of Treatment  individual therapy;speech-language pathology  -CH      Patient/Family Observations  Son present during session  -CH      Care Plan Review  evaluation/treatment results reviewed;care plan/treatment goals reviewed;risks/benefits reviewed;current/potential barriers reviewed;patient/other agree to care plan  -CH      Care Plan Review, Other Participant(s)  son  -CH      Therapy Frequency (Swallow)  5 days per week  -CH      Therapy Frequency (SLP SLC)  5 days per week  -CH      Patient Effort  good  -CH      Recorded by [CH] Judith Kim, MS CCC-SLP 20 1359      Row Name 20 1000             Pain Scale: Numbers Pre/Post-Treatment    Pain Scale: Numbers, Pretreatment  7/10  -CH      Pain Scale: Numbers, Post-Treatment  7/10  -CH      Pain Location - Side  Left  -CH      Pain Location  neck  -       Pain Intervention(s)  Other (Comment) pain patch in place  -      Recorded by [] Judith Kim MS CCC-SLP 01/21/20 1358      Row Name 01/21/20 1000             Pain Scale: FACES Pre/Post-Treatment    Pain: FACES Scale, Pretreatment  2-->hurts little bit  -CH      Pain: FACES Scale, Post-Treatment  2-->hurts little bit  -      Recorded by [] Judith Kim, MS CCC-SLP 01/21/20 1358      Row Name 01/21/20 1000             Outcome Summary/Treatment Plan (SLP)    Daily Summary of Progress (SLP)  progress toward functional goals as expected  -      Plan for Continued Treatment (SLP)  Continue to follow to address dysphagia tx, as well as cognitive-communication tx focusing on short-term memory and improving speech intelligibility.  -      Anticipated Dischage Disposition  inpatient rehabilitation facility;anticipate therapy at next level of care  -      Recorded by [] Judith Kim MS CCC-SLP 01/21/20 1358        User Key  (r) = Recorded By, (t) = Taken By, (c) = Cosigned By    Initials Name Effective Dates Discipline     Judith Kim MS CCC-SLP 02/14/19 -  SLP          EDUCATION  The patient has been educated in the following areas:   Cognitive Impairment Communication Impairment.    SLP Recommendation and Plan  Daily Summary of Progress (SLP): progress toward functional goals as expected     Plan for Continued Treatment (SLP): Continue to follow to address dysphagia tx, as well as cognitive-communication tx focusing on short-term memory and improving speech intelligibility.  Anticipated Dischage Disposition: inpatient rehabilitation facility, anticipate therapy at next level of care             SLP GOALS     Row Name 01/21/20 1000             Oral Nutrition/Hydration Goal 1 (SLP)    Oral Nutrition/Hydration Goal 1, SLP  LTG: Pt will improve swallow function in order to safely return to PO diet w/ no overt s/sxs aspiration/distress w/ 100% acc and no cues  -      Time Frame  (Oral Nutrition/Hydration Goal 1, SLP)  by discharge  -CH      Progress/Outcomes (Oral Nutrition/Hydration Goal 1, SLP)  continuing progress toward goal  -CH         Oral Nutrition/Hydration Goal 2 (SLP)    Oral Nutrition/Hydration Goal 2, SLP  Pt will tolerate therapeutic trials of thin H2O & puree w/ no overt s/sxs aspiration/distress w/ 70% acc and no cues in order to determine readiness for repeat instrumental eval  -CH      Time Frame (Oral Nutrition/Hydration Goal 2, SLP)  short term goal (STG);by discharge  -CH      Barriers (Oral Nutrition/Hydration Goal 2, SLP)  Trialed ice chips, thin via tsp. TC x 1 with spoon sips of thin H2O . No additional s/s of aspiration with trials.   -CH      Progress/Outcomes (Oral Nutrition/Hydration Goal 2, SLP)  good progress toward goal  -CH         Pharyngeal Strengthening Exercise Goal 1 (SLP)    Activity (Pharyngeal Strengthening Goal 1, SLP)  increase superior movement of the hyolaryngeal complex;increase anterior movement of the hyolaryngeal complex;increase closure at entrance to airway/closure of airway at glottis;increase squeeze/positive pressure generation  -CH      Increase Superior Movement of the Hyolaryngeal Complex  effortful pitch glide (falsetto + pharyngeal squeeze)  -CH      Increase Anterior Movement of the Hyolaryngeal Complex  chin tuck against resistance (CTAR)  -CH      Increase Closure at Entrance to Airway/Closure of Airway at Glottis  supraglottic swallow  -CH      Increase Squeeze/Positive Pressure Generation  hard effortful swallow  -CH      [place]/Accuracy (Pharyngeal Strengthening Goal 1, SLP)  with minimal cues (75-90% accuracy)  -CH      Time Frame (Pharyngeal Strengthening Goal 1, SLP)  short term goal (STG);by discharge  -CH      Barriers (Pharyngeal Strengthening Goal 1, SLP)  All exercises reviewed with patient and patient completed each x 5 with min cues and models. Son encouraging patient to complete exercises in room between ST  sessions.   -CH      Progress/Outcomes (Pharyngeal Strengthening Goal 1, SLP)  continuing progress toward goal  -CH         Comprehend Questions Goal 1 (SLP)    Improve Ability to Comprehend Questions Goal 1 (SLP)  simple yes/no questions;simple wh questions;90%;with minimal cues (75-90%)  -      Time Frame (Comprehend Questions Goal 1, SLP)  short term goal (STG);by discharge  -      Progress (Ability to Comprehend Questions Goal 1, SLP)  90%;independently (over 90% accuracy)  -      Progress/Outcomes (Comprehend Questions Goal 1, SLP)  goal met  -         Follow Directions Goal 2 (SLP)    Improve Ability to Follow Directions Goal 1 (SLP)  2 step commands;90%;with minimal cues (75-90%)  -      Time Frame (Follow Directions Goal 1, SLP)  short term goal (STG);by discharge  -      Progress (Ability to Follow Directions Goal 1, SLP)  100%;independently (over 90% accuracy)  -      Progress/Outcomes (Follow Directions Goal 1, SLP)  goal met  -         Attention Goal 1 (SLP)    Improve Attention by Goal 1 (SLP)  complete selective attention task;90%;with minimal cues (75-90%)  -      Time Frame (Attention Goal 1, SLP)  short term goal (STG);by discharge  -      Progress (Attention Goal 1, SLP)  70%;with minimal cues (75-90%)  -      Progress/Outcomes (Attention Goal 1, SLP)  continuing progress toward goal  -CH         Memory Skills Goal 1 (SLP)    Improve Memory Skills Through Goal 1 (SLP)  recalling related word lists with an imposed delay;listen to a paragraph and answer questions;recall details of the day;use memory strategies;80%;with minimal cues (75-90%)  -      Time Frame (Memory Skills Goal 1, SLP)  short term goal (STG)  -CH      Progress (Memory Skills Goal 1, SLP)  60%;with minimal cues (75-90%)  -      Progress/Outcomes (Memory Skills Goal 1, SLP)  continuing progress toward goal  -CH      Comment (Memory Skills Goal 1, SLP)  3/5 words after 5 min delay; recalling details of the day  2/3  -         Additional Goal 1 (SLP)    Additional Goal 1, SLP  LTG: Improve cog-comm skills in order to participate in care while in hospital setting with 80% accuracy and cues  -CH      Time Frame (Additional Goal 1, SLP)  by discharge  -      Progress/Outcomes (Additional Goal 1, SLP)  continuing progress toward goal  -        User Key  (r) = Recorded By, (t) = Taken By, (c) = Cosigned By    Initials Name Provider Type    Judith Carter MS CCC-SLP Speech and Language Pathologist              Time Calculation:     Time Calculation- SLP     Row Name 20 1410             Time Calculation- SLP    SLP Start Time  1000  -      SLP Received On  20  -        User Key  (r) = Recorded By, (t) = Taken By, (c) = Cosigned By    Initials Name Provider Type    Judith Carter MS CCC-SLP Speech and Language Pathologist          Therapy Charges for Today     Code Description Service Date Service Provider Modifiers Qty    81159628407 HC ST TREATMENT SWALLOW 3 2020 Judith Kim MS CCC-SLP GN 1    [id number] HC ST TREATMENT SPEECH 3 2020 Judith Kim MS CCC-SLP GN 1                     Judith Kim MS CCC-SLP  2020   and Acute Care - Speech Language Pathology   Swallow Treatment Note  [facility]     Patient Name: Glo Berry  : 1955  MRN: [id number]  Today's Date: 2020               Admit Date: 2020    Visit Dx:      ICD-10-CM ICD-9-CM   1. Encephalopathy acute G93.40 348.30   2. Impaired mobility and ADLs Z74.09 799.89   3. Dysphagia, unspecified type R13.10 787.20   4. Cognitive communication deficit R41.841 799.52     Patient Active Problem List   Diagnosis   • Uterine prolapse   • Stress incontinence of urine   • Urgency incontinence   • Diarrhea   • Encephalopathy acute   • R/O AIS    • R/O status epilepticus    • Hypertension   • Pancreatic insufficiency   • T2DM on metformin    • Chronic diarrhea   • Lumbar stenosis   • Diabetic  neuropathy    • Depression   • Dementia    • Smoker   • Acute respiratory failure    • Encephalopathy       Therapy Treatment  Rehabilitation Treatment Summary     Row Name 01/21/20 1000             Treatment Time/Intention    Discipline  speech language pathologist  -      Document Type  therapy note (daily note)  -      Subjective Information  no complaints  -      Mode of Treatment  individual therapy;speech-language pathology  -      Patient/Family Observations  Son present during session  -      Care Plan Review  evaluation/treatment results reviewed;care plan/treatment goals reviewed;risks/benefits reviewed;current/potential barriers reviewed;patient/other agree to care plan  -      Care Plan Review, Other Participant(s)  son  -      Therapy Frequency (Swallow)  5 days per week  -      Therapy Frequency (SLP SLC)  5 days per week  -      Patient Effort  good  -      Recorded by [] Judith Kim MS CCC-SLP 01/21/20 1358      Row Name 01/21/20 1000             Pain Scale: Numbers Pre/Post-Treatment    Pain Scale: Numbers, Pretreatment  7/10  -CH      Pain Scale: Numbers, Post-Treatment  7/10  -CH      Pain Location - Side  Left  -      Pain Location  neck  -      Pain Intervention(s)  Other (Comment) pain patch in place  -      Recorded by [] Judith Kim MS CCC-SLP 01/21/20 1358      Row Name 01/21/20 1000             Pain Scale: FACES Pre/Post-Treatment    Pain: FACES Scale, Pretreatment  2-->hurts little bit  -      Pain: FACES Scale, Post-Treatment  2-->hurts little bit  -      Recorded by [] Judith Kim MS CCC-SLP 01/21/20 1358      Row Name 01/21/20 1000             Outcome Summary/Treatment Plan (SLP)    Daily Summary of Progress (SLP)  progress toward functional goals as expected  -      Plan for Continued Treatment (SLP)  Continue to follow to address dysphagia tx, as well as cognitive-communication tx focusing on short-term memory and improving  speech intelligibility.  -CH      Anticipated Dischage Disposition  inpatient rehabilitation facility;anticipate therapy at next level of care  -CH      Recorded by [CH] Judith Kim MS CCC-SLP 01/21/20 1198        User Key  (r) = Recorded By, (t) = Taken By, (c) = Cosigned By    Initials Name Effective Dates Discipline    CH Judith Kim MS CCC-SLP 02/14/19 -  SLP          Outcome Summary  Outcome Summary/Treatment Plan (SLP)  Daily Summary of Progress (SLP): progress toward functional goals as expected (01/21/20 1000 : Judith Kim MS CCC-SLP)  Plan for Continued Treatment (SLP): Continue to follow to address dysphagia tx, as well as cognitive-communication tx focusing on short-term memory and improving speech intelligibility. (01/21/20 1000 : Judith Kim MS CCC-SLP)  Anticipated Dischage Disposition: inpatient rehabilitation facility, anticipate therapy at next level of care (01/21/20 1000 : Judith Kim MS CCC-SLP)      SLP GOALS     Row Name 01/21/20 1000             Oral Nutrition/Hydration Goal 1 (SLP)    Oral Nutrition/Hydration Goal 1, SLP  LTG: Pt will improve swallow function in order to safely return to PO diet w/ no overt s/sxs aspiration/distress w/ 100% acc and no cues  -CH      Time Frame (Oral Nutrition/Hydration Goal 1, SLP)  by discharge  -CH      Progress/Outcomes (Oral Nutrition/Hydration Goal 1, SLP)  continuing progress toward goal  -CH         Oral Nutrition/Hydration Goal 2 (SLP)    Oral Nutrition/Hydration Goal 2, SLP  Pt will tolerate therapeutic trials of thin H2O & puree w/ no overt s/sxs aspiration/distress w/ 70% acc and no cues in order to determine readiness for repeat instrumental eval  -CH      Time Frame (Oral Nutrition/Hydration Goal 2, SLP)  short term goal (STG);by discharge  -CH      Barriers (Oral Nutrition/Hydration Goal 2, SLP)  Trialed ice chips, thin via tsp. TC x 1 with spoon sips of thin H2O . No additional s/s of aspiration with trials.    -CH      Progress/Outcomes (Oral Nutrition/Hydration Goal 2, SLP)  good progress toward goal  -CH         Pharyngeal Strengthening Exercise Goal 1 (SLP)    Activity (Pharyngeal Strengthening Goal 1, SLP)  increase superior movement of the hyolaryngeal complex;increase anterior movement of the hyolaryngeal complex;increase closure at entrance to airway/closure of airway at glottis;increase squeeze/positive pressure generation  -CH      Increase Superior Movement of the Hyolaryngeal Complex  effortful pitch glide (falsetto + pharyngeal squeeze)  -CH      Increase Anterior Movement of the Hyolaryngeal Complex  chin tuck against resistance (CTAR)  -CH      Increase Closure at Entrance to Airway/Closure of Airway at Glottis  supraglottic swallow  -CH      Increase Squeeze/Positive Pressure Generation  hard effortful swallow  -CH      [place]/Accuracy (Pharyngeal Strengthening Goal 1, SLP)  with minimal cues (75-90% accuracy)  -CH      Time Frame (Pharyngeal Strengthening Goal 1, SLP)  short term goal (STG);by discharge  -CH      Barriers (Pharyngeal Strengthening Goal 1, SLP)  All exercises reviewed with patient and patient completed each x 5 with min cues and models. Son encouraging patient to complete exercises in room between ST sessions.   -CH      Progress/Outcomes (Pharyngeal Strengthening Goal 1, SLP)  continuing progress toward goal  -CH         Comprehend Questions Goal 1 (SLP)    Improve Ability to Comprehend Questions Goal 1 (SLP)  simple yes/no questions;simple wh questions;90%;with minimal cues (75-90%)  -      Time Frame (Comprehend Questions Goal 1, SLP)  short term goal (STG);by discharge  -      Progress (Ability to Comprehend Questions Goal 1, SLP)  90%;independently (over 90% accuracy)  -      Progress/Outcomes (Comprehend Questions Goal 1, SLP)  goal met  -CH         Follow Directions Goal 2 (SLP)    Improve Ability to Follow Directions Goal 1 (SLP)  2 step commands;90%;with minimal cues  (75-90%)  -CH      Time Frame (Follow Directions Goal 1, SLP)  short term goal (STG);by discharge  -CH      Progress (Ability to Follow Directions Goal 1, SLP)  100%;independently (over 90% accuracy)  -CH      Progress/Outcomes (Follow Directions Goal 1, SLP)  goal met  -CH         Attention Goal 1 (SLP)    Improve Attention by Goal 1 (SLP)  complete selective attention task;90%;with minimal cues (75-90%)  -CH      Time Frame (Attention Goal 1, SLP)  short term goal (STG);by discharge  -CH      Progress (Attention Goal 1, SLP)  70%;with minimal cues (75-90%)  -CH      Progress/Outcomes (Attention Goal 1, SLP)  continuing progress toward goal  -CH         Memory Skills Goal 1 (SLP)    Improve Memory Skills Through Goal 1 (SLP)  recalling related word lists with an imposed delay;listen to a paragraph and answer questions;recall details of the day;use memory strategies;80%;with minimal cues (75-90%)  -CH      Time Frame (Memory Skills Goal 1, SLP)  short term goal (STG)  -CH      Progress (Memory Skills Goal 1, SLP)  60%;with minimal cues (75-90%)  -CH      Progress/Outcomes (Memory Skills Goal 1, SLP)  continuing progress toward goal  -CH      Comment (Memory Skills Goal 1, SLP)  3/5 words after 5 min delay; recalling details of the day 2/3  -CH         Additional Goal 1 (SLP)    Additional Goal 1, SLP  LTG: Improve cog-comm skills in order to participate in care while in hospital setting with 80% accuracy and cues  -CH      Time Frame (Additional Goal 1, SLP)  by discharge  -CH      Progress/Outcomes (Additional Goal 1, SLP)  continuing progress toward goal  -CH        User Key  (r) = Recorded By, (t) = Taken By, (c) = Cosigned By    Initials Name Provider Type    Judith Carter MS CCC-SLP Speech and Language Pathologist          EDUCATION  The patient has been educated in the following areas:   Home Exercise Program (HEP) Dysphagia (Swallowing Impairment) Oral Care/Hydration NPO rationale.    SLP  Recommendation and Plan  Daily Summary of Progress (SLP): progress toward functional goals as expected     Plan for Continued Treatment (SLP): Continue to follow to address dysphagia tx, as well as cognitive-communication tx focusing on short-term memory and improving speech intelligibility.  Anticipated Dischage Disposition: inpatient rehabilitation facility, anticipate therapy at next level of care                    Time Calculation:   Time Calculation- SLP     Row Name 01/21/20 1410             Time Calculation- SLP    SLP Start Time  1000  -CH      SLP Received On  01/21/20  -        User Key  (r) = Recorded By, (t) = Taken By, (c) = Cosigned By    Initials Name Provider Type     Judith Kim MS CCC-SLP Speech and Language Pathologist          Therapy Charges for Today     Code Description Service Date Service Provider Modifiers Qty    56000340301  ST TREATMENT SWALLOW 3 1/21/2020 Judith Kim MS CCC-SLP GN 1    [id number]  ST TREATMENT SPEECH 3 1/21/2020 Judith Kim MS CCC-SLP GN 1                 Judith Kim MS CCC-JAS  1/21/2020

## 2020-01-22 VITALS
RESPIRATION RATE: 18 BRPM | HEIGHT: 64 IN | DIASTOLIC BLOOD PRESSURE: 80 MMHG | HEART RATE: 78 BPM | BODY MASS INDEX: 23.6 KG/M2 | SYSTOLIC BLOOD PRESSURE: 122 MMHG | WEIGHT: 138.2 LBS | TEMPERATURE: 97.9 F | OXYGEN SATURATION: 94 %

## 2020-01-22 LAB
ANION GAP SERPL CALCULATED.3IONS-SCNC: 10 MMOL/L (ref 5–15)
BUN BLD-MCNC: 27 MG/DL (ref 8–23)
BUN/CREAT SERPL: 45 (ref 7–25)
CALCIUM SPEC-SCNC: 11.1 MG/DL (ref 8.6–10.5)
CHLORIDE SERPL-SCNC: 99 MMOL/L (ref 98–107)
CO2 SERPL-SCNC: 30 MMOL/L (ref 22–29)
CREAT BLD-MCNC: 0.6 MG/DL (ref 0.57–1)
DEPRECATED RDW RBC AUTO: 57.6 FL (ref 37–54)
ERYTHROCYTE [DISTWIDTH] IN BLOOD BY AUTOMATED COUNT: 18 % (ref 12.3–15.4)
GFR SERPL CREATININE-BSD FRML MDRD: 101 ML/MIN/1.73
GLUCOSE BLD-MCNC: 216 MG/DL (ref 65–99)
GLUCOSE BLDC GLUCOMTR-MCNC: 201 MG/DL (ref 70–130)
GLUCOSE BLDC GLUCOMTR-MCNC: 237 MG/DL (ref 70–130)
GLUCOSE BLDC GLUCOMTR-MCNC: 242 MG/DL (ref 70–130)
HCT VFR BLD AUTO: 26.8 % (ref 34–46.6)
HGB BLD-MCNC: 7.9 G/DL (ref 12–15.9)
MAGNESIUM SERPL-MCNC: 1.7 MG/DL (ref 1.6–2.4)
MCH RBC QN AUTO: 27.2 PG (ref 26.6–33)
MCHC RBC AUTO-ENTMCNC: 29.5 G/DL (ref 31.5–35.7)
MCV RBC AUTO: 92.4 FL (ref 79–97)
PLATELET # BLD AUTO: 155 10*3/MM3 (ref 140–450)
PMV BLD AUTO: 12.7 FL (ref 6–12)
POTASSIUM BLD-SCNC: 4 MMOL/L (ref 3.5–5.2)
RBC # BLD AUTO: 2.9 10*6/MM3 (ref 3.77–5.28)
SODIUM BLD-SCNC: 139 MMOL/L (ref 136–145)
WBC NRBC COR # BLD: 13.58 10*3/MM3 (ref 3.4–10.8)

## 2020-01-22 PROCEDURE — 83735 ASSAY OF MAGNESIUM: CPT | Performed by: INTERNAL MEDICINE

## 2020-01-22 PROCEDURE — 25010000002 HEPARIN (PORCINE) PER 1000 UNITS: Performed by: SURGERY

## 2020-01-22 PROCEDURE — 92507 TX SP LANG VOICE COMM INDIV: CPT

## 2020-01-22 PROCEDURE — 80048 BASIC METABOLIC PNL TOTAL CA: CPT | Performed by: NURSE PRACTITIONER

## 2020-01-22 PROCEDURE — 92526 ORAL FUNCTION THERAPY: CPT

## 2020-01-22 PROCEDURE — 82962 GLUCOSE BLOOD TEST: CPT

## 2020-01-22 PROCEDURE — 85027 COMPLETE CBC AUTOMATED: CPT | Performed by: NURSE PRACTITIONER

## 2020-01-22 PROCEDURE — 99239 HOSP IP/OBS DSCHRG MGMT >30: CPT | Performed by: INTERNAL MEDICINE

## 2020-01-22 RX ORDER — RISPERIDONE 1 MG/ML
2 SOLUTION ORAL NIGHTLY
Qty: 60 ML | Refills: 0 | Status: SHIPPED | OUTPATIENT
Start: 2020-01-22 | End: 2020-02-21

## 2020-01-22 RX ORDER — ATORVASTATIN CALCIUM 80 MG/1
80 TABLET, FILM COATED ORAL NIGHTLY
Qty: 30 TABLET | Refills: 0 | Status: SHIPPED | OUTPATIENT
Start: 2020-01-22 | End: 2020-02-21

## 2020-01-22 RX ORDER — AMLODIPINE BESYLATE 10 MG/1
10 TABLET ORAL
Qty: 30 TABLET | Refills: 0 | Status: SHIPPED | OUTPATIENT
Start: 2020-01-23 | End: 2020-02-22

## 2020-01-22 RX ORDER — LEVETIRACETAM 100 MG/ML
500 SOLUTION ORAL EVERY 12 HOURS SCHEDULED
Qty: 300 ML | Refills: 0 | Status: SHIPPED | OUTPATIENT
Start: 2020-01-22 | End: 2020-02-21

## 2020-01-22 RX ORDER — DOCUSATE SODIUM 50 MG/5 ML
100 LIQUID (ML) ORAL DAILY
Status: DISCONTINUED | OUTPATIENT
Start: 2020-01-22 | End: 2020-01-22 | Stop reason: HOSPADM

## 2020-01-22 RX ORDER — BISACODYL 10 MG
10 SUPPOSITORY, RECTAL RECTAL ONCE AS NEEDED
Status: COMPLETED | OUTPATIENT
Start: 2020-01-22 | End: 2020-01-22

## 2020-01-22 RX ADMIN — LEVETIRACETAM 500 MG: 100 SOLUTION ORAL at 09:46

## 2020-01-22 RX ADMIN — AMLODIPINE BESYLATE 10 MG: 10 TABLET ORAL at 09:41

## 2020-01-22 RX ADMIN — HEPARIN SODIUM 5000 UNITS: 5000 INJECTION INTRAVENOUS; SUBCUTANEOUS at 06:34

## 2020-01-22 RX ADMIN — MAGNESIUM HYDROXIDE 10 ML: 2400 SUSPENSION ORAL at 09:42

## 2020-01-22 RX ADMIN — NICOTINE 1 PATCH: 21 PATCH, EXTENDED RELEASE TRANSDERMAL at 09:45

## 2020-01-22 RX ADMIN — POLYETHYLENE GLYCOL 3350 17 G: 17 POWDER, FOR SOLUTION ORAL at 09:42

## 2020-01-22 RX ADMIN — ACETAMINOPHEN 650 MG: 325 TABLET ORAL at 02:00

## 2020-01-22 RX ADMIN — DOCUSATE SODIUM 100 MG: 50 LIQUID ORAL at 09:42

## 2020-01-22 RX ADMIN — ASPIRIN 81 MG 81 MG: 81 TABLET ORAL at 09:42

## 2020-01-22 RX ADMIN — CARVEDILOL 25 MG: 12.5 TABLET, FILM COATED ORAL at 09:41

## 2020-01-22 RX ADMIN — RISPERIDONE 1 MG: 1 SOLUTION ORAL at 09:48

## 2020-01-22 RX ADMIN — INSULIN HUMAN 3 UNITS: 100 INJECTION, SOLUTION PARENTERAL at 00:46

## 2020-01-22 RX ADMIN — MINERAL SUPPLEMENT IRON 300 MG / 5 ML STRENGTH LIQUID 100 PER BOX UNFLAVORED 300 MG: at 09:46

## 2020-01-22 RX ADMIN — LIDOCAINE 1 PATCH: 50 PATCH TOPICAL at 09:44

## 2020-01-22 RX ADMIN — LOSARTAN POTASSIUM 100 MG: 50 TABLET, FILM COATED ORAL at 09:42

## 2020-01-22 RX ADMIN — BISACODYL 10 MG: 10 SUPPOSITORY RECTAL at 12:27

## 2020-01-22 RX ADMIN — INSULIN HUMAN 3 UNITS: 100 INJECTION, SOLUTION PARENTERAL at 12:27

## 2020-01-22 RX ADMIN — INSULIN HUMAN 3 UNITS: 100 INJECTION, SOLUTION PARENTERAL at 06:34

## 2020-01-22 NOTE — PLAN OF CARE
Problem: Patient Care Overview  Goal: Plan of Care Review  Outcome: Ongoing (interventions implemented as appropriate)  Flowsheets (Taken 1/22/2020 1312)  Plan of Care Reviewed With: patient; son  Note:   SLP treatment completed. Will continue to address dysphagia cognition and communication in treatment. Please see note for further details and recommendations.

## 2020-01-22 NOTE — PROGRESS NOTES
Continued Stay Note  [facility]     Patient Name: Glo Berry  MRN: [id number]  Today's Date: 1/22/2020    Admit Date: 1/6/2020    Discharge Plan     Row Name 01/22/20 0909       Plan    Plan  [facility]    Patient/Family in Agreement with Plan  yes    Plan Comments  Spoke to patient at bedside. She is agreeable to [facility] pending insurance approval. CM will continue to follow.    Final Discharge Disposition Code  03 - skilled nursing facility (SNF)        Discharge Codes    No documentation.       Expected Discharge Date and Time     Expected Discharge Date Expected Discharge Time    Jan 21, 2020             [employer] [employer] RN

## 2020-01-22 NOTE — PROGRESS NOTES
Case Management Discharge Note      Final Note: Plan is [facility].  will transport. Nurse to call report to [phone]. CM will fax discharge summary. Please send any hard scripts for narcotics with the patient.         Destination - Selection Complete      Service Provider Request Status Selected Services Address Phone Number Fax Number    [facility] Selected Skilled Nursing [address] [facility], [facility] [address] [phone]      Durable Medical Equipment      No service has been selected for the patient.      Dialysis/Infusion      No service has been selected for the patient.      Home Medical Care      No service has been selected for the patient.      Therapy      No service has been selected for the patient.      Community Resources      No service has been selected for the patient.             Final Discharge Disposition Code: 03 - skilled nursing facility (SNF)

## 2020-01-22 NOTE — DISCHARGE SUMMARY
[facility]  TRANSFER SUMMARY    Patient Name: Glo Berry  : 1955  MRN: [id number]    Date of Admission: 2020  Date of Discharge:  2020  Length of Stay: 16  Primary Care Physician: Philomena Walters MD    Consults     Date and Time Order Name Status Description    2020 1647 Inpatient Neurology Consult Stroke Completed           Hospital Course     Presenting Problem:   CVA (cerebral vascular accident) (CMS/HCC) [I63.9]  Encephalopathy [G93.40]    Active Hospital Problems    Diagnosis  POA   • **Encephalopathy acute [G93.40]  Yes   • R/O AIS  [I63.9]  Yes   • R/O status epilepticus  [G40.901]  Yes   • Hypertension [I10]  Yes   • Pancreatic insufficiency [K86.89]  Yes   • T2DM on metformin  [E11.9]  Yes   • Chronic diarrhea [K52.9]  Yes   • Lumbar stenosis [M48.061]  Yes   • Diabetic neuropathy  [E11.40]  Yes   • Depression [F32.9]  Yes   • Dementia  [F03.90]  Yes   • Smoker [F17.200]  Yes   • Acute respiratory failure  [J96.00]  Yes   • Encephalopathy [G93.40]  Yes      Resolved Hospital Problems   No resolved problems to display.          Hospital Course:  Glo Berry is a 64 y.o. female 64-year-old lady with a past medical history significant for hypertension, dementia, pancreatic insufficiency, depression, and type 2 diabetes who was transferred to [facility] on  for altered mental status concerning for stroke versus status epilepticus.  CT head without contrast was without acute findings.  CT cerebral perfusion with and without contrast was without abnormalities.  CTA head and neck with bilateral calcifications involving the carotid bifurcations producing only mild narrowing of the internal carotid arteries approximately 30% bilaterally, with normal vertebral and basilar arteries bilaterally.  Carotid duplex showed R ICA with 0 to 49% stenosis in the L ICA with 50 to 69% stenosis.  MRI brain without contrast showed no acute intracranial  findings of acute infarction or signal abnormality.  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  However, she did have severe dysphasia requiring PEG tube placement.  She was very weak and malnourished.  PT recommended skilled nursing on discharge.  After her PEG tube was placed she was alert and oriented to person, time, and place, but had not had a bowel movement for several days.  Suppository was ordered and she had a large bowel movement.   She had leukocytosis at 13 on day of discharge, likely reactive from PEG tube placement.     Discharge Follow Up Recommendations for outpatient labs/diagnostics:   Follow up CBC in 1 week for leukocytosis    Day of Discharge     HPI:   No acute events overnight. Has some soreness where the PEG tube is. Denies n/v/ abdominal pain.    Review of Systems  Gen- No fevers, chills  CV- No chest pain, palpitations  Resp- No cough, dyspnea    Vital Signs:   Temp:  [97.7 °F (36.5 °C)-98.3 °F (36.8 °C)] 97.9 °F (36.6 °C)  Heart Rate:  [] 78  Resp:  [16-18] 18  BP: (122-156)/(64-90) 122/80     Physical Exam:  Constitutional: No acute distress, awake, alert, appears malnourished   HENT: NCAT, mucous membranes moist  Respiratory: Clear to auscultation bilaterally, respiratory effort normal   Cardiovascular: RRR, no murmurs  Gastrointestinal: Positive bowel sounds, soft, nontender, nondistended  Psychiatric: Appropriate affect, cooperative  Neurologic: Oriented x 3, Cranial Nerves grossly intact to confrontation, speech clear  Skin: has g-tube in place without significant erythema, appears clean, dry, and intact       Pertinent Results     Results from last 7 days   Lab Units 01/22/20  0656 01/21/20  1854 01/21/20  0434 01/18/20  1201 01/17/20  0410 01/16/20  2244 01/16/20  0532   WBC 10*3/mm3 13.58*  --    --   --  11.37*  --  11.54*   HEMOGLOBIN g/dL 7.9*  --   --   --  7.8*  --  8.0*   HEMATOCRIT % 26.8*  --   --   --  27.2*  --  26.2*   PLATELETS 10*3/mm3 155  --   --   --  139*  --  146   SODIUM mmol/L 139  --  140  --  139  --  138   POTASSIUM mmol/L 4.0 4.6 3.6 4.4 3.8 4.1 3.2*   CHLORIDE mmol/L 99  --  99  --  101  --  99   CO2 mmol/L 30.0*  --  28.0  --  28.0  --  27.0   BUN mg/dL 27*  --  26*  --  38*  --  31*   CREATININE mg/dL 0.60  --  0.55*  --  0.62  --  0.60   GLUCOSE mg/dL 216*  --  193*  --  209*  --  225*   CALCIUM mg/dL 11.1*  --  10.1  --  11.4*  --  10.6*     Results from last 7 days   Lab Units 01/21/20  0434   BILIRUBIN mg/dL 0.3   ALK PHOS U/L 64   ALT (SGPT) U/L 32   AST (SGOT) U/L 37*     Results from last 7 days   Lab Units 01/21/20  0434   CHOLESTEROL mg/dL 66   TRIGLYCERIDES mg/dL 130         Brief Urine Lab Results     None          Microbiology Results Abnormal     None          Imaging Results (All)     Procedure Component Value Units Date/Time    Fiberoptic Endo (fees) [[id number]] Resulted:  01/17/20 1125     Updated:  01/17/20 1125    Narrative:       This procedure was auto-finalized with no dictation required.    XR Abdomen KUB [[id number]] Collected:  01/15/20 0505     Updated:  01/15/20 0507    Narrative:       CR Abdomen 1 Vw    INDICATION:   64-year-old female status post NG tube placement today.    COMPARISON:   None available    FINDINGS:  Single AP radiograph of the abdomen. Nasogastric tube is coiled in the gastric fundus with tip projecting over the mid to distal body of the stomach.    The bowel gas pattern is nonobstructive. No acute osseous abnormalities. No radiopaque foreign body.      Impression:       Nasogastric tube coiled in the gastric fundus with tip projecting over the mid to distal body of the stomach.    Signer Name: Sung Natarajan MD   Signed: 1/15/2020 5:05 AM   Workstation Name: [email or web address]    [facility]    XR Chest 1 View  [[id number]] Collected:  01/13/20 0856     Updated:  01/13/20 1718    Narrative:       EXAMINATION: XR CHEST 1 VW-01/13/2020:      INDICATION: Respiratory failure; Z74.09-Other reduced mobility;  R13.10-Dysphagia, unspecified; R41.841-Cognitive communication deficit.      COMPARISON: 01/09/2020.     FINDINGS: The cardiac silhouette is normal. There is no acute  inflammatory process, mass or effusion.           Impression:       Chronic change; no acute disease. There has been no  significant change when compared to the previous examination of  01/09/2020.     D:  01/13/2020  E:  01/13/2020     This report was finalized on 1/13/2020 5:15 PM by Dr. Noe Duggan MD.       Fiberoptic Endo (fees) [[id number]] Resulted:  01/10/20 1108     Updated:  01/10/20 1108    Narrative:       This procedure was auto-finalized with no dictation required.    XR Chest 1 View [[id number]] Collected:  01/09/20 0859     Updated:  01/09/20 2204    Narrative:       EXAMINATION: XR CHEST 1 VW- 01/09/2020     INDICATION: resp failure, extubated 01/08/2020     COMPARISON: 01/08/2020     FINDINGS: ET tube is seen at the level of the clavicles. NG tube is seen  in the stomach. The heart is enlarged, vasculature is cephalized. Left  basilar atelectasis has resolved. No new pulmonary parenchymal disease,  effusion or pneumothorax is seen.       Impression:       Interval clearing of mild left basilar atelectasis. No new  chest disease is seen.     D:  01/09/2020  E:  01/09/2020         This report was finalized on 1/9/2020 10:01 PM by Dr. Stephane Erickson MD.       MRI Brain Without Contrast [[id number]] Collected:  01/09/20 0858     Updated:  01/09/20 1028    Narrative:       EXAMINATION: MRI BRAIN WO CONTRAST- 01/09/2020     INDICATION: Malignant hypertension     TECHNIQUE: Multiplanar MRI of the brain without intravenous contrast     COMPARISON: NONE     FINDINGS: Mild motion degradation:  No restriction on diffusion-weighted sequences. Midline  structures are  symmetric without evidence of mass, mass effect or midline shift.  Ventricles and sulci within normal limits. Mild amount of increased T2  and FLAIR signal findings in the periventricular and deep white matter  suggesting chronic small vessel ischemic disease. Pituitary and sella  within normal limits. Cervicomedullary junction of limited evaluation  due to motion degradation. Globes and orbits retain normal T2 signal  characteristics. Visualized paranasal sinuses and mastoid air cells are  grossly clear and well pneumatized with the exception of trace bilateral  mastoid effusions. No cerebellopontine angle mass lesion. Normal signal  flow voids in the distal internal carotid and vertebrobasilar arteries.       Impression:       No acute intracranial findings of acute infarction or signal  abnormality other than mild increased signal findings of likely chronic  small vessel ischemic disease in the periventricular and subcortical  white matter of typical predominance in the periventricular regions.  Posterior parenchyma without subcortical signal abnormality or  restriction.      D:  01/09/2020  E:  01/09/2020     This report was finalized on 1/9/2020 10:25 AM by Dr. Sean Gonzalez.       CT Cerebral Perfusion With & Without Contrast [[id number]] Collected:  01/06/20 1638     Updated:  01/09/20 0843    Narrative:       EXAMINATION: CT CEREBRAL PERFUSION W WO CONTRAST-      INDICATION: TIA, initial screening, stroke symptoms.     TECHNIQUE: Cerebral perfusion analysis was performed using computed  tomography with contrast administration, including post processing of  parametric maps with determination of cerebral blood flow, cerebral  blood volume, and mean transit time.     The radiation dose reduction device was turned on for each scan per the  ALARA (As Low as Reasonably Achievable) protocol.     COMPARISON: None.     FINDINGS: CEREBRAL BLOOD VOLUME: There is normal symmetrical cerebral  blood volume  on perfusion maps.     CEREBRAL BLOOD FLOW: There is normal symmetrical cerebral blood flow on  perfusion maps.     TIME TO PEAK: There is normal symmetrical time to peak on perfusion  maps.     MEAN TRANSIT TIME: Normal symmetric appearance of mean transient time  perfusion map.     PERFUSION ANALYSIS: Normal cerebral perfusion.       Impression:       No perfusion abnormality to suggest evidence of reversible  ischemia.     D:  01/06/2020  E:  01/07/2020                 This report was finalized on 1/9/2020 8:40 AM by Dr. Trena Nettles MD.       XR Chest 1 View [[id number]] Collected:  01/08/20 0912     Updated:  01/08/20 1200    Narrative:       EXAMINATION: XR CHEST 1 VW-01/08/2020:      INDICATION: Intubated patient.      COMPARISON: 01/07/2020.     FINDINGS: The endotracheal tube is well positioned. The heart is  slightly large. The heart is compensated. There is minimal left basilar  airspace disease, likely representing atelectasis.           Impression:       There is left basilar atelectasis which has developed since  the previous examination, mild. The endotracheal tube remains well  positioned and there has otherwise been no change since the previous  exam.     D:  01/08/2020  E:  01/08/2020     This report was finalized on 1/8/2020 11:57 AM by Dr. Noe Duggan MD.       XR Chest 1 View [[id number]] Collected:  01/07/20 0758     Updated:  01/07/20 1633    Narrative:       EXAMINATION: XR CHEST 1 VW-      INDICATION: Intubated patient.      COMPARISON: 01/06/2020.     FINDINGS: Cardiac size borderline enlarged and unchanged with  endotracheal tube terminating above the level of the italo.  Esophagogastric tube has been placed in the interim coursing below the  diaphragm and out of the field-of-view. No pneumothorax or pleural  effusion.  No overt edema or focal consolidation.           Impression:       Interval placement of esophagogastric tube coursing below  the diaphragm and out of the  field-of-view with endotracheal tube  remaining in appropriate position above the level of the italo. No new  consolidation or overt edema. No pleural effusion.     D:  01/07/2020  E:  01/07/2020     This report was finalized on 1/7/2020 4:30 PM by Dr. Sean Gonzalez.       CT Angiogram Neck [[id number]] Collected:  01/06/20 1652     Updated:  01/07/20 0951    Narrative:       EXAMINATION: CT ANGIOGRAM NECK- 01/06/2020     INDICATION: Stroke     TECHNIQUE: CT angiogram of the neck was performed following intravenous  contrast. Images are displayed in the axial, sagittal and coronal  projections (3-D).     The radiation dose reduction device was turned on for each scan per the  ALARA (As Low as Reasonably Achievable) protocol.     COMPARISON: NONE     FINDINGS: Both common carotid arteries are normal. There is mild  calcification in both carotid bifurcations with tortuous internal  carotid arteries but without evidence of high-grade stenosis. Both  vertebral arteries are patent as is the basilar artery.       Impression:       There are bilateral calcifications involving the carotid  bifurcations producing only mild narrowing of the internal carotid  arteries (approximately 30% bilaterally). Both vertebral arteries are  normal as is the basilar artery.     D:  01/06/2020  E:  01/07/2020     This report was finalized on 1/7/2020 9:48 AM by Dr. Noe Duggan MD.       CT Angiogram Head [[id number]] Collected:  01/06/20 1656     Updated:  01/07/20 0951    Narrative:       EXAMINATION: CT ANGIOGRAM HEAD- 01/06/2020     INDICATION: Stroke     TECHNIQUE: Intracranial CTA was performed prior to and following  intravenous contrast.     The radiation dose reduction device was turned on for each scan per the  ALARA (As Low as Reasonably Achievable) protocol.     COMPARISON: NONE     FINDINGS: The intracranial portion of the internal carotid arteries is  normal. The anterior cerebral arteries are patent with no stenosis,  occlusion  or aneurysm. Likewise the middle cerebral arteries demonstrate  no large vessel occlusion. The basilar artery is normal.  The superior  cerebellar and posterior cerebral arteries are normal.       Impression:       Normal intracranial CT angiogram.     D:  01/06/2020  E:  01/07/2020        This report was finalized on 1/7/2020 9:48 AM by Dr. Noe Duggan MD.       XR Chest 1 View [[id number]] Collected:  01/06/20 1714     Updated:  01/07/20 0950    Narrative:       EXAMINATION: XR CHEST 1 VW-      INDICATION: Confirm ET tube placement.      COMPARISON: 08/04/2014.     FINDINGS: The endotracheal tube is well positioned. The heart is at the  upper limits of normal. The heart is compensated. There are chronic  pulmonary findings.           Impression:       Endotracheal tube is well positioned. There are no acute  cardiopulmonary findings.     D:  01/06/2020  E:  01/07/2020     This report was finalized on 1/7/2020 9:47 AM by Dr. Noe Duggan MD.       CT Head Without Contrast [[id number]] Collected:  01/06/20 1618     Updated:  01/06/20 1707    Narrative:       EXAMINATION: CT HEAD WO CONTRAST-01/06/2020:      INDICATION: Stroke.     TECHNIQUE: CT scan of the head was performed at 5 mm intervals. No  intravenous contrast was utilized.     The radiation dose reduction device was turned on for each scan per the  ALARA (As Low as Reasonably Achievable) protocol.     COMPARISON: NONE.     FINDINGS: There are age-related periventricular white matter changes.  There is no intracranial mass, hemorrhage, midline shift or extra-axial  fluid collection.       Impression:       Age-related change. There are no acute findings.     D:  01/06/2020  E:  01/06/2020           This report was finalized on 1/6/2020 5:04 PM by Dr. Noe Duggan MD.             Results for orders placed during the hospital encounter of 01/06/20   Adult Transthoracic Echo Complete W/ Cont if Necessary Per Protocol (With Agitated Saline)    Narrative · There  is calcification of the aortic valve.  · Peak velocity of the flow distal to the aortic valve is 217.0 cm/s.  · Estimated EF = 70%.  · Left ventricular systolic function is normal.              Discharge Details        Discharge Medications      New Medications      Instructions Start Date   atorvastatin 80 MG tablet  Commonly known as:  LIPITOR   80 mg, Per G Tube, Nightly      levETIRAcetam 100 MG/ML solution  Commonly known as:  KEPPRA   500 mg, Per G Tube, Every 12 Hours Scheduled      polyethylene glycol pack packet  Commonly known as:  MIRALAX   17 g, Oral, Daily PRN      risperiDONE 1 MG/ML oral solution  Commonly known as:  risperDAL   2 mg, Per G Tube, Nightly         Changes to Medications      Instructions Start Date   amLODIPine 10 MG tablet  Commonly known as:  NORVASC  What changed:    · medication strength  · how much to take  · how to take this  · when to take this   10 mg, Oral, Every 24 Hours Scheduled   Start Date:  January 23, 2020     sertraline 50 MG tablet  Commonly known as:  ZOLOFT  What changed:    · medication strength  · how much to take  · when to take this   150 mg, Oral, Nightly         Continue These Medications      Instructions Start Date   carvedilol 25 MG tablet  Commonly known as:  COREG   25 mg, Oral, 2 Times Daily With Meals      cyanocobalamin 1000 MCG/ML injection   inject contents of 1 vial intramuscularly MONTHLY      donepezil 5 MG tablet  Commonly known as:  ARICEPT   5 mg, Oral, Nightly      losartan 100 MG tablet  Commonly known as:  COZAAR   100 mg, Oral, Daily      metFORMIN 1000 MG tablet  Commonly known as:  GLUCOPHAGE   1,000 mg, Oral, Daily With Breakfast      Pancrelipase (Lip-Prot-Amyl) 86906 units capsule delayed-release particles  Commonly known as:  CREON   Take 2 by mouth 30 minutes before meals and 1 before a snack      traZODone 100 MG tablet  Commonly known as:  DESYREL   100 mg, Oral, Nightly         Stop These Medications    colesevelam 625 MG  tablet  Commonly known as:  WELCHOL     dicyclomine 20 MG tablet  Commonly known as:  BENTYL     fenofibrate 160 MG tablet     potassium chloride 20 MEQ CR tablet  Commonly known as:  K-DUR,KLOR-CON     prochlorperazine 10 MG tablet  Commonly known as:  COMPAZINE     topiramate 50 MG tablet  Commonly known as:  TOPAMAX     venlafaxine  MG 24 hr capsule  Commonly known as:  EFFEXOR-XR            Allergies   Allergen Reactions   • Codeine          Discharge Disposition:  Skilled Nursing Facility (DC - External)    Discharge Diet:  Diet Order   Procedures   • NPO Diet       Discharge Activity:        CODE STATUS:    Code Status and Medical Interventions:   Ordered at: 01/06/20 8380     Code Status:    CPR     Medical Interventions (Level of Support Prior to Arrest):    Full         No future appointments.    Additional Instructions for the Follow-ups that You Need to Schedule     Discharge Follow-up with PCP   As directed       Currently Documented PCP:    Philomena Walters MD    PCP Phone Number:    [phone]     Follow Up Details:  in 1-2 weeks for blood pressure medication adjustment if needed                 Time Spent on Discharge:  34 minutes    Electronically signed by Estella Billingsley MD, 01/22/20, 1:32 PM.

## 2020-01-22 NOTE — DISCHARGE PLACEMENT REQUEST
"Glo Berry (64 y.o. Female)     Date of Birth Social Security Number Address Home Phone MRN    1955  [address] [place] [address] [phone] [id number]    [employer] Marital Status          None        Admission Date Admission Type Admitting Provider Attending Provider Department, Room/Bed    20 Urgent Estella Billingsley MD Anciro, Audree, MD [facility] 3F, S320/1    Discharge Date Discharge Disposition Discharge Destination         Skilled Nursing Facility (DC - External)              Attending Provider:  Estella Billingsley MD    Allergies:  Codeine    Isolation:  None   Infection:  None   Code Status:  CPR    Ht:  162.6 cm (64.02\")   Wt:  62.7 kg (138 lb 3.2 oz)    Admission Cmt:  None   Principal Problem:  Encephalopathy acute [G93.40]                 Active Insurance as of 2020     Primary Coverage     Payor Plan Insurance Group Employer/Plan Group    [facility] [employer] [facility] [employer] [id number]     Payor Plan Address Payor Plan Phone Number Payor Plan Fax Number Effective Dates    [address] [phone]  2015 - None Entered    [place] [id number]       Subscriber Name Subscriber Birth Date Member ID       GLO BERRY 1955 [id number]                 Emergency Contacts      (Rel.) Home Phone Work Phone Mobile Phone    JANENE BERRY (Spouse) [phone] -- [phone]    ayo berry (Son) [phone] -- --    Sendy Berry (Daughter) -- -- [phone]               Discharge Summary      Estella Billingsley MD at 20 1332              [facility] Medicine Services  TRANSFER SUMMARY    Patient Name: Glo Berry  : 1955  MRN: [id number]    Date of Admission: 2020  Date of Discharge:  2020  Length of Stay: 16  Primary Care Physician: Philomena Walters MD    Consults     Date and Time Order Name Status Description    2020 1647 Inpatient Neurology Consult Stroke " Completed           Hospital Course     Presenting Problem:   CVA (cerebral vascular accident) (CMS/HCC) [I63.9]  Encephalopathy [G93.40]    Active Hospital Problems    Diagnosis  POA   • **Encephalopathy acute [G93.40]  Yes   • R/O AIS  [I63.9]  Yes   • R/O status epilepticus  [G40.901]  Yes   • Hypertension [I10]  Yes   • Pancreatic insufficiency [K86.89]  Yes   • T2DM on metformin  [E11.9]  Yes   • Chronic diarrhea [K52.9]  Yes   • Lumbar stenosis [M48.061]  Yes   • Diabetic neuropathy  [E11.40]  Yes   • Depression [F32.9]  Yes   • Dementia  [F03.90]  Yes   • Smoker [F17.200]  Yes   • Acute respiratory failure  [J96.00]  Yes   • Encephalopathy [G93.40]  Yes      Resolved Hospital Problems   No resolved problems to display.          Hospital Course:  Glo Berry is a 64 y.o. female 64-year-old lady with a past medical history significant for hypertension, dementia, pancreatic insufficiency, depression, and type 2 diabetes who was transferred to [facility] on January 6 for altered mental status concerning for stroke versus status epilepticus.  CT head without contrast was without acute findings.  CT cerebral perfusion with and without contrast was without abnormalities.  CTA head and neck with bilateral calcifications involving the carotid bifurcations producing only mild narrowing of the internal carotid arteries approximately 30% bilaterally, with normal vertebral and basilar arteries bilaterally.  Carotid duplex showed R ICA with 0 to 49% stenosis in the L ICA with 50 to 69% stenosis.  MRI brain without contrast showed no acute intracranial findings of acute infarction or signal abnormality.  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  However, she did have severe dysphasia requiring PEG tube placement.  She was very weak and  malnourished.  PT recommended skilled nursing on discharge.  After her PEG tube was placed she was alert and oriented to person, time, and place, but had not had a bowel movement for several days.  Suppository was ordered and she had a large bowel movement.   She had leukocytosis at 13 on day of discharge, likely reactive from PEG tube placement.     Discharge Follow Up Recommendations for outpatient labs/diagnostics:   Follow up CBC in 1 week for leukocytosis    Day of Discharge     HPI:   No acute events overnight. Has some soreness where the PEG tube is. Denies n/v/ abdominal pain.    Review of Systems  Gen- No fevers, chills  CV- No chest pain, palpitations  Resp- No cough, dyspnea    Vital Signs:   Temp:  [97.7 °F (36.5 °C)-98.3 °F (36.8 °C)] 97.9 °F (36.6 °C)  Heart Rate:  [] 78  Resp:  [16-18] 18  BP: (122-156)/(64-90) 122/80     Physical Exam:  Constitutional: No acute distress, awake, alert, appears malnourished   HENT: NCAT, mucous membranes moist  Respiratory: Clear to auscultation bilaterally, respiratory effort normal   Cardiovascular: RRR, no murmurs  Gastrointestinal: Positive bowel sounds, soft, nontender, nondistended  Psychiatric: Appropriate affect, cooperative  Neurologic: Oriented x 3, Cranial Nerves grossly intact to confrontation, speech clear  Skin: has g-tube in place without significant erythema, appears clean, dry, and intact       Pertinent Results     Results from last 7 days   Lab Units 01/22/20  0656 01/21/20  1854 01/21/20  0434 01/18/20  1201 01/17/20  0410 01/16/20  2244 01/16/20  0532   WBC 10*3/mm3 13.58*  --   --   --  11.37*  --  11.54*   HEMOGLOBIN g/dL 7.9*  --   --   --  7.8*  --  8.0*   HEMATOCRIT % 26.8*  --   --   --  27.2*  --  26.2*   PLATELETS 10*3/mm3 155  --   --   --  139*  --  146   SODIUM mmol/L 139  --  140  --  139  --  138   POTASSIUM mmol/L 4.0 4.6 3.6 4.4 3.8 4.1 3.2*   CHLORIDE mmol/L 99  --  99  --  101  --  99   CO2 mmol/L 30.0*  --  28.0  --  28.0   --  27.0   BUN mg/dL 27*  --  26*  --  38*  --  31*   CREATININE mg/dL 0.60  --  0.55*  --  0.62  --  0.60   GLUCOSE mg/dL 216*  --  193*  --  209*  --  225*   CALCIUM mg/dL 11.1*  --  10.1  --  11.4*  --  10.6*     Results from last 7 days   Lab Units 01/21/20  0434   BILIRUBIN mg/dL 0.3   ALK PHOS U/L 64   ALT (SGPT) U/L 32   AST (SGOT) U/L 37*     Results from last 7 days   Lab Units 01/21/20  0434   CHOLESTEROL mg/dL 66   TRIGLYCERIDES mg/dL 130         Brief Urine Lab Results     None          Microbiology Results Abnormal     None          Imaging Results (All)     Procedure Component Value Units Date/Time    Fiberoptic Endo (fees) [[id number]] Resulted:  01/17/20 1125     Updated:  01/17/20 1125    Narrative:       This procedure was auto-finalized with no dictation required.    XR Abdomen KUB [[id number]] Collected:  01/15/20 0505     Updated:  01/15/20 0507    Narrative:       CR Abdomen 1 Vw    INDICATION:   64-year-old female status post NG tube placement today.    COMPARISON:   None available    FINDINGS:  Single AP radiograph of the abdomen. Nasogastric tube is coiled in the gastric fundus with tip projecting over the mid to distal body of the stomach.    The bowel gas pattern is nonobstructive. No acute osseous abnormalities. No radiopaque foreign body.      Impression:       Nasogastric tube coiled in the gastric fundus with tip projecting over the mid to distal body of the stomach.    Signer Name: Sung Natarajan MD   Signed: 1/15/2020 5:05 AM   Workstation Name: [email or web address]    [facility]    XR Chest 1 View [[id number]] Collected:  01/13/20 0856     Updated:  01/13/20 1718    Narrative:       EXAMINATION: XR CHEST 1 VW-01/13/2020:      INDICATION: Respiratory failure; Z74.09-Other reduced mobility;  R13.10-Dysphagia, unspecified; R41.841-Cognitive communication deficit.      COMPARISON: 01/09/2020.     FINDINGS: The cardiac silhouette is normal. There is no acute  inflammatory  process, mass or effusion.           Impression:       Chronic change; no acute disease. There has been no  significant change when compared to the previous examination of  01/09/2020.     D:  01/13/2020  E:  01/13/2020     This report was finalized on 1/13/2020 5:15 PM by Dr. Noe Duggan MD.       Fiberoptic Endo (fees) [[id number]] Resulted:  01/10/20 1108     Updated:  01/10/20 1108    Narrative:       This procedure was auto-finalized with no dictation required.    XR Chest 1 View [[id number]] Collected:  01/09/20 0859     Updated:  01/09/20 2204    Narrative:       EXAMINATION: XR CHEST 1 VW- 01/09/2020     INDICATION: resp failure, extubated 01/08/2020     COMPARISON: 01/08/2020     FINDINGS: ET tube is seen at the level of the clavicles. NG tube is seen  in the stomach. The heart is enlarged, vasculature is cephalized. Left  basilar atelectasis has resolved. No new pulmonary parenchymal disease,  effusion or pneumothorax is seen.       Impression:       Interval clearing of mild left basilar atelectasis. No new  chest disease is seen.     D:  01/09/2020  E:  01/09/2020         This report was finalized on 1/9/2020 10:01 PM by Dr. Stephane Erickson MD.       MRI Brain Without Contrast [[id number]] Collected:  01/09/20 0858     Updated:  01/09/20 1028    Narrative:       EXAMINATION: MRI BRAIN WO CONTRAST- 01/09/2020     INDICATION: Malignant hypertension     TECHNIQUE: Multiplanar MRI of the brain without intravenous contrast     COMPARISON: NONE     FINDINGS: Mild motion degradation:  No restriction on diffusion-weighted sequences. Midline structures are  symmetric without evidence of mass, mass effect or midline shift.  Ventricles and sulci within normal limits. Mild amount of increased T2  and FLAIR signal findings in the periventricular and deep white matter  suggesting chronic small vessel ischemic disease. Pituitary and sella  within normal limits. Cervicomedullary junction of limited evaluation  due to motion  degradation. Globes and orbits retain normal T2 signal  characteristics. Visualized paranasal sinuses and mastoid air cells are  grossly clear and well pneumatized with the exception of trace bilateral  mastoid effusions. No cerebellopontine angle mass lesion. Normal signal  flow voids in the distal internal carotid and vertebrobasilar arteries.       Impression:       No acute intracranial findings of acute infarction or signal  abnormality other than mild increased signal findings of likely chronic  small vessel ischemic disease in the periventricular and subcortical  white matter of typical predominance in the periventricular regions.  Posterior parenchyma without subcortical signal abnormality or  restriction.      D:  01/09/2020  E:  01/09/2020     This report was finalized on 1/9/2020 10:25 AM by Dr. Sean Gonzalez.       CT Cerebral Perfusion With & Without Contrast [[id number]] Collected:  01/06/20 1638     Updated:  01/09/20 0843    Narrative:       EXAMINATION: CT CEREBRAL PERFUSION W WO CONTRAST-      INDICATION: TIA, initial screening, stroke symptoms.     TECHNIQUE: Cerebral perfusion analysis was performed using computed  tomography with contrast administration, including post processing of  parametric maps with determination of cerebral blood flow, cerebral  blood volume, and mean transit time.     The radiation dose reduction device was turned on for each scan per the  ALARA (As Low as Reasonably Achievable) protocol.     COMPARISON: None.     FINDINGS: CEREBRAL BLOOD VOLUME: There is normal symmetrical cerebral  blood volume on perfusion maps.     CEREBRAL BLOOD FLOW: There is normal symmetrical cerebral blood flow on  perfusion maps.     TIME TO PEAK: There is normal symmetrical time to peak on perfusion  maps.     MEAN TRANSIT TIME: Normal symmetric appearance of mean transient time  perfusion map.     PERFUSION ANALYSIS: Normal cerebral perfusion.       Impression:       No perfusion abnormality to  suggest evidence of reversible  ischemia.     D:  01/06/2020  E:  01/07/2020                 This report was finalized on 1/9/2020 8:40 AM by Dr. Trena Nettles MD.       XR Chest 1 View [[id number]] Collected:  01/08/20 0912     Updated:  01/08/20 1200    Narrative:       EXAMINATION: XR CHEST 1 VW-01/08/2020:      INDICATION: Intubated patient.      COMPARISON: 01/07/2020.     FINDINGS: The endotracheal tube is well positioned. The heart is  slightly large. The heart is compensated. There is minimal left basilar  airspace disease, likely representing atelectasis.           Impression:       There is left basilar atelectasis which has developed since  the previous examination, mild. The endotracheal tube remains well  positioned and there has otherwise been no change since the previous  exam.     D:  01/08/2020  E:  01/08/2020     This report was finalized on 1/8/2020 11:57 AM by Dr. oNe Duggan MD.       XR Chest 1 View [[id number]] Collected:  01/07/20 0758     Updated:  01/07/20 1633    Narrative:       EXAMINATION: XR CHEST 1 VW-      INDICATION: Intubated patient.      COMPARISON: 01/06/2020.     FINDINGS: Cardiac size borderline enlarged and unchanged with  endotracheal tube terminating above the level of the italo.  Esophagogastric tube has been placed in the interim coursing below the  diaphragm and out of the field-of-view. No pneumothorax or pleural  effusion.  No overt edema or focal consolidation.           Impression:       Interval placement of esophagogastric tube coursing below  the diaphragm and out of the field-of-view with endotracheal tube  remaining in appropriate position above the level of the italo. No new  consolidation or overt edema. No pleural effusion.     D:  01/07/2020  E:  01/07/2020     This report was finalized on 1/7/2020 4:30 PM by Dr. Sean Gonzalez.       CT Angiogram Neck [[id number]] Collected:  01/06/20 1652     Updated:  01/07/20 0951    Narrative:       EXAMINATION: CT  ANGIOGRAM NECK- 01/06/2020     INDICATION: Stroke     TECHNIQUE: CT angiogram of the neck was performed following intravenous  contrast. Images are displayed in the axial, sagittal and coronal  projections (3-D).     The radiation dose reduction device was turned on for each scan per the  ALARA (As Low as Reasonably Achievable) protocol.     COMPARISON: NONE     FINDINGS: Both common carotid arteries are normal. There is mild  calcification in both carotid bifurcations with tortuous internal  carotid arteries but without evidence of high-grade stenosis. Both  vertebral arteries are patent as is the basilar artery.       Impression:       There are bilateral calcifications involving the carotid  bifurcations producing only mild narrowing of the internal carotid  arteries (approximately 30% bilaterally). Both vertebral arteries are  normal as is the basilar artery.     D:  01/06/2020  E:  01/07/2020     This report was finalized on 1/7/2020 9:48 AM by Dr. Noe Duggan MD.       CT Angiogram Head [[id number]] Collected:  01/06/20 1656     Updated:  01/07/20 0951    Narrative:       EXAMINATION: CT ANGIOGRAM HEAD- 01/06/2020     INDICATION: Stroke     TECHNIQUE: Intracranial CTA was performed prior to and following  intravenous contrast.     The radiation dose reduction device was turned on for each scan per the  ALARA (As Low as Reasonably Achievable) protocol.     COMPARISON: NONE     FINDINGS: The intracranial portion of the internal carotid arteries is  normal. The anterior cerebral arteries are patent with no stenosis,  occlusion or aneurysm. Likewise the middle cerebral arteries demonstrate  no large vessel occlusion. The basilar artery is normal.  The superior  cerebellar and posterior cerebral arteries are normal.       Impression:       Normal intracranial CT angiogram.     D:  01/06/2020  E:  01/07/2020        This report was finalized on 1/7/2020 9:48 AM by Dr. Noe Duggan MD.       XR Chest 1 View  [[id number]] Collected:  01/06/20 1714     Updated:  01/07/20 0950    Narrative:       EXAMINATION: XR CHEST 1 VW-      INDICATION: Confirm ET tube placement.      COMPARISON: 08/04/2014.     FINDINGS: The endotracheal tube is well positioned. The heart is at the  upper limits of normal. The heart is compensated. There are chronic  pulmonary findings.           Impression:       Endotracheal tube is well positioned. There are no acute  cardiopulmonary findings.     D:  01/06/2020  E:  01/07/2020     This report was finalized on 1/7/2020 9:47 AM by Dr. Noe Duggan MD.       CT Head Without Contrast [[id number]] Collected:  01/06/20 1618     Updated:  01/06/20 1707    Narrative:       EXAMINATION: CT HEAD WO CONTRAST-01/06/2020:      INDICATION: Stroke.     TECHNIQUE: CT scan of the head was performed at 5 mm intervals. No  intravenous contrast was utilized.     The radiation dose reduction device was turned on for each scan per the  ALARA (As Low as Reasonably Achievable) protocol.     COMPARISON: NONE.     FINDINGS: There are age-related periventricular white matter changes.  There is no intracranial mass, hemorrhage, midline shift or extra-axial  fluid collection.       Impression:       Age-related change. There are no acute findings.     D:  01/06/2020  E:  01/06/2020           This report was finalized on 1/6/2020 5:04 PM by Dr. Noe Duggan MD.             Results for orders placed during the hospital encounter of 01/06/20   Adult Transthoracic Echo Complete W/ Cont if Necessary Per Protocol (With Agitated Saline)    Narrative · There is calcification of the aortic valve.  · Peak velocity of the flow distal to the aortic valve is 217.0 cm/s.  · Estimated EF = 70%.  · Left ventricular systolic function is normal.              Discharge Details        Discharge Medications      New Medications      Instructions Start Date   atorvastatin 80 MG tablet  Commonly known as:  LIPITOR   80 mg, Per G Tube, Nightly         levETIRAcetam 100 MG/ML solution  Commonly known as:  KEPPRA   500 mg, Per G Tube, Every 12 Hours Scheduled      polyethylene glycol pack packet  Commonly known as:  MIRALAX   17 g, Oral, Daily PRN      risperiDONE 1 MG/ML oral solution  Commonly known as:  risperDAL   2 mg, Per G Tube, Nightly         Changes to Medications      Instructions Start Date   amLODIPine 10 MG tablet  Commonly known as:  NORVASC  What changed:    · medication strength  · how much to take  · how to take this  · when to take this   10 mg, Oral, Every 24 Hours Scheduled   Start Date:  January 23, 2020     sertraline 50 MG tablet  Commonly known as:  ZOLOFT  What changed:    · medication strength  · how much to take  · when to take this   150 mg, Oral, Nightly         Continue These Medications      Instructions Start Date   carvedilol 25 MG tablet  Commonly known as:  COREG   25 mg, Oral, 2 Times Daily With Meals      cyanocobalamin 1000 MCG/ML injection   inject contents of 1 vial intramuscularly MONTHLY      donepezil 5 MG tablet  Commonly known as:  ARICEPT   5 mg, Oral, Nightly      losartan 100 MG tablet  Commonly known as:  COZAAR   100 mg, Oral, Daily      metFORMIN 1000 MG tablet  Commonly known as:  GLUCOPHAGE   1,000 mg, Oral, Daily With Breakfast      Pancrelipase (Lip-Prot-Amyl) 27245 units capsule delayed-release particles  Commonly known as:  CREON   Take 2 by mouth 30 minutes before meals and 1 before a snack      traZODone 100 MG tablet  Commonly known as:  DESYREL   100 mg, Oral, Nightly         Stop These Medications    colesevelam 625 MG tablet  Commonly known as:  WELCHOL     dicyclomine 20 MG tablet  Commonly known as:  BENTYL     fenofibrate 160 MG tablet     potassium chloride 20 MEQ CR tablet  Commonly known as:  K-DURKLOR-CON     prochlorperazine 10 MG tablet  Commonly known as:  COMPAZINE     topiramate 50 MG tablet  Commonly known as:  TOPAMAX     venlafaxine  MG 24 hr capsule  Commonly known as:   EFFEXOR-XR            Allergies   Allergen Reactions   • Codeine          Discharge Disposition:  Skilled Nursing Facility (DC - External)    Discharge Diet:  Diet Order   Procedures   • NPO Diet       Discharge Activity:        CODE STATUS:    Code Status and Medical Interventions:   Ordered at: 01/06/20 1352     Code Status:    CPR     Medical Interventions (Level of Support Prior to Arrest):    Full         No future appointments.    Additional Instructions for the Follow-ups that You Need to Schedule     Discharge Follow-up with PCP   As directed       Currently Documented PCP:    Philomena Walters MD    PCP Phone Number:    [phone]     Follow Up Details:  in 1-2 weeks for blood pressure medication adjustment if needed                 Time Spent on Discharge:  34 minutes    Electronically signed by Estella Billingsley MD, 01/22/20, 1:32 PM.      Electronically signed by Estella Billingsley MD at 01/22/20 2333

## 2020-01-22 NOTE — THERAPY TREATMENT NOTE
Acute Care - Speech Language Pathology Treatment Note  [facility]     Patient Name: Glo Berry  : 1955  MRN: [id number]  Today's Date: 2020         Admit Date: 2020    Visit Dx:      ICD-10-CM ICD-9-CM   1. Encephalopathy acute G93.40 348.30   2. Impaired mobility and ADLs Z74.09 799.89   3. Dysphagia, unspecified type R13.10 787.20   4. Cognitive communication deficit R41.841 799.52     Patient Active Problem List   Diagnosis   • Uterine prolapse   • Stress incontinence of urine   • Urgency incontinence   • Diarrhea   • Encephalopathy acute   • R/O AIS    • R/O status epilepticus    • Hypertension   • Pancreatic insufficiency   • T2DM on metformin    • Chronic diarrhea   • Lumbar stenosis   • Diabetic neuropathy    • Depression   • Dementia    • Smoker   • Acute respiratory failure    • Encephalopathy        Therapy Treatment  Rehabilitation Treatment Summary     Row Name 20 1100             Treatment Time/Intention    Discipline  speech language pathologist  -CH      Document Type  therapy note (daily note)  -CH      Subjective Information  no complaints  -CH      Mode of Treatment  individual therapy;speech-language pathology  -CH      Patient/Family Observations  Son present  -      Care Plan Review  evaluation/treatment results reviewed;care plan/treatment goals reviewed;risks/benefits reviewed;current/potential barriers reviewed;patient/other agree to care plan  -      Care Plan Review, Other Participant(s)  son  -CH      Therapy Frequency (Swallow)  5 days per week  -CH      Therapy Frequency (SLP SLC)  5 days per week  -CH      Patient Effort  good  -CH      Recorded by [CH] Judith Kim MS CCC-SLP 20 1202      Row Name 20 1100             Pain Assessment    Additional Documentation  Pain Scale: FACES Pre/Post-Treatment (Group);Pain Scale: Numbers Pre/Post-Treatment (Group)  -CH      Recorded by [CH] Judith Kim MS CCC-SLP 20 1202      Row Name  01/22/20 1100             Pain Scale: Numbers Pre/Post-Treatment    Pain Scale: Numbers, Pretreatment  3/10  -CH      Pain Scale: Numbers, Post-Treatment  3/10  -CH      Pain Location - Side  Left  -      Pain Location  neck  -      Pain Intervention(s)  Other (Comment) tolerated  -      Recorded by [] Julio Judith, MS CCC-SLP 01/22/20 1202      Row Name 01/22/20 1100             Pain Scale: FACES Pre/Post-Treatment    Pain: FACES Scale, Pretreatment  0-->no hurt  -CH      Pain: FACES Scale, Post-Treatment  0-->no hurt  -CH      Recorded by [] Judith Kim MS CCC-SLP 01/22/20 1202      Row Name 01/22/20 1100             Outcome Summary/Treatment Plan (SLP)    Daily Summary of Progress (SLP)  progress toward functional goals as expected  -      Plan for Continued Treatment (SLP)  Cognition improving. Son reported patient seems to be nearing baseline. Patient displayed wet vocals following 3-4 spoon sips of thin liquids. Patient would benefit from continued dysphagia treatment and cognitive-communication therapy .  -      Anticipated Dischage Disposition  inpatient rehabilitation facility;anticipate therapy at next level of care  -      Recorded by [] Judith Kim MS CCC-SLP 01/22/20 1202        User Key  (r) = Recorded By, (t) = Taken By, (c) = Cosigned By    Initials Name Effective Dates Discipline     Julio Judith, MS CCC-SLP 02/14/19 -  SLP          EDUCATION  The patient has been educated in the following areas:   Cognitive Impairment Communication Impairment.    SLP Recommendation and Plan  Daily Summary of Progress (SLP): progress toward functional goals as expected     Plan for Continued Treatment (SLP): Cognition improving. Son reported patient seems to be nearing baseline. Patient displayed wet vocals following 3-4 spoon sips of thin liquids. Patient would benefit from continued dysphagia treatment and cognitive-communication therapy .  Anticipated Dischage  Disposition: inpatient rehabilitation facility, anticipate therapy at next level of care             SLP GOALS     Row Name 01/22/20 1100 01/21/20 1000          Oral Nutrition/Hydration Goal 1 (SLP)    Oral Nutrition/Hydration Goal 1, SLP  LTG: Pt will improve swallow function in order to safely return to PO diet w/ no overt s/sxs aspiration/distress w/ 100% acc and no cues  -CH  LTG: Pt will improve swallow function in order to safely return to PO diet w/ no overt s/sxs aspiration/distress w/ 100% acc and no cues  -CH     Time Frame (Oral Nutrition/Hydration Goal 1, SLP)  by discharge  -CH  by discharge  -CH     Barriers (Oral Nutrition/Hydration Goal 1, SLP)  Patient continues to display overt s/s of aspiration with trials of thin via tsp.   -CH  --     Progress/Outcomes (Oral Nutrition/Hydration Goal 1, SLP)  continuing progress toward goal  -CH  continuing progress toward goal  -CH        Oral Nutrition/Hydration Goal 2 (SLP)    Oral Nutrition/Hydration Goal 2, SLP  Pt will tolerate therapeutic trials of thin H2O & puree w/ no overt s/sxs aspiration/distress w/ 70% acc and no cues in order to determine readiness for repeat instrumental eval  -CH  Pt will tolerate therapeutic trials of thin H2O & puree w/ no overt s/sxs aspiration/distress w/ 70% acc and no cues in order to determine readiness for repeat instrumental eval  -CH     Time Frame (Oral Nutrition/Hydration Goal 2, SLP)  short term goal (STG);by discharge  -CH  short term goal (STG);by discharge  -CH     Barriers (Oral Nutrition/Hydration Goal 2, SLP)  Trialed ice chips without overt s/s of aspiration, thin via tsp. Wet vocals with spoon sips of thin H2O   -CH  Trialed ice chips, thin via tsp. TC x 1 with spoon sips of thin H2O . No additional s/s of aspiration with trials.   -CH     Progress/Outcomes (Oral Nutrition/Hydration Goal 2, SLP)  continuing progress toward goal  -CH  good progress toward goal  -CH        Pharyngeal Strengthening Exercise Goal  1 (SLP)    Activity (Pharyngeal Strengthening Goal 1, SLP)  increase superior movement of the hyolaryngeal complex;increase anterior movement of the hyolaryngeal complex;increase closure at entrance to airway/closure of airway at glottis;increase squeeze/positive pressure generation  -CH  increase superior movement of the hyolaryngeal complex;increase anterior movement of the hyolaryngeal complex;increase closure at entrance to airway/closure of airway at glottis;increase squeeze/positive pressure generation  -CH     Increase Superior Movement of the Hyolaryngeal Complex  effortful pitch glide (falsetto + pharyngeal squeeze)  -CH  effortful pitch glide (falsetto + pharyngeal squeeze)  -CH     Increase Anterior Movement of the Hyolaryngeal Complex  chin tuck against resistance (CTAR)  -CH  chin tuck against resistance (CTAR)  -CH     Increase Closure at Entrance to Airway/Closure of Airway at Glottis  supraglottic swallow  -CH  supraglottic swallow  -CH     Increase Squeeze/Positive Pressure Generation  hard effortful swallow  -CH  hard effortful swallow  -CH     [place]/Accuracy (Pharyngeal Strengthening Goal 1, SLP)  with minimal cues (75-90% accuracy)  -CH  with minimal cues (75-90% accuracy)  -CH     Time Frame (Pharyngeal Strengthening Goal 1, SLP)  short term goal (STG);by discharge  -CH  short term goal (STG);by discharge  -CH     Barriers (Pharyngeal Strengthening Goal 1, SLP)  All exercises reviewed with patient and patient completed each x 5 with min cues and models. Son encouraging patient to complete exercises in room between ST sessions.   -CH  All exercises reviewed with patient and patient completed each x 5 with min cues and models. Son encouraging patient to complete exercises in room between ST sessions.   -CH     Progress/Outcomes (Pharyngeal Strengthening Goal 1, SLP)  continuing progress toward goal  -CH  continuing progress toward goal  -CH        Comprehend Questions Goal 1 (SLP)     Improve Ability to Comprehend Questions Goal 1 (SLP)  simple yes/no questions;simple wh questions;90%;with minimal cues (75-90%)  -  simple yes/no questions;simple wh questions;90%;with minimal cues (75-90%)  -     Time Frame (Comprehend Questions Goal 1, SLP)  short term goal (STG);by discharge  -  short term goal (STG);by discharge  -     Progress (Ability to Comprehend Questions Goal 1, SLP)  90%;independently (over 90% accuracy)  -CH  90%;independently (over 90% accuracy)  -     Progress/Outcomes (Comprehend Questions Goal 1, SLP)  goal met  -  goal met  -        Follow Directions Goal 2 (SLP)    Improve Ability to Follow Directions Goal 1 (SLP)  2 step commands;90%;with minimal cues (75-90%)  -  2 step commands;90%;with minimal cues (75-90%)  -     Time Frame (Follow Directions Goal 1, SLP)  short term goal (STG);by discharge  -  short term goal (STG);by discharge  -     Progress (Ability to Follow Directions Goal 1, SLP)  100%;independently (over 90% accuracy)  -  100%;independently (over 90% accuracy)  -     Progress/Outcomes (Follow Directions Goal 1, SLP)  goal met  -  goal met  -        Attention Goal 1 (SLP)    Improve Attention by Goal 1 (SLP)  complete selective attention task;90%;with minimal cues (75-90%)  -  complete selective attention task;90%;with minimal cues (75-90%)  -     Time Frame (Attention Goal 1, SLP)  short term goal (STG);by discharge  -  short term goal (STG);by discharge  -     Progress (Attention Goal 1, SLP)  90%;with minimal cues (75-90%)  -  70%;with minimal cues (75-90%)  -     Progress/Outcomes (Attention Goal 1, SLP)  continuing progress toward goal  -CH  continuing progress toward goal  -CH        Memory Skills Goal 1 (SLP)    Improve Memory Skills Through Goal 1 (SLP)  recalling related word lists with an imposed delay;listen to a paragraph and answer questions;recall details of the day;use memory strategies;80%;with minimal cues  (75-90%)  -CH  recalling related word lists with an imposed delay;listen to a paragraph and answer questions;recall details of the day;use memory strategies;80%;with minimal cues (75-90%)  -CH     Time Frame (Memory Skills Goal 1, SLP)  short term goal (STG)  -CH  short term goal (STG)  -CH     Progress (Memory Skills Goal 1, SLP)  70%;with minimal cues (75-90%)  -CH  60%;with minimal cues (75-90%)  -CH     Progress/Outcomes (Memory Skills Goal 1, SLP)  continuing progress toward goal  -CH  continuing progress toward goal  -CH     Comment (Memory Skills Goal 1, SLP)  --  3/5 words after 5 min delay; recalling details of the day 2/3  -CH        Additional Goal 1 (SLP)    Additional Goal 1, SLP  LTG: Improve cog-comm skills in order to participate in care while in hospital setting with 80% accuracy and cues  -CH  LTG: Improve cog-comm skills in order to participate in care while in hospital setting with 80% accuracy and cues  -CH     Time Frame (Additional Goal 1, SLP)  by discharge  -CH  by discharge  -CH     Progress/Outcomes (Additional Goal 1, SLP)  good progress toward goal  -CH  continuing progress toward goal  -CH       User Key  (r) = Recorded By, (t) = Taken By, (c) = Cosigned By    Initials Name Provider Type    Judith Carter MS CCC-SLP Speech and Language Pathologist              Time Calculation:     Time Calculation- SLP     Row Name 01/22/20 1310             Time Calculation- SLP    SLP Start Time  1100  -      SLP Received On  01/22/20  -        User Key  (r) = Recorded By, (t) = Taken By, (c) = Cosigned By    Initials Name Provider Type    Judith Carter MS CCC-SLP Speech and Language Pathologist          Therapy Charges for Today     Code Description Service Date Service Provider Modifiers Qty    50429908596 HC ST TREATMENT SWALLOW 3 1/21/2020 Judith Kim MS CCC-SLP GN 1    63365555183 HC ST TREATMENT SPEECH 3 1/21/2020 Judith Kim MS CCC-SLP GN 1    42243032238 HC ST  TREATMENT SPEECH 3 2020 Judith Kim MS CCC-SLP GN 1    [id number]  ST TREATMENT SWALLOW 3 2020 Judith Kim MS CCC-SLP GN 1                     Judith Kim MS CCC-SLP  2020   and Acute Care - Speech Language Pathology   Swallow Treatment Note  [facility]     Patient Name: Glo Berry  : 1955  MRN: [id number]  Today's Date: 2020               Admit Date: 2020    Visit Dx:      ICD-10-CM ICD-9-CM   1. Encephalopathy acute G93.40 348.30   2. Impaired mobility and ADLs Z74.09 799.89   3. Dysphagia, unspecified type R13.10 787.20   4. Cognitive communication deficit R41.841 799.52     Patient Active Problem List   Diagnosis   • Uterine prolapse   • Stress incontinence of urine   • Urgency incontinence   • Diarrhea   • Encephalopathy acute   • R/O AIS    • R/O status epilepticus    • Hypertension   • Pancreatic insufficiency   • T2DM on metformin    • Chronic diarrhea   • Lumbar stenosis   • Diabetic neuropathy    • Depression   • Dementia    • Smoker   • Acute respiratory failure    • Encephalopathy       Therapy Treatment  Rehabilitation Treatment Summary     Row Name 20 1100             Treatment Time/Intention    Discipline  speech language pathologist  -CH      Document Type  therapy note (daily note)  -CH      Subjective Information  no complaints  -CH      Mode of Treatment  individual therapy;speech-language pathology  -CH      Patient/Family Observations  Son present  -CH      Care Plan Review  evaluation/treatment results reviewed;care plan/treatment goals reviewed;risks/benefits reviewed;current/potential barriers reviewed;patient/other agree to care plan  -CH      Care Plan Review, Other Participant(s)  son  -CH      Therapy Frequency (Swallow)  5 days per week  -CH      Therapy Frequency (SLP SLC)  5 days per week  -CH      Patient Effort  good  -CH      Recorded by [CH] Judith Kim MS CCC-SLP 20 1202      Row Name 20 1100              Pain Assessment    Additional Documentation  Pain Scale: FACES Pre/Post-Treatment (Group);Pain Scale: Numbers Pre/Post-Treatment (Group)  -      Recorded by [] Judith Kim MS CCC-SLP 01/22/20 1202      Row Name 01/22/20 1100             Pain Scale: Numbers Pre/Post-Treatment    Pain Scale: Numbers, Pretreatment  3/10  -CH      Pain Scale: Numbers, Post-Treatment  3/10  -CH      Pain Location - Side  Left  -CH      Pain Location  neck  -CH      Pain Intervention(s)  Other (Comment) tolerated  -CH      Recorded by [] Judith Kim MS CCC-SLP 01/22/20 1202      Row Name 01/22/20 1100             Pain Scale: FACES Pre/Post-Treatment    Pain: FACES Scale, Pretreatment  0-->no hurt  -CH      Pain: FACES Scale, Post-Treatment  0-->no hurt  -CH      Recorded by [] Judith Kim MS CCC-SLP 01/22/20 1202      Row Name 01/22/20 1100             Outcome Summary/Treatment Plan (SLP)    Daily Summary of Progress (SLP)  progress toward functional goals as expected  -      Plan for Continued Treatment (SLP)  Cognition improving. Son reported patient seems to be nearing baseline. Patient displayed wet vocals following 3-4 spoon sips of thin liquids. Patient would benefit from continued dysphagia treatment and cognitive-communication therapy .  -      Anticipated Dischage Disposition  inpatient rehabilitation facility;anticipate therapy at next level of care  -      Recorded by [] Judith Kim MS CCC-SLP 01/22/20 1202        User Key  (r) = Recorded By, (t) = Taken By, (c) = Cosigned By    Initials Name Effective Dates Discipline     Judith Kim MS CCC-SLP 02/14/19 -  [facility]          Outcome Summary  Outcome Summary/Treatment Plan (SLP)  Daily Summary of Progress (SLP): progress toward functional goals as expected (01/22/20 1100 : Judith Kim MS CCC-SLP)  Plan for Continued Treatment (SLP): Cognition improving. Son reported patient seems to be nearing baseline. Patient  displayed wet vocals following 3-4 spoon sips of thin liquids. Patient would benefit from continued dysphagia treatment and cognitive-communication therapy . (01/22/20 1100 : Judith Kim, MS CCC-SLP)  Anticipated Dischage Disposition: inpatient rehabilitation facility, anticipate therapy at next level of care (01/22/20 1100 : Judith Kim, MS CCC-SLP)      SLP GOALS     Row Name 01/22/20 1100 01/21/20 1000          Oral Nutrition/Hydration Goal 1 (SLP)    Oral Nutrition/Hydration Goal 1, SLP  LTG: Pt will improve swallow function in order to safely return to PO diet w/ no overt s/sxs aspiration/distress w/ 100% acc and no cues  -CH  LTG: Pt will improve swallow function in order to safely return to PO diet w/ no overt s/sxs aspiration/distress w/ 100% acc and no cues  -CH     Time Frame (Oral Nutrition/Hydration Goal 1, SLP)  by discharge  -CH  by discharge  -CH     Barriers (Oral Nutrition/Hydration Goal 1, SLP)  Patient continues to display overt s/s of aspiration with trials of thin via tsp.   -CH  --     Progress/Outcomes (Oral Nutrition/Hydration Goal 1, SLP)  continuing progress toward goal  -CH  continuing progress toward goal  -CH        Oral Nutrition/Hydration Goal 2 (SLP)    Oral Nutrition/Hydration Goal 2, SLP  Pt will tolerate therapeutic trials of thin H2O & puree w/ no overt s/sxs aspiration/distress w/ 70% acc and no cues in order to determine readiness for repeat instrumental eval  -CH  Pt will tolerate therapeutic trials of thin H2O & puree w/ no overt s/sxs aspiration/distress w/ 70% acc and no cues in order to determine readiness for repeat instrumental eval  -CH     Time Frame (Oral Nutrition/Hydration Goal 2, SLP)  short term goal (STG);by discharge  -CH  short term goal (STG);by discharge  -CH     Barriers (Oral Nutrition/Hydration Goal 2, SLP)  Trialed ice chips without overt s/s of aspiration, thin via tsp. Wet vocals with spoon sips of thin H2O   -CH  Trialed ice chips, thin via  tsp. TC x 1 with spoon sips of thin H2O . No additional s/s of aspiration with trials.   -CH     Progress/Outcomes (Oral Nutrition/Hydration Goal 2, SLP)  continuing progress toward goal  -CH  good progress toward goal  -CH        Pharyngeal Strengthening Exercise Goal 1 (SLP)    Activity (Pharyngeal Strengthening Goal 1, SLP)  increase superior movement of the hyolaryngeal complex;increase anterior movement of the hyolaryngeal complex;increase closure at entrance to airway/closure of airway at glottis;increase squeeze/positive pressure generation  -CH  increase superior movement of the hyolaryngeal complex;increase anterior movement of the hyolaryngeal complex;increase closure at entrance to airway/closure of airway at glottis;increase squeeze/positive pressure generation  -CH     Increase Superior Movement of the Hyolaryngeal Complex  effortful pitch glide (falsetto + pharyngeal squeeze)  -CH  effortful pitch glide (falsetto + pharyngeal squeeze)  -CH     Increase Anterior Movement of the Hyolaryngeal Complex  chin tuck against resistance (CTAR)  -CH  chin tuck against resistance (CTAR)  -CH     Increase Closure at Entrance to Airway/Closure of Airway at Glottis  supraglottic swallow  -CH  supraglottic swallow  -CH     Increase Squeeze/Positive Pressure Generation  hard effortful swallow  -CH  hard effortful swallow  -CH     [place]/Accuracy (Pharyngeal Strengthening Goal 1, SLP)  with minimal cues (75-90% accuracy)  -CH  with minimal cues (75-90% accuracy)  -CH     Time Frame (Pharyngeal Strengthening Goal 1, SLP)  short term goal (STG);by discharge  -CH  short term goal (STG);by discharge  -CH     Barriers (Pharyngeal Strengthening Goal 1, SLP)  All exercises reviewed with patient and patient completed each x 5 with min cues and models. Son encouraging patient to complete exercises in room between ST sessions.   -CH  All exercises reviewed with patient and patient completed each x 5 with min cues and  models. Son encouraging patient to complete exercises in room between ST sessions.   -CH     Progress/Outcomes (Pharyngeal Strengthening Goal 1, SLP)  continuing progress toward goal  -CH  continuing progress toward goal  -CH        Comprehend Questions Goal 1 (SLP)    Improve Ability to Comprehend Questions Goal 1 (SLP)  simple yes/no questions;simple wh questions;90%;with minimal cues (75-90%)  -CH  simple yes/no questions;simple wh questions;90%;with minimal cues (75-90%)  -     Time Frame (Comprehend Questions Goal 1, SLP)  short term goal (STG);by discharge  -CH  short term goal (STG);by discharge  -     Progress (Ability to Comprehend Questions Goal 1, SLP)  90%;independently (over 90% accuracy)  -CH  90%;independently (over 90% accuracy)  -     Progress/Outcomes (Comprehend Questions Goal 1, SLP)  goal met  -  goal met  -        Follow Directions Goal 2 (SLP)    Improve Ability to Follow Directions Goal 1 (SLP)  2 step commands;90%;with minimal cues (75-90%)  -  2 step commands;90%;with minimal cues (75-90%)  -     Time Frame (Follow Directions Goal 1, SLP)  short term goal (STG);by discharge  -  short term goal (STG);by discharge  -     Progress (Ability to Follow Directions Goal 1, SLP)  100%;independently (over 90% accuracy)  -  100%;independently (over 90% accuracy)  -     Progress/Outcomes (Follow Directions Goal 1, SLP)  goal met  -  goal met  -        Attention Goal 1 (SLP)    Improve Attention by Goal 1 (SLP)  complete selective attention task;90%;with minimal cues (75-90%)  -  complete selective attention task;90%;with minimal cues (75-90%)  -     Time Frame (Attention Goal 1, SLP)  short term goal (STG);by discharge  -  short term goal (STG);by discharge  -CH     Progress (Attention Goal 1, SLP)  90%;with minimal cues (75-90%)  -  70%;with minimal cues (75-90%)  -     Progress/Outcomes (Attention Goal 1, SLP)  continuing progress toward goal  -CH  continuing  progress toward goal  -CH        Memory Skills Goal 1 (SLP)    Improve Memory Skills Through Goal 1 (SLP)  recalling related word lists with an imposed delay;listen to a paragraph and answer questions;recall details of the day;use memory strategies;80%;with minimal cues (75-90%)  -CH  recalling related word lists with an imposed delay;listen to a paragraph and answer questions;recall details of the day;use memory strategies;80%;with minimal cues (75-90%)  -CH     Time Frame (Memory Skills Goal 1, SLP)  short term goal (STG)  -CH  short term goal (STG)  -CH     Progress (Memory Skills Goal 1, SLP)  70%;with minimal cues (75-90%)  -CH  60%;with minimal cues (75-90%)  -CH     Progress/Outcomes (Memory Skills Goal 1, SLP)  continuing progress toward goal  -CH  continuing progress toward goal  -CH     Comment (Memory Skills Goal 1, SLP)  --  3/5 words after 5 min delay; recalling details of the day 2/3  -CH        Additional Goal 1 (SLP)    Additional Goal 1, SLP  LTG: Improve cog-comm skills in order to participate in care while in hospital setting with 80% accuracy and cues  -CH  LTG: Improve cog-comm skills in order to participate in care while in hospital setting with 80% accuracy and cues  -CH     Time Frame (Additional Goal 1, SLP)  by discharge  -CH  by discharge  -CH     Progress/Outcomes (Additional Goal 1, SLP)  good progress toward goal  -CH  continuing progress toward goal  -CH       User Key  (r) = Recorded By, (t) = Taken By, (c) = Cosigned By    Initials Name Provider Type    Judith Carter MS CCC-SLP Speech and Language Pathologist          EDUCATION  The patient has been educated in the following areas:   Home Exercise Program (HEP) Dysphagia (Swallowing Impairment) Oral Care/Hydration NPO rationale.    SLP Recommendation and Plan  Daily Summary of Progress (SLP): progress toward functional goals as expected     Plan for Continued Treatment (SLP): Cognition improving. Son reported patient seems to  be nearing baseline. Patient displayed wet vocals following 3-4 spoon sips of thin liquids. Patient would benefit from continued dysphagia treatment and cognitive-communication therapy .  Anticipated Dischage Disposition: inpatient rehabilitation facility, anticipate therapy at next level of care                    Time Calculation:   Time Calculation- SLP     Row Name 01/22/20 1310             Time Calculation- SLP    SLP Start Time  1100  -      SLP Received On  01/22/20  -        User Key  (r) = Recorded By, (t) = Taken By, (c) = Cosigned By    Initials Name Provider Type    Judith Carter MS CCC-SLP Speech and Language Pathologist          Therapy Charges for Today     Code Description Service Date Service Provider Modifiers Qty    27502345534 HC ST TREATMENT SWALLOW 3 1/21/2020 Judith Kim MS CCC-SLP GN 1    [id number] HC ST TREATMENT SPEECH 3 1/21/2020 Judith Kim MS CCC-SLP GN 1    [id number] HC ST TREATMENT SPEECH 3 1/22/2020 Judith Kim MS CCC-SLP GN 1    [id number] [facility] TREATMENT SWALLOW 3 1/22/2020 Judith Kim MS CCC-SLP GN 1                 Judith Kim MS CCC-JAS  1/22/2020

## 2020-01-22 NOTE — PROGRESS NOTES
"                  Clinical Nutrition     Nutrition Support Followup  Reason for Visit:   Follow-up protocol, EN    Patient Name: Glo Berry  YOB: 1955  MRN: [id number]  Date of Encounter: 01/22/20 8:13 AM  Admission date: 1/6/2020    Nutrition Assessment   Assessment     Hospital Problem List  Altered mental status, improving    Additional conditions, tests, procedures this admission  Malignant HTN, ?PRES  Seizures  Encephalopathy, resolved  Acute respiratory failure, improving  ?Pre-existing dementia  Dysphagia  Anemia  Thrombocytopenia, improving  New onset seizure vs TIA/both?  Depression, improving    (1/6) intubated  (1/8) extubated  (1/9) SLP eval - NPO, alternate route  (1/10) SLP eval - NPO, temporary alternate methods of nutrition/hydration  (1/10) SLP FEES - NPO, temporary alternate methods of nutrition/hydration  (1/13) SLP tx - Safest to cont NPO, alternative means of nutrition  (1/16) SLP tx - Will plan to repeat FEES tomorrow to determine if pt safe for PO  (1/17) SLP re-eval - long term alternate methods of nutrition/hydration, NPO, other (see comments)  (1/20) s/p PEG placement  (1/20) EN order adjusted to Isosource 1.5 @ 50 mL, GV = 1100 mL/d    PMH/PSxH  HTN  DM2  Tubular adenoma  Chronic diarrhea  IBS  Pancreatic insufficiency  Dementia  Tobacco use  COPD    CCY    Reported/Observed/Food/Nutrition Related History:      SLP in working with patient on treatment. Patient tolerating EN. BM today per I/O documentation. Noted per charting, plans to discharge to rehab today.    Per I/O documentation:  Last BM x1 1/22    Anthropometrics     Height: 162.6 cm (64.02\")  Last filed wt: Weight: 62.7 kg (138 lb 3.2 oz) (01/19/20 0540)  Weight Method: Bed scale    BMI: BMI (Calculated): 25.2  Overweight: 25.0-29.9kg/m2     Ideal Body Weight (IBW) (kg): 55.04  Admission wt: 146 lb 9.7 oz  Method obtained: bed scale weight per charting on admission 1/6    Last 15 Recorded Weights   View " "Complete Flowsheet   Weight Weight (kg) Weight (lbs) Weight Method VISIT REPORT   1/16/2020 64.184 kg 141 lb 8 oz - -   1/15/2020 63.3 kg 139 lb 8.8 oz Bed scale -   1/14/2020 63 kg 138 lb 14.2 oz Bed scale -   1/13/2020 63.2 kg 139 lb 5.3 oz Bed scale -   1/12/2020 58.7 kg 129 lb 6.6 oz Bed scale -   1/11/2020 64.7 kg 142 lb 10.2 oz Bed scale -   1/10/2020 63.5 kg 139 lb 15.9 oz Bed scale -   1/9/2020 64.3 kg 141 lb 12.1 oz - -   1/7/2020 66.5 kg 146 lb 9.7 oz - -   1/7/2020 66.5 kg 146 lb 9.7 oz - -   1/7/2020 66.5 kg 146 lb 9.7 oz - -   1/7/2020 66.5 kg 146 lb 9.7 oz Bed scale -   1/6/2020 66.5 kg 146 lb 9.7 oz Bed scale -   4/4/2019 57.153 kg 126 lb - Report   2/21/2019 55.339 kg 122 lb - Report     Labs reviewed     Results from last 7 days   Lab Units 01/21/20  1854 01/21/20  0434 01/19/20  0444 01/18/20  1201 01/17/20  0410  01/16/20  0532   GLUCOSE mg/dL  --  193*  --   --  209*  --  225*   BUN mg/dL  --  26*  --   --  38*  --  31*   CREATININE mg/dL  --  0.55*  --   --  0.62  --  0.60   SODIUM mmol/L  --  140  --   --  139  --  138   CHLORIDE mmol/L  --  99  --   --  101  --  99   POTASSIUM mmol/L 4.6 3.6  --  4.4 3.8   < > 3.2*   PHOSPHORUS mg/dL  --  4.1  --   --   --   --   --    MAGNESIUM mg/dL  --  1.8 2.1 1.6 1.7  --  1.4*   ALT (SGPT) U/L  --  32  --   --   --   --   --     < > = values in this interval not displayed.     Results from last 7 days   Lab Units 01/21/20  0434   ALBUMIN g/dL 3.10*   PREALBUMIN mg/dL 21.0   CRP mg/dL 0.46   CHOLESTEROL mg/dL 66   TRIGLYCERIDES mg/dL 130       Results from last 7 days   Lab Units 01/22/20  0559 01/22/20  0043 01/21/20  1805 01/21/20  1309 01/21/20  0555 01/21/20  0125   GLUCOSE mg/dL 201* 242* 224* 205* 212* 216*     Lab Results   Lab Value Date/Time    HGBA1C 5.60 01/06/2020 1821       Medications reviewed   Pertinent: colace, iron, insulin, keppra, risperdal, miralax, zoloft, trazodone      Needs Assessment (1/17)     Height used 162.6 cm (64\")   Weight " used 66 kgs (146 lbs)         Estimated need Method/Equation used Result    Energy/Calorie need    ~1600 kcals/d 25 kcal/kg actBW  MSJ: 1198 x 1.3  1650 kcal  1557 kcal             Protein   ~73 g/day 1.0 - 1.2 g/kg actBW 66 - 79 g        Fiber 25 - 30 g/kg    Fluid 25 - 30 mL/kcal 1650 - 1980 mL          Current Nutrition Prescription     PO: NPO Diet  EN: Isosource 1.5    Goal rate: 50 mL  Route: PEG    Evaluation of Received Nutrient/Fluid Intake last 2 day average - 1/20 - 1/21:    ?discrepancy in PO documentation  Per nursing EN started at 6pm 1/20 at 25 mL/hr; advanced to goal rate 1/21 ~0800     At Target Goal Volume  Received per I/O's    % Est needs   Volume  1200 ml mL      Modulars        Energy/kcals 1440 kcals kcals %   Protein  91 g pro g pro %               Fiber 7 g  g    Fluid   W/Free water 973 ml  1333 ml ml  ml              Nutrition Diagnosis     1/9 updated 1/13, 1/15  Problem Altered GI function   Etiology Pancreatic insufficiency / IBS   Signs/Symptoms History of chronic diarrhea   Status: resolving, no diarrhea    1/9 updated 1/13, 1/15, 1/17, 1/20, 1/22  Problem Swallowing difficulty   Etiology Dysphagia / encephalopathy   Signs/Symptoms SLP eval 1/17 rec, long term NPO/alt method nutrition   Status: ongoing    1/7  Problem Inadequate protein intake    Etiology Clinical status / intubated   Signs/Symptoms NPO   Status: EN started 1/13    Nutrition Intervention   1.  Follow treatment progress, Care plan reviewed  2. Weekly Nutrition panel, CRP and Prealbumin ordered to start Monday 1/27  3. RD to continue to monitor patient EN tolerance, labs, SLP evals, and adjust nutrition support regimen as medically appropriate      Goal:   General: Nutrition support treatment  EN/PN: Tolerate EN at goal, Deliver estimated needs    Monitoring/Evaluation:   Per protocol, I&O, Pertinent labs, EN delivery/tolerance, Weight, Symptoms, Swallow function    Will Continue to follow per protocol    Jordana NAVARRETE  ENOCH Mckeon, LD  Time Spent: 25 minutes

## 2020-01-22 NOTE — PAYOR COMM NOTE
"Dionna Preston RN CM  Phone [phone]  Fax [phone]    Waiting on precertification to go to SNF      Glo Berry (64 y.o. Female)     Date of Birth Social Security Number Address Home Phone MRN    1955  [address] [address] [phone] [id number]    Sabianist Marital Status          None        Admission Date Admission Type Admitting Provider Attending Provider Department, Room/Bed    1/6/20 Urgent Estella Billingsley MD Anciro, Audree, MD [facility] 3F, S320/1    Discharge Date Discharge Disposition Discharge Destination                       Attending Provider:  Estella Billingsley MD    Allergies:  Codeine    Isolation:  None   Infection:  None   Code Status:  CPR    Ht:  162.6 cm (64.02\")   Wt:  62.7 kg (138 lb 3.2 oz)    Admission Cmt:  None   Principal Problem:  Encephalopathy acute [G93.40]                 Active Insurance as of 1/6/2020     Primary Coverage     Payor Plan Insurance Group Employer/Plan Group    [employer] EM[employer] [id number]     Payor Plan Address Payor Plan Phone Number Payor Plan Fax Number Effective Dates    [address] [phone]  1/1/2015 - None Entered    [place] [id number]       Subscriber Name Subscriber Birth Date Member ID       GLO BERRY 1955 [id number]                 Emergency Contacts      (Rel.) Home Phone Work Phone Mobile Phone    JANENE BERRY (Spouse) [phone] -- [phone]    ayo berry (Son) [phone] -- --    Sendy Berry (Daughter) -- -- [phone]            Vital Signs (last day)     Date/Time   Temp   Temp src   Pulse   Resp   BP   Patient Position   SpO2    01/22/20 0700   98.3 (36.8)   Oral   87   16   122/80   Lying   94    01/22/20 0441   98.1 (36.7)   Oral   88   18   134/80   --   --    01/22/20 0041   98 (36.7)   Oral   102   18   131/71   Lying   98    01/21/20 1904   98.2 (36.8)   Oral   83   18   156/90   Sitting   100    " 01/21/20 1806   --   --   78   --   --   --   --    01/21/20 1500   97.7 (36.5)   Oral   --   16   141/64   Sitting   --    01/21/20 1100   98.6 (37)   Oral   --   16   100/55   Lying   --    01/21/20 0700   98.6 (37)   Axillary   --   16   136/78   Lying   --    01/21/20 0353   97.7 (36.5)   Oral   101   18   149/68   Lying   93    01/21/20 0026   98.1 (36.7)   Oral   92   18   152/67   Sitting   93              Current Facility-Administered Medications   Medication Dose Route Frequency Provider Last Rate Last Dose   • acetaminophen (TYLENOL) tablet 650 mg  650 mg Oral Q6H PRN Estella Billingsley MD   650 mg at 01/22/20 0200   • amLODIPine (NORVASC) tablet 10 mg  10 mg Oral Q24H Milton Cerda MD   10 mg at 01/22/20 0941   • aspirin chewable tablet 81 mg  81 mg Oral Daily Milton Cerda MD   81 mg at 01/22/20 0942    Or   • aspirin suppository 300 mg  300 mg Rectal Daily Milton Cerda MD   300 mg at 01/07/20 0839   • atorvastatin (LIPITOR) tablet 80 mg  80 mg Oral Nightly Milton Cerda MD   80 mg at 01/21/20 2036   • carvedilol (COREG) tablet 25 mg  25 mg Oral BID With Meals Milton Cerda MD   25 mg at 01/22/20 0941   • docusate sodium (COLACE) liquid 100 mg  100 mg Oral Daily Estella Bililngsley MD   100 mg at 01/22/20 0942   • donepezil (ARICEPT) tablet 5 mg  5 mg Oral Nightly Milton Cerda MD   5 mg at 01/21/20 2034   • ferrous sulfate 300 (60 Fe) MG/5ML syrup 300 mg  300 mg Oral Daily Milton Cerda MD   300 mg at 01/22/20 0946   • heparin (porcine) 5000 UNIT/ML injection 5,000 Units  5,000 Units Subcutaneous Q8H Milton Cerda MD   5,000 Units at 01/22/20 0634   • insulin detemir (LEVEMIR) injection 5 Units  5 Units Subcutaneous Nightly Lilian Curry APRN   5 Units at 01/21/20 2038   • insulin regular (humuLIN R,novoLIN R) injection 0-7 Units  0-7 Units Subcutaneous Q6H Milton Cerda MD   3 Units at 01/22/20 0634   • ipratropium-albuterol (DUO-NEB) nebulizer  solution 3 mL  3 mL Nebulization Q4H PRN Milton Cerda MD   3 mL at 01/15/20 0339   • labetalol (NORMODYNE,TRANDATE) injection 20 mg  20 mg Intravenous Q10 Min PRN Milton Cerda MD   20 mg at 01/12/20 1606   • lactated ringers infusion  9 mL/hr Intravenous Continuous Yves Alegre MD 9 mL/hr at 01/20/20 1254 9 mL/hr at 01/20/20 1254   • levETIRAcetam (KEPPRA) 100 MG/ML solution 500 mg  500 mg Oral Q12H Milton Cerda MD   500 mg at 01/22/20 0946   • lidocaine (LIDODERM) 5 % 1 patch  1 patch Transdermal Q24H Milton Cerda MD   1 patch at 01/22/20 0944   • losartan (COZAAR) tablet 100 mg  100 mg Oral Daily Milton Cerda MD   100 mg at 01/22/20 0942   • magnesium hydroxide (MILK OF MAGNESIA) suspension 2400 mg/10mL 10 mL  10 mL Oral Daily PRN Estella Billingsley MD   10 mL at 01/22/20 0942   • magnesium sulfate 2g/50 mL (PREMIX) infusion  6 g Intravenous PRN Milton Cerda MD   6 g at 01/14/20 0546   • nicotine (NICODERM CQ) 21 MG/24HR patch 1 patch  1 patch Transdermal Q24H Milton Cerda MD   1 patch at 01/22/20 0945   • ondansetron (ZOFRAN) injection 4 mg  4 mg Intravenous Q6H PRN Milton Cerda MD   4 mg at 01/20/20 1013   • polyethylene glycol 3350 powder (packet)  17 g Oral Daily Estella Billingsley MD   17 g at 01/22/20 0942   • potassium chloride (KLOR-CON) packet 40 mEq  40 mEq Oral PRN Milton Cerda MD   40 mEq at 01/21/20 1452   • potassium chloride 10 mEq in 100 mL IVPB  10 mEq Intravenous Q1H PRN Milton Cerda MD       • potassium phosphate 15 mmol in sodium chloride 0.9 % 100 mL infusion  15 mmol Intravenous PRN Milton Cerda MD       • potassium phosphate 30 mmol in sodium chloride 0.9 % 500 mL infusion  30 mmol Intravenous PRN Milton Cerda MD   30 mmol at 01/11/20 1344   • risperiDONE (risperDAL) 1 MG/ML oral solution 1 mg  1 mg Nasogastric Daily Milton Cerda MD   1 mg at 01/22/20 0948   • risperiDONE (risperDAL) 1 MG/ML oral solution 2 mg  2 mg  Nasogastric Nightly Milton Cerda MD   2 mg at 01/21/20 2037   • sertraline (ZOLOFT) tablet 150 mg  150 mg Nasogastric Nightly Milton Cerda MD   150 mg at 01/21/20 2035   • traZODone (DESYREL) tablet 100 mg  100 mg Nasogastric Nightly Milton Cerda MD   100 mg at 01/21/20 2035     Lab Results (last 24 hours)     Procedure Component Value Units Date/Time    Basic Metabolic Panel [[id number]]  (Abnormal) Collected:  01/22/20 0656    Specimen:  Blood Updated:  01/22/20 0821     Glucose 216 mg/dL      BUN 27 mg/dL      Creatinine 0.60 mg/dL      Sodium 139 mmol/L      Potassium 4.0 mmol/L      Chloride 99 mmol/L      CO2 30.0 mmol/L      Calcium 11.1 mg/dL      eGFR Non African Amer 101 mL/min/1.73      BUN/Creatinine Ratio 45.0     Anion Gap 10.0 mmol/L     Narrative:       GFR Normal >60  Chronic Kidney Disease <60  Kidney Failure <15      Magnesium [[id number]]  (Normal) Collected:  01/22/20 0656    Specimen:  Blood Updated:  01/22/20 0821     Magnesium 1.7 mg/dL     CBC (No Diff) [[id number]]  (Abnormal) Collected:  01/22/20 0656    Specimen:  Blood Updated:  01/22/20 0744     WBC 13.58 10*3/mm3      RBC 2.90 10*6/mm3      Hemoglobin 7.9 g/dL      Hematocrit 26.8 %      MCV 92.4 fL      MCH 27.2 pg      MCHC 29.5 g/dL      RDW 18.0 %      RDW-SD 57.6 fl      MPV 12.7 fL      Platelets 155 10*3/mm3     POC Glucose Once [[id number]]  (Abnormal) Collected:  01/22/20 0559    Specimen:  Blood Updated:  01/22/20 0600     Glucose 201 mg/dL     POC Glucose Once [[id number]]  (Abnormal) Collected:  01/22/20 0043    Specimen:  Blood Updated:  01/22/20 0054     Glucose 242 mg/dL     Potassium [[id number]]  (Normal) Collected:  01/21/20 1854    Specimen:  Blood Updated:  01/21/20 1933     Potassium 4.6 mmol/L     POC Glucose Once [[id number]]  (Abnormal) Collected:  01/21/20 1805    Specimen:  Blood Updated:  01/21/20 1806     Glucose 224 mg/dL     POC Glucose Once [[id number]]  (Abnormal) Collected:  01/21/20 1309     "Specimen:  Blood Updated:  01/21/20 1312     Glucose 205 mg/dL         Imaging Results (Last 24 Hours)     ** No results found for the last 24 hours. **        Milton Cerda MD   Physician   Surgery   Progress Notes   Signed   Date of Service:  01/21/20 0722   Creation Time:  01/21/20 0722            Signed        Expand All Collapse All      Show:Clear all  [x]Manual[x]Template[]Copied    Added by:  [x]Milton Cerda MD    [][facility] for details  Patient Name:  Glo Berry  YOB: 1955  [id number]     Surgery Progress Note     Date of visit: 1/21/2020        Subjective      Subjective: Feels OK, tolerating TF              Objective         Objective:      /68 (BP Location: Right arm, Patient Position: Lying)   Pulse 101   Temp 97.7 °F (36.5 °C) (Oral)   Resp 18   Ht 162.6 cm (64.02\")   Wt 62.7 kg (138 lb 3.2 oz)   SpO2 93%   BMI 23.71 kg/m²      Intake/Output Summary (Last 24 hours) at 1/21/2020 0722  Last data filed at 1/20/2020 2230      Gross per 24 hour   Intake 50 ml   Output 300 ml   Net -250 ml         CV:      Rhythm  regular and rate regular   L:         Clear  to auscultation bilaterally   Abd:    Bowel sounds positive , soft, nontender. PEG c/d/i  Ext:     No cyanosis, clubbing, edema     Recent labs that are back at this time have been reviewed.            Assessment/Plan         Assessment/ Plan:           Hospital Problem List             * (Principal) Encephalopathy acute - Doing well after PEG. Will sign off, please call with questions.         R/O AIS       R/O status epilepticus       Hypertension (Chronic)            Pancreatic insufficiency (Chronic)      T2DM on metformin  (Chronic)            Chronic diarrhea (Chronic)      Overview Signed 1/6/2020  3:34 PM by Aubree Luis APRN        Followed by Dr. Wright             Lumbar stenosis (Chronic)      Diabetic neuropathy  (Chronic)            Depression (Chronic)            Dementia  (Chronic)            " Smoker      Acute respiratory failure       Encephalopathy                    Milton Cerda MD  2020  7:22 AM                         Lilian Curry APRN   Nurse Practitioner   Medicine   Progress Notes   Signed   Date of Service:  20 1050   Creation Time:  20 1448            Signed        Expand All Collapse All      Show:Clear all  [x]Manual[x]Template[x]Copied    Added by:  [x]Estella Billingsley MD[x]Lilian Curry APRN    []Tavia for details       [facility] Medicine Services  PROGRESS NOTE     Patient Name: Glo Berry  : 1955  MRN: [id number]     Date of Admission: 2020  Primary Care Physician: Philomena Walters MD        Subjective      Subjective      CC:  Follow-up for dysphasia     HPI:  Pt is seen resting up in bed in NAD.  Awake and alert.  Son at bs.  Pt is chronically ill appearing.  Poor dentition.  States she feels okay, but very weak, mild occ SOA with exertion and mild nausea.  No vomiting or diarrhea.  Pt states she is tolerating TF okay.  No new issues.       Review of Systems  Gen- No fevers, chills  CV- No chest pain, palpitations  Resp- No cough, mild intermittent exertional dyspnea  GI- positive mild intermittent nausea, no V/D, no abd pain   All other systems reviewed and are negative.        Objective      Objective      Vital Signs:   Temp:  [96.3 °F (35.7 °C)-98.6 °F (37 °C)] 98.6 °F (37 °C)  Heart Rate:  [] 101  Resp:  [16-18] 16  BP: (100-177)/(55-94) 100/55  Total (NIH Stroke Scale): 6     Physical Exam:  Constitutional: Appears to be in distress, will not open her eyes, appears nauseous  HENT: NCAT, dry mucous membranes and lips  Respiratory: Clear to auscultation bilaterally A/P with rare exp wheeze, respiratory effort normal on RA with sats 97%  Cardiovascular: RRR to mild sinus tach per tele, no murmurs, no lower extremity edema  Gastrointestinal: Positive bowel sounds, soft, nontender, no  distention. No rebound or guarding  Psychiatric: calm, pleasant, appropriate affect currently  Neurologic: Awake and alert.  Oriented to person, place, year and general events.  Follows commands.  Cranial Nerves grossly intact to confrontation, speech clear  Skin: No rashes        Results Reviewed:         Results from last 7 days   Lab Units 01/17/20  0410 01/16/20  0532 01/15/20  0431   WBC 10*3/mm3 11.37* 11.54* 12.37*   HEMOGLOBIN g/dL 7.8* 8.0* 8.6*   HEMATOCRIT % 27.2* 26.2* 28.0*   PLATELETS 10*3/mm3 139* 146 145               Results from last 7 days   Lab Units 01/21/20  0434 01/18/20  1201 01/17/20  0410   01/16/20  0532   SODIUM mmol/L 140  --  139  --  138   POTASSIUM mmol/L 3.6 4.4 3.8   < > 3.2*   CHLORIDE mmol/L 99  --  101  --  99   CO2 mmol/L 28.0  --  28.0  --  27.0   BUN mg/dL 26*  --  38*  --  31*   CREATININE mg/dL 0.55*  --  0.62  --  0.60   GLUCOSE mg/dL 193*  --  209*  --  225*   CALCIUM mg/dL 10.1  --  11.4*  --  10.6*   ALT (SGPT) U/L 32  --   --   --   --    AST (SGOT) U/L 37*  --   --   --   --     < > = values in this interval not displayed.      Estimated Creatinine Clearance: 102.3 mL/min (A) (by C-G formula based on SCr of 0.55 mg/dL (L)).         Microbiology Results Abnormal      None                 Imaging Results (Last 24 Hours)      ** No results found for the last 24 hours. **                  Results for orders placed during the hospital encounter of 01/06/20   Adult Transthoracic Echo Complete W/ Cont if Necessary Per Protocol (With Agitated Saline)     Narrative · There is calcification of the aortic valve.  · Peak velocity of the flow distal to the aortic valve is 217.0 cm/s.  · Estimated EF = 70%.  · Left ventricular systolic function is normal.            I have reviewed the medications:  Scheduled Meds:     amLODIPine 10 mg Oral Q24H   aspirin 81 mg Oral Daily   Or         aspirin 300 mg Rectal Daily   atorvastatin 80 mg Oral Nightly   carvedilol 25 mg Oral BID With  Meals   donepezil 5 mg Oral Nightly   ferrous sulfate 300 mg Oral Daily   heparin (porcine) 5,000 Units Subcutaneous Q8H   insulin detemir 5 Units Subcutaneous Nightly   insulin regular 0-7 Units Subcutaneous Q6H   levETIRAcetam 500 mg Oral Q12H   lidocaine 1 patch Transdermal Q24H   losartan 100 mg Oral Daily   nicotine 1 patch Transdermal Q24H   risperiDONE 1 mg Nasogastric Daily   risperiDONE 2 mg Nasogastric Nightly   sertraline 150 mg Nasogastric Nightly   traZODone 100 mg Nasogastric Nightly      Continuous Infusions:     lactated ringers 9 mL/hr Last Rate: 9 mL/hr (01/20/20 1254)      PRN Meds:.•  acetaminophen  •  ipratropium-albuterol  •  labetalol  •  magnesium sulfate  •  magnesium sulfate  •  ondansetron  •  potassium chloride  •  potassium chloride  •  potassium phosphate  •  potassium phosphate        Assessment/Plan      Assessment & Plan            Active Hospital Problems     Diagnosis   POA   • **Encephalopathy acute [G93.40]   Yes   • R/O AIS  [I63.9]   Yes   • R/O status epilepticus  [G40.901]   Yes   • Hypertension [I10]   Yes   • Pancreatic insufficiency [K86.89]   Yes   • T2DM on metformin  [E11.9]   Yes   • Chronic diarrhea [K52.9]   Yes   • Lumbar stenosis [M48.061]   Yes   • Diabetic neuropathy  [E11.40]   Yes   • Depression [F32.9]   Yes   • Dementia  [F03.90]   Yes   • Smoker [F17.200]   Yes   • Acute respiratory failure  [J96.00]   Yes   • Encephalopathy [G93.40]   Yes       Resolved Hospital Problems   No resolved problems to display.         Brief Hospital Course to date:  Glo Berry is a 64 y.o. female With a past medical history significant for hypertension, type 2 diabetes on metformin, pancreatic insufficiency, lumbar stenosis, dementia, depression, and tobacco use who was admitted on January 6, 2020 for acute encephalopathy secondary to malignant hypertension and possible PRES syndrome.     Assessment/plan:     All problems new to me today     Dysphagia  -Continues have severe  dysphagia on speech evaluation, s/p PEG tube placement and Tube feeding infusing.   --dietician following   --needs good oral care...     Acute encephalopathy- resolved  PRES syndrome  -Improved with better blood pressure control-continue amlodipine 10 mg, carvedilol 25 mg twice daily, losartan 100 mg daily  -Continue risperidone and trazodone at night  --stable      Possible new onset seizures  -Continue Keppra and follow-up with neurology memory care clinic in 4 to 6 weeks     Type 2 diabetes  - Continue sliding scale  --running in lower 200s fairly consistently on TF  Will add low dose levimer tonight.  NPO otherwise.    --otherwise will continue Q6 accuchecks and SSI with R as current.       Tobacco use-continue nicotine patch  Dementia  -son states pt with mild memory issues for a little while but really not confused except with onset of PRES as above and in ICU  -continue Aricept 5 mg q. Night     Acute respiratory failure-resolved  --stable, oxygen and nebs as needed     DVT Prophylaxis: Heparin     Disposition: I expect the patient to be discharged tolerating PEG tube feedings and placement. CM on board.     CODE STATUS:       Code Status and Medical Interventions:   Ordered at: 01/06/20 1647     Code Status:     CPR     Medical Interventions (Level of Support Prior to Arrest):     Full            Electronically signed by HORACIO Nguyen, 01/21/20, 2:49 PM.                       Redd Car, RN      Case Management   Progress Notes   Signed   Date of Service:  01/22/20 0910   Creation Time:  01/22/20 0910            Signed             Show:Clear all  []Manual[x]Template[]Copied    Added by:  [x]Redd Car, RN    []Tavia for details  Continued Stay Note   [place]     Patient Name: Glo Berry                  MRN: [id number]  Today's Date: 1/22/2020                     Admit Date: 1/6/2020          Discharge Plan      Row Name 01/22/20 0909           Plan     Plan   [facility]     Patient/Family in Agreement with Plan  yes     Plan Comments  Spoke to patient at bedside. She is agreeable to [facility] pending insurance approval. CM will continue to follow.     Final Discharge Disposition Code  03 - skilled nursing facility (SNF)          Discharge Codes    No documentation.              Expected Discharge Date and Time      Expected Discharge Date Expected Discharge Time     Jan 21, 2020                  Redd Car RN

## 2020-01-23 NOTE — PAYOR COMM NOTE
"Glo Berry (64 y.o. Female)     Date of Birth Social Security Number Address Home Phone MRN    1955  [address] [facility] [address] [phone] [id number]    [employer] Marital Status          None        Admission Date Admission Type Admitting Provider Attending Provider Department, Room/Bed    20 Urgent Estella Billingsley MD  [facility] 3F, S320/1    Discharge Date Discharge Disposition Discharge Destination        2020 Skilled Nursing Facility (DC - External)              Attending Provider:  (none)   Allergies:  Codeine    Isolation:  None   Infection:  None   Code Status:  Prior    Ht:  162.6 cm (64.02\")   Wt:  62.7 kg (138 lb 3.2 oz)    Admission Cmt:  None   Principal Problem:  Encephalopathy acute [G93.40]                 Active Insurance as of 2020     Primary Coverage     Payor Plan Insurance Group Employer/Plan Group    [facility] [employer] [facility] EMPL[employer]EE [id number]     Payor Plan Address Payor Plan Phone Number Payor Plan Fax Number Effective Dates    [address] [phone]  2015 - None Entered    [place] [address]       Subscriber Name Subscriber Birth Date Member ID       GLO BERRY 1955 [id number]                 Emergency Contacts      (Rel.) Home Phone Work Phone Mobile Phone    JANENE BERRY (Spouse) [phone] -- [phone]    ayo berry (Son) [phone] -- --    Sendy Berry (Daughter) -- -- [phone]               Discharge Summary      Estella Billingsley MD at 20 1332              [facility] Medicine Services  TRANSFER SUMMARY    Patient Name: Glo Berry  : 1955  MRN: [id number]    Date of Admission: 2020  Date of Discharge:  2020  Length of Stay: 16  Primary Care Physician: Philomena Walters MD    Consults     Date and Time Order Name Status Description    2020 1647 Inpatient Neurology Consult Stroke Completed           "       Hospital Course     Presenting Problem:   CVA (cerebral vascular accident) (CMS/HCC) [I63.9]  Encephalopathy [G93.40]    Active Hospital Problems    Diagnosis  POA   • **Encephalopathy acute [G93.40]  Yes   • R/O AIS  [I63.9]  Yes   • R/O status epilepticus  [G40.901]  Yes   • Hypertension [I10]  Yes   • Pancreatic insufficiency [K86.89]  Yes   • T2DM on metformin  [E11.9]  Yes   • Chronic diarrhea [K52.9]  Yes   • Lumbar stenosis [M48.061]  Yes   • Diabetic neuropathy  [E11.40]  Yes   • Depression [F32.9]  Yes   • Dementia  [F03.90]  Yes   • Smoker [F17.200]  Yes   • Acute respiratory failure  [J96.00]  Yes   • Encephalopathy [G93.40]  Yes      Resolved Hospital Problems   No resolved problems to display.          Hospital Course:  Glo Berry is a 64 y.o. female 64-year-old lady with a past medical history significant for hypertension, dementia, pancreatic insufficiency, depression, and type 2 diabetes who was transferred to [facility] on January 6 for altered mental status concerning for stroke versus status epilepticus.  CT head without contrast was without acute findings.  CT cerebral perfusion with and without contrast was without abnormalities.  CTA head and neck with bilateral calcifications involving the carotid bifurcations producing only mild narrowing of the internal carotid arteries approximately 30% bilaterally, with normal vertebral and basilar arteries bilaterally.  Carotid duplex showed R ICA with 0 to 49% stenosis in the L ICA with 50 to 69% stenosis.  MRI brain without contrast showed no acute intracranial findings of acute infarction or signal abnormality.  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  However, she did have severe dysphasia requiring PEG tube placement.  She was very weak and malnourished.  PT  recommended skilled nursing on discharge.  After her PEG tube was placed she was alert and oriented to person, time, and place, but had not had a bowel movement for several days.  Suppository was ordered and she had a large bowel movement.   She had leukocytosis at 13 on day of discharge, likely reactive from PEG tube placement.     Discharge Follow Up Recommendations for outpatient labs/diagnostics:   Follow up CBC in 1 week for leukocytosis    Day of Discharge     HPI:   No acute events overnight. Has some soreness where the PEG tube is. Denies n/v/ abdominal pain.    Review of Systems  Gen- No fevers, chills  CV- No chest pain, palpitations  Resp- No cough, dyspnea    Vital Signs:   Temp:  [97.7 °F (36.5 °C)-98.3 °F (36.8 °C)] 97.9 °F (36.6 °C)  Heart Rate:  [] 78  Resp:  [16-18] 18  BP: (122-156)/(64-90) 122/80     Physical Exam:  Constitutional: No acute distress, awake, alert, appears malnourished   HENT: NCAT, mucous membranes moist  Respiratory: Clear to auscultation bilaterally, respiratory effort normal   Cardiovascular: RRR, no murmurs  Gastrointestinal: Positive bowel sounds, soft, nontender, nondistended  Psychiatric: Appropriate affect, cooperative  Neurologic: Oriented x 3, Cranial Nerves grossly intact to confrontation, speech clear  Skin: has g-tube in place without significant erythema, appears clean, dry, and intact       Pertinent Results     Results from last 7 days   Lab Units 01/22/20  0656 01/21/20  1854 01/21/20  0434 01/18/20  1201 01/17/20  0410 01/16/20  2244 01/16/20  0532   WBC 10*3/mm3 13.58*  --   --   --  11.37*  --  11.54*   HEMOGLOBIN g/dL 7.9*  --   --   --  7.8*  --  8.0*   HEMATOCRIT % 26.8*  --   --   --  27.2*  --  26.2*   PLATELETS 10*3/mm3 155  --   --   --  139*  --  146   SODIUM mmol/L 139  --  140  --  139  --  138   POTASSIUM mmol/L 4.0 4.6 3.6 4.4 3.8 4.1 3.2*   CHLORIDE mmol/L 99  --  99  --  101  --  99   CO2 mmol/L 30.0*  --  28.0  --  28.0  --  27.0   BUN  mg/dL 27*  --  26*  --  38*  --  31*   CREATININE mg/dL 0.60  --  0.55*  --  0.62  --  0.60   GLUCOSE mg/dL 216*  --  193*  --  209*  --  225*   CALCIUM mg/dL 11.1*  --  10.1  --  11.4*  --  10.6*     Results from last 7 days   Lab Units 01/21/20  0434   BILIRUBIN mg/dL 0.3   ALK PHOS U/L 64   ALT (SGPT) U/L 32   AST (SGOT) U/L 37*     Results from last 7 days   Lab Units 01/21/20  0434   CHOLESTEROL mg/dL 66   TRIGLYCERIDES mg/dL 130         Brief Urine Lab Results     None          Microbiology Results Abnormal     None          Imaging Results (All)     Procedure Component Value Units Date/Time    Fiberoptic Endo (fees) [[id number]] Resulted:  01/17/20 1125     Updated:  01/17/20 1125    Narrative:       This procedure was auto-finalized with no dictation required.    XR Abdomen KUB [[id number]] Collected:  01/15/20 0505     Updated:  01/15/20 0507    Narrative:       CR Abdomen 1 Vw    INDICATION:   64-year-old female status post NG tube placement today.    COMPARISON:   None available    FINDINGS:  Single AP radiograph of the abdomen. Nasogastric tube is coiled in the gastric fundus with tip projecting over the mid to distal body of the stomach.    The bowel gas pattern is nonobstructive. No acute osseous abnormalities. No radiopaque foreign body.      Impression:       Nasogastric tube coiled in the gastric fundus with tip projecting over the mid to distal body of the stomach.    Signer Name: Sung Natarajan MD   Signed: 1/15/2020 5:05 AM   Workstation Name: [id number]    [facility]    XR Chest 1 View [[id number]] Collected:  01/13/20 0856     Updated:  01/13/20 1718    Narrative:       EXAMINATION: XR CHEST 1 VW-01/13/2020:      INDICATION: Respiratory failure; Z74.09-Other reduced mobility;  R13.10-Dysphagia, unspecified; R41.841-Cognitive communication deficit.      COMPARISON: 01/09/2020.     FINDINGS: The cardiac silhouette is normal. There is no acute  inflammatory process, mass or  effusion.           Impression:       Chronic change; no acute disease. There has been no  significant change when compared to the previous examination of  01/09/2020.     D:  01/13/2020  E:  01/13/2020     This report was finalized on 1/13/2020 5:15 PM by Dr. Noe Duggan MD.       Fiberoptic Endo (fees) [[id number]] Resulted:  01/10/20 1108     Updated:  01/10/20 1108    Narrative:       This procedure was auto-finalized with no dictation required.    XR Chest 1 View [[id number]] Collected:  01/09/20 0859     Updated:  01/09/20 2204    Narrative:       EXAMINATION: XR CHEST 1 VW- 01/09/2020     INDICATION: resp failure, extubated 01/08/2020     COMPARISON: 01/08/2020     FINDINGS: ET tube is seen at the level of the clavicles. NG tube is seen  in the stomach. The heart is enlarged, vasculature is cephalized. Left  basilar atelectasis has resolved. No new pulmonary parenchymal disease,  effusion or pneumothorax is seen.       Impression:       Interval clearing of mild left basilar atelectasis. No new  chest disease is seen.     D:  01/09/2020  E:  01/09/2020         This report was finalized on 1/9/2020 10:01 PM by Dr. Stephane Erickson MD.       MRI Brain Without Contrast [[id number]] Collected:  01/09/20 0858     Updated:  01/09/20 1028    Narrative:       EXAMINATION: MRI BRAIN WO CONTRAST- 01/09/2020     INDICATION: Malignant hypertension     TECHNIQUE: Multiplanar MRI of the brain without intravenous contrast     COMPARISON: NONE     FINDINGS: Mild motion degradation:  No restriction on diffusion-weighted sequences. Midline structures are  symmetric without evidence of mass, mass effect or midline shift.  Ventricles and sulci within normal limits. Mild amount of increased T2  and FLAIR signal findings in the periventricular and deep white matter  suggesting chronic small vessel ischemic disease. Pituitary and sella  within normal limits. Cervicomedullary junction of limited evaluation  due to motion degradation. Globes  and orbits retain normal T2 signal  characteristics. Visualized paranasal sinuses and mastoid air cells are  grossly clear and well pneumatized with the exception of trace bilateral  mastoid effusions. No cerebellopontine angle mass lesion. Normal signal  flow voids in the distal internal carotid and vertebrobasilar arteries.       Impression:       No acute intracranial findings of acute infarction or signal  abnormality other than mild increased signal findings of likely chronic  small vessel ischemic disease in the periventricular and subcortical  white matter of typical predominance in the periventricular regions.  Posterior parenchyma without subcortical signal abnormality or  restriction.      D:  01/09/2020  E:  01/09/2020     This report was finalized on 1/9/2020 10:25 AM by Dr. Sean Gonzalez.       CT Cerebral Perfusion With & Without Contrast [[id number]] Collected:  01/06/20 1638     Updated:  01/09/20 0843    Narrative:       EXAMINATION: CT CEREBRAL PERFUSION W WO CONTRAST-      INDICATION: TIA, initial screening, stroke symptoms.     TECHNIQUE: Cerebral perfusion analysis was performed using computed  tomography with contrast administration, including post processing of  parametric maps with determination of cerebral blood flow, cerebral  blood volume, and mean transit time.     The radiation dose reduction device was turned on for each scan per the  ALARA (As Low as Reasonably Achievable) protocol.     COMPARISON: None.     FINDINGS: CEREBRAL BLOOD VOLUME: There is normal symmetrical cerebral  blood volume on perfusion maps.     CEREBRAL BLOOD FLOW: There is normal symmetrical cerebral blood flow on  perfusion maps.     TIME TO PEAK: There is normal symmetrical time to peak on perfusion  maps.     MEAN TRANSIT TIME: Normal symmetric appearance of mean transient time  perfusion map.     PERFUSION ANALYSIS: Normal cerebral perfusion.       Impression:       No perfusion abnormality to suggest evidence of  reversible  ischemia.     D:  01/06/2020  E:  01/07/2020                 This report was finalized on 1/9/2020 8:40 AM by Dr. Trena Nettles MD.       XR Chest 1 View [[id number]] Collected:  01/08/20 0912     Updated:  01/08/20 1200    Narrative:       EXAMINATION: XR CHEST 1 VW-01/08/2020:      INDICATION: Intubated patient.      COMPARISON: 01/07/2020.     FINDINGS: The endotracheal tube is well positioned. The heart is  slightly large. The heart is compensated. There is minimal left basilar  airspace disease, likely representing atelectasis.           Impression:       There is left basilar atelectasis which has developed since  the previous examination, mild. The endotracheal tube remains well  positioned and there has otherwise been no change since the previous  exam.     D:  01/08/2020  E:  01/08/2020     This report was finalized on 1/8/2020 11:57 AM by Dr. Noe Duggan MD.       XR Chest 1 View [[id number]] Collected:  01/07/20 0758     Updated:  01/07/20 1633    Narrative:       EXAMINATION: XR CHEST 1 VW-      INDICATION: Intubated patient.      COMPARISON: 01/06/2020.     FINDINGS: Cardiac size borderline enlarged and unchanged with  endotracheal tube terminating above the level of the italo.  Esophagogastric tube has been placed in the interim coursing below the  diaphragm and out of the field-of-view. No pneumothorax or pleural  effusion.  No overt edema or focal consolidation.           Impression:       Interval placement of esophagogastric tube coursing below  the diaphragm and out of the field-of-view with endotracheal tube  remaining in appropriate position above the level of the italo. No new  consolidation or overt edema. No pleural effusion.     D:  01/07/2020  E:  01/07/2020     This report was finalized on 1/7/2020 4:30 PM by Dr. Sean Gonzalez.       CT Angiogram Neck [[id number]] Collected:  01/06/20 1652     Updated:  01/07/20 0951    Narrative:       EXAMINATION: CT ANGIOGRAM NECK-  01/06/2020     INDICATION: Stroke     TECHNIQUE: CT angiogram of the neck was performed following intravenous  contrast. Images are displayed in the axial, sagittal and coronal  projections (3-D).     The radiation dose reduction device was turned on for each scan per the  ALARA (As Low as Reasonably Achievable) protocol.     COMPARISON: NONE     FINDINGS: Both common carotid arteries are normal. There is mild  calcification in both carotid bifurcations with tortuous internal  carotid arteries but without evidence of high-grade stenosis. Both  vertebral arteries are patent as is the basilar artery.       Impression:       There are bilateral calcifications involving the carotid  bifurcations producing only mild narrowing of the internal carotid  arteries (approximately 30% bilaterally). Both vertebral arteries are  normal as is the basilar artery.     D:  01/06/2020  E:  01/07/2020     This report was finalized on 1/7/2020 9:48 AM by Dr. Noe Duggan MD.       CT Angiogram Head [[id number]] Collected:  01/06/20 1656     Updated:  01/07/20 0951    Narrative:       EXAMINATION: CT ANGIOGRAM HEAD- 01/06/2020     INDICATION: Stroke     TECHNIQUE: Intracranial CTA was performed prior to and following  intravenous contrast.     The radiation dose reduction device was turned on for each scan per the  ALARA (As Low as Reasonably Achievable) protocol.     COMPARISON: NONE     FINDINGS: The intracranial portion of the internal carotid arteries is  normal. The anterior cerebral arteries are patent with no stenosis,  occlusion or aneurysm. Likewise the middle cerebral arteries demonstrate  no large vessel occlusion. The basilar artery is normal.  The superior  cerebellar and posterior cerebral arteries are normal.       Impression:       Normal intracranial CT angiogram.     D:  01/06/2020  E:  01/07/2020        This report was finalized on 1/7/2020 9:48 AM by Dr. Noe uDggan MD.       XR Chest 1 View [[id number]] Collected:   01/06/20 1714     Updated:  01/07/20 0950    Narrative:       EXAMINATION: XR CHEST 1 VW-      INDICATION: Confirm ET tube placement.      COMPARISON: 08/04/2014.     FINDINGS: The endotracheal tube is well positioned. The heart is at the  upper limits of normal. The heart is compensated. There are chronic  pulmonary findings.           Impression:       Endotracheal tube is well positioned. There are no acute  cardiopulmonary findings.     D:  01/06/2020  E:  01/07/2020     This report was finalized on 1/7/2020 9:47 AM by Dr. Noe Duggan MD.       CT Head Without Contrast [[id number]] Collected:  01/06/20 1618     Updated:  01/06/20 1707    Narrative:       EXAMINATION: CT HEAD WO CONTRAST-01/06/2020:      INDICATION: Stroke.     TECHNIQUE: CT scan of the head was performed at 5 mm intervals. No  intravenous contrast was utilized.     The radiation dose reduction device was turned on for each scan per the  ALARA (As Low as Reasonably Achievable) protocol.     COMPARISON: NONE.     FINDINGS: There are age-related periventricular white matter changes.  There is no intracranial mass, hemorrhage, midline shift or extra-axial  fluid collection.       Impression:       Age-related change. There are no acute findings.     D:  01/06/2020  E:  01/06/2020           This report was finalized on 1/6/2020 5:04 PM by Dr. Noe Duggan MD.             Results for orders placed during the hospital encounter of 01/06/20   Adult Transthoracic Echo Complete W/ Cont if Necessary Per Protocol (With Agitated Saline)    Narrative · There is calcification of the aortic valve.  · Peak velocity of the flow distal to the aortic valve is 217.0 cm/s.  · Estimated EF = 70%.  · Left ventricular systolic function is normal.              Discharge Details        Discharge Medications      New Medications      Instructions Start Date   atorvastatin 80 MG tablet  Commonly known as:  LIPITOR   80 mg, Per G Tube, Nightly      levETIRAcetam 100 MG/ML  solution  Commonly known as:  KEPPRA   500 mg, Per G Tube, Every 12 Hours Scheduled      polyethylene glycol pack packet  Commonly known as:  MIRALAX   17 g, Oral, Daily PRN      risperiDONE 1 MG/ML oral solution  Commonly known as:  risperDAL   2 mg, Per G Tube, Nightly         Changes to Medications      Instructions Start Date   amLODIPine 10 MG tablet  Commonly known as:  NORVASC  What changed:    · medication strength  · how much to take  · how to take this  · when to take this   10 mg, Oral, Every 24 Hours Scheduled   Start Date:  January 23, 2020     sertraline 50 MG tablet  Commonly known as:  ZOLOFT  What changed:    · medication strength  · how much to take  · when to take this   150 mg, Oral, Nightly         Continue These Medications      Instructions Start Date   carvedilol 25 MG tablet  Commonly known as:  COREG   25 mg, Oral, 2 Times Daily With Meals      cyanocobalamin 1000 MCG/ML injection   inject contents of 1 vial intramuscularly MONTHLY      donepezil 5 MG tablet  Commonly known as:  ARICEPT   5 mg, Oral, Nightly      losartan 100 MG tablet  Commonly known as:  COZAAR   100 mg, Oral, Daily      metFORMIN 1000 MG tablet  Commonly known as:  GLUCOPHAGE   1,000 mg, Oral, Daily With Breakfast      Pancrelipase (Lip-Prot-Amyl) 78100 units capsule delayed-release particles  Commonly known as:  CREON   Take 2 by mouth 30 minutes before meals and 1 before a snack      traZODone 100 MG tablet  Commonly known as:  DESYREL   100 mg, Oral, Nightly         Stop These Medications    colesevelam 625 MG tablet  Commonly known as:  WELCHOL     dicyclomine 20 MG tablet  Commonly known as:  BENTYL     fenofibrate 160 MG tablet     potassium chloride 20 MEQ CR tablet  Commonly known as:  K-DUR,KLOR-CON     prochlorperazine 10 MG tablet  Commonly known as:  COMPAZINE     topiramate 50 MG tablet  Commonly known as:  TOPAMAX     venlafaxine  MG 24 hr capsule  Commonly known as:  EFFEXOR-XR            Allergies    Allergen Reactions   • Codeine          Discharge Disposition:  Skilled Nursing Facility (DC - External)    Discharge Diet:  Diet Order   Procedures   • NPO Diet       Discharge Activity:        CODE STATUS:    Code Status and Medical Interventions:   Ordered at: 01/06/20 0156     Code Status:    CPR     Medical Interventions (Level of Support Prior to Arrest):    Full         No future appointments.    Additional Instructions for the Follow-ups that You Need to Schedule     Discharge Follow-up with PCP   As directed       Currently Documented PCP:    Philomena Walters MD    PCP Phone Number:    [phone]     Follow Up Details:  in 1-2 weeks for blood pressure medication adjustment if needed                 Time Spent on Discharge:  34 minutes    Electronically signed by Estella Billingsley MD, 01/22/20, 1:32 PM.      Electronically signed by Estella Billingsley MD at 01/22/20 4635

## 2021-11-04 NOTE — PROGRESS NOTES
Intensive Care Follow-up     Hospital:  LOS: 8 days   Ms. Glo Berry, 64 y.o. female is followed for:   Encephalopathy acute          History of present illness:   Glo Berry is a 64 y.o. female being transferred via airvac to [facility] on 1/6 from [facility] with altered mentation concerning for stroke vs status epilepticus. Her last known well was 1/5 ~10PM and family reports that she was somewhat restless prior to bed. Upon awakening her family found her altered and took her to the OSH for further evaluation.Per the ED physican, she was awake on arrival but inappropriate with leftward eye deviation, right-sided neglect, facial droop, and questionable decorticate posturing, ~1240 she became unresponsive and was intubated.      Reportedly she received multiple doses of Ativan and Versed, however it is difficult to discern if this was pre/post intubation. CT head negative for acute process and she was transferred to [facility] for higher level of care.     Patient was evaluated by neurology and patient did have pretty significantly elevated blood pressures.  Patient was started on a Cardene drip.  Patient was extubated couple days after arrival.  She had periods of agitation requiring Precedex.  Also difficult to control blood pressure.  She was thought to have posterior reversible encephalopathy syndrome.    Subjective   Interval History:  Patient came off Precedex.  Able to talk coherently.  Still remains quite weak.  On enteral feeds currently.  No periods of agitation overnight.  Hemoglobin dropped a little today but no active bleed.  Will watch closely.                 The patient's past medical, surgical and social history were reviewed and updated in Epic as appropriate.       Objective     Infusions:     Medications:    amLODIPine 10 mg Oral Q24H   aspirin 81 mg Oral Daily   Or      aspirin 300 mg Rectal Daily   atorvastatin 80 mg Oral Nightly   carvedilol 25 mg Oral BID With Meals   donepezil 5 mg Oral  "Nightly   heparin (porcine) 5,000 Units Subcutaneous Q8H   insulin regular 0-7 Units Subcutaneous Q6H   ipratropium-albuterol 3 mL Nebulization 4x Daily - RT   levETIRAcetam 500 mg Oral Q12H   losartan 100 mg Oral Daily   nicotine 1 patch Transdermal Q24H   risperiDONE 1 mg Nasogastric Daily   risperiDONE 2 mg Nasogastric Nightly   sertraline 150 mg Nasogastric Nightly   traZODone 50 mg Nasogastric Nightly       Vital Sign Min/Max for last 24 hours  Temp  Min: 97.6 °F (36.4 °C)  Max: 98.5 °F (36.9 °C)   BP  Min: 105/92  Max: 183/75   Pulse  Min: 69  Max: 114   Resp  Min: 20  Max: 28   SpO2  Min: 84 %  Max: 98 %   Flow (L/min)  Min: 2  Max: 2       Input/Output for last 24 hour shift  01/13 0701 - 01/14 0700  In: 1674.4 [I.V.:307.4]  Out: 600 [Urine:600]      Objective:  Vital signs: (most recent): Blood pressure 132/96, pulse 100, temperature 97.6 °F (36.4 °C), temperature source Axillary, resp. rate 20, height 162.6 cm (64.02\"), weight 63 kg (138 lb 14.2 oz), SpO2 97 %.               General Appearance: Sleepy but wakes up, in no acute distress  Lungs:   B/L Breath sounds present with decreased breath sounds on bases, no wheezing heard, no crackles.   Heart: S1 and S2 present, no murmur  Abdomen: Soft, non-tender, no guarding or rigidity, bowel sounds positive.  Extremities: Atraumatic, no edema, warm to touch.  Neurologic:  Moving all four extremities.  Generalized weak    Results from last 7 days   Lab Units 01/14/20  0350 01/13/20  0437 01/12/20  0536   WBC 10*3/mm3 9.19 11.69* 8.62   HEMOGLOBIN g/dL 7.9* 9.0* 8.7*   PLATELETS 10*3/mm3 96* 115* 93*     Results from last 7 days   Lab Units 01/14/20  0350 01/13/20  1649 01/13/20  0441 01/12/20  0536   SODIUM mmol/L 137  --  139 141   POTASSIUM mmol/L 4.1 4.6 3.5 3.0*   CO2 mmol/L 24.0  --  28.0 28.0   BUN mg/dL 21  --  21 22   CREATININE mg/dL 0.40*  --  0.39* 0.34*   MAGNESIUM mg/dL 1.4*  --  1.7 1.3*   PHOSPHORUS mg/dL 4.1  --  3.4 3.8   GLUCOSE mg/dL 190*  --  " 191* 161*     Estimated Creatinine Clearance: 141.3 mL/min (A) (by C-G formula based on SCr of 0.4 mg/dL (L)).    Results from last 7 days   Lab Units 01/08/20  1615   PH, ARTERIAL pH units 7.369   PCO2, ARTERIAL mm Hg 30.5*   PO2 ART mm Hg 58.5*       Images:   No new    I reviewed the patient's results and images.     Assessment/Plan   Impression        Encephalopathy acute    R/O AIS     R/O status epilepticus     Hypertension    Pancreatic insufficiency    T2DM on metformin     Chronic diarrhea    Lumbar stenosis    Diabetic neuropathy     Depression    Dementia     Smoker    Acute respiratory failure     Encephalopathy       Plan        1.  Patient seems to be recovering from her underlying issues.  Blood pressure is under better control and off nicardipine drip.  Continue current antihypertensives.  2.  Speech following for dysphagia.  Continue enteral nutrition for now.  3.  Continue antiseizure medications, antidepressants and  antipsychotics as well.  Patient seems to be recovering.  DC Precedex.  4.  GI and DVT prophylaxis.  5.  Magnesium replaced  earlier.  Will recheck in the morning again.  6.  Hemoglobin dropped a little but no active bleed noted.  Will recheck in the morning.    PT/OT and ambulate as tolerated.    We will keep in ICU and when shows hemodynamic stability will plan on transferring out of ICU may be tomorrow.    Plan of care and goals reviewed with mulitdisciplinary/antibiotic stewardship team during rounds.   I discussed the patient's findings and my recommendations with family and nursing staff     Time spent 25 (exclusive of procedure time)  including high complexity decision making to assess, manipulate, and support vital organ system failure in this individual who has impairment of one or more vital organ systems such that there is a high probability of imminent or life threatening deterioration in the patient’s condition.      Gurmeet Jules MD, [facility]  Pulmonary, Critical care and  Sleep Medicine        Opioid Counseling: I discussed with the patient the potential side effects of opioids including but not limited to addiction, altered mental status, and depression. I stressed avoiding alcohol, benzodiazepines, muscle relaxants and sleep aids unless specifically okayed by a physician. The patient verbalized understanding of the proper use and possible adverse effects of opioids. All of the patient's questions and concerns were addressed. They were instructed to flush the remaining pills down the toilet if they did not need them for pain.

## 2023-04-05 NOTE — PROGRESS NOTES
[facility] Medicine Services  PROGRESS NOTE    Patient Name: Glo Berry  : 1955  MRN: [id number]    Date of Admission: 2020  Primary Care Physician: Phliomena Walters MD    Subjective   Subjective     CC:  Dysphasia    HPI:  Patient failed her swallowing study today, and both she and her family say it is time for a PEG tube    Review of Systems  Gen- No fevers, chills  CV- No chest pain, palpitations  Resp- No cough, dyspnea  GI- No N/V/D, abd pain          All other systems reviewed and are negative.    Objective   Objective     Vital Signs:   Temp:  [97.9 °F (36.6 °C)-98.7 °F (37.1 °C)] 98 °F (36.7 °C)  Heart Rate:  [59-84] 68  Resp:  [16-20] 18  BP: (107-142)/(56-81) 112/61  Total (NIH Stroke Scale): 6     Physical Exam:  Constitutional -frail female in bed  HEENT-NCAT, mucous membranes moist, Keofeed in place  CV-RRR, S1 S2 normal, no m/r/g  Resp-grossly clear bilaterally  Abd-soft, non-tender, non-distended, normo active bowel sounds  Ext-No lower extremity cyanosis, clubbing or edema bilaterally  Neuro-alert, speech clear, moves all extremities   Psych-flat affect   Skin- No rash on exposed UE or LE bilaterally        Results Reviewed:  Results from last 7 days   Lab Units 20  0532 01/15/20  0431   WBC 10*3/mm3 11.37* 11.54* 12.37*   HEMOGLOBIN g/dL 7.8* 8.0* 8.6*   HEMATOCRIT % 27.2* 26.2* 28.0*   PLATELETS 10*3/mm3 139* 146 145     Results from last 7 days   Lab Units 200 20  2244 20  0532 01/15/20  0431  20  0441   SODIUM mmol/L 139  --  138 138   < > 139   POTASSIUM mmol/L 3.8 4.1 3.2* 3.8   < > 3.5   CHLORIDE mmol/L 101  --  99 97*   < > 100   CO2 mmol/L 28.0  --  27.0 30.0*   < > 28.0   BUN mg/dL 38*  --  31* 22   < > 21   CREATININE mg/dL 0.62  --  0.60 0.51*   < > 0.39*   GLUCOSE mg/dL 209*  --  225* 183*   < > 191*   CALCIUM mg/dL 11.4*  --  10.6* 11.2*   < > 10.6*   ALT (SGPT) U/L  --   --   --   --   --  22    AST (SGOT) U/L  --   --   --   --   --  37*    < > = values in this interval not displayed.     Estimated Creatinine Clearance: 92.9 mL/min (by C-G formula based on SCr of 0.62 mg/dL).    Microbiology Results Abnormal     None          Imaging Results (Last 24 Hours)     Procedure Component Value Units Date/Time    Fiberoptic Endo (fees) [[id number]] Resulted:  01/17/20 1125     Updated:  01/17/20 1125    Narrative:       This procedure was auto-finalized with no dictation required.          Results for orders placed during the hospital encounter of 01/06/20   Adult Transthoracic Echo Complete W/ Cont if Necessary Per Protocol (With Agitated Saline)    Narrative · There is calcification of the aortic valve.  · Peak velocity of the flow distal to the aortic valve is 217.0 cm/s.  · Estimated EF = 70%.  · Left ventricular systolic function is normal.          I have reviewed the medications:  Scheduled Meds:    amLODIPine 10 mg Oral Q24H   aspirin 81 mg Oral Daily   Or      aspirin 300 mg Rectal Daily   atorvastatin 80 mg Oral Nightly   carvedilol 25 mg Oral BID With Meals   donepezil 5 mg Oral Nightly   heparin (porcine) 5,000 Units Subcutaneous Q8H   insulin regular 0-7 Units Subcutaneous Q6H   ipratropium-albuterol 3 mL Nebulization 4x Daily - RT   levETIRAcetam 500 mg Oral Q12H   lidocaine 1 patch Transdermal Q24H   losartan 100 mg Oral Daily   nicotine 1 patch Transdermal Q24H   risperiDONE 1 mg Nasogastric Daily   risperiDONE 2 mg Nasogastric Nightly   sertraline 150 mg Nasogastric Nightly   traZODone 100 mg Nasogastric Nightly     Continuous Infusions:   PRN Meds:.HYDROcodone-acetaminophen  •  ipratropium-albuterol  •  labetalol  •  magnesium sulfate  •  magnesium sulfate  •  potassium chloride  •  potassium chloride  •  potassium phosphate  •  potassium phosphate  •  risperiDONE  •  traZODone    Assessment/Plan   Assessment & Plan     Active Hospital Problems    Diagnosis  POA   • **Encephalopathy acute  [FreeTextEntry1] : 44 yo female PAF on AC,,hypertrophic cardiomyopathy. In 2/22 in hosp rapid afib converted with IV Cardizem. Pt had stopped her beta..Recently had possible Norovirus and stopped beta again. She was in A fib last visit. Pt was to resume beta and return for rhythm check. \par  4/3//23 We increased the metoprolol to 75mg daily last week  and pt returned today for f/u. She converted in recent past with higher metoprolol dose. She feels well but EKG Shows AFib flutter rate 126 BPM .\par Pt denies chest pain, shortness of breath,  palpitations,dizziness, syncope or near syncope.\par \par \par  [G93.40]  Yes   • R/O AIS  [I63.9]  Yes   • R/O status epilepticus  [G40.901]  Yes   • Hypertension [I10]  Yes   • Pancreatic insufficiency [K86.89]  Yes   • T2DM on metformin  [E11.9]  Yes   • Chronic diarrhea [K52.9]  Yes   • Lumbar stenosis [M48.061]  Yes   • Diabetic neuropathy  [E11.40]  Yes   • Depression [F32.9]  Yes   • Dementia  [F03.90]  Yes   • Smoker [F17.200]  Yes   • Acute respiratory failure  [J96.00]  Yes   • Encephalopathy [G93.40]  Yes      Resolved Hospital Problems   No resolved problems to display.        Brief Hospital Course to date:  Glo Berry is a 64 y.o. female transferred from the outside hospital for mentation    Acute encephalopathy  -May have been secondary to malignant hypertension as blood pressure elevated on admission here in the 180s.  Discussed possible etiologies with neurology Dr. Petersen who felt the patient's presentation may actually represent a PRES-like syndrome  -EEG without epileptic changes, however continuing Keppra for possible underlying new onset seizure  -Mental status improving.  -Continue risperidone at night and additional risperidone as needed as needed     Possible new onset seizures  -Continue Keppra, follow-up with neurology outpatient in 6 weeks    Malignant hypertension  -Resolved    Acute respiratory failure  -Resolved    Dysphagia  -Continued severe dysphagia on speech evaluation today.  Patient says that she would like a PEG tube placed.  I discussed the patient with general surgery Dr. Cerda who will evaluate for a PEG tube with likely placement Monday    Depression  -Zoloft    Dementia  -Continue Aricept    DM 2  -Continue insulin therapy  -A1c 5.6    Tobacco abuse    Hypokalemia  -Replace both potassium and magnesium as needed    Anemia  -Hemoglobin 7.8, normocytic, with normal iron levels but slightly low iron saturation.  B12 and folate levels unremarkable  -Recommend weekly CBC while at rehab    ICA stenosis  -Left ICA stenosis noted on duplex  exam of approximately 50 to 69%, however only 30% stenoses noted on CT Angio of the neck.  -Continue medical therapy      DVT Prophylaxis: heparin    Disposition: I expect the patient to be discharged to rehab/SNF early next week after PEG tube placed    CODE STATUS:   Code Status and Medical Interventions:   Ordered at: 01/06/20 1647     Code Status:    CPR     Medical Interventions (Level of Support Prior to Arrest):    Full         Electronically signed by Jason Busby MD, 01/17/20, 6:49 PM.

## 2024-07-08 NOTE — PROGRESS NOTES
PCP:  Philomena Walters MD Haddad, Housam I, MD  [address] [place] [address]    No chief complaint on file.       HPI   The patient is a 63-year-old female with chronic diarrhea.  She has tried many agents without success.  She has been on Viberzi, Colestid, Imodium and over-the-counter agents.  Imodium helps initially, but then seems to lose its effectiveness.  She has had multiple colonoscopies.  [facility] about 4 years ago[facility]  [facility]   She has a history of an acute colitis but not chronic colitis.  She has never had polyps.  She has lost weight.  She can see no fat or oil in the water.  She does occasionally have very foul-smelling stool.  She has never been a heavy drinker.  She has no family history of colon polyps or cancers.  She has never tried Lotronex.  She cannot recall being checked for celiac disease.  She is post cholecystectomy.  Her diarrhea did get worse after her gallbladder surgery.  She has no family history of colon polyps or cancers to her knowledge.  She has had a significant weight loss.    Allergies   Allergen Reactions   • Codeine           Current Outpatient Medications:   •  amLODIPine (NORVASC) 5 MG tablet, , Disp: , Rfl: 0  •  carvedilol (COREG) 25 MG tablet, , Disp: , Rfl: 0  •  colesevelam (WELCHOL) 625 MG tablet, , Disp: , Rfl: 0  •  cyanocobalamin 1000 MCG/ML injection, inject contents of 1 vial intramuscularly MONTHLY, Disp: , Rfl: 0  •  donepezil (ARICEPT) 5 MG tablet, , Disp: , Rfl: 1  •  fenofibrate 160 MG tablet, , Disp: , Rfl: 0  •  losartan (COZAAR) 100 MG tablet, , Disp: , Rfl: 0  •  metFORMIN (GLUCOPHAGE) 1000 MG tablet, , Disp: , Rfl: 0  •  potassium chloride (K-DUR,KLOR-CON) 20 MEQ CR tablet, , Disp: , Rfl: 0  •  prochlorperazine (COMPAZINE) 10 MG tablet, Take 10 mg by mouth 3 (Three) Times a Day As Needed., Disp: , Rfl: 0  •  sertraline (ZOLOFT) 100 MG tablet, take 1/2 tablet by mouth every morning then 1 tablet every  evening, Disp: , Rfl: 0  •  topiramate (TOPAMAX) 50 MG tablet, , Disp: , Rfl: 0  •  traZODone (DESYREL) 100 MG tablet, , Disp: , Rfl: 1  •  venlafaxine XR (EFFEXOR-XR) 150 MG 24 hr capsule, , Disp: , Rfl: 0     Past Medical History:   Diagnosis Date   • COPD (chronic obstructive pulmonary disease) (CMS/[facility])    • Diabetes mellitus (CMS/[facility])    • History of ear injury, mastoid surgery and subsequent reoperations for some leaking spinal fluid. 1973    Injury of the right eardrum.This has resulted in the headaches   • Hypertension    Hypertriglyceridemia  Depression    Past Surgical History:   Procedure Laterality Date   • BREAST CYST EXCISION     • SINUS SURGERY     • TONSILLECTOMY     • TUBAL ABDOMINAL LIGATION     Total abdominal hysterectomy bilateral salpingo-oophorectomy  Cholecystectomy for stones    Social History     Socioeconomic History   • Marital status:      Spouse name: Not on file   • Number of children: Not on file   • Years of education: Not on file   • Highest education level: Not on file   Social Needs   • Financial resource strain: Not on file   • Food insecurity - worry: Not on file   • Food insecurity - inability: Not on file   • Transportation needs - medical: Not on file   • Transportation needs - non-medical: Not on file   Occupational History   • Not on file   Tobacco Use   • Smoking status: Current Every Day Smoker     Packs/day: 1.00     Types: Cigarettes   • Smokeless tobacco: Never Used   Substance and Sexual Activity   • Alcohol use: No   • Drug use: Not on file   • Sexual activity: Not on file   Other Topics Concern   • Not on file   Social History Narrative   • Not on file        Family History   Problem Relation Age of Onset   • Aortic aneurysm Mother    • Lung disease Father    • Parkinsonism Father    • Heart disease Other         Review of Systems   Constitutional: Negative for unexpected weight loss.   HENT: Negative for trouble swallowing.    Eyes: Negative.    Respiratory:  Negative.    Gastrointestinal: Positive for diarrhea. Negative for abdominal distention, abdominal pain, anal bleeding, blood in stool, constipation, nausea, rectal pain, vomiting, GERD and indigestion.   Endocrine: Negative.    Genitourinary: Negative.    Musculoskeletal: Negative.    Skin: Negative.    Allergic/Immunologic: Negative.    Neurological: Negative.    Hematological: Negative.    Psychiatric/Behavioral: Negative.         Vitals:    02/21/19 1445   BP: 147/66   Pulse: 72        Physical Exam   General Appearance: Alert, in no acute distress   Head: Normocephalic, without obvious abnormality, atraumatic   Eyes: Lids and lashes normal, conjunctivae and sclerae normal, no icterus, no pallor, corneas clear, PERRLA   Ears: Ears appear intact with no abnormalities noted   Throat: No oral lesions, no thrush, oral mucosa moist   Neck: No adenopathy, supple, trachea midline, no thyromegaly, no JVD   Lungs: Clear to auscultation,respirations regular, even and unlabored Heart: Regular rhythm and normal rate, normal S1 and S2, no murmur, no gallop, no rub, no click   Chest Wall: Symmetrical respiratory expansion   Abdomen: Normal bowel sounds, no masses, no organomegaly, soft non-tender, non-distended, no guarding, no rebound tenderness   Extremities: Moves all extremities well, no edema, no cyanosis, no redness   Skin: No bleeding, bruising or rash   Neurologic: Cranial nerves 2 - 12 grossly intact, no focal deficits       Diagnoses and all orders for this visit:    Diarrhea, unspecified type  -     Vitamin B12  -     Basic Metabolic Panel  -     Celiac Comprehensive Panel; Future  -     Fecal Fat, Qualitative - Stool, Per Rectum; Future    Impressions and plan #1 diarrhea: She has chronic diarrhea.  It certainly is worse since her gallbladder surgery.  I would like to check a celiac panel.  She has probably had this checked in the past.  I am going to check some routine blood work including a B12 level.  Crohn's  disease can sometimes cause low B12.  I am going to check stool for fecal fat.  Weight loss and diarrhea always brings about the possibility of malabsorption.  She does not give a classic history of seeing oil in the water.  I am going to try her on some Creon empirically.  It is unlikely to hurt anything.  She will take 1-2 with meals.  I gave her samples.  We will plan a colonoscopy.  I will look for microscopic colitis, Crohn's disease or ulcerative colitis as well.  We may ultimately try Lotronex if everything is negative.  It does not appear that she has tried that.  The diarrhea is significantly affecting her life.    Adan Wright MD    Acne Type: Comedonal Lesions Prep Text (Optional): Prior to removal the treatment areas were prepped in the usual fashion. Consent was obtained and risks were reviewed including but not limited to scarring, infection, bleeding, scabbing, incomplete removal, and allergy to anesthesia. Post-Care Instructions: I reviewed with the patient in detail post-care instructions. Patient is to keep the treatment areas dry overnight, and then apply bacitracin twice daily until healed. Patient may apply hydrogen peroxide soaks to remove any crusting. Extraction Method: 11 blade and comedo extractor Render Post-Care Instructions In Note?: yes Detail Level: Detailed Render Number Of Lesions Treated: no

## (undated) DEVICE — TUBING, SUCTION, 1/4" X 10', STRAIGHT: Brand: MEDLINE

## (undated) DEVICE — ENDOGATOR HYBRID TUBING KIT FOR USE WITH ENDOGATOR IRRIGATION PUMP, OLYMPUS PUMP, GI4000 ESU, AND TORRENT IRRIGATION PUMP.: Brand: ENDOGATOR KIT

## (undated) DEVICE — TUBING,OXYGEN,CRUSH RES,7',CLEAR,UC: Brand: MEDLINE INDUSTRIES, INC.

## (undated) DEVICE — KT BIOGUARD SXN VLV AIR/H20 4PC DISP

## (undated) DEVICE — INTRO ACCSR BLNT TP

## (undated) DEVICE — DRAINBAG,ANTI-REFLUX TOWER,L/F,2000ML,LL: Brand: MEDLINE

## (undated) DEVICE — CONTN GRAD MEAS TRIANG 32OZ BLK

## (undated) DEVICE — "MH-438 A/W VLVE F/140 EVIS-140": Brand: AIR/WATER VALVE

## (undated) DEVICE — PERCUTANEOUS ENDOSCOPIC GASTROSTOMY KIT: Brand: ENDOVIVE SAFETY PEG KIT

## (undated) DEVICE — THE BITE BLOCK MAXI, LATEX FREE STRAP IS USED TO PROTECT THE ENDOSCOPE INSERTION TUBE FROM BEING BITTEN BY THE PATIENT.

## (undated) DEVICE — SYR LUERLOK 50ML

## (undated) DEVICE — LIMB HOLDER, WRIST/ANKLE: Brand: DEROYAL

## (undated) DEVICE — Device: Brand: AIR/WATER CHANNEL CLEANING ADAPTER

## (undated) DEVICE — "MH-443 SUCTION VALVE F/EVIS140 EVIS160": Brand: SUCTION VALVE